# Patient Record
Sex: MALE | Race: WHITE | NOT HISPANIC OR LATINO | Employment: OTHER | URBAN - METROPOLITAN AREA
[De-identification: names, ages, dates, MRNs, and addresses within clinical notes are randomized per-mention and may not be internally consistent; named-entity substitution may affect disease eponyms.]

---

## 2017-01-05 ENCOUNTER — AMBULATORY - HEALTHEAST (OUTPATIENT)
Dept: NURSING | Facility: CLINIC | Age: 77
End: 2017-01-05

## 2017-01-13 ENCOUNTER — COMMUNICATION - HEALTHEAST (OUTPATIENT)
Dept: INTERNAL MEDICINE | Facility: CLINIC | Age: 77
End: 2017-01-13

## 2017-01-13 DIAGNOSIS — E78.00 HYPERCHOLESTEREMIA: ICD-10-CM

## 2017-01-19 ENCOUNTER — COMMUNICATION - HEALTHEAST (OUTPATIENT)
Dept: NURSING | Facility: CLINIC | Age: 77
End: 2017-01-19

## 2017-01-24 ENCOUNTER — AMBULATORY - HEALTHEAST (OUTPATIENT)
Dept: LAB | Facility: CLINIC | Age: 77
End: 2017-01-24

## 2017-01-24 ENCOUNTER — COMMUNICATION - HEALTHEAST (OUTPATIENT)
Dept: NURSING | Facility: CLINIC | Age: 77
End: 2017-01-24

## 2017-01-24 DIAGNOSIS — I48.0 PAROXYSMAL ATRIAL FIBRILLATION (H): ICD-10-CM

## 2017-02-08 ENCOUNTER — COMMUNICATION - HEALTHEAST (OUTPATIENT)
Dept: NURSING | Facility: CLINIC | Age: 77
End: 2017-02-08

## 2017-02-08 DIAGNOSIS — I48.91 ATRIAL FIBRILLATION (H): ICD-10-CM

## 2017-03-14 ENCOUNTER — COMMUNICATION - HEALTHEAST (OUTPATIENT)
Dept: NURSING | Facility: CLINIC | Age: 77
End: 2017-03-14

## 2017-03-21 ENCOUNTER — COMMUNICATION - HEALTHEAST (OUTPATIENT)
Dept: NURSING | Facility: CLINIC | Age: 77
End: 2017-03-21

## 2017-03-21 ENCOUNTER — AMBULATORY - HEALTHEAST (OUTPATIENT)
Dept: LAB | Facility: CLINIC | Age: 77
End: 2017-03-21

## 2017-03-21 DIAGNOSIS — I48.91 ATRIAL FIBRILLATION (H): ICD-10-CM

## 2017-04-19 ENCOUNTER — OFFICE VISIT - HEALTHEAST (OUTPATIENT)
Dept: FAMILY MEDICINE | Facility: CLINIC | Age: 77
End: 2017-04-19

## 2017-04-19 DIAGNOSIS — E55.9 VITAMIN D DEFICIENCY: ICD-10-CM

## 2017-04-19 DIAGNOSIS — I25.10 CORONARY ARTERY DISEASE: ICD-10-CM

## 2017-04-19 DIAGNOSIS — E78.00 HYPERCHOLESTEREMIA: ICD-10-CM

## 2017-04-20 ENCOUNTER — COMMUNICATION - HEALTHEAST (OUTPATIENT)
Dept: FAMILY MEDICINE | Facility: CLINIC | Age: 77
End: 2017-04-20

## 2017-04-25 ENCOUNTER — COMMUNICATION - HEALTHEAST (OUTPATIENT)
Dept: NURSING | Facility: CLINIC | Age: 77
End: 2017-04-25

## 2017-04-25 ENCOUNTER — AMBULATORY - HEALTHEAST (OUTPATIENT)
Dept: LAB | Facility: CLINIC | Age: 77
End: 2017-04-25

## 2017-04-25 DIAGNOSIS — I48.91 ATRIAL FIBRILLATION (H): ICD-10-CM

## 2017-05-17 ENCOUNTER — COMMUNICATION - HEALTHEAST (OUTPATIENT)
Dept: NURSING | Facility: CLINIC | Age: 77
End: 2017-05-17

## 2017-05-26 ENCOUNTER — COMMUNICATION - HEALTHEAST (OUTPATIENT)
Dept: INTERNAL MEDICINE | Facility: CLINIC | Age: 77
End: 2017-05-26

## 2017-05-26 DIAGNOSIS — Z79.01 LONG TERM (CURRENT) USE OF ANTICOAGULANTS: ICD-10-CM

## 2017-05-26 DIAGNOSIS — G45.9 TRANSIENT CEREBRAL ISCHEMIA: ICD-10-CM

## 2017-05-26 DIAGNOSIS — I48.91 ATRIAL FIBRILLATION (H): ICD-10-CM

## 2017-06-05 ENCOUNTER — COMMUNICATION - HEALTHEAST (OUTPATIENT)
Dept: CARDIOLOGY | Facility: CLINIC | Age: 77
End: 2017-06-05

## 2017-06-05 DIAGNOSIS — I42.9 CARDIOMYOPATHY (H): ICD-10-CM

## 2017-06-13 ENCOUNTER — COMMUNICATION - HEALTHEAST (OUTPATIENT)
Dept: NURSING | Facility: CLINIC | Age: 77
End: 2017-06-13

## 2017-06-23 ENCOUNTER — AMBULATORY - HEALTHEAST (OUTPATIENT)
Dept: LAB | Facility: CLINIC | Age: 77
End: 2017-06-23

## 2017-06-23 ENCOUNTER — COMMUNICATION - HEALTHEAST (OUTPATIENT)
Dept: NURSING | Facility: CLINIC | Age: 77
End: 2017-06-23

## 2017-06-23 DIAGNOSIS — I48.91 ATRIAL FIBRILLATION (H): ICD-10-CM

## 2017-06-30 ENCOUNTER — OFFICE VISIT - HEALTHEAST (OUTPATIENT)
Dept: CARDIOLOGY | Facility: CLINIC | Age: 77
End: 2017-06-30

## 2017-06-30 DIAGNOSIS — I48.0 PAROXYSMAL ATRIAL FIBRILLATION (H): ICD-10-CM

## 2017-06-30 ASSESSMENT — MIFFLIN-ST. JEOR: SCORE: 1920.54

## 2017-07-24 ENCOUNTER — COMMUNICATION - HEALTHEAST (OUTPATIENT)
Dept: CARDIOLOGY | Facility: CLINIC | Age: 77
End: 2017-07-24

## 2017-07-24 DIAGNOSIS — I48.0 PAF (PAROXYSMAL ATRIAL FIBRILLATION) (H): ICD-10-CM

## 2017-07-24 DIAGNOSIS — R09.89 PULSE IRREGULARITY: ICD-10-CM

## 2017-07-25 ENCOUNTER — COMMUNICATION - HEALTHEAST (OUTPATIENT)
Dept: FAMILY MEDICINE | Facility: CLINIC | Age: 77
End: 2017-07-25

## 2017-07-25 ENCOUNTER — AMBULATORY - HEALTHEAST (OUTPATIENT)
Dept: CARDIOLOGY | Facility: CLINIC | Age: 77
End: 2017-07-25

## 2017-07-25 DIAGNOSIS — R09.89 PULSE IRREGULARITY: ICD-10-CM

## 2017-07-25 DIAGNOSIS — I48.0 PAF (PAROXYSMAL ATRIAL FIBRILLATION) (H): ICD-10-CM

## 2017-07-25 DIAGNOSIS — I48.0 PAROXYSMAL ATRIAL FIBRILLATION (H): ICD-10-CM

## 2017-07-25 LAB
ATRIAL RATE - MUSE: NORMAL BPM
DIASTOLIC BLOOD PRESSURE - MUSE: NORMAL MMHG
INTERPRETATION ECG - MUSE: NORMAL
P AXIS - MUSE: NORMAL DEGREES
PR INTERVAL - MUSE: NORMAL MS
QRS DURATION - MUSE: 82 MS
QT - MUSE: 340 MS
QTC - MUSE: 420 MS
R AXIS - MUSE: 40 DEGREES
SYSTOLIC BLOOD PRESSURE - MUSE: NORMAL MMHG
T AXIS - MUSE: 29 DEGREES
VENTRICULAR RATE- MUSE: 92 BPM

## 2017-07-25 ASSESSMENT — MIFFLIN-ST. JEOR: SCORE: 1908.74

## 2017-07-27 ENCOUNTER — AMBULATORY - HEALTHEAST (OUTPATIENT)
Dept: LAB | Facility: CLINIC | Age: 77
End: 2017-07-27

## 2017-07-27 ENCOUNTER — COMMUNICATION - HEALTHEAST (OUTPATIENT)
Dept: NURSING | Facility: CLINIC | Age: 77
End: 2017-07-27

## 2017-07-27 DIAGNOSIS — I48.91 ATRIAL FIBRILLATION (H): ICD-10-CM

## 2017-08-03 ENCOUNTER — COMMUNICATION - HEALTHEAST (OUTPATIENT)
Dept: NURSING | Facility: CLINIC | Age: 77
End: 2017-08-03

## 2017-08-03 ENCOUNTER — AMBULATORY - HEALTHEAST (OUTPATIENT)
Dept: LAB | Facility: CLINIC | Age: 77
End: 2017-08-03

## 2017-08-03 DIAGNOSIS — I48.91 ATRIAL FIBRILLATION (H): ICD-10-CM

## 2017-08-10 ENCOUNTER — COMMUNICATION - HEALTHEAST (OUTPATIENT)
Dept: NURSING | Facility: CLINIC | Age: 77
End: 2017-08-10

## 2017-08-10 ENCOUNTER — AMBULATORY - HEALTHEAST (OUTPATIENT)
Dept: LAB | Facility: CLINIC | Age: 77
End: 2017-08-10

## 2017-08-10 DIAGNOSIS — I48.91 ATRIAL FIBRILLATION (H): ICD-10-CM

## 2017-08-17 ENCOUNTER — COMMUNICATION - HEALTHEAST (OUTPATIENT)
Dept: NURSING | Facility: CLINIC | Age: 77
End: 2017-08-17

## 2017-08-17 ENCOUNTER — AMBULATORY - HEALTHEAST (OUTPATIENT)
Dept: LAB | Facility: CLINIC | Age: 77
End: 2017-08-17

## 2017-08-17 DIAGNOSIS — I48.91 ATRIAL FIBRILLATION (H): ICD-10-CM

## 2017-08-18 ENCOUNTER — OFFICE VISIT - HEALTHEAST (OUTPATIENT)
Dept: CARDIOLOGY | Facility: CLINIC | Age: 77
End: 2017-08-18

## 2017-08-18 DIAGNOSIS — I48.19 PERSISTENT ATRIAL FIBRILLATION (H): ICD-10-CM

## 2017-08-18 DIAGNOSIS — I42.9 CARDIOMYOPATHY (H): ICD-10-CM

## 2017-08-18 ASSESSMENT — MIFFLIN-ST. JEOR: SCORE: 1913.51

## 2017-08-21 ENCOUNTER — COMMUNICATION - HEALTHEAST (OUTPATIENT)
Dept: NURSING | Facility: CLINIC | Age: 77
End: 2017-08-21

## 2017-08-28 ENCOUNTER — COMMUNICATION - HEALTHEAST (OUTPATIENT)
Dept: INTERNAL MEDICINE | Facility: CLINIC | Age: 77
End: 2017-08-28

## 2017-08-28 DIAGNOSIS — I48.91 ATRIAL FIBRILLATION (H): ICD-10-CM

## 2017-08-28 DIAGNOSIS — G45.9 TRANSIENT CEREBRAL ISCHEMIA: ICD-10-CM

## 2017-08-28 DIAGNOSIS — Z79.01 LONG TERM (CURRENT) USE OF ANTICOAGULANTS: ICD-10-CM

## 2017-08-29 ENCOUNTER — COMMUNICATION - HEALTHEAST (OUTPATIENT)
Dept: CARDIOLOGY | Facility: CLINIC | Age: 77
End: 2017-08-29

## 2017-09-06 ENCOUNTER — HOSPITAL ENCOUNTER (OUTPATIENT)
Dept: CARDIOLOGY | Facility: HOSPITAL | Age: 77
Discharge: HOME | End: 2017-09-06
Attending: NURSE PRACTITIONER

## 2017-09-06 DIAGNOSIS — I48.19 PERSISTENT ATRIAL FIBRILLATION (H): ICD-10-CM

## 2017-09-08 ENCOUNTER — COMMUNICATION - HEALTHEAST (OUTPATIENT)
Dept: NURSING | Facility: CLINIC | Age: 77
End: 2017-09-08

## 2017-09-11 ENCOUNTER — COMMUNICATION - HEALTHEAST (OUTPATIENT)
Dept: CARDIOLOGY | Facility: CLINIC | Age: 77
End: 2017-09-11

## 2017-09-11 DIAGNOSIS — I48.19 PERSISTENT ATRIAL FIBRILLATION (H): ICD-10-CM

## 2017-09-25 ENCOUNTER — COMMUNICATION - HEALTHEAST (OUTPATIENT)
Dept: NURSING | Facility: CLINIC | Age: 77
End: 2017-09-25

## 2017-09-25 ENCOUNTER — AMBULATORY - HEALTHEAST (OUTPATIENT)
Dept: LAB | Facility: CLINIC | Age: 77
End: 2017-09-25

## 2017-09-25 DIAGNOSIS — I48.91 ATRIAL FIBRILLATION (H): ICD-10-CM

## 2017-10-12 ENCOUNTER — AMBULATORY - HEALTHEAST (OUTPATIENT)
Dept: LAB | Facility: CLINIC | Age: 77
End: 2017-10-12

## 2017-10-12 ENCOUNTER — COMMUNICATION - HEALTHEAST (OUTPATIENT)
Dept: NURSING | Facility: CLINIC | Age: 77
End: 2017-10-12

## 2017-10-12 DIAGNOSIS — I48.91 ATRIAL FIBRILLATION (H): ICD-10-CM

## 2017-11-10 ENCOUNTER — OFFICE VISIT - HEALTHEAST (OUTPATIENT)
Dept: FAMILY MEDICINE | Facility: CLINIC | Age: 77
End: 2017-11-10

## 2017-11-10 ENCOUNTER — COMMUNICATION - HEALTHEAST (OUTPATIENT)
Dept: NURSING | Facility: CLINIC | Age: 77
End: 2017-11-10

## 2017-11-10 DIAGNOSIS — I48.91 ATRIAL FIBRILLATION (H): ICD-10-CM

## 2017-11-10 DIAGNOSIS — M25.512 ACUTE PAIN OF LEFT SHOULDER: ICD-10-CM

## 2017-11-10 DIAGNOSIS — Z23 NEED FOR VACCINATION: ICD-10-CM

## 2017-11-19 ENCOUNTER — COMMUNICATION - HEALTHEAST (OUTPATIENT)
Dept: INTERNAL MEDICINE | Facility: CLINIC | Age: 77
End: 2017-11-19

## 2017-11-19 DIAGNOSIS — Z79.01 LONG TERM CURRENT USE OF ANTICOAGULANT THERAPY: ICD-10-CM

## 2017-11-19 DIAGNOSIS — I48.91 ATRIAL FIBRILLATION (H): ICD-10-CM

## 2017-11-19 DIAGNOSIS — I48.19 PERSISTENT ATRIAL FIBRILLATION (H): ICD-10-CM

## 2017-11-19 DIAGNOSIS — G45.9 TRANSIENT CEREBRAL ISCHEMIA: ICD-10-CM

## 2017-12-05 ENCOUNTER — AMBULATORY - HEALTHEAST (OUTPATIENT)
Dept: LAB | Facility: CLINIC | Age: 77
End: 2017-12-05

## 2017-12-05 ENCOUNTER — COMMUNICATION - HEALTHEAST (OUTPATIENT)
Dept: NURSING | Facility: CLINIC | Age: 77
End: 2017-12-05

## 2017-12-05 DIAGNOSIS — I48.91 ATRIAL FIBRILLATION (H): ICD-10-CM

## 2017-12-21 ENCOUNTER — AMBULATORY - HEALTHEAST (OUTPATIENT)
Dept: LAB | Facility: CLINIC | Age: 77
End: 2017-12-21

## 2017-12-21 ENCOUNTER — COMMUNICATION - HEALTHEAST (OUTPATIENT)
Dept: NURSING | Facility: CLINIC | Age: 77
End: 2017-12-21

## 2017-12-21 DIAGNOSIS — I48.91 ATRIAL FIBRILLATION (H): ICD-10-CM

## 2018-01-11 ENCOUNTER — COMMUNICATION - HEALTHEAST (OUTPATIENT)
Dept: NURSING | Facility: CLINIC | Age: 78
End: 2018-01-11

## 2018-01-11 DIAGNOSIS — I48.19 PERSISTENT ATRIAL FIBRILLATION (H): ICD-10-CM

## 2018-01-18 ENCOUNTER — COMMUNICATION - HEALTHEAST (OUTPATIENT)
Dept: NURSING | Facility: CLINIC | Age: 78
End: 2018-01-18

## 2018-01-18 ENCOUNTER — AMBULATORY - HEALTHEAST (OUTPATIENT)
Dept: LAB | Facility: CLINIC | Age: 78
End: 2018-01-18

## 2018-01-18 DIAGNOSIS — I48.19 PERSISTENT ATRIAL FIBRILLATION (H): ICD-10-CM

## 2018-01-18 LAB — INR PPP: 1.8 (ref 0.9–1.1)

## 2018-01-29 ENCOUNTER — COMMUNICATION - HEALTHEAST (OUTPATIENT)
Dept: FAMILY MEDICINE | Facility: CLINIC | Age: 78
End: 2018-01-29

## 2018-02-01 ENCOUNTER — COMMUNICATION - HEALTHEAST (OUTPATIENT)
Dept: NURSING | Facility: CLINIC | Age: 78
End: 2018-02-01

## 2018-02-01 ENCOUNTER — AMBULATORY - HEALTHEAST (OUTPATIENT)
Dept: LAB | Facility: CLINIC | Age: 78
End: 2018-02-01

## 2018-02-01 DIAGNOSIS — I48.19 PERSISTENT ATRIAL FIBRILLATION (H): ICD-10-CM

## 2018-02-01 LAB — INR PPP: 1.6 (ref 0.9–1.1)

## 2018-02-12 ENCOUNTER — COMMUNICATION - HEALTHEAST (OUTPATIENT)
Dept: CARDIOLOGY | Facility: CLINIC | Age: 78
End: 2018-02-12

## 2018-02-12 DIAGNOSIS — I48.91 A-FIB (H): ICD-10-CM

## 2018-02-15 ENCOUNTER — HOSPITAL ENCOUNTER (OUTPATIENT)
Dept: CARDIOLOGY | Facility: HOSPITAL | Age: 78
Discharge: HOME | End: 2018-02-15
Attending: INTERNAL MEDICINE

## 2018-02-15 ENCOUNTER — COMMUNICATION - HEALTHEAST (OUTPATIENT)
Dept: INTERNAL MEDICINE | Facility: CLINIC | Age: 78
End: 2018-02-15

## 2018-02-15 ENCOUNTER — AMBULATORY - HEALTHEAST (OUTPATIENT)
Dept: LAB | Facility: CLINIC | Age: 78
End: 2018-02-15

## 2018-02-15 DIAGNOSIS — I48.19 PERSISTENT ATRIAL FIBRILLATION (H): ICD-10-CM

## 2018-02-15 DIAGNOSIS — I48.91 A-FIB (H): ICD-10-CM

## 2018-02-15 LAB — INR PPP: 2.9 (ref 0.9–1.1)

## 2018-02-17 ENCOUNTER — COMMUNICATION - HEALTHEAST (OUTPATIENT)
Dept: INTERNAL MEDICINE | Facility: CLINIC | Age: 78
End: 2018-02-17

## 2018-02-17 DIAGNOSIS — G45.9 TRANSIENT CEREBRAL ISCHEMIA: ICD-10-CM

## 2018-02-17 DIAGNOSIS — Z79.01 LONG TERM CURRENT USE OF ANTICOAGULANT THERAPY: ICD-10-CM

## 2018-02-17 DIAGNOSIS — E78.00 HYPERCHOLESTEREMIA: ICD-10-CM

## 2018-02-19 ENCOUNTER — AMBULATORY - HEALTHEAST (OUTPATIENT)
Dept: CARDIOLOGY | Facility: CLINIC | Age: 78
End: 2018-02-19

## 2018-02-19 DIAGNOSIS — I48.19 PERSISTENT ATRIAL FIBRILLATION (H): ICD-10-CM

## 2018-03-01 ENCOUNTER — AMBULATORY - HEALTHEAST (OUTPATIENT)
Dept: LAB | Facility: CLINIC | Age: 78
End: 2018-03-01

## 2018-03-01 ENCOUNTER — COMMUNICATION - HEALTHEAST (OUTPATIENT)
Dept: NURSING | Facility: CLINIC | Age: 78
End: 2018-03-01

## 2018-03-01 DIAGNOSIS — I48.19 PERSISTENT ATRIAL FIBRILLATION (H): ICD-10-CM

## 2018-03-01 LAB — INR PPP: 2.5 (ref 0.9–1.1)

## 2018-03-09 ENCOUNTER — COMMUNICATION - HEALTHEAST (OUTPATIENT)
Dept: NURSING | Facility: CLINIC | Age: 78
End: 2018-03-09

## 2018-03-09 ENCOUNTER — AMBULATORY - HEALTHEAST (OUTPATIENT)
Dept: LAB | Facility: CLINIC | Age: 78
End: 2018-03-09

## 2018-03-09 ENCOUNTER — COMMUNICATION - HEALTHEAST (OUTPATIENT)
Dept: CARDIOLOGY | Facility: CLINIC | Age: 78
End: 2018-03-09

## 2018-03-09 DIAGNOSIS — I48.19 PERSISTENT ATRIAL FIBRILLATION (H): ICD-10-CM

## 2018-03-09 LAB — INR PPP: 2.9 (ref 0.9–1.1)

## 2018-03-13 ENCOUNTER — OFFICE VISIT - HEALTHEAST (OUTPATIENT)
Dept: CARDIOLOGY | Facility: CLINIC | Age: 78
End: 2018-03-13

## 2018-03-13 DIAGNOSIS — I48.19 PERSISTENT ATRIAL FIBRILLATION (H): ICD-10-CM

## 2018-03-13 ASSESSMENT — MIFFLIN-ST. JEOR: SCORE: 1928.02

## 2018-03-22 ENCOUNTER — HOSPITAL ENCOUNTER (OUTPATIENT)
Dept: CARDIOLOGY | Facility: HOSPITAL | Age: 78
Discharge: HOME | End: 2018-03-22
Attending: NURSE PRACTITIONER

## 2018-03-22 DIAGNOSIS — I48.19 PERSISTENT ATRIAL FIBRILLATION (H): ICD-10-CM

## 2018-03-22 LAB
AORTIC ROOT: 4.5 CM
AORTIC VALVE MEAN VELOCITY: 80.7 CM/S
AV DIMENSIONLESS INDEX VTI: 0.8
AV MEAN GRADIENT: 3 MMHG
AV PEAK GRADIENT: 4 MMHG
AV VALVE AREA: 3 CM2
AV VELOCITY RATIO: 0.8
BSA FOR ECHO PROCEDURE: 2.45 M2
CV BLOOD PRESSURE: NORMAL MMHG
CV ECHO HEIGHT: 71.5 IN
CV ECHO WEIGHT: 263 LBS
DOP CALC AO PEAK VEL: 99.9 CM/S
DOP CALC AO VTI: 20.2 CM
DOP CALC LVOT AREA: 3.8 CM2
DOP CALC LVOT DIAMETER: 2.2 CM
DOP CALC LVOT PEAK VEL: 82 CM/S
DOP CALC LVOT STROKE VOLUME: 61.2 CM3
DOP CALCLVOT PEAK VEL VTI: 16.1 CM
EJECTION FRACTION: 58 % (ref 55–75)
FRACTIONAL SHORTENING: 15.6 % (ref 28–44)
INTERVENTRICULAR SEPTUM IN END DIASTOLE: 1.1 CM (ref 0.6–1)
IVS/PW RATIO: 1.1
LA AREA 1: 23.2 CM2
LA AREA 2: 26.4 CM2
LEFT ATRIUM LENGTH: 5.58 CM
LEFT ATRIUM SIZE: 4.3 CM
LEFT ATRIUM VOLUME INDEX: 38.1 ML/M2
LEFT ATRIUM VOLUME: 93.3 ML
LEFT VENTRICLE CARDIAC INDEX: 2.2 L/MIN/M2
LEFT VENTRICLE CARDIAC OUTPUT: 5.5 L/MIN
LEFT VENTRICLE DIASTOLIC VOLUME INDEX: 49.4 CM3/M2 (ref 34–74)
LEFT VENTRICLE DIASTOLIC VOLUME: 121 CM3 (ref 62–150)
LEFT VENTRICLE HEART RATE: 90 BPM
LEFT VENTRICLE MASS INDEX: 66.9 G/M2
LEFT VENTRICLE SYSTOLIC VOLUME INDEX: 20.8 CM3/M2 (ref 11–31)
LEFT VENTRICLE SYSTOLIC VOLUME: 51 CM3 (ref 21–61)
LEFT VENTRICULAR INTERNAL DIMENSION IN DIASTOLE: 4.5 CM (ref 4.2–5.8)
LEFT VENTRICULAR INTERNAL DIMENSION IN SYSTOLE: 3.8 CM (ref 2.5–4)
LEFT VENTRICULAR MASS: 164 G
LEFT VENTRICULAR OUTFLOW TRACT MEAN GRADIENT: 2 MMHG
LEFT VENTRICULAR OUTFLOW TRACT MEAN VELOCITY: 64.5 CM/S
LEFT VENTRICULAR POSTERIOR WALL IN END DIASTOLE: 1 CM (ref 0.6–1)
LV STROKE VOLUME INDEX: 25 ML/M2
MV AVERAGE E/E' RATIO: 15.1 CM/S
MV DECELERATION TIME: 148 MS
MV E'TISSUE VEL-LAT: 7.8 CM/S
MV E'TISSUE VEL-MED: 6.92 CM/S
MV LATERAL E/E' RATIO: 14.2
MV MEDIAL E/E' RATIO: 16
MV PEAK E VELOCITY: 111 CM/S
NUC REST DIASTOLIC VOLUME INDEX: 4208 LBS
NUC REST SYSTOLIC VOLUME INDEX: 71.5 IN
TRICUSPID REGURGITATION PEAK PRESSURE GRADIENT: 21.3 MMHG
TRICUSPID VALVE ANULAR PLANE SYSTOLIC EXCURSION: 1.2 CM
TRICUSPID VALVE PEAK REGURGITANT VELOCITY: 231 CM/S

## 2018-03-22 ASSESSMENT — MIFFLIN-ST. JEOR: SCORE: 1928.02

## 2018-04-02 ENCOUNTER — COMMUNICATION - HEALTHEAST (OUTPATIENT)
Dept: FAMILY MEDICINE | Facility: CLINIC | Age: 78
End: 2018-04-02

## 2018-04-04 ENCOUNTER — COMMUNICATION - HEALTHEAST (OUTPATIENT)
Dept: ANTICOAGULATION | Facility: CLINIC | Age: 78
End: 2018-04-04

## 2018-04-04 ENCOUNTER — OFFICE VISIT - HEALTHEAST (OUTPATIENT)
Dept: UROLOGY | Facility: CLINIC | Age: 78
End: 2018-04-04

## 2018-04-04 DIAGNOSIS — N20.9 URINARY TRACT STONES: ICD-10-CM

## 2018-04-04 DIAGNOSIS — N20.1 CALCULUS OF URETER: ICD-10-CM

## 2018-04-04 DIAGNOSIS — N13.2 HYDRONEPHROSIS WITH URINARY OBSTRUCTION DUE TO URETERAL CALCULUS: ICD-10-CM

## 2018-04-04 DIAGNOSIS — N20.0 CALCULUS OF KIDNEY: ICD-10-CM

## 2018-04-04 LAB
ALBUMIN UR-MCNC: ABNORMAL MG/DL
APPEARANCE UR: ABNORMAL
BILIRUB UR QL STRIP: NEGATIVE
COLOR UR AUTO: ABNORMAL
GLUCOSE UR STRIP-MCNC: NEGATIVE MG/DL
HGB UR QL STRIP: ABNORMAL
KETONES UR STRIP-MCNC: NEGATIVE MG/DL
LEUKOCYTE ESTERASE UR QL STRIP: NEGATIVE
NITRATE UR QL: NEGATIVE
PH UR STRIP: 5 [PH] (ref 5–8)
SP GR UR STRIP: 1.02 (ref 1–1.03)
UROBILINOGEN UR STRIP-ACNC: ABNORMAL

## 2018-04-04 ASSESSMENT — MIFFLIN-ST. JEOR: SCORE: 1468.76

## 2018-04-05 ENCOUNTER — COMMUNICATION - HEALTHEAST (OUTPATIENT)
Dept: SCHEDULING | Facility: CLINIC | Age: 78
End: 2018-04-05

## 2018-04-05 ENCOUNTER — COMMUNICATION - HEALTHEAST (OUTPATIENT)
Dept: UROLOGY | Facility: CLINIC | Age: 78
End: 2018-04-05

## 2018-04-06 ENCOUNTER — COMMUNICATION - HEALTHEAST (OUTPATIENT)
Dept: FAMILY MEDICINE | Facility: CLINIC | Age: 78
End: 2018-04-06

## 2018-04-09 ENCOUNTER — COMMUNICATION - HEALTHEAST (OUTPATIENT)
Dept: ANTICOAGULATION | Facility: CLINIC | Age: 78
End: 2018-04-09

## 2018-04-09 ENCOUNTER — AMBULATORY - HEALTHEAST (OUTPATIENT)
Dept: LAB | Facility: CLINIC | Age: 78
End: 2018-04-09

## 2018-04-09 DIAGNOSIS — I48.19 PERSISTENT ATRIAL FIBRILLATION (H): ICD-10-CM

## 2018-04-09 LAB — INR PPP: 3.2 (ref 0.9–1.1)

## 2018-04-10 ENCOUNTER — COMMUNICATION - HEALTHEAST (OUTPATIENT)
Dept: UROLOGY | Facility: CLINIC | Age: 78
End: 2018-04-10

## 2018-04-10 DIAGNOSIS — N20.1 CALCULUS OF URETER: ICD-10-CM

## 2018-04-17 ENCOUNTER — OFFICE VISIT - HEALTHEAST (OUTPATIENT)
Dept: UROLOGY | Facility: CLINIC | Age: 78
End: 2018-04-17

## 2018-04-17 ENCOUNTER — HOSPITAL ENCOUNTER (OUTPATIENT)
Dept: CT IMAGING | Facility: CLINIC | Age: 78
Discharge: HOME | End: 2018-04-17
Attending: PHYSICIAN ASSISTANT

## 2018-04-17 DIAGNOSIS — N13.2 HYDRONEPHROSIS WITH URINARY OBSTRUCTION DUE TO URETERAL CALCULUS: ICD-10-CM

## 2018-04-17 DIAGNOSIS — N20.0 CALCULUS OF KIDNEY: ICD-10-CM

## 2018-04-17 DIAGNOSIS — N20.1 CALCULUS OF URETER: ICD-10-CM

## 2018-04-17 DIAGNOSIS — N20.9 URINARY TRACT STONES: ICD-10-CM

## 2018-04-17 LAB
ALBUMIN UR-MCNC: NEGATIVE MG/DL
APPEARANCE UR: CLEAR
BILIRUB UR QL STRIP: ABNORMAL
COLOR UR AUTO: YELLOW
GLUCOSE UR STRIP-MCNC: NEGATIVE MG/DL
HGB UR QL STRIP: ABNORMAL
KETONES UR STRIP-MCNC: ABNORMAL MG/DL
LEUKOCYTE ESTERASE UR QL STRIP: NEGATIVE
NITRATE UR QL: NEGATIVE
PH UR STRIP: 5 [PH] (ref 5–8)
SP GR UR STRIP: >=1.03 (ref 1–1.03)
UROBILINOGEN UR STRIP-ACNC: ABNORMAL

## 2018-04-17 ASSESSMENT — MIFFLIN-ST. JEOR: SCORE: 1922.35

## 2018-04-19 ENCOUNTER — COMMUNICATION - HEALTHEAST (OUTPATIENT)
Dept: TELEHEALTH | Facility: CLINIC | Age: 78
End: 2018-04-19

## 2018-04-23 ENCOUNTER — COMMUNICATION - HEALTHEAST (OUTPATIENT)
Dept: ANTICOAGULATION | Facility: CLINIC | Age: 78
End: 2018-04-23

## 2018-04-23 ENCOUNTER — AMBULATORY - HEALTHEAST (OUTPATIENT)
Dept: LAB | Facility: CLINIC | Age: 78
End: 2018-04-23

## 2018-04-23 DIAGNOSIS — I48.19 PERSISTENT ATRIAL FIBRILLATION (H): ICD-10-CM

## 2018-04-23 LAB — INR PPP: 4.7 (ref 0.9–1.1)

## 2018-04-30 ENCOUNTER — AMBULATORY - HEALTHEAST (OUTPATIENT)
Dept: CARDIOLOGY | Facility: CLINIC | Age: 78
End: 2018-04-30

## 2018-04-30 DIAGNOSIS — Z00.6 RESEARCH EXAM: ICD-10-CM

## 2018-04-30 LAB
ATRIAL RATE - MUSE: 113 BPM
DIASTOLIC BLOOD PRESSURE - MUSE: NORMAL MMHG
INTERPRETATION ECG - MUSE: NORMAL
P AXIS - MUSE: NORMAL DEGREES
PR INTERVAL - MUSE: NORMAL MS
QRS DURATION - MUSE: 84 MS
QT - MUSE: 340 MS
QTC - MUSE: 413 MS
R AXIS - MUSE: 34 DEGREES
SYSTOLIC BLOOD PRESSURE - MUSE: NORMAL MMHG
T AXIS - MUSE: 26 DEGREES
VENTRICULAR RATE- MUSE: 89 BPM

## 2018-04-30 ASSESSMENT — MIFFLIN-ST. JEOR: SCORE: 1900.47

## 2018-05-01 ENCOUNTER — OFFICE VISIT - HEALTHEAST (OUTPATIENT)
Dept: UROLOGY | Facility: CLINIC | Age: 78
End: 2018-05-01

## 2018-05-01 ENCOUNTER — HOSPITAL ENCOUNTER (OUTPATIENT)
Dept: CT IMAGING | Facility: CLINIC | Age: 78
Discharge: HOME | End: 2018-05-01
Attending: PHYSICIAN ASSISTANT

## 2018-05-01 DIAGNOSIS — N20.9 URINARY TRACT STONES: ICD-10-CM

## 2018-05-01 DIAGNOSIS — N20.1 CALCULUS OF URETER: ICD-10-CM

## 2018-05-01 LAB
ALBUMIN UR-MCNC: NEGATIVE MG/DL
APPEARANCE UR: CLEAR
BILIRUB UR QL STRIP: NEGATIVE
COLOR UR AUTO: YELLOW
GLUCOSE UR STRIP-MCNC: NEGATIVE MG/DL
HGB UR QL STRIP: ABNORMAL
KETONES UR STRIP-MCNC: NEGATIVE MG/DL
LEUKOCYTE ESTERASE UR QL STRIP: NEGATIVE
NITRATE UR QL: NEGATIVE
PH UR STRIP: 5.5 [PH] (ref 5–8)
SP GR UR STRIP: 1.02 (ref 1–1.03)
UROBILINOGEN UR STRIP-ACNC: ABNORMAL

## 2018-05-03 ENCOUNTER — COMMUNICATION - HEALTHEAST (OUTPATIENT)
Dept: ANTICOAGULATION | Facility: CLINIC | Age: 78
End: 2018-05-03

## 2018-05-03 ENCOUNTER — AMBULATORY - HEALTHEAST (OUTPATIENT)
Dept: LAB | Facility: CLINIC | Age: 78
End: 2018-05-03

## 2018-05-03 DIAGNOSIS — I48.19 PERSISTENT ATRIAL FIBRILLATION (H): ICD-10-CM

## 2018-05-03 LAB — INR PPP: 2.4 (ref 0.9–1.1)

## 2018-05-04 ENCOUNTER — COMMUNICATION - HEALTHEAST (OUTPATIENT)
Dept: ADMINISTRATIVE | Facility: CLINIC | Age: 78
End: 2018-05-04

## 2018-05-15 ENCOUNTER — HOSPITAL ENCOUNTER (OUTPATIENT)
Dept: CT IMAGING | Facility: CLINIC | Age: 78
Discharge: HOME | End: 2018-05-15
Attending: UROLOGY

## 2018-05-15 ENCOUNTER — OFFICE VISIT - HEALTHEAST (OUTPATIENT)
Dept: UROLOGY | Facility: CLINIC | Age: 78
End: 2018-05-15

## 2018-05-15 DIAGNOSIS — N20.9 URINARY TRACT STONES: ICD-10-CM

## 2018-05-15 DIAGNOSIS — N20.1 CALCULUS OF URETER: ICD-10-CM

## 2018-05-15 LAB
ALBUMIN UR-MCNC: NEGATIVE MG/DL
APPEARANCE UR: CLEAR
BILIRUB UR QL STRIP: NEGATIVE
COLOR UR AUTO: YELLOW
GLUCOSE UR STRIP-MCNC: NEGATIVE MG/DL
HGB UR QL STRIP: NEGATIVE
KETONES UR STRIP-MCNC: NEGATIVE MG/DL
LEUKOCYTE ESTERASE UR QL STRIP: NEGATIVE
NITRATE UR QL: NEGATIVE
PH UR STRIP: 5.5 [PH] (ref 5–8)
SP GR UR STRIP: 1.02 (ref 1–1.03)
UROBILINOGEN UR STRIP-ACNC: NORMAL

## 2018-05-17 ENCOUNTER — AMBULATORY - HEALTHEAST (OUTPATIENT)
Dept: LAB | Facility: CLINIC | Age: 78
End: 2018-05-17

## 2018-05-17 ENCOUNTER — COMMUNICATION - HEALTHEAST (OUTPATIENT)
Dept: ANTICOAGULATION | Facility: CLINIC | Age: 78
End: 2018-05-17

## 2018-05-17 DIAGNOSIS — I48.19 PERSISTENT ATRIAL FIBRILLATION (H): ICD-10-CM

## 2018-05-17 LAB — INR PPP: 2.3 (ref 0.9–1.1)

## 2018-05-19 ENCOUNTER — COMMUNICATION - HEALTHEAST (OUTPATIENT)
Dept: INTERNAL MEDICINE | Facility: CLINIC | Age: 78
End: 2018-05-19

## 2018-05-19 DIAGNOSIS — E78.00 HYPERCHOLESTEREMIA: ICD-10-CM

## 2018-06-11 ENCOUNTER — COMMUNICATION - HEALTHEAST (OUTPATIENT)
Dept: UROLOGY | Facility: CLINIC | Age: 78
End: 2018-06-11

## 2018-06-11 DIAGNOSIS — N20.1 CALCULUS OF URETER: ICD-10-CM

## 2018-06-13 ENCOUNTER — AMBULATORY - HEALTHEAST (OUTPATIENT)
Dept: LAB | Facility: CLINIC | Age: 78
End: 2018-06-13

## 2018-06-13 ENCOUNTER — COMMUNICATION - HEALTHEAST (OUTPATIENT)
Dept: ANTICOAGULATION | Facility: CLINIC | Age: 78
End: 2018-06-13

## 2018-06-13 DIAGNOSIS — I48.19 PERSISTENT ATRIAL FIBRILLATION (H): ICD-10-CM

## 2018-06-13 LAB — INR PPP: 2.6 (ref 0.9–1.1)

## 2018-06-14 ENCOUNTER — OFFICE VISIT - HEALTHEAST (OUTPATIENT)
Dept: UROLOGY | Facility: CLINIC | Age: 78
End: 2018-06-14

## 2018-06-14 ENCOUNTER — COMMUNICATION - HEALTHEAST (OUTPATIENT)
Dept: FAMILY MEDICINE | Facility: CLINIC | Age: 78
End: 2018-06-14

## 2018-06-14 ENCOUNTER — HOSPITAL ENCOUNTER (OUTPATIENT)
Dept: CT IMAGING | Facility: CLINIC | Age: 78
Discharge: HOME | End: 2018-06-14
Attending: UROLOGY

## 2018-06-14 DIAGNOSIS — N20.0 CALCULUS OF KIDNEY: ICD-10-CM

## 2018-06-14 DIAGNOSIS — N20.9 URINARY TRACT STONES: ICD-10-CM

## 2018-06-14 DIAGNOSIS — N20.1 CALCULUS OF URETER: ICD-10-CM

## 2018-06-15 ENCOUNTER — COMMUNICATION - HEALTHEAST (OUTPATIENT)
Dept: UROLOGY | Facility: CLINIC | Age: 78
End: 2018-06-15

## 2018-06-19 ENCOUNTER — COMMUNICATION - HEALTHEAST (OUTPATIENT)
Dept: FAMILY MEDICINE | Facility: CLINIC | Age: 78
End: 2018-06-19

## 2018-06-19 ENCOUNTER — OFFICE VISIT - HEALTHEAST (OUTPATIENT)
Dept: FAMILY MEDICINE | Facility: CLINIC | Age: 78
End: 2018-06-19

## 2018-06-19 DIAGNOSIS — E66.9 OBESITY: ICD-10-CM

## 2018-06-19 DIAGNOSIS — N20.9 UROLITHIASIS: ICD-10-CM

## 2018-06-19 DIAGNOSIS — C61 MALIGNANT NEOPLASM OF PROSTATE (H): ICD-10-CM

## 2018-06-19 DIAGNOSIS — I48.19 PERSISTENT ATRIAL FIBRILLATION (H): ICD-10-CM

## 2018-06-19 DIAGNOSIS — Z01.818 PRE-OPERATIVE EXAMINATION: ICD-10-CM

## 2018-06-19 DIAGNOSIS — G45.9 TRANSIENT CEREBRAL ISCHEMIA: ICD-10-CM

## 2018-06-19 DIAGNOSIS — I25.10 CORONARY ARTERY DISEASE: ICD-10-CM

## 2018-06-19 LAB
ANION GAP SERPL CALCULATED.3IONS-SCNC: 11 MMOL/L (ref 5–18)
BASOPHILS # BLD AUTO: 0 THOU/UL (ref 0–0.2)
BASOPHILS NFR BLD AUTO: 1 % (ref 0–2)
BUN SERPL-MCNC: 15 MG/DL (ref 8–28)
CALCIUM SERPL-MCNC: 10.4 MG/DL (ref 8.5–10.5)
CHLORIDE BLD-SCNC: 109 MMOL/L (ref 98–107)
CO2 SERPL-SCNC: 22 MMOL/L (ref 22–31)
CREAT SERPL-MCNC: 0.84 MG/DL (ref 0.7–1.3)
EOSINOPHIL # BLD AUTO: 0.2 THOU/UL (ref 0–0.4)
EOSINOPHIL NFR BLD AUTO: 4 % (ref 0–6)
ERYTHROCYTE [DISTWIDTH] IN BLOOD BY AUTOMATED COUNT: 14.4 % (ref 11–14.5)
GFR SERPL CREATININE-BSD FRML MDRD: >60 ML/MIN/1.73M2
GLUCOSE BLD-MCNC: 136 MG/DL (ref 70–125)
HCT VFR BLD AUTO: 43.7 % (ref 40–54)
HGB BLD-MCNC: 14.7 G/DL (ref 14–18)
LYMPHOCYTES # BLD AUTO: 1.9 THOU/UL (ref 0.8–4.4)
LYMPHOCYTES NFR BLD AUTO: 32 % (ref 20–40)
MCH RBC QN AUTO: 29.6 PG (ref 27–34)
MCHC RBC AUTO-ENTMCNC: 33.5 G/DL (ref 32–36)
MCV RBC AUTO: 88 FL (ref 80–100)
MONOCYTES # BLD AUTO: 0.5 THOU/UL (ref 0–0.9)
MONOCYTES NFR BLD AUTO: 9 % (ref 2–10)
NEUTROPHILS # BLD AUTO: 3.1 THOU/UL (ref 2–7.7)
NEUTROPHILS NFR BLD AUTO: 54 % (ref 50–70)
PLATELET # BLD AUTO: 122 THOU/UL (ref 140–440)
PMV BLD AUTO: 7.3 FL (ref 7–10)
POTASSIUM BLD-SCNC: 4 MMOL/L (ref 3.5–5)
RBC # BLD AUTO: 4.95 MILL/UL (ref 4.4–6.2)
SODIUM SERPL-SCNC: 142 MMOL/L (ref 136–145)
WBC: 5.7 THOU/UL (ref 4–11)

## 2018-06-19 ASSESSMENT — MIFFLIN-ST. JEOR: SCORE: 1912.83

## 2018-06-20 ENCOUNTER — COMMUNICATION - HEALTHEAST (OUTPATIENT)
Dept: FAMILY MEDICINE | Facility: CLINIC | Age: 78
End: 2018-06-20

## 2018-06-21 ENCOUNTER — ANESTHESIA - HEALTHEAST (OUTPATIENT)
Dept: SURGERY | Facility: CLINIC | Age: 78
End: 2018-06-21

## 2018-06-22 ENCOUNTER — SURGERY - HEALTHEAST (OUTPATIENT)
Dept: SURGERY | Facility: CLINIC | Age: 78
End: 2018-06-22

## 2018-06-22 ASSESSMENT — MIFFLIN-ST. JEOR: SCORE: 1906.94

## 2018-06-26 ENCOUNTER — COMMUNICATION - HEALTHEAST (OUTPATIENT)
Dept: ANTICOAGULATION | Facility: CLINIC | Age: 78
End: 2018-06-26

## 2018-07-02 ENCOUNTER — AMBULATORY - HEALTHEAST (OUTPATIENT)
Dept: UROLOGY | Facility: CLINIC | Age: 78
End: 2018-07-02

## 2018-07-02 DIAGNOSIS — N20.1 CALCULUS OF URETER: ICD-10-CM

## 2018-07-02 DIAGNOSIS — N20.9 URINARY TRACT STONES: ICD-10-CM

## 2018-07-02 LAB
ALBUMIN UR-MCNC: ABNORMAL MG/DL
APPEARANCE UR: ABNORMAL
BILIRUB UR QL STRIP: ABNORMAL
COLOR UR AUTO: YELLOW
GLUCOSE UR STRIP-MCNC: NEGATIVE MG/DL
HGB UR QL STRIP: ABNORMAL
KETONES UR STRIP-MCNC: ABNORMAL MG/DL
LEUKOCYTE ESTERASE UR QL STRIP: ABNORMAL
NITRATE UR QL: NEGATIVE
PH UR STRIP: 5.5 [PH] (ref 5–8)
SP GR UR STRIP: >=1.03 (ref 1–1.03)
UROBILINOGEN UR STRIP-ACNC: ABNORMAL

## 2018-07-03 LAB — BACTERIA SPEC CULT: NO GROWTH

## 2018-07-11 ENCOUNTER — COMMUNICATION - HEALTHEAST (OUTPATIENT)
Dept: ANTICOAGULATION | Facility: CLINIC | Age: 78
End: 2018-07-11

## 2018-07-11 ENCOUNTER — AMBULATORY - HEALTHEAST (OUTPATIENT)
Dept: LAB | Facility: CLINIC | Age: 78
End: 2018-07-11

## 2018-07-11 DIAGNOSIS — I48.19 PERSISTENT ATRIAL FIBRILLATION (H): ICD-10-CM

## 2018-07-11 LAB — INR PPP: 3.7 (ref 0.9–1.1)

## 2018-07-25 ENCOUNTER — AMBULATORY - HEALTHEAST (OUTPATIENT)
Dept: LAB | Facility: CLINIC | Age: 78
End: 2018-07-25

## 2018-07-25 ENCOUNTER — COMMUNICATION - HEALTHEAST (OUTPATIENT)
Dept: ANTICOAGULATION | Facility: CLINIC | Age: 78
End: 2018-07-25

## 2018-07-25 DIAGNOSIS — I48.19 PERSISTENT ATRIAL FIBRILLATION (H): ICD-10-CM

## 2018-07-25 LAB — INR PPP: 2.8 (ref 0.9–1.1)

## 2018-08-06 ENCOUNTER — OFFICE VISIT - HEALTHEAST (OUTPATIENT)
Dept: UROLOGY | Facility: CLINIC | Age: 78
End: 2018-08-06

## 2018-08-06 ENCOUNTER — HOSPITAL ENCOUNTER (OUTPATIENT)
Dept: CT IMAGING | Facility: CLINIC | Age: 78
Setting detail: RADIATION/ONCOLOGY SERIES
Discharge: STILL ADMITTED OR ADMINISTRATIVE | End: 2018-08-06
Attending: UROLOGY

## 2018-08-06 DIAGNOSIS — N20.9 URINARY CALCULUS: ICD-10-CM

## 2018-08-06 DIAGNOSIS — N20.1 CALCULUS OF URETER: ICD-10-CM

## 2018-08-06 DIAGNOSIS — N20.0 CALCULUS OF KIDNEY: ICD-10-CM

## 2018-08-06 DIAGNOSIS — N20.9 URINARY TRACT STONES: ICD-10-CM

## 2018-08-06 LAB
ALBUMIN UR-MCNC: NEGATIVE MG/DL
APPEARANCE UR: CLEAR
BILIRUB UR QL STRIP: ABNORMAL
COLOR UR AUTO: ABNORMAL
GLUCOSE UR STRIP-MCNC: NEGATIVE MG/DL
HGB UR QL STRIP: NEGATIVE
KETONES UR STRIP-MCNC: ABNORMAL MG/DL
LEUKOCYTE ESTERASE UR QL STRIP: NEGATIVE
NITRATE UR QL: NEGATIVE
PH UR STRIP: 5 [PH] (ref 5–8)
SP GR UR STRIP: >=1.03 (ref 1–1.03)
UROBILINOGEN UR STRIP-ACNC: ABNORMAL

## 2018-08-07 ENCOUNTER — COMMUNICATION - HEALTHEAST (OUTPATIENT)
Dept: CARDIOLOGY | Facility: CLINIC | Age: 78
End: 2018-08-07

## 2018-08-07 ENCOUNTER — COMMUNICATION - HEALTHEAST (OUTPATIENT)
Dept: ADMINISTRATIVE | Facility: CLINIC | Age: 78
End: 2018-08-07

## 2018-08-07 DIAGNOSIS — I48.19 PERSISTENT ATRIAL FIBRILLATION (H): ICD-10-CM

## 2018-08-08 ENCOUNTER — COMMUNICATION - HEALTHEAST (OUTPATIENT)
Dept: ANTICOAGULATION | Facility: CLINIC | Age: 78
End: 2018-08-08

## 2018-08-08 ENCOUNTER — AMBULATORY - HEALTHEAST (OUTPATIENT)
Dept: LAB | Facility: CLINIC | Age: 78
End: 2018-08-08

## 2018-08-08 DIAGNOSIS — I48.19 PERSISTENT ATRIAL FIBRILLATION (H): ICD-10-CM

## 2018-08-08 LAB — INR PPP: 2.5 (ref 0.9–1.1)

## 2018-08-13 ENCOUNTER — COMMUNICATION - HEALTHEAST (OUTPATIENT)
Dept: INTERNAL MEDICINE | Facility: CLINIC | Age: 78
End: 2018-08-13

## 2018-08-13 DIAGNOSIS — Z79.01 LONG TERM CURRENT USE OF ANTICOAGULANT THERAPY: ICD-10-CM

## 2018-08-13 DIAGNOSIS — G45.9 TRANSIENT CEREBRAL ISCHEMIA: ICD-10-CM

## 2018-08-16 ENCOUNTER — COMMUNICATION - HEALTHEAST (OUTPATIENT)
Dept: INTERNAL MEDICINE | Facility: CLINIC | Age: 78
End: 2018-08-16

## 2018-08-16 DIAGNOSIS — E78.00 HYPERCHOLESTEREMIA: ICD-10-CM

## 2018-09-04 ENCOUNTER — AMBULATORY - HEALTHEAST (OUTPATIENT)
Dept: LAB | Facility: CLINIC | Age: 78
End: 2018-09-04

## 2018-09-04 ENCOUNTER — COMMUNICATION - HEALTHEAST (OUTPATIENT)
Dept: ANTICOAGULATION | Facility: CLINIC | Age: 78
End: 2018-09-04

## 2018-09-04 DIAGNOSIS — I48.19 PERSISTENT ATRIAL FIBRILLATION (H): ICD-10-CM

## 2018-09-04 LAB — INR PPP: 2.3 (ref 0.9–1.1)

## 2018-10-09 ENCOUNTER — COMMUNICATION - HEALTHEAST (OUTPATIENT)
Dept: ANTICOAGULATION | Facility: CLINIC | Age: 78
End: 2018-10-09

## 2018-10-09 ENCOUNTER — AMBULATORY - HEALTHEAST (OUTPATIENT)
Dept: LAB | Facility: CLINIC | Age: 78
End: 2018-10-09

## 2018-10-09 DIAGNOSIS — I48.19 PERSISTENT ATRIAL FIBRILLATION (H): ICD-10-CM

## 2018-10-09 LAB — INR PPP: 3.2 (ref 0.9–1.1)

## 2018-10-23 ENCOUNTER — AMBULATORY - HEALTHEAST (OUTPATIENT)
Dept: LAB | Facility: CLINIC | Age: 78
End: 2018-10-23

## 2018-10-23 ENCOUNTER — COMMUNICATION - HEALTHEAST (OUTPATIENT)
Dept: ANTICOAGULATION | Facility: CLINIC | Age: 78
End: 2018-10-23

## 2018-10-23 DIAGNOSIS — I48.19 PERSISTENT ATRIAL FIBRILLATION (H): ICD-10-CM

## 2018-10-23 LAB — INR PPP: 3.6 (ref 0.9–1.1)

## 2018-11-06 ENCOUNTER — COMMUNICATION - HEALTHEAST (OUTPATIENT)
Dept: ANTICOAGULATION | Facility: CLINIC | Age: 78
End: 2018-11-06

## 2018-11-06 ENCOUNTER — AMBULATORY - HEALTHEAST (OUTPATIENT)
Dept: LAB | Facility: CLINIC | Age: 78
End: 2018-11-06

## 2018-11-06 DIAGNOSIS — I48.19 PERSISTENT ATRIAL FIBRILLATION (H): ICD-10-CM

## 2018-11-06 LAB — INR PPP: 2.5 (ref 0.9–1.1)

## 2018-11-20 ENCOUNTER — COMMUNICATION - HEALTHEAST (OUTPATIENT)
Dept: ANTICOAGULATION | Facility: CLINIC | Age: 78
End: 2018-11-20

## 2018-11-20 ENCOUNTER — AMBULATORY - HEALTHEAST (OUTPATIENT)
Dept: LAB | Facility: CLINIC | Age: 78
End: 2018-11-20

## 2018-11-20 DIAGNOSIS — I48.19 PERSISTENT ATRIAL FIBRILLATION (H): ICD-10-CM

## 2018-11-20 LAB — INR PPP: 3 (ref 0.9–1.1)

## 2018-12-11 ENCOUNTER — COMMUNICATION - HEALTHEAST (OUTPATIENT)
Dept: ANTICOAGULATION | Facility: CLINIC | Age: 78
End: 2018-12-11

## 2018-12-11 DIAGNOSIS — I48.19 PERSISTENT ATRIAL FIBRILLATION (H): ICD-10-CM

## 2018-12-18 ENCOUNTER — COMMUNICATION - HEALTHEAST (OUTPATIENT)
Dept: TELEHEALTH | Facility: CLINIC | Age: 78
End: 2018-12-18

## 2018-12-18 ENCOUNTER — COMMUNICATION - HEALTHEAST (OUTPATIENT)
Dept: ANTICOAGULATION | Facility: CLINIC | Age: 78
End: 2018-12-18

## 2018-12-18 ENCOUNTER — AMBULATORY - HEALTHEAST (OUTPATIENT)
Dept: LAB | Facility: CLINIC | Age: 78
End: 2018-12-18

## 2018-12-18 DIAGNOSIS — I48.19 PERSISTENT ATRIAL FIBRILLATION (H): ICD-10-CM

## 2018-12-18 LAB — INR PPP: 2.6 (ref 0.9–1.1)

## 2019-01-15 ENCOUNTER — COMMUNICATION - HEALTHEAST (OUTPATIENT)
Dept: ANTICOAGULATION | Facility: CLINIC | Age: 79
End: 2019-01-15

## 2019-01-15 ENCOUNTER — AMBULATORY - HEALTHEAST (OUTPATIENT)
Dept: LAB | Facility: CLINIC | Age: 79
End: 2019-01-15

## 2019-01-15 DIAGNOSIS — I48.19 PERSISTENT ATRIAL FIBRILLATION (H): ICD-10-CM

## 2019-01-15 LAB — INR PPP: 2.7 (ref 0.9–1.1)

## 2019-02-10 ENCOUNTER — COMMUNICATION - HEALTHEAST (OUTPATIENT)
Dept: INTERNAL MEDICINE | Facility: CLINIC | Age: 79
End: 2019-02-10

## 2019-02-10 DIAGNOSIS — G45.9 TRANSIENT CEREBRAL ISCHEMIA: ICD-10-CM

## 2019-02-10 DIAGNOSIS — Z79.01 LONG TERM CURRENT USE OF ANTICOAGULANT THERAPY: ICD-10-CM

## 2019-02-11 ENCOUNTER — COMMUNICATION - HEALTHEAST (OUTPATIENT)
Dept: SCHEDULING | Facility: CLINIC | Age: 79
End: 2019-02-11

## 2019-02-12 ENCOUNTER — OFFICE VISIT - HEALTHEAST (OUTPATIENT)
Dept: FAMILY MEDICINE | Facility: CLINIC | Age: 79
End: 2019-02-12

## 2019-02-12 ENCOUNTER — RECORDS - HEALTHEAST (OUTPATIENT)
Dept: GENERAL RADIOLOGY | Facility: CLINIC | Age: 79
End: 2019-02-12

## 2019-02-12 DIAGNOSIS — I48.19 PERSISTENT ATRIAL FIBRILLATION (H): ICD-10-CM

## 2019-02-12 DIAGNOSIS — R07.89 OTHER CHEST PAIN: ICD-10-CM

## 2019-02-12 DIAGNOSIS — R07.89 RIGHT-SIDED CHEST WALL PAIN: ICD-10-CM

## 2019-02-12 DIAGNOSIS — E78.00 HYPERCHOLESTEREMIA: ICD-10-CM

## 2019-02-26 ENCOUNTER — COMMUNICATION - HEALTHEAST (OUTPATIENT)
Dept: ANTICOAGULATION | Facility: CLINIC | Age: 79
End: 2019-02-26

## 2019-02-26 ENCOUNTER — AMBULATORY - HEALTHEAST (OUTPATIENT)
Dept: LAB | Facility: CLINIC | Age: 79
End: 2019-02-26

## 2019-02-26 DIAGNOSIS — I48.19 PERSISTENT ATRIAL FIBRILLATION (H): ICD-10-CM

## 2019-02-26 LAB — INR PPP: 2.6 (ref 0.9–1.1)

## 2019-03-20 ENCOUNTER — COMMUNICATION - HEALTHEAST (OUTPATIENT)
Dept: CARDIOLOGY | Facility: CLINIC | Age: 79
End: 2019-03-20

## 2019-03-20 DIAGNOSIS — I48.19 PERSISTENT ATRIAL FIBRILLATION (H): ICD-10-CM

## 2019-04-09 ENCOUNTER — COMMUNICATION - HEALTHEAST (OUTPATIENT)
Dept: ANTICOAGULATION | Facility: CLINIC | Age: 79
End: 2019-04-09

## 2019-04-09 ENCOUNTER — AMBULATORY - HEALTHEAST (OUTPATIENT)
Dept: LAB | Facility: CLINIC | Age: 79
End: 2019-04-09

## 2019-04-09 DIAGNOSIS — I48.19 PERSISTENT ATRIAL FIBRILLATION (H): ICD-10-CM

## 2019-04-09 LAB — INR PPP: 2.5 (ref 0.9–1.1)

## 2019-04-16 ENCOUNTER — COMMUNICATION - HEALTHEAST (OUTPATIENT)
Dept: CARDIOLOGY | Facility: CLINIC | Age: 79
End: 2019-04-16

## 2019-04-16 DIAGNOSIS — I48.19 PERSISTENT ATRIAL FIBRILLATION (H): ICD-10-CM

## 2019-04-18 ENCOUNTER — AMBULATORY - HEALTHEAST (OUTPATIENT)
Dept: FAMILY MEDICINE | Facility: CLINIC | Age: 79
End: 2019-04-18

## 2019-04-18 DIAGNOSIS — I48.19 PERSISTENT ATRIAL FIBRILLATION (H): ICD-10-CM

## 2019-06-04 ENCOUNTER — AMBULATORY - HEALTHEAST (OUTPATIENT)
Dept: LAB | Facility: CLINIC | Age: 79
End: 2019-06-04

## 2019-06-04 ENCOUNTER — COMMUNICATION - HEALTHEAST (OUTPATIENT)
Dept: ANTICOAGULATION | Facility: CLINIC | Age: 79
End: 2019-06-04

## 2019-06-04 DIAGNOSIS — I48.19 PERSISTENT ATRIAL FIBRILLATION (H): ICD-10-CM

## 2019-06-04 LAB — INR PPP: 2 (ref 0.9–1.1)

## 2019-07-07 ENCOUNTER — COMMUNICATION - HEALTHEAST (OUTPATIENT)
Dept: INTERNAL MEDICINE | Facility: CLINIC | Age: 79
End: 2019-07-07

## 2019-07-07 DIAGNOSIS — Z79.01 LONG TERM CURRENT USE OF ANTICOAGULANT THERAPY: ICD-10-CM

## 2019-07-07 DIAGNOSIS — G45.9 TRANSIENT CEREBRAL ISCHEMIA: ICD-10-CM

## 2019-07-17 ENCOUNTER — COMMUNICATION - HEALTHEAST (OUTPATIENT)
Dept: FAMILY MEDICINE | Facility: CLINIC | Age: 79
End: 2019-07-17

## 2019-07-24 ENCOUNTER — COMMUNICATION - HEALTHEAST (OUTPATIENT)
Dept: ANTICOAGULATION | Facility: CLINIC | Age: 79
End: 2019-07-24

## 2019-07-24 ENCOUNTER — AMBULATORY - HEALTHEAST (OUTPATIENT)
Dept: LAB | Facility: CLINIC | Age: 79
End: 2019-07-24

## 2019-07-24 DIAGNOSIS — I48.19 PERSISTENT ATRIAL FIBRILLATION (H): ICD-10-CM

## 2019-07-24 LAB — INR PPP: 3.1 (ref 0.9–1.1)

## 2019-07-26 ENCOUNTER — COMMUNICATION - HEALTHEAST (OUTPATIENT)
Dept: FAMILY MEDICINE | Facility: CLINIC | Age: 79
End: 2019-07-26

## 2019-07-30 ENCOUNTER — AMBULATORY - HEALTHEAST (OUTPATIENT)
Dept: LAB | Facility: CLINIC | Age: 79
End: 2019-07-30

## 2019-07-30 ENCOUNTER — COMMUNICATION - HEALTHEAST (OUTPATIENT)
Dept: ANTICOAGULATION | Facility: CLINIC | Age: 79
End: 2019-07-30

## 2019-07-30 DIAGNOSIS — I48.19 PERSISTENT ATRIAL FIBRILLATION (H): ICD-10-CM

## 2019-07-30 LAB — INR PPP: 2.6 (ref 0.9–1.1)

## 2019-08-15 ENCOUNTER — COMMUNICATION - HEALTHEAST (OUTPATIENT)
Dept: ANTICOAGULATION | Facility: CLINIC | Age: 79
End: 2019-08-15

## 2019-08-20 ENCOUNTER — COMMUNICATION - HEALTHEAST (OUTPATIENT)
Dept: UROLOGY | Facility: CLINIC | Age: 79
End: 2019-08-20

## 2019-08-20 DIAGNOSIS — Z87.442 HISTORY OF KIDNEY STONES: ICD-10-CM

## 2019-08-21 ENCOUNTER — AMBULATORY - HEALTHEAST (OUTPATIENT)
Dept: LAB | Facility: CLINIC | Age: 79
End: 2019-08-21

## 2019-08-21 ENCOUNTER — COMMUNICATION - HEALTHEAST (OUTPATIENT)
Dept: ANTICOAGULATION | Facility: CLINIC | Age: 79
End: 2019-08-21

## 2019-08-21 DIAGNOSIS — I48.19 PERSISTENT ATRIAL FIBRILLATION (H): ICD-10-CM

## 2019-08-21 LAB — INR PPP: 1.7 (ref 0.9–1.1)

## 2019-08-27 ENCOUNTER — OFFICE VISIT - HEALTHEAST (OUTPATIENT)
Dept: UROLOGY | Facility: CLINIC | Age: 79
End: 2019-08-27

## 2019-08-27 ENCOUNTER — COMMUNICATION - HEALTHEAST (OUTPATIENT)
Dept: LAB | Facility: CLINIC | Age: 79
End: 2019-08-27

## 2019-08-27 ENCOUNTER — HOSPITAL ENCOUNTER (OUTPATIENT)
Dept: CT IMAGING | Facility: CLINIC | Age: 79
Discharge: HOME | End: 2019-08-27
Attending: PHYSICIAN ASSISTANT

## 2019-08-27 DIAGNOSIS — Z87.442 HISTORY OF KIDNEY STONES: ICD-10-CM

## 2019-08-27 DIAGNOSIS — E55.9 VITAMIN D DEFICIENCY: ICD-10-CM

## 2019-08-27 DIAGNOSIS — N20.0 CALCULUS OF KIDNEY: ICD-10-CM

## 2019-08-27 DIAGNOSIS — R73.01 IMPAIRED FASTING GLUCOSE: ICD-10-CM

## 2019-08-27 DIAGNOSIS — D69.6 THROMBOCYTOPENIA (H): ICD-10-CM

## 2019-08-27 DIAGNOSIS — I48.19 PERSISTENT ATRIAL FIBRILLATION (H): ICD-10-CM

## 2019-08-27 DIAGNOSIS — E78.5 DYSLIPIDEMIA: ICD-10-CM

## 2019-08-27 LAB
ALBUMIN UR-MCNC: NEGATIVE MG/DL
APPEARANCE UR: CLEAR
BILIRUB UR QL STRIP: ABNORMAL
COLOR UR AUTO: YELLOW
GLUCOSE UR STRIP-MCNC: NEGATIVE MG/DL
HGB UR QL STRIP: ABNORMAL
KETONES UR STRIP-MCNC: NEGATIVE MG/DL
LEUKOCYTE ESTERASE UR QL STRIP: NEGATIVE
NITRATE UR QL: NEGATIVE
PH UR STRIP: 5 [PH] (ref 5–8)
SP GR UR STRIP: >=1.03 (ref 1–1.03)
UROBILINOGEN UR STRIP-ACNC: ABNORMAL

## 2019-09-10 ENCOUNTER — AMBULATORY - HEALTHEAST (OUTPATIENT)
Dept: CARDIOLOGY | Facility: CLINIC | Age: 79
End: 2019-09-10

## 2019-09-10 DIAGNOSIS — I48.91 A-FIB (H): ICD-10-CM

## 2019-09-19 ENCOUNTER — COMMUNICATION - HEALTHEAST (OUTPATIENT)
Dept: ANTICOAGULATION | Facility: CLINIC | Age: 79
End: 2019-09-19

## 2019-09-19 ENCOUNTER — OFFICE VISIT - HEALTHEAST (OUTPATIENT)
Dept: FAMILY MEDICINE | Facility: CLINIC | Age: 79
End: 2019-09-19

## 2019-09-19 ENCOUNTER — AMBULATORY - HEALTHEAST (OUTPATIENT)
Dept: LAB | Facility: CLINIC | Age: 79
End: 2019-09-19

## 2019-09-19 DIAGNOSIS — R73.01 IMPAIRED FASTING GLUCOSE: ICD-10-CM

## 2019-09-19 DIAGNOSIS — H90.6 MIXED CONDUCTIVE AND SENSORINEURAL HEARING LOSS OF BOTH EARS: ICD-10-CM

## 2019-09-19 DIAGNOSIS — G45.9 TRANSIENT CEREBRAL ISCHEMIA, UNSPECIFIED TYPE: ICD-10-CM

## 2019-09-19 DIAGNOSIS — Z23 FLU VACCINE NEED: ICD-10-CM

## 2019-09-19 DIAGNOSIS — E66.01 CLASS 2 SEVERE OBESITY DUE TO EXCESS CALORIES WITH SERIOUS COMORBIDITY AND BODY MASS INDEX (BMI) OF 36.0 TO 36.9 IN ADULT (H): ICD-10-CM

## 2019-09-19 DIAGNOSIS — E55.9 VITAMIN D DEFICIENCY: ICD-10-CM

## 2019-09-19 DIAGNOSIS — E66.812 CLASS 2 SEVERE OBESITY DUE TO EXCESS CALORIES WITH SERIOUS COMORBIDITY AND BODY MASS INDEX (BMI) OF 36.0 TO 36.9 IN ADULT (H): ICD-10-CM

## 2019-09-19 DIAGNOSIS — I48.19 PERSISTENT ATRIAL FIBRILLATION (H): ICD-10-CM

## 2019-09-19 DIAGNOSIS — Z00.01 ENCOUNTER FOR GENERAL ADULT MEDICAL EXAMINATION WITH ABNORMAL FINDINGS: ICD-10-CM

## 2019-09-19 DIAGNOSIS — E78.5 DYSLIPIDEMIA: ICD-10-CM

## 2019-09-19 DIAGNOSIS — N43.3 LEFT HYDROCELE: ICD-10-CM

## 2019-09-19 DIAGNOSIS — N20.0 CALCULUS OF KIDNEY: ICD-10-CM

## 2019-09-19 DIAGNOSIS — D69.6 THROMBOCYTOPENIA (H): ICD-10-CM

## 2019-09-19 DIAGNOSIS — E78.00 HYPERCHOLESTEREMIA: ICD-10-CM

## 2019-09-19 DIAGNOSIS — H61.23 CERUMINOSIS, BILATERAL: ICD-10-CM

## 2019-09-19 LAB
25(OH)D3 SERPL-MCNC: 27.7 NG/ML (ref 30–80)
25(OH)D3 SERPL-MCNC: 27.7 NG/ML (ref 30–80)
ANION GAP SERPL CALCULATED.3IONS-SCNC: 8 MMOL/L (ref 5–18)
BUN SERPL-MCNC: 15 MG/DL (ref 8–28)
CALCIUM SERPL-MCNC: 10.4 MG/DL (ref 8.5–10.5)
CHLORIDE BLD-SCNC: 108 MMOL/L (ref 98–107)
CHOLEST SERPL-MCNC: 168 MG/DL
CO2 SERPL-SCNC: 27 MMOL/L (ref 22–31)
CREAT SERPL-MCNC: 0.9 MG/DL (ref 0.7–1.3)
ERYTHROCYTE [DISTWIDTH] IN BLOOD BY AUTOMATED COUNT: 13.2 % (ref 11–14.5)
FASTING STATUS PATIENT QL REPORTED: YES
GFR SERPL CREATININE-BSD FRML MDRD: >60 ML/MIN/1.73M2
GLUCOSE BLD-MCNC: 104 MG/DL (ref 70–125)
HBA1C MFR BLD: 6.1 % (ref 3.5–6)
HCT VFR BLD AUTO: 46.2 % (ref 40–54)
HDLC SERPL-MCNC: 34 MG/DL
HGB BLD-MCNC: 15.6 G/DL (ref 14–18)
INR PPP: 3.1 (ref 0.9–1.1)
LDLC SERPL CALC-MCNC: 111 MG/DL
MCH RBC QN AUTO: 29.6 PG (ref 27–34)
MCHC RBC AUTO-ENTMCNC: 33.8 G/DL (ref 32–36)
MCV RBC AUTO: 87 FL (ref 80–100)
PLATELET # BLD AUTO: 124 THOU/UL (ref 140–440)
PMV BLD AUTO: 7.7 FL (ref 7–10)
POTASSIUM BLD-SCNC: 4.2 MMOL/L (ref 3.5–5)
RBC # BLD AUTO: 5.29 MILL/UL (ref 4.4–6.2)
SODIUM SERPL-SCNC: 143 MMOL/L (ref 136–145)
TRIGL SERPL-MCNC: 117 MG/DL
WBC: 6.9 THOU/UL (ref 4–11)

## 2019-09-19 ASSESSMENT — MIFFLIN-ST. JEOR: SCORE: 1920.09

## 2019-10-01 ENCOUNTER — COMMUNICATION - HEALTHEAST (OUTPATIENT)
Dept: ANTICOAGULATION | Facility: CLINIC | Age: 79
End: 2019-10-01

## 2019-10-01 ENCOUNTER — AMBULATORY - HEALTHEAST (OUTPATIENT)
Dept: LAB | Facility: CLINIC | Age: 79
End: 2019-10-01

## 2019-10-01 DIAGNOSIS — I48.19 PERSISTENT ATRIAL FIBRILLATION (H): ICD-10-CM

## 2019-10-01 LAB — INR PPP: 3.2 (ref 0.9–1.1)

## 2019-10-15 ENCOUNTER — COMMUNICATION - HEALTHEAST (OUTPATIENT)
Dept: ANTICOAGULATION | Facility: CLINIC | Age: 79
End: 2019-10-15

## 2019-10-15 ENCOUNTER — AMBULATORY - HEALTHEAST (OUTPATIENT)
Dept: LAB | Facility: CLINIC | Age: 79
End: 2019-10-15

## 2019-10-15 DIAGNOSIS — I48.19 PERSISTENT ATRIAL FIBRILLATION (H): ICD-10-CM

## 2019-10-15 LAB — INR PPP: 2.5 (ref 0.9–1.1)

## 2019-10-18 ENCOUNTER — COMMUNICATION - HEALTHEAST (OUTPATIENT)
Dept: FAMILY MEDICINE | Facility: CLINIC | Age: 79
End: 2019-10-18

## 2019-10-18 DIAGNOSIS — I48.19 PERSISTENT ATRIAL FIBRILLATION (H): ICD-10-CM

## 2019-10-29 ENCOUNTER — COMMUNICATION - HEALTHEAST (OUTPATIENT)
Dept: ANTICOAGULATION | Facility: CLINIC | Age: 79
End: 2019-10-29

## 2019-10-29 ENCOUNTER — AMBULATORY - HEALTHEAST (OUTPATIENT)
Dept: LAB | Facility: CLINIC | Age: 79
End: 2019-10-29

## 2019-10-29 DIAGNOSIS — I48.19 PERSISTENT ATRIAL FIBRILLATION (H): ICD-10-CM

## 2019-10-29 LAB — INR PPP: 3.7 (ref 0.9–1.1)

## 2019-11-12 ENCOUNTER — COMMUNICATION - HEALTHEAST (OUTPATIENT)
Dept: ANTICOAGULATION | Facility: CLINIC | Age: 79
End: 2019-11-12

## 2019-11-12 ENCOUNTER — AMBULATORY - HEALTHEAST (OUTPATIENT)
Dept: LAB | Facility: CLINIC | Age: 79
End: 2019-11-12

## 2019-11-12 DIAGNOSIS — I48.19 PERSISTENT ATRIAL FIBRILLATION (H): ICD-10-CM

## 2019-11-12 LAB — INR PPP: 2.1 (ref 0.9–1.1)

## 2019-11-26 ENCOUNTER — AMBULATORY - HEALTHEAST (OUTPATIENT)
Dept: LAB | Facility: CLINIC | Age: 79
End: 2019-11-26

## 2019-11-26 ENCOUNTER — COMMUNICATION - HEALTHEAST (OUTPATIENT)
Dept: ANTICOAGULATION | Facility: CLINIC | Age: 79
End: 2019-11-26

## 2019-11-26 DIAGNOSIS — I48.19 PERSISTENT ATRIAL FIBRILLATION (H): ICD-10-CM

## 2019-11-26 LAB — INR PPP: 2.6 (ref 0.9–1.1)

## 2019-12-05 ENCOUNTER — COMMUNICATION - HEALTHEAST (OUTPATIENT)
Dept: ANTICOAGULATION | Facility: CLINIC | Age: 79
End: 2019-12-05

## 2019-12-05 DIAGNOSIS — I48.19 PERSISTENT ATRIAL FIBRILLATION (H): ICD-10-CM

## 2019-12-05 DIAGNOSIS — G45.9 TRANSIENT CEREBRAL ISCHEMIA, UNSPECIFIED TYPE: ICD-10-CM

## 2019-12-24 ENCOUNTER — COMMUNICATION - HEALTHEAST (OUTPATIENT)
Dept: ANTICOAGULATION | Facility: CLINIC | Age: 79
End: 2019-12-24

## 2019-12-24 ENCOUNTER — AMBULATORY - HEALTHEAST (OUTPATIENT)
Dept: LAB | Facility: CLINIC | Age: 79
End: 2019-12-24

## 2019-12-24 DIAGNOSIS — I48.19 PERSISTENT ATRIAL FIBRILLATION (H): ICD-10-CM

## 2019-12-24 DIAGNOSIS — G45.9 TRANSIENT CEREBRAL ISCHEMIA, UNSPECIFIED TYPE: ICD-10-CM

## 2019-12-24 LAB — INR PPP: 2.4 (ref 0.9–1.1)

## 2020-01-21 ENCOUNTER — AMBULATORY - HEALTHEAST (OUTPATIENT)
Dept: LAB | Facility: CLINIC | Age: 80
End: 2020-01-21

## 2020-01-21 ENCOUNTER — COMMUNICATION - HEALTHEAST (OUTPATIENT)
Dept: ANTICOAGULATION | Facility: CLINIC | Age: 80
End: 2020-01-21

## 2020-01-21 DIAGNOSIS — I48.19 PERSISTENT ATRIAL FIBRILLATION (H): ICD-10-CM

## 2020-01-21 DIAGNOSIS — G45.9 TRANSIENT CEREBRAL ISCHEMIA, UNSPECIFIED TYPE: ICD-10-CM

## 2020-01-21 LAB — INR PPP: 2.1 (ref 0.9–1.1)

## 2020-02-18 ENCOUNTER — AMBULATORY - HEALTHEAST (OUTPATIENT)
Dept: LAB | Facility: CLINIC | Age: 80
End: 2020-02-18

## 2020-02-18 ENCOUNTER — COMMUNICATION - HEALTHEAST (OUTPATIENT)
Dept: ANTICOAGULATION | Facility: CLINIC | Age: 80
End: 2020-02-18

## 2020-02-18 DIAGNOSIS — I48.19 PERSISTENT ATRIAL FIBRILLATION (H): ICD-10-CM

## 2020-02-18 DIAGNOSIS — G45.9 TRANSIENT CEREBRAL ISCHEMIA, UNSPECIFIED TYPE: ICD-10-CM

## 2020-02-18 LAB — INR PPP: 2.5 (ref 0.9–1.1)

## 2020-03-31 ENCOUNTER — COMMUNICATION - HEALTHEAST (OUTPATIENT)
Dept: ANTICOAGULATION | Facility: CLINIC | Age: 80
End: 2020-03-31

## 2020-03-31 ENCOUNTER — AMBULATORY - HEALTHEAST (OUTPATIENT)
Dept: LAB | Facility: CLINIC | Age: 80
End: 2020-03-31

## 2020-03-31 DIAGNOSIS — I48.19 PERSISTENT ATRIAL FIBRILLATION (H): ICD-10-CM

## 2020-03-31 DIAGNOSIS — G45.9 TRANSIENT CEREBRAL ISCHEMIA, UNSPECIFIED TYPE: ICD-10-CM

## 2020-03-31 LAB — INR PPP: 2.1 (ref 0.9–1.1)

## 2020-05-26 ENCOUNTER — AMBULATORY - HEALTHEAST (OUTPATIENT)
Dept: LAB | Facility: CLINIC | Age: 80
End: 2020-05-26

## 2020-05-26 ENCOUNTER — COMMUNICATION - HEALTHEAST (OUTPATIENT)
Dept: ANTICOAGULATION | Facility: CLINIC | Age: 80
End: 2020-05-26

## 2020-05-26 DIAGNOSIS — G45.9 TRANSIENT CEREBRAL ISCHEMIA, UNSPECIFIED TYPE: ICD-10-CM

## 2020-05-26 DIAGNOSIS — I48.19 PERSISTENT ATRIAL FIBRILLATION (H): ICD-10-CM

## 2020-05-26 LAB — INR PPP: 2.7 (ref 0.9–1.1)

## 2020-07-02 ENCOUNTER — COMMUNICATION - HEALTHEAST (OUTPATIENT)
Dept: SCHEDULING | Facility: CLINIC | Age: 80
End: 2020-07-02

## 2020-07-02 ENCOUNTER — RECORDS - HEALTHEAST (OUTPATIENT)
Dept: ADMINISTRATIVE | Facility: OTHER | Age: 80
End: 2020-07-02

## 2020-07-04 ENCOUNTER — RECORDS - HEALTHEAST (OUTPATIENT)
Dept: ADMINISTRATIVE | Facility: OTHER | Age: 80
End: 2020-07-04

## 2020-07-08 ENCOUNTER — OFFICE VISIT - HEALTHEAST (OUTPATIENT)
Dept: FAMILY MEDICINE | Facility: CLINIC | Age: 80
End: 2020-07-08

## 2020-07-08 ENCOUNTER — COMMUNICATION - HEALTHEAST (OUTPATIENT)
Dept: ANTICOAGULATION | Facility: CLINIC | Age: 80
End: 2020-07-08

## 2020-07-08 DIAGNOSIS — R73.01 IMPAIRED FASTING GLUCOSE: ICD-10-CM

## 2020-07-08 DIAGNOSIS — G45.9 TRANSIENT CEREBRAL ISCHEMIA, UNSPECIFIED TYPE: ICD-10-CM

## 2020-07-08 DIAGNOSIS — E78.00 HYPERCHOLESTEREMIA: ICD-10-CM

## 2020-07-08 DIAGNOSIS — I48.19 PERSISTENT ATRIAL FIBRILLATION (H): ICD-10-CM

## 2020-07-08 DIAGNOSIS — L03.115 CELLULITIS OF RIGHT LEG: ICD-10-CM

## 2020-07-08 LAB
ANION GAP SERPL CALCULATED.3IONS-SCNC: 8 MMOL/L (ref 5–18)
BUN SERPL-MCNC: 15 MG/DL (ref 8–28)
CALCIUM SERPL-MCNC: 10.3 MG/DL (ref 8.5–10.5)
CHLORIDE BLD-SCNC: 105 MMOL/L (ref 98–107)
CHOLEST SERPL-MCNC: 135 MG/DL
CO2 SERPL-SCNC: 26 MMOL/L (ref 22–31)
CREAT SERPL-MCNC: 0.92 MG/DL (ref 0.7–1.3)
ERYTHROCYTE [DISTWIDTH] IN BLOOD BY AUTOMATED COUNT: 13.5 % (ref 11–14.5)
FASTING STATUS PATIENT QL REPORTED: YES
GFR SERPL CREATININE-BSD FRML MDRD: >60 ML/MIN/1.73M2
GLUCOSE BLD-MCNC: 112 MG/DL (ref 70–125)
HBA1C MFR BLD: 5.8 % (ref 3.5–6)
HCT VFR BLD AUTO: 43.3 % (ref 40–54)
HDLC SERPL-MCNC: 29 MG/DL
HGB BLD-MCNC: 14.7 G/DL (ref 14–18)
INR PPP: 2 (ref 0.9–1.1)
LDLC SERPL CALC-MCNC: 90 MG/DL
MCH RBC QN AUTO: 29.8 PG (ref 27–34)
MCHC RBC AUTO-ENTMCNC: 33.9 G/DL (ref 32–36)
MCV RBC AUTO: 88 FL (ref 80–100)
PLATELET # BLD AUTO: 129 THOU/UL (ref 140–440)
PMV BLD AUTO: 7.8 FL (ref 7–10)
POTASSIUM BLD-SCNC: 4.7 MMOL/L (ref 3.5–5)
RBC # BLD AUTO: 4.94 MILL/UL (ref 4.4–6.2)
SODIUM SERPL-SCNC: 139 MMOL/L (ref 136–145)
TRIGL SERPL-MCNC: 82 MG/DL
WBC: 7.4 THOU/UL (ref 4–11)

## 2020-07-14 ENCOUNTER — COMMUNICATION - HEALTHEAST (OUTPATIENT)
Dept: INTERNAL MEDICINE | Facility: CLINIC | Age: 80
End: 2020-07-14

## 2020-07-14 DIAGNOSIS — G45.9 TRANSIENT CEREBRAL ISCHEMIA: ICD-10-CM

## 2020-07-14 DIAGNOSIS — Z79.01 LONG TERM CURRENT USE OF ANTICOAGULANT THERAPY: ICD-10-CM

## 2020-09-01 ENCOUNTER — AMBULATORY - HEALTHEAST (OUTPATIENT)
Dept: LAB | Facility: CLINIC | Age: 80
End: 2020-09-01

## 2020-09-01 ENCOUNTER — COMMUNICATION - HEALTHEAST (OUTPATIENT)
Dept: ANTICOAGULATION | Facility: CLINIC | Age: 80
End: 2020-09-01

## 2020-09-01 DIAGNOSIS — I48.19 PERSISTENT ATRIAL FIBRILLATION (H): ICD-10-CM

## 2020-09-01 DIAGNOSIS — G45.9 TRANSIENT CEREBRAL ISCHEMIA, UNSPECIFIED TYPE: ICD-10-CM

## 2020-09-01 LAB — INR PPP: 2.1 (ref 0.9–1.1)

## 2020-09-02 ENCOUNTER — RECORDS - HEALTHEAST (OUTPATIENT)
Dept: ADMINISTRATIVE | Facility: OTHER | Age: 80
End: 2020-09-02

## 2020-09-03 ENCOUNTER — OFFICE VISIT - HEALTHEAST (OUTPATIENT)
Dept: FAMILY MEDICINE | Facility: CLINIC | Age: 80
End: 2020-09-03

## 2020-09-03 DIAGNOSIS — Z23 ENCOUNTER FOR IMMUNIZATION: ICD-10-CM

## 2020-09-03 DIAGNOSIS — I48.19 PERSISTENT ATRIAL FIBRILLATION (H): ICD-10-CM

## 2020-09-03 DIAGNOSIS — S81.801D OPEN WOUND OF RIGHT LOWER EXTREMITY, SUBSEQUENT ENCOUNTER: ICD-10-CM

## 2020-09-03 DIAGNOSIS — L03.115 CELLULITIS OF RIGHT LEG: ICD-10-CM

## 2020-09-18 ENCOUNTER — OFFICE VISIT - HEALTHEAST (OUTPATIENT)
Dept: VASCULAR SURGERY | Facility: CLINIC | Age: 80
End: 2020-09-18

## 2020-09-18 ENCOUNTER — AMBULATORY - HEALTHEAST (OUTPATIENT)
Dept: VASCULAR SURGERY | Facility: CLINIC | Age: 80
End: 2020-09-18

## 2020-09-18 DIAGNOSIS — S80.11XA LEG HEMATOMA, RIGHT, INITIAL ENCOUNTER: ICD-10-CM

## 2020-09-18 DIAGNOSIS — R73.03 PRE-DIABETES: ICD-10-CM

## 2020-09-18 DIAGNOSIS — R60.9 DEPENDENT EDEMA: ICD-10-CM

## 2020-09-18 DIAGNOSIS — I87.303 VENOUS HYPERTENSION OF LOWER EXTREMITY, BILATERAL: ICD-10-CM

## 2020-09-18 DIAGNOSIS — E66.812 CLASS 2 SEVERE OBESITY DUE TO EXCESS CALORIES WITH SERIOUS COMORBIDITY AND BODY MASS INDEX (BMI) OF 35.0 TO 35.9 IN ADULT (H): ICD-10-CM

## 2020-09-18 DIAGNOSIS — I87.2 VENOUS INSUFFICIENCY OF BOTH LOWER EXTREMITIES: ICD-10-CM

## 2020-09-18 DIAGNOSIS — E66.01 CLASS 2 SEVERE OBESITY DUE TO EXCESS CALORIES WITH SERIOUS COMORBIDITY AND BODY MASS INDEX (BMI) OF 35.0 TO 35.9 IN ADULT (H): ICD-10-CM

## 2020-09-18 DIAGNOSIS — L97.912 CHRONIC ULCER OF RIGHT LEG, WITH FAT LAYER EXPOSED (H): ICD-10-CM

## 2020-09-18 ASSESSMENT — MIFFLIN-ST. JEOR: SCORE: 1930.52

## 2020-09-22 ENCOUNTER — AMBULATORY - HEALTHEAST (OUTPATIENT)
Dept: VASCULAR SURGERY | Facility: CLINIC | Age: 80
End: 2020-09-22

## 2020-09-22 ENCOUNTER — COMMUNICATION - HEALTHEAST (OUTPATIENT)
Dept: VASCULAR SURGERY | Facility: CLINIC | Age: 80
End: 2020-09-22

## 2020-09-22 DIAGNOSIS — I87.303 VENOUS HYPERTENSION OF LOWER EXTREMITY, BILATERAL: ICD-10-CM

## 2020-09-22 DIAGNOSIS — L97.912 CHRONIC ULCER OF RIGHT LEG, WITH FAT LAYER EXPOSED (H): ICD-10-CM

## 2020-09-22 DIAGNOSIS — R73.03 PRE-DIABETES: ICD-10-CM

## 2020-09-22 DIAGNOSIS — S80.11XA LEG HEMATOMA, RIGHT, INITIAL ENCOUNTER: ICD-10-CM

## 2020-09-25 ENCOUNTER — AMBULATORY - HEALTHEAST (OUTPATIENT)
Dept: VASCULAR SURGERY | Facility: CLINIC | Age: 80
End: 2020-09-25

## 2020-09-29 ENCOUNTER — AMBULATORY - HEALTHEAST (OUTPATIENT)
Dept: VASCULAR SURGERY | Facility: CLINIC | Age: 80
End: 2020-09-29

## 2020-10-05 ENCOUNTER — AMBULATORY - HEALTHEAST (OUTPATIENT)
Dept: VASCULAR SURGERY | Facility: CLINIC | Age: 80
End: 2020-10-05

## 2020-10-05 DIAGNOSIS — L97.912 CHRONIC ULCER OF RIGHT LEG, WITH FAT LAYER EXPOSED (H): ICD-10-CM

## 2020-10-09 ENCOUNTER — AMBULATORY - HEALTHEAST (OUTPATIENT)
Dept: VASCULAR SURGERY | Facility: CLINIC | Age: 80
End: 2020-10-09

## 2020-10-09 DIAGNOSIS — L97.912 CHRONIC ULCER OF RIGHT LEG, WITH FAT LAYER EXPOSED (H): ICD-10-CM

## 2020-10-12 ENCOUNTER — AMBULATORY - HEALTHEAST (OUTPATIENT)
Dept: VASCULAR SURGERY | Facility: CLINIC | Age: 80
End: 2020-10-12

## 2020-10-12 DIAGNOSIS — I87.303 VENOUS HYPERTENSION OF LOWER EXTREMITY, BILATERAL: ICD-10-CM

## 2020-10-12 DIAGNOSIS — L97.912 CHRONIC ULCER OF RIGHT LEG, WITH FAT LAYER EXPOSED (H): ICD-10-CM

## 2020-10-12 DIAGNOSIS — S80.11XA LEG HEMATOMA, RIGHT, INITIAL ENCOUNTER: ICD-10-CM

## 2020-10-13 ENCOUNTER — COMMUNICATION - HEALTHEAST (OUTPATIENT)
Dept: ANTICOAGULATION | Facility: CLINIC | Age: 80
End: 2020-10-13

## 2020-10-13 ENCOUNTER — OFFICE VISIT - HEALTHEAST (OUTPATIENT)
Dept: FAMILY MEDICINE | Facility: CLINIC | Age: 80
End: 2020-10-13

## 2020-10-13 DIAGNOSIS — Z23 ENCOUNTER FOR IMMUNIZATION: ICD-10-CM

## 2020-10-13 DIAGNOSIS — D69.6 THROMBOCYTOPENIA (H): ICD-10-CM

## 2020-10-13 DIAGNOSIS — S81.801D OPEN WOUND OF RIGHT LOWER EXTREMITY, SUBSEQUENT ENCOUNTER: ICD-10-CM

## 2020-10-13 DIAGNOSIS — I48.19 PERSISTENT ATRIAL FIBRILLATION (H): ICD-10-CM

## 2020-10-13 DIAGNOSIS — Z00.01 ENCOUNTER FOR GENERAL ADULT MEDICAL EXAMINATION WITH ABNORMAL FINDINGS: ICD-10-CM

## 2020-10-13 DIAGNOSIS — G45.9 TRANSIENT CEREBRAL ISCHEMIA, UNSPECIFIED TYPE: ICD-10-CM

## 2020-10-13 DIAGNOSIS — I87.2 VENOUS INSUFFICIENCY OF BOTH LOWER EXTREMITIES: ICD-10-CM

## 2020-10-13 DIAGNOSIS — Z85.46 H/O PROSTATE CANCER: ICD-10-CM

## 2020-10-13 DIAGNOSIS — E66.09 CLASS 2 OBESITY DUE TO EXCESS CALORIES WITHOUT SERIOUS COMORBIDITY WITH BODY MASS INDEX (BMI) OF 36.0 TO 36.9 IN ADULT: ICD-10-CM

## 2020-10-13 DIAGNOSIS — E66.812 CLASS 2 OBESITY DUE TO EXCESS CALORIES WITHOUT SERIOUS COMORBIDITY WITH BODY MASS INDEX (BMI) OF 36.0 TO 36.9 IN ADULT: ICD-10-CM

## 2020-10-13 DIAGNOSIS — I25.10 CORONARY ARTERY DISEASE INVOLVING NATIVE CORONARY ARTERY OF NATIVE HEART WITHOUT ANGINA PECTORIS: ICD-10-CM

## 2020-10-13 DIAGNOSIS — H90.6 MIXED CONDUCTIVE AND SENSORINEURAL HEARING LOSS OF BOTH EARS: ICD-10-CM

## 2020-10-13 DIAGNOSIS — R73.01 IMPAIRED FASTING GLUCOSE: ICD-10-CM

## 2020-10-13 DIAGNOSIS — E55.9 VITAMIN D DEFICIENCY: ICD-10-CM

## 2020-10-13 DIAGNOSIS — N43.3 LEFT HYDROCELE: ICD-10-CM

## 2020-10-13 LAB
ANION GAP SERPL CALCULATED.3IONS-SCNC: 9 MMOL/L (ref 5–18)
BUN SERPL-MCNC: 14 MG/DL (ref 8–28)
CALCIUM SERPL-MCNC: 9.9 MG/DL (ref 8.5–10.5)
CHLORIDE BLD-SCNC: 105 MMOL/L (ref 98–107)
CO2 SERPL-SCNC: 24 MMOL/L (ref 22–31)
CREAT SERPL-MCNC: 0.81 MG/DL (ref 0.7–1.3)
ERYTHROCYTE [DISTWIDTH] IN BLOOD BY AUTOMATED COUNT: 14.2 % (ref 11–14.5)
GFR SERPL CREATININE-BSD FRML MDRD: >60 ML/MIN/1.73M2
GLUCOSE BLD-MCNC: 102 MG/DL (ref 70–125)
HBA1C MFR BLD: 5.9 %
HCT VFR BLD AUTO: 45.3 % (ref 40–54)
HGB BLD-MCNC: 15.3 G/DL (ref 14–18)
INR PPP: 2.5 (ref 0.9–1.1)
MCH RBC QN AUTO: 29.3 PG (ref 27–34)
MCHC RBC AUTO-ENTMCNC: 33.7 G/DL (ref 32–36)
MCV RBC AUTO: 87 FL (ref 80–100)
PLATELET # BLD AUTO: 107 THOU/UL (ref 140–440)
PMV BLD AUTO: 7.7 FL (ref 7–10)
POTASSIUM BLD-SCNC: 4.2 MMOL/L (ref 3.5–5)
RBC # BLD AUTO: 5.2 MILL/UL (ref 4.4–6.2)
SODIUM SERPL-SCNC: 138 MMOL/L (ref 136–145)
WBC: 5.9 THOU/UL (ref 4–11)

## 2020-10-13 ASSESSMENT — MIFFLIN-ST. JEOR: SCORE: 1902.51

## 2020-10-14 LAB
25(OH)D3 SERPL-MCNC: 26.8 NG/ML (ref 30–80)
25(OH)D3 SERPL-MCNC: 26.8 NG/ML (ref 30–80)

## 2020-10-19 ENCOUNTER — AMBULATORY - HEALTHEAST (OUTPATIENT)
Dept: VASCULAR SURGERY | Facility: CLINIC | Age: 80
End: 2020-10-19

## 2020-10-19 DIAGNOSIS — L97.912 CHRONIC ULCER OF RIGHT LEG, WITH FAT LAYER EXPOSED (H): ICD-10-CM

## 2020-10-23 ENCOUNTER — COMMUNICATION - HEALTHEAST (OUTPATIENT)
Dept: FAMILY MEDICINE | Facility: CLINIC | Age: 80
End: 2020-10-23

## 2020-10-23 DIAGNOSIS — I48.19 PERSISTENT ATRIAL FIBRILLATION (H): ICD-10-CM

## 2020-10-24 RX ORDER — CARVEDILOL 25 MG/1
25 TABLET ORAL 2 TIMES DAILY WITH MEALS
Qty: 180 TABLET | Refills: 3 | Status: SHIPPED | OUTPATIENT
Start: 2020-10-24 | End: 2022-02-08

## 2020-10-29 ENCOUNTER — COMMUNICATION - HEALTHEAST (OUTPATIENT)
Dept: FAMILY MEDICINE | Facility: CLINIC | Age: 80
End: 2020-10-29

## 2020-10-29 DIAGNOSIS — Z12.11 COLON CANCER SCREENING: ICD-10-CM

## 2020-11-10 ENCOUNTER — OFFICE VISIT - HEALTHEAST (OUTPATIENT)
Dept: VASCULAR SURGERY | Facility: CLINIC | Age: 80
End: 2020-11-10

## 2020-11-10 DIAGNOSIS — R73.03 PRE-DIABETES: ICD-10-CM

## 2020-11-10 DIAGNOSIS — E66.01 MORBID OBESITY (H): ICD-10-CM

## 2020-11-10 DIAGNOSIS — S80.11XA LEG HEMATOMA, RIGHT, INITIAL ENCOUNTER: ICD-10-CM

## 2020-11-10 DIAGNOSIS — L97.912 CHRONIC ULCER OF RIGHT LEG, WITH FAT LAYER EXPOSED (H): ICD-10-CM

## 2020-11-10 DIAGNOSIS — R60.9 DEPENDENT EDEMA: ICD-10-CM

## 2020-11-10 DIAGNOSIS — I87.303 VENOUS HYPERTENSION OF LOWER EXTREMITY, BILATERAL: ICD-10-CM

## 2020-11-10 DIAGNOSIS — I87.2 VENOUS INSUFFICIENCY OF BOTH LOWER EXTREMITIES: ICD-10-CM

## 2020-11-25 ENCOUNTER — AMBULATORY - HEALTHEAST (OUTPATIENT)
Dept: ANTICOAGULATION | Facility: CLINIC | Age: 80
End: 2020-11-25

## 2020-11-25 ENCOUNTER — RECORDS - HEALTHEAST (OUTPATIENT)
Dept: ADMINISTRATIVE | Facility: OTHER | Age: 80
End: 2020-11-25

## 2020-11-25 DIAGNOSIS — I48.19 PERSISTENT ATRIAL FIBRILLATION (H): ICD-10-CM

## 2020-11-25 LAB — COLOGUARD-ABSTRACT: POSITIVE

## 2020-12-08 ENCOUNTER — COMMUNICATION - HEALTHEAST (OUTPATIENT)
Dept: ANTICOAGULATION | Facility: CLINIC | Age: 80
End: 2020-12-08

## 2020-12-08 ENCOUNTER — AMBULATORY - HEALTHEAST (OUTPATIENT)
Dept: LAB | Facility: CLINIC | Age: 80
End: 2020-12-08

## 2020-12-08 DIAGNOSIS — I48.19 PERSISTENT ATRIAL FIBRILLATION (H): ICD-10-CM

## 2020-12-08 LAB — INR PPP: 2.9 (ref 0.9–1.1)

## 2020-12-22 ENCOUNTER — OFFICE VISIT - HEALTHEAST (OUTPATIENT)
Dept: FAMILY MEDICINE | Facility: CLINIC | Age: 80
End: 2020-12-22

## 2020-12-22 ENCOUNTER — AMBULATORY - HEALTHEAST (OUTPATIENT)
Dept: FAMILY MEDICINE | Facility: CLINIC | Age: 80
End: 2020-12-22

## 2020-12-22 DIAGNOSIS — Z20.822 ENCOUNTER FOR LABORATORY TESTING FOR COVID-19 VIRUS: ICD-10-CM

## 2020-12-23 ENCOUNTER — COMMUNICATION - HEALTHEAST (OUTPATIENT)
Dept: SCHEDULING | Facility: CLINIC | Age: 80
End: 2020-12-23

## 2020-12-23 ENCOUNTER — COMMUNICATION - HEALTHEAST (OUTPATIENT)
Dept: FAMILY MEDICINE | Facility: CLINIC | Age: 80
End: 2020-12-23

## 2020-12-23 DIAGNOSIS — R89.9 ABNORMAL LABORATORY TEST RESULT: ICD-10-CM

## 2020-12-23 LAB
SARS-COV-2 PCR COMMENT: NORMAL
SARS-COV-2 RNA SPEC QL NAA+PROBE: NEGATIVE
SARS-COV-2 VIRUS SPECIMEN SOURCE: NORMAL

## 2020-12-29 ENCOUNTER — RECORDS - HEALTHEAST (OUTPATIENT)
Dept: ADMINISTRATIVE | Facility: OTHER | Age: 80
End: 2020-12-29

## 2021-01-18 ENCOUNTER — COMMUNICATION - HEALTHEAST (OUTPATIENT)
Dept: FAMILY MEDICINE | Facility: CLINIC | Age: 81
End: 2021-01-18

## 2021-01-18 DIAGNOSIS — G45.9 TRANSIENT CEREBRAL ISCHEMIA: ICD-10-CM

## 2021-01-18 DIAGNOSIS — Z79.01 LONG TERM CURRENT USE OF ANTICOAGULANT THERAPY: ICD-10-CM

## 2021-01-19 RX ORDER — WARFARIN SODIUM 5 MG/1
TABLET ORAL
Qty: 90 TABLET | Refills: 1 | Status: SHIPPED | OUTPATIENT
Start: 2021-01-19 | End: 2022-08-09

## 2021-01-21 ENCOUNTER — RECORDS - HEALTHEAST (OUTPATIENT)
Dept: HEALTH INFORMATION MANAGEMENT | Facility: CLINIC | Age: 81
End: 2021-01-21

## 2021-01-21 ENCOUNTER — AMBULATORY - HEALTHEAST (OUTPATIENT)
Dept: FAMILY MEDICINE | Facility: CLINIC | Age: 81
End: 2021-01-21

## 2021-01-21 DIAGNOSIS — R89.9 ABNORMAL LABORATORY TEST RESULT: ICD-10-CM

## 2021-01-22 ENCOUNTER — COMMUNICATION - HEALTHEAST (OUTPATIENT)
Dept: FAMILY MEDICINE | Facility: CLINIC | Age: 81
End: 2021-01-22

## 2021-01-26 ENCOUNTER — AMBULATORY - HEALTHEAST (OUTPATIENT)
Dept: SURGERY | Facility: CLINIC | Age: 81
End: 2021-01-26

## 2021-01-26 DIAGNOSIS — Z11.59 ENCOUNTER FOR SCREENING FOR OTHER VIRAL DISEASES: ICD-10-CM

## 2021-01-27 ENCOUNTER — COMMUNICATION - HEALTHEAST (OUTPATIENT)
Dept: FAMILY MEDICINE | Facility: CLINIC | Age: 81
End: 2021-01-27

## 2021-02-02 ENCOUNTER — COMMUNICATION - HEALTHEAST (OUTPATIENT)
Dept: SCHEDULING | Facility: CLINIC | Age: 81
End: 2021-02-02

## 2021-02-02 ENCOUNTER — AMBULATORY - HEALTHEAST (OUTPATIENT)
Dept: LAB | Facility: CLINIC | Age: 81
End: 2021-02-02

## 2021-02-02 ENCOUNTER — COMMUNICATION - HEALTHEAST (OUTPATIENT)
Dept: ANTICOAGULATION | Facility: CLINIC | Age: 81
End: 2021-02-02

## 2021-02-02 DIAGNOSIS — I48.19 PERSISTENT ATRIAL FIBRILLATION (H): ICD-10-CM

## 2021-02-02 LAB — INR PPP: 1.8 (ref 0.9–1.1)

## 2021-02-09 ENCOUNTER — OFFICE VISIT - HEALTHEAST (OUTPATIENT)
Dept: FAMILY MEDICINE | Facility: CLINIC | Age: 81
End: 2021-02-09

## 2021-02-09 DIAGNOSIS — Z01.818 PREOPERATIVE EXAMINATION: ICD-10-CM

## 2021-02-09 DIAGNOSIS — R73.01 IMPAIRED FASTING GLUCOSE: ICD-10-CM

## 2021-02-09 DIAGNOSIS — E66.09 CLASS 2 OBESITY DUE TO EXCESS CALORIES WITHOUT SERIOUS COMORBIDITY WITH BODY MASS INDEX (BMI) OF 36.0 TO 36.9 IN ADULT: ICD-10-CM

## 2021-02-09 DIAGNOSIS — I25.10 CORONARY ARTERY DISEASE INVOLVING NATIVE CORONARY ARTERY OF NATIVE HEART WITHOUT ANGINA PECTORIS: ICD-10-CM

## 2021-02-09 DIAGNOSIS — G45.9 TRANSIENT CEREBRAL ISCHEMIA, UNSPECIFIED TYPE: ICD-10-CM

## 2021-02-09 DIAGNOSIS — I48.19 PERSISTENT ATRIAL FIBRILLATION (H): ICD-10-CM

## 2021-02-09 DIAGNOSIS — D69.6 THROMBOCYTOPENIA (H): ICD-10-CM

## 2021-02-09 DIAGNOSIS — R89.9 ABNORMAL LABORATORY TEST RESULT: ICD-10-CM

## 2021-02-09 DIAGNOSIS — E66.812 CLASS 2 OBESITY DUE TO EXCESS CALORIES WITHOUT SERIOUS COMORBIDITY WITH BODY MASS INDEX (BMI) OF 36.0 TO 36.9 IN ADULT: ICD-10-CM

## 2021-02-09 DIAGNOSIS — Z01.818 PREOP GENERAL PHYSICAL EXAM: ICD-10-CM

## 2021-02-09 LAB
ANION GAP SERPL CALCULATED.3IONS-SCNC: 6 MMOL/L (ref 5–18)
BUN SERPL-MCNC: 17 MG/DL (ref 8–28)
CALCIUM SERPL-MCNC: 9.9 MG/DL (ref 8.5–10.5)
CHLORIDE BLD-SCNC: 107 MMOL/L (ref 98–107)
CO2 SERPL-SCNC: 28 MMOL/L (ref 22–31)
CREAT SERPL-MCNC: 0.97 MG/DL (ref 0.7–1.3)
ERYTHROCYTE [DISTWIDTH] IN BLOOD BY AUTOMATED COUNT: 14.1 % (ref 11–14.5)
GFR SERPL CREATININE-BSD FRML MDRD: >60 ML/MIN/1.73M2
GLUCOSE BLD-MCNC: 121 MG/DL (ref 70–125)
HBA1C MFR BLD: 5.7 %
HCT VFR BLD AUTO: 43.7 % (ref 40–54)
HGB BLD-MCNC: 15.8 G/DL (ref 14–18)
MCH RBC QN AUTO: 31 PG (ref 27–34)
MCHC RBC AUTO-ENTMCNC: 36.2 G/DL (ref 32–36)
MCV RBC AUTO: 86 FL (ref 80–100)
PLATELET # BLD AUTO: 112 THOU/UL (ref 140–440)
PMV BLD AUTO: 9.6 FL (ref 7–10)
POTASSIUM BLD-SCNC: 4.3 MMOL/L (ref 3.5–5)
RBC # BLD AUTO: 5.1 MILL/UL (ref 4.4–6.2)
SODIUM SERPL-SCNC: 141 MMOL/L (ref 136–145)
WBC: 7.5 THOU/UL (ref 4–11)

## 2021-02-09 ASSESSMENT — MIFFLIN-ST. JEOR
SCORE: 1922.35
SCORE: 1912.72

## 2021-02-10 ENCOUNTER — COMMUNICATION - HEALTHEAST (OUTPATIENT)
Dept: FAMILY MEDICINE | Facility: CLINIC | Age: 81
End: 2021-02-10

## 2021-02-12 ENCOUNTER — AMBULATORY - HEALTHEAST (OUTPATIENT)
Dept: LAB | Facility: CLINIC | Age: 81
End: 2021-02-12

## 2021-02-12 DIAGNOSIS — Z11.59 ENCOUNTER FOR SCREENING FOR OTHER VIRAL DISEASES: ICD-10-CM

## 2021-02-14 ENCOUNTER — COMMUNICATION - HEALTHEAST (OUTPATIENT)
Dept: SCHEDULING | Facility: CLINIC | Age: 81
End: 2021-02-14

## 2021-02-14 ENCOUNTER — ANESTHESIA - HEALTHEAST (OUTPATIENT)
Dept: SURGERY | Facility: CLINIC | Age: 81
End: 2021-02-14

## 2021-02-15 ENCOUNTER — COMMUNICATION - HEALTHEAST (OUTPATIENT)
Dept: ANTICOAGULATION | Facility: CLINIC | Age: 81
End: 2021-02-15

## 2021-02-15 ENCOUNTER — SURGERY - HEALTHEAST (OUTPATIENT)
Dept: SURGERY | Facility: CLINIC | Age: 81
End: 2021-02-15

## 2021-02-15 ASSESSMENT — MIFFLIN-ST. JEOR: SCORE: 1890.6

## 2021-02-18 ENCOUNTER — COMMUNICATION - HEALTHEAST (OUTPATIENT)
Dept: FAMILY MEDICINE | Facility: CLINIC | Age: 81
End: 2021-02-18

## 2021-02-24 ENCOUNTER — AMBULATORY - HEALTHEAST (OUTPATIENT)
Dept: NURSING | Facility: CLINIC | Age: 81
End: 2021-02-24

## 2021-02-25 ENCOUNTER — COMMUNICATION - HEALTHEAST (OUTPATIENT)
Dept: ANTICOAGULATION | Facility: CLINIC | Age: 81
End: 2021-02-25

## 2021-02-25 ENCOUNTER — AMBULATORY - HEALTHEAST (OUTPATIENT)
Dept: LAB | Facility: CLINIC | Age: 81
End: 2021-02-25

## 2021-02-25 DIAGNOSIS — I48.19 PERSISTENT ATRIAL FIBRILLATION (H): ICD-10-CM

## 2021-02-25 LAB — INR PPP: 1.6 (ref 0.9–1.1)

## 2021-02-27 ENCOUNTER — AMBULATORY - HEALTHEAST (OUTPATIENT)
Dept: GASTROENTEROLOGY | Facility: CLINIC | Age: 81
End: 2021-02-27

## 2021-02-27 DIAGNOSIS — Z11.59 ENCOUNTER FOR SCREENING FOR OTHER VIRAL DISEASES: ICD-10-CM

## 2021-03-09 ENCOUNTER — AMBULATORY - HEALTHEAST (OUTPATIENT)
Dept: LAB | Facility: CLINIC | Age: 81
End: 2021-03-09

## 2021-03-09 ENCOUNTER — COMMUNICATION - HEALTHEAST (OUTPATIENT)
Dept: ANTICOAGULATION | Facility: CLINIC | Age: 81
End: 2021-03-09

## 2021-03-09 DIAGNOSIS — I48.19 PERSISTENT ATRIAL FIBRILLATION (H): ICD-10-CM

## 2021-03-09 LAB — INR PPP: 2.4 (ref 0.9–1.1)

## 2021-03-17 ENCOUNTER — AMBULATORY - HEALTHEAST (OUTPATIENT)
Dept: NURSING | Facility: CLINIC | Age: 81
End: 2021-03-17

## 2021-03-22 ENCOUNTER — COMMUNICATION - HEALTHEAST (OUTPATIENT)
Dept: SCHEDULING | Facility: CLINIC | Age: 81
End: 2021-03-22

## 2021-03-23 ENCOUNTER — COMMUNICATION - HEALTHEAST (OUTPATIENT)
Dept: FAMILY MEDICINE | Facility: CLINIC | Age: 81
End: 2021-03-23

## 2021-03-23 ENCOUNTER — COMMUNICATION - HEALTHEAST (OUTPATIENT)
Dept: ANTICOAGULATION | Facility: CLINIC | Age: 81
End: 2021-03-23

## 2021-03-23 ENCOUNTER — AMBULATORY - HEALTHEAST (OUTPATIENT)
Dept: LAB | Facility: CLINIC | Age: 81
End: 2021-03-23

## 2021-03-23 DIAGNOSIS — I48.19 PERSISTENT ATRIAL FIBRILLATION (H): ICD-10-CM

## 2021-03-23 LAB — INR PPP: 2.1 (ref 0.9–1.1)

## 2021-03-26 ENCOUNTER — COMMUNICATION - HEALTHEAST (OUTPATIENT)
Dept: FAMILY MEDICINE | Facility: CLINIC | Age: 81
End: 2021-03-26

## 2021-03-30 ENCOUNTER — COMMUNICATION - HEALTHEAST (OUTPATIENT)
Dept: FAMILY MEDICINE | Facility: CLINIC | Age: 81
End: 2021-03-30

## 2021-04-13 ENCOUNTER — OFFICE VISIT - HEALTHEAST (OUTPATIENT)
Dept: FAMILY MEDICINE | Facility: CLINIC | Age: 81
End: 2021-04-13

## 2021-04-13 DIAGNOSIS — D12.6 SERRATED ADENOMA OF COLON: ICD-10-CM

## 2021-04-13 DIAGNOSIS — I25.10 CORONARY ARTERY DISEASE INVOLVING NATIVE CORONARY ARTERY OF NATIVE HEART WITHOUT ANGINA PECTORIS: ICD-10-CM

## 2021-04-13 DIAGNOSIS — R73.01 IMPAIRED FASTING GLUCOSE: ICD-10-CM

## 2021-04-13 DIAGNOSIS — I48.19 PERSISTENT ATRIAL FIBRILLATION (H): ICD-10-CM

## 2021-04-15 ENCOUNTER — AMBULATORY - HEALTHEAST (OUTPATIENT)
Dept: LAB | Facility: CLINIC | Age: 81
End: 2021-04-15

## 2021-04-15 DIAGNOSIS — Z11.59 ENCOUNTER FOR SCREENING FOR OTHER VIRAL DISEASES: ICD-10-CM

## 2021-04-15 ASSESSMENT — MIFFLIN-ST. JEOR: SCORE: 1922.35

## 2021-04-16 ENCOUNTER — ANESTHESIA - HEALTHEAST (OUTPATIENT)
Dept: SURGERY | Facility: CLINIC | Age: 81
End: 2021-04-16

## 2021-04-17 ENCOUNTER — COMMUNICATION - HEALTHEAST (OUTPATIENT)
Dept: SCHEDULING | Facility: CLINIC | Age: 81
End: 2021-04-17

## 2021-04-19 ENCOUNTER — SURGERY - HEALTHEAST (OUTPATIENT)
Dept: SURGERY | Facility: CLINIC | Age: 81
End: 2021-04-19

## 2021-04-19 ENCOUNTER — AMBULATORY - HEALTHEAST (OUTPATIENT)
Dept: ANTICOAGULATION | Facility: CLINIC | Age: 81
End: 2021-04-19

## 2021-04-29 ENCOUNTER — COMMUNICATION - HEALTHEAST (OUTPATIENT)
Dept: ANTICOAGULATION | Facility: CLINIC | Age: 81
End: 2021-04-29

## 2021-05-04 ENCOUNTER — COMMUNICATION - HEALTHEAST (OUTPATIENT)
Dept: ANTICOAGULATION | Facility: CLINIC | Age: 81
End: 2021-05-04

## 2021-05-04 ENCOUNTER — AMBULATORY - HEALTHEAST (OUTPATIENT)
Dept: LAB | Facility: CLINIC | Age: 81
End: 2021-05-04

## 2021-05-04 DIAGNOSIS — I48.19 PERSISTENT ATRIAL FIBRILLATION (H): ICD-10-CM

## 2021-05-04 LAB — INR PPP: 2.1 (ref 0.9–1.1)

## 2021-05-26 ENCOUNTER — COMMUNICATION - HEALTHEAST (OUTPATIENT)
Dept: ANTICOAGULATION | Facility: CLINIC | Age: 81
End: 2021-05-26

## 2021-05-26 VITALS
HEART RATE: 80 BPM | SYSTOLIC BLOOD PRESSURE: 118 MMHG | RESPIRATION RATE: 18 BRPM | DIASTOLIC BLOOD PRESSURE: 64 MMHG | TEMPERATURE: 97.8 F

## 2021-05-26 VITALS — HEART RATE: 72 BPM | RESPIRATION RATE: 18 BRPM | TEMPERATURE: 97.8 F

## 2021-05-27 VITALS
SYSTOLIC BLOOD PRESSURE: 102 MMHG | TEMPERATURE: 97.1 F | RESPIRATION RATE: 26 BRPM | DIASTOLIC BLOOD PRESSURE: 62 MMHG | HEART RATE: 80 BPM

## 2021-05-27 VITALS
HEART RATE: 76 BPM | DIASTOLIC BLOOD PRESSURE: 60 MMHG | TEMPERATURE: 98.2 F | RESPIRATION RATE: 12 BRPM | SYSTOLIC BLOOD PRESSURE: 120 MMHG

## 2021-05-27 VITALS
TEMPERATURE: 97.5 F | RESPIRATION RATE: 24 BRPM | SYSTOLIC BLOOD PRESSURE: 118 MMHG | HEART RATE: 64 BPM | DIASTOLIC BLOOD PRESSURE: 64 MMHG

## 2021-05-27 VITALS
DIASTOLIC BLOOD PRESSURE: 74 MMHG | SYSTOLIC BLOOD PRESSURE: 120 MMHG | TEMPERATURE: 97.7 F | RESPIRATION RATE: 16 BRPM | HEART RATE: 80 BPM

## 2021-05-27 NOTE — TELEPHONE ENCOUNTER
ANTICOAGULATION  MANAGEMENT    Assessment     Today's INR result of 2.5 is Therapeutic (goal INR of 2.0-3.0)        Warfarin taken as previously instructed    No new diet changes affecting INR    No new medication/supplements affecting INR    Continues to tolerate warfarin with no reported s/s of bleeding or thromboembolism     Previous INR was Therapeutic    Plan:     Spoke with Prosper regarding INR result and instructed:     Warfarin Dosing Instructions:  Continue current warfarin dose    2.5 mg every Tue, Fri; 5 mg all other days       Instructed patient to follow up no later than: 6-8 weeks. Appt is made for 6/4.     Education provided: importance of taking warfarin as instructed    Waldo verbalizes understanding and agrees to warfarin dosing plan.    Instructed to call the [facility] for any changes, questions or concerns. (#[phone])   ?   Christopher Jimenez RN    Subjective/Objective:      Prosper Lopez, a 78 y.o. male is on warfarin.     Prosper reports:     Home warfarin dose: verbally confirmed home dose with pt and updated on anticoagulation calendar     Missed doses: No     Medication changes:  No     S/S of bleeding or thromboembolism:  No     New Injury or illness:  No     Changes in diet or alcohol consumption:  No     Upcoming surgery, procedure or cardioversion:  No    Anticoagulation Episode Summary     Current INR goal:   2.0-3.0   TTR:   76.3 % (4.2 y)   Next INR check:   6/4/2019   INR from last check:   2.50 (4/9/2019)   Weekly max warfarin dose:      Target end date:   Indefinite   INR check location:      Preferred lab:      Send INR reminders to:   ANTICOAGULATION POOL B (MPW,HUG,STW,RVL,OAK,RLN)    Indications    A-fib (H) (Resolved) [I48.91]           Comments:            Anticoagulation Care Providers     Provider Role Specialty Phone number    Demar Dickey MD Referring Family Medicine [phone]

## 2021-05-28 ENCOUNTER — RECORDS - HEALTHEAST (OUTPATIENT)
Dept: ADMINISTRATIVE | Facility: CLINIC | Age: 81
End: 2021-05-28

## 2021-05-29 NOTE — TELEPHONE ENCOUNTER
ANTICOAGULATION  MANAGEMENT    Assessment     Today's INR result of 2.0 is Therapeutic (goal INR of 2.0-3.0)        Warfarin taken as previously instructed    No new diet changes affecting INR    No new medication/supplements affecting INR    Continues to tolerate warfarin with no reported s/s of bleeding or thromboembolism     Previous INR was Therapeutic    Plan:     Spoke with Prosper regarding INR result and instructed:     Warfarin Dosing Instructions:  Continue current warfarin dose    2.5 mg every Tue, Fri; 5 mg all other days         Instructed patient to follow up no later than: 6-8 weeks. Appt is made fro 7/30.     Education provided: importance of taking warfarin as instructed    Waldo verbalizes understanding and agrees to warfarin dosing plan.    Instructed to call the [facility] for any changes, questions or concerns. (#[phone])   ?   Christopher Jimenez RN    Subjective/Objective:      Prosper Lopez, a 79 y.o. male is on warfarin.     Prosper reports:     Home warfarin dose: verbally confirmed home dose with pt and updated on anticoagulation calendar     Missed doses: No     Medication changes:  No     S/S of bleeding or thromboembolism:  No     New Injury or illness:  No     Changes in diet or alcohol consumption:  No     Upcoming surgery, procedure or cardioversion:  No    Anticoagulation Episode Summary     Current INR goal:   2.0-3.0   TTR:   77.1 % (4.4 y)   Next INR check:   7/30/2019   INR from last check:   2.00 (6/4/2019)   Weekly max warfarin dose:      Target end date:   Indefinite   INR check location:      Preferred lab:      Send INR reminders to:   [facility]    Indications    A-fib (H) (Resolved) [I48.91]           Comments:            Anticoagulation Care Providers     Provider Role Specialty Phone number    Foreign Tavares MD Referring Family Medicine [phone]

## 2021-05-30 VITALS — WEIGHT: 259 LBS | BODY MASS INDEX: 36.12 KG/M2

## 2021-05-30 NOTE — TELEPHONE ENCOUNTER
Patient Returning Call  Reason for call:  Returning phone call.  Information relayed to patient:  Patient was questioning the status of his refill request for Warfarin. I informed patient the refill had been submitted to his pharmacy. Patient was concerned because he recently changed his PCP.  Patient has additional questions:  No  If YES, what are your questions/concerns: No additional questions at this time.  Okay to leave a detailed message?: No call back needed

## 2021-05-30 NOTE — TELEPHONE ENCOUNTER
ANTICOAGULATION  MANAGEMENT    Assessment     Today's INR result of 2.6 is Therapeutic (goal INR of 2.0-3.0)        Warfarin taken as previously instructed    No new diet changes affecting INR    Interaction between Amoxicillin and warfarin may be affecting INR- will finish tmr.     Continues to tolerate warfarin with no reported s/s of bleeding or thromboembolism     Previous INR was Supratherapeutic    Plan:     Spoke with Prosper regarding INR result and instructed:     Warfarin Dosing Instructions:  Continue current warfarin dose    2.5 mg every Tue, Fri; 5 mg all other days         Instructed patient to follow up no later than: OV on 8/8.     Education provided: importance of taking warfarin as instructed    Waldo verbalizes understanding and agrees to warfarin dosing plan.    Instructed to call the [facility] for any changes, questions or concerns. (#[phone])   ?   Christopher Jimenez RN    Subjective/Objective:      Prosper Lopez, a 79 y.o. male is on warfarin.     Prosper reports:     Home warfarin dose: template incorrect; verbally confirmed home dose with pt and updated on anticoagulation calendar     Missed doses: No     Medication changes:  Yes: Amoxicillin     S/S of bleeding or thromboembolism:  No     New Injury or illness:  No     Changes in diet or alcohol consumption:  No     Upcoming surgery, procedure or cardioversion:  No    Anticoagulation Episode Summary     Current INR goal:   2.0-3.0   TTR:   77.5 % (4.5 y)   Next INR check:   8/8/2019   INR from last check:   2.60 (7/30/2019)   Weekly max warfarin dose:      Target end date:   Indefinite   INR check location:      Preferred lab:      Send INR reminders to:   [facility]    Indications    A-fib (H) (Resolved) [I48.91]           Comments:            Anticoagulation Care Providers     Provider Role Specialty Phone number    Foreign Tavares MD Referring Family Medicine [phone]

## 2021-05-30 NOTE — TELEPHONE ENCOUNTER
Medication Question or Clarification  Who is calling: Patient  What medication are you calling about? (include dose and sig)    Disp Refills Start End    warfarin (COUMADIN/JANTOVEN) 5 MG tablet 90 tablet 1 7/8/2019     Sig: TAKE 0.5 TO 1 TABLET BY MOUTH EVERY DAY. ADJUST DOSE PER INR RESULT AS INSTRUCTED.    Sent to pharmacy as: warfarin (COUMADIN/JANTOVEN) 5 MG tablet    Cosign for Ordering: Accepted by Foreign Tavares MD on 7/8/2019  5:02 PM      Who prescribed the medication?: Foreign Tavares MD   What is your question/concern?: Patient called with concerns that his pharmacy has still not received the above medication to process his refill.  Patient stated this is not urgent he is not out of the medication.  Patient verbalized a concern that this was mixed up with another patient at the pharmacy with the same or similar name.  Patient stated this has happened in the past.      Writer offered to contact the pharmacy to verify whether or not this Rx had been received, and if not would try to determine where the error may have occurred.  Writer stated a return call would be made to the patient if there were any questions or concerns about the Rx.  Patient was in  Agreement.    Writer placed a call to [facility], spoke with the pharmacist.  Writer was informed that the Rx that was sent electronically on 7/8/19 had not been received.  Writer explained to the pharmacist the patient's concerns about it being filled under the wrong patient due to similar names.  Pharmacist stated patient's are always verified by their date of birth.  Pharmacist requested to be provided with the medication information verbally in order to resolve this for the patient as soon as possible.  Writer provider a verbal order for the above Rx.  Pharmacist stated they will process this as soon as possible for the patient in order to resolve this situation.  Pharmacy: [facility] ([address])  Okay to leave a  detailed message?: No  Site CMT - Please call the pharmacy to obtain any additional needed information.

## 2021-05-30 NOTE — TELEPHONE ENCOUNTER
Who is calling:  Patient  Reason for Call:  Patient wanted to inform [facility] nurse about antibiotics he was prescribed from his dentist.  Date of last appointment with primary care: 2.12.19  Okay to leave a detailed message: Yes

## 2021-05-30 NOTE — TELEPHONE ENCOUNTER
RN cannot approve Refill Request    RN can NOT refill this medication med is not covered by policy/route to provider. Last office visit: 4/19/2017 Demar Dickey MD Last Physical: 6/19/2018 Last MTM visit: Visit date not found Last visit same specialty: Visit date not found.  Next visit within 3 mo: Visit date not found  Next physical within 3 mo: Visit date not found      Jeana Cornelius, [facility] Triage/Med Refill 7/7/2019    Requested Prescriptions   Pending Prescriptions Disp Refills     warfarin (COUMADIN/JANTOVEN) 5 MG tablet [Pharmacy Med Name: WARFARIN SOD 5MG TABLETS (PEACH)] 90 tablet 0     Sig: TAKE 0.5 TO 1 TABLET BY MOUTH EVERY DAY. ADJUST DOSE PER INR RESULT AS INSTRUCTED.       Warfarin Refill Protocol  Failed - 7/7/2019 11:14 AM        Failed -  Route to appropriate pool/provider     Last Anticoagulation Summary:   Anticoagulation Episode Summary     Current INR goal:   2.0-3.0   TTR:   77.1 % (4.4 y)   Next INR check:   7/30/2019   INR from last check:   2.00 (6/4/2019)   Weekly max warfarin dose:      Target end date:   Indefinite   INR check location:      Preferred lab:      Send INR reminders to:   [facility]    Indications    A-fib (H) (Resolved) [I48.91]           Comments:            Anticoagulation Care Providers     Provider Role Specialty Phone number    Foreign Tavares MD Referring Family Medicine [phone]                Passed - Provider visit in last year     Last office visit with prescriber/PCP: 4/19/2017 Demar Dickey MD OR same dept: Visit date not found OR same specialty: Visit date not found  Last physical: 6/19/2018 Last MTM visit: Visit date not found    Next appt within 3 mo: Visit date not found Next physical within 3 mo: Visit date not found  Prescriber OR PCP: Demar Dickey MD  Last diagnosis associated with med order: 1. Transient Ischemic Attack  - warfarin (COUMADIN/JANTOVEN) 5 MG tablet [Pharmacy Med Name: WARFARIN SOD 5MG  TABLETS (PEACH)]; TAKE 0.5 TO 1 TABLET BY MOUTH EVERY DAY. ADJUST DOSE PER INR RESULT AS INSTRUCTED.  Dispense: 90 tablet; Refill: 0    2. Long term current use of anticoagulant therapy  - warfarin (COUMADIN/JANTOVEN) 5 MG tablet [Pharmacy Med Name: WARFARIN SOD 5MG TABLETS (PEACH)]; TAKE 0.5 TO 1 TABLET BY MOUTH EVERY DAY. ADJUST DOSE PER INR RESULT AS INSTRUCTED.  Dispense: 90 tablet; Refill: 0    If protocol passes may refill for 6 months if within 3 months of last provider visit (or a total of 9 months).

## 2021-05-30 NOTE — TELEPHONE ENCOUNTER
Left message to call back for: returning pt's call.  Information to relay to patient:  LM to return call

## 2021-05-30 NOTE — TELEPHONE ENCOUNTER
ANTICOAGULATION  MANAGEMENT    Assessment     Today's INR result of 3.1 is Supratherapeutic (goal INR of 2.0-3.0)        Warfarin taken as previously instructed    No new diet changes affecting INR    Interaction between Percocet and warfarin may be affecting INR- has been taking for toothache.     Also started Amoxicillin today for the toothache. Will follow up with dentist on 7/26 to see if can stop abx. Pt will call [facility] back if he has to continue abx past 7/26.     Continues to tolerate warfarin with no reported s/s of bleeding or thromboembolism     Previous INR was Therapeutic    Plan:     Spoke with Prosper regarding INR result and instructed:     Warfarin Dosing Instructions:  Take one time lower dose of 2.5 mg today only then continue current warfarin dose    2.5 mg every Tue, Fri; 5 mg all other days         Instructed patient to follow up no later than: OV on 8/8.    Education provided: importance of taking warfarin as instructed and potential interaction between warfarin and Amoxicillin, Percocet    Waldo verbalizes understanding and agrees to warfarin dosing plan.    Instructed to call the [facility] for any changes, questions or concerns. (#[phone])   ?   Christopher Jimenez RN    Subjective/Objective:      Prosper Lopez, a 79 y.o. male is on warfarin.     Prosper reports:     Home warfarin dose: verbally confirmed home dose with pt and updated on anticoagulation calendar     Missed doses: No     Medication changes:  Yes: taking Percocet and Amoxicillin     S/S of bleeding or thromboembolism:  No     New Injury or illness:  Yes: toothache     Changes in diet or alcohol consumption:  No     Upcoming surgery, procedure or cardioversion:  No    Anticoagulation Episode Summary     Current INR goal:   2.0-3.0   TTR:   77.5 % (4.5 y)   Next INR check:   8/8/2019   INR from last check:   3.10! (7/24/2019)   Weekly max warfarin dose:      Target end date:   Indefinite   INR check location:       Preferred lab:      Send INR reminders to:   [facility]    Indications    A-fib (H) (Resolved) [I48.91]           Comments:            Anticoagulation Care Providers     Provider Role Specialty Phone number    Foreign Tavares MD Referring Family Medicine [phone]

## 2021-05-30 NOTE — TELEPHONE ENCOUNTER
Who is calling:  Patient   Reason for Call:  Patient states that he has some concerns about Warfarin 5 mg tablet . Per patient he is seeing different provider . Patient requested call back from providers nurse to discuss directly about medication.   Date of last appointment with primary care: 02/12/19  Okay to leave a detailed message: No

## 2021-05-30 NOTE — TELEPHONE ENCOUNTER
[facility] called back and spoke with pt. Pt started on Amoxicillin 7/24 and will continue taking until around 7/30. [facility] informed pt of potential interaction between warfarin and Amoxicillin. Advised to recheck INR on 7/30. Pt agreed. INR appt is made for 7/30.

## 2021-05-31 VITALS — WEIGHT: 259.8 LBS | HEIGHT: 72 IN | BODY MASS INDEX: 35.19 KG/M2

## 2021-05-31 VITALS — WEIGHT: 257 LBS | HEIGHT: 72 IN | BODY MASS INDEX: 34.81 KG/M2

## 2021-05-31 VITALS — WEIGHT: 259.6 LBS | BODY MASS INDEX: 35.16 KG/M2 | HEIGHT: 72 IN

## 2021-05-31 VITALS — BODY MASS INDEX: 36.17 KG/M2 | WEIGHT: 263 LBS

## 2021-05-31 NOTE — PATIENT INSTRUCTIONS - HE
Patient Stated Goal: Prevent further stones  Calcium Oxalate Stone Prevention Self Management    Drink more fluids:    Drinking more liquids is the best way you can help prevent future stones. Stones can form when substances in the urine are too concentrated. The more you drink, the more urine you will make. This means all substances in the urine will be less concentrated.    How much urine should I be producing?    The usual recommended daily urine production is about 2 to 3 quarts (1884-3340 ml). If you are producing more than 3 quarts of urine on a regular basis, it is possible to deplete important minerals stored in the body.    To measure the amount of urine you produce in a day, you can either:    Collect all urine in a container and measure at the end of the day     Use a measuring cup each time you urinate and add up the amounts at the end of the day     Observe    Color - Dark micah urine is concentrated. Light straw color or lighter is dilute and desirable     Odor - Concentrated urine tends to smell stronger. Dilute urine is nearly odorless    Ways to increase your fluid intake    Increasing the amount of fluid you drink is effective for all types of kidney stones. While water is commonly recommended, all fluids are effective for increasing the amount of urine your body produces.    Focus on starting a lifelong habit, rather than a short-term solution.     Keep liquids on hand that you like. Crystal Light is a low calorie appropriate choice.    Drink out of larger glasses. You'll tend to drink more with each serving.     Have an additional glass of fluid with each meal.     Keep a water or drink bottle at work and fill it regularly.     *If you are prone to fluid retention, consult your doctor before making changes to your fluid habits.    Low Oxalate Diet:    Avoid excess amounts or daily consumption of these foods:    All nuts and nut products including peanuts, almonds, pecans, peanut butter, almond  milk    Rhubarb    Chocolate    Soybeans and soy products     Spinach    Wheat Germ    Beets    Maintain a normal calcium diet:    Researches have found that people with low calcium intakes tend to have more stones. Foods with high calcium content are acceptable and include:    Dairy products (including milk, cheese and yogurt)    Meat and fish    Enriched cereals    Dark green vegetables    What about calcium supplements?     Many people take calcium supplements, either on their own or as prescribed by a doctor. Research has indicated that calcium supplements do not usually pose a risk for stone formation.  Calcium citrate is a better choice for a supplement.    Avoid excess salt:    Salt (sodium chloride) is found in abundance in many foods. High sodium levels in the urine can interfere with the kidney's handling of calcium.     Tips for reducing the salt in your diet:    Don't use salt at the table    Reduce the salt used in food preparation. Try 1/2 teaspoon when recipes call for 1 teaspoon.    Use herbs and spices for flavoring instead of salt.    Avoid salty foods.    Check the label before you buy or use a product. Note sodium and portion size information.    Try to consume less than 2,000 mg/day. (1 teaspoon = 2,000 mg)    Foods with high sodium content include:    Processed meat (including luncheon meats, sausage)     Crackers     Instant cereal     Processed cheese     Canned soups     Chips and snack foods     Soy sauce    [facility] can respond to your questions or concerns 24 hours a day at [phone].

## 2021-05-31 NOTE — TELEPHONE ENCOUNTER
ANTICOAGULATION  MANAGEMENT    Assessment     Today's INR result of 1.7 is Subtherapeutic (goal INR of 2.0-3.0)        Missed dose(s) may be affecting INR- missed a dose 1 or 2 weeks ago.    No new diet changes affecting INR    No new medication/supplements affecting INR    Continues to tolerate warfarin with no reported s/s of bleeding or thromboembolism     Previous INR was Therapeutic    Plan:     Spoke with Prosper regarding INR result and instructed:     Warfarin Dosing Instructions:  Take booster dose of 7.5 mg today only then continue current warfarin dose    2.5 mg every Tue, Fri; 5 mg all other days         Instructed patient to follow up no later than: 2 weeks.    Education provided: importance of taking warfarin as instructed    Waldo verbalizes understanding and agrees to warfarin dosing plan.    Instructed to call the [facility] for any changes, questions or concerns. (#[phone])   ?   Christopher Jimenez RN    Subjective/Objective:      Prosper Lopez, a 79 y.o. male is on warfarin.     Prosper reports:     Home warfarin dose: verbally confirmed home dose with pt and updated on anticoagulation calendar     Missed doses: Yes: one dose 1 or 2 weeks ago     Medication changes:  No     S/S of bleeding or thromboembolism:  No     New Injury or illness:  No     Changes in diet or alcohol consumption:  No     Upcoming surgery, procedure or cardioversion:  No    Anticoagulation Episode Summary     Current INR goal:   2.0-3.0   TTR:   77.4 % (4.6 y)   Next INR check:   9/4/2019   INR from last check:   1.70! (8/21/2019)   Weekly max warfarin dose:      Target end date:   Indefinite   INR check location:      Preferred lab:      Send INR reminders to:   [facility]    Indications    A-fib (H) (Resolved) [I48.91]           Comments:            Anticoagulation Care Providers     Provider Role Specialty Phone number    Foreign Tavares MD Referring Family Medicine [phone]

## 2021-05-31 NOTE — TELEPHONE ENCOUNTER
ANTICOAGULATION  MANAGEMENT PROGRAM    Prosper Bhagatickson is overdue for INR check.  Reminder call made.    Spoke with pt and scheduled INR appointment on 8/20 .    Christopher Jimenez RN

## 2021-05-31 NOTE — PROGRESS NOTES
Assessment/Plan:        Diagnoses and all orders for this visit:    Calculus of kidney  -     Patient Stated Goal: Prevent further stones  -     Calcium Oxalate Stone Prevention Education    History of kidney stones  -     Urinalysis Macroscopic      Stone Management Plan  [facility] Stone [facility] 7/2/2018 8/6/2018 8/27/2019   Urinary Tract Infection No suspicion of infection No suspicion of infection No suspicion of infection   Renal Colic Well controlled symptoms Asymptomatic at this time Asymptomatic at this time   Renal Failure No suspicion of renal failure No suspicion of renal failure No suspicion of renal failure   Current CT date - 8/6/2018 8/27/2019   Right sided stones? - Yes Yes   R Number of ureteral stones - No ureteral stones No ureteral stones   R Number of kidney stones  - Renal stones unchanged from last exam Renal stones unchanged from last exam   R GSD of kidney stones - 2 - 4 2 - 4   R Hydronephrosis - None None   R Stone Event No current event No current event No current event   R Current Plan - Observe Observe   Observe rationale - Significant stone burden amenable to emergency management Significant stone burden amenable to emergency management   Left sided stones? - No No   L Number of ureteral stones - - -   L GSD of ureteral stones - - -   L Location of ureteral stone - - -   L Number of kidney stones  - - -   L GSD of kidney stones - - -   L Hydronephrosis - None -   L Stone Event Established event Resolved event No current event   Diagnosis date - - -   Initial location of primary symptomatic stone - - -   Initial GSD of primary symptomatic stone - - -   Resolved date - 8/6/2018 -   Post-op status Stent Removal No residual stone -   L MET Status - - -   L Current Plan - - -   MET - - -   Clear rationale - - -             Subjective:      HPI  Mr. Prosper Lopez is a 79 y.o.  male returning to the [facility] [place] for follow up of his stone disease.    He has done  well in the interim. No stone symptoms.    CT from today is personally reviewed. Right upper pole 3 mm stone is unchanged.    We had a good discussion regarding future observation. He has a compromised distal ureter secondary to prostate surgery and is likely to have challenges should the stone mobilize. That being said, he is comfortable waiting for symptoms before intervention.    Will follow on a PRN basis and  should the need arise.     ROS   Review of systems is negative except for HPI.    Past Medical History:   Diagnosis Date     Atrial fibrillation (H) 06/10/2014     Calculus of ureter 4/4/2018     Cardiomyopathy (H) 06/16/2014     Cataract 2012     Coronary artery disease      Hydrarthrosis 2012     Hyperlipidemia 2012     Nephrolithiasis 2009    first stone at age 68     Obesity 9/1/2015     Plantar fasciitis of right foot      Prostate CA (H) 2011     Seborrheic keratosis 2012     Skin neoplasm     Of uncertain behavior     TIA (transient ischemic attack) 01/24/2012     Tubular adenoma of colon 2006       Past Surgical History:   Procedure Laterality Date     CA CYSTO/URETERO W/LITHOTRIPSY &INDWELL STENT INSRT Left 6/22/2018    Procedure: CYSTOSCOPY, LEFT  URETEROSCOPY, LASER LITHOTRIPSY STENT INSERTION;  Surgeon: Foreign Lakhani MD;  Location: [facility];  Service: Urology     PROSTATE SURGERY       URETEROSCOPY  2009       Current Outpatient Medications   Medication Sig Dispense Refill     carvedilol (COREG) 25 MG tablet Take 1 tablet (25 mg total) by mouth 2 (two) times a day with meals. 60 tablet 5     cholecalciferol, vitamin D3, (VITAMIN D3) 2,000 unit cap Take 2,000 Units by mouth daily.       warfarin (COUMADIN/JANTOVEN) 5 MG tablet TAKE 0.5 TO 1 TABLET BY MOUTH EVERY DAY. ADJUST DOSE PER INR RESULT AS INSTRUCTED. 90 tablet 1     No current facility-administered medications for this visit.        No Known Allergies    Social History     Socioeconomic History     Marital status:       Spouse name: Not on file     Number of children: Not on file     Years of education: Not on file     Highest education level: Not on file   Occupational History     Not on file   Social Needs     Financial resource strain: Not on file     Food insecurity:     Worry: Not on file     Inability: Not on file     Transportation needs:     Medical: Not on file     Non-medical: Not on file   Tobacco Use     Smoking status: Former Smoker     Last attempt to quit: 2014     Years since quittin.2     Smokeless tobacco: Never Used   Substance and Sexual Activity     Alcohol use: Yes     Comment: Occasional     Drug use: No     Sexual activity: Not on file   Lifestyle     Physical activity:     Days per week: Not on file     Minutes per session: Not on file     Stress: Not on file   Relationships     Social connections:     Talks on phone: Not on file     Gets together: Not on file     Attends Alevism service: Not on file     Active member of club or organization: Not on file     Attends meetings of clubs or organizations: Not on file     Relationship status: Not on file     Intimate partner violence:     Fear of current or ex partner: Not on file     Emotionally abused: Not on file     Physically abused: Not on file     Forced sexual activity: Not on file   Other Topics Concern     Not on file   Social History Narrative     Not on file       Family History   Problem Relation Age of Onset     Heart attack Father      Heart disease Father      Colon cancer Brother      Urolithiasis Brother      Objective:      Physical Exam  Vitals:    19 0823   BP: 105/62   Pulse: 80   Temp: 97.6  F (36.4  C)     General - well developed, well nourished, appropriate for age. Appears no distress at this time  Abdomen - moderately obese soft, non-tender, no hepatosplenomegaly, no masses.   - no flank tenderness, no suprapubic tenderness, kidney and bladder non-palpable  MSK - normal spinal curvature. no spinal  tenderness. normal gait. muscular strength intact.  Psych - oriented to time, place, and person, normal mood and affect.      Labs   Urinalysis POC (Office):  Nitrite, UA   Date Value Ref Range Status   08/27/2019 Negative Negative Final   08/06/2018 Negative Negative Final   07/02/2018 Negative Negative Final       Lab Urinalysis:  Blood, UA   Date Value Ref Range Status   08/27/2019 Trace (!) Negative Final   08/06/2018 Negative Negative Final   07/02/2018 Large (!) Negative Final     Nitrite, UA   Date Value Ref Range Status   08/27/2019 Negative Negative Final   08/06/2018 Negative Negative Final   07/02/2018 Negative Negative Final     Leukocytes, UA   Date Value Ref Range Status   08/27/2019 Negative Negative Final   08/06/2018 Negative Negative Final   07/02/2018 Small (!) Negative Final     pH, UA   Date Value Ref Range Status   08/27/2019 5.0 5.0 - 8.0 Final   08/06/2018 5.0 5.0 - 8.0 Final   07/02/2018 5.5 5.0 - 8.0 Final

## 2021-06-01 ENCOUNTER — RECORDS - HEALTHEAST (OUTPATIENT)
Dept: ADMINISTRATIVE | Facility: CLINIC | Age: 81
End: 2021-06-01

## 2021-06-01 VITALS — HEIGHT: 71 IN | BODY MASS INDEX: 36.6 KG/M2 | WEIGHT: 261.4 LBS

## 2021-06-01 VITALS — HEIGHT: 72 IN | BODY MASS INDEX: 35.21 KG/M2 | WEIGHT: 260 LBS

## 2021-06-01 VITALS — WEIGHT: 255.7 LBS | BODY MASS INDEX: 34.63 KG/M2 | HEIGHT: 72 IN

## 2021-06-01 VITALS — BODY MASS INDEX: 35.62 KG/M2 | HEIGHT: 72 IN | WEIGHT: 263 LBS

## 2021-06-01 VITALS — BODY MASS INDEX: 21.67 KG/M2 | HEIGHT: 72 IN | WEIGHT: 160 LBS

## 2021-06-01 VITALS — WEIGHT: 260.1 LBS | HEIGHT: 71 IN | BODY MASS INDEX: 36.41 KG/M2

## 2021-06-01 VITALS — BODY MASS INDEX: 35.62 KG/M2 | WEIGHT: 263 LBS | HEIGHT: 72 IN

## 2021-06-01 NOTE — PROGRESS NOTES
Assessment and Plan:       1. Encounter for general adult medical examination with abnormal findings  Annual wellness visit completed.  Risks associated with suboptimal diet and urine leakage issues.  Needs high-dose flu shot today which was provided.  Immunizations otherwise reviewed and up-to-date.    2. Class 2 severe obesity due to excess calories with serious comorbidity and body mass index (BMI) of 36.0 to 36.9 in adult (H)  Dietary and exercise modification for weight goal less than 250 pounds initially, less than 240 pounds ideally before follow-up office visit 6 months.    3. Persistent atrial fibrillation (H)  Persistent atrial fibrillation.  Chads 2 vascular score of 4.  Warfarin 2.5 mg on Tuesday and Friday otherwise 5 mg daily.  INR was 3.1 today.  Continue same dose.  Repeat in 2 weeks.  Rate controlled ongoing.  Not appropriate for cardioversion.  Follows with Dr. June al.    4. Hypercholesteremia  Had been on atorvastatin 20 mg daily in the past however heard bad things about it and wanted to quit on his own.  Does not want to restart statin therapy.  Lipid cascade reviewed with noted worsening cholesterol results since November 3, 2016.  Therapeutic lifestyle changes reviewed.    5. Impaired fasting glucose  A1c of 6.1% with fasting glucose 104.  Therapeutic lifestyle changes reviewed as noted above.    6. Transient cerebral ischemia, unspecified type  Described TIA history however patient uncertain if it was a TIA.  Risk factor reduction discussed.    7. Flu vaccine need  High-dose flu shot provided.  Immunizations otherwise up-to-date.  - Influenza High Dose, Seasonal 65+ yrs    8.  Left-sided hydrocele    9.  Mixed conductive and sensorineural hearing loss of both ears    10.  Ceruminosis, bilateral      The patient's current medical problems were reviewed.    I have had an Advance Directives discussion with the patient.  The following high BMI interventions were performed this visit:  encouragement to exercise, weight monitoring, weight loss from baseline weight and lifestyle education regarding diet.  Ensure ongoing efforts to achieve weight goal < 250 pounds initially, < 240 pounds ideally.     The following health maintenance schedule was reviewed with the patient and provided in printed form in the after visit summary:   Health Maintenance   Topic Date Due     ZOSTER VACCINES (1 of 2) 1990     MEDICARE ANNUAL WELLNESS VISIT  2017     INFLUENZA VACCINE RULE BASED (1) 2019     ADVANCE CARE PLANNING  2021 (Originally 2016)     FALL RISK ASSESSMENT  2020     TD 18+ HE  2021     PNEUMOCOCCAL POLYSACCHARIDE VACCINE AGE 65 AND OVER  Completed     PNEUMOCOCCAL CONJUGATE VACCINE FOR ADULTS (PCV13 OR PREVNAR)  Completed        Subjective:     Chief Complaint: Prosper Lopez is an 79 y.o. male here for an Annual Wellness visit.     HPI: History of persistent atrial fibrillation.  Not cardioversion candidate.  Continues chronic warfarin anticoagulation.  Risk factor reduction reviewed.  Was on atorvastatin 20 mg daily however discontinued on his own because he had heard bad things about atorvastatin and does not want to try other statin therapy.  Impaired fasting glucose history.  Hearing decrease noted.  Unclear if TIA history patient uncertain that that was etiology for symptoms.  No recurrent concerns.  No orthopnea or PND..  [id number] score of 4 with history of TIA and on chronic warfarin.  Will continue with rate control.  Not appropriate for cardioversion and canceled.   In , he had tachycardia mediated cardiomyopathy, which resolved after cardioversion.     Review of Systems:  Please see above.  The rest of the review of systems are negative for all systems.     - Frances  5 sons -   1 daughter -  40 breast CA  Tobacco: quit ~  (1 ppd x > 30 years) - had quit once for 14 years  EtOH: occ  Mom -  92 y.o due to ? cancer  Dad  -  56 heart attack  2 bros - one  age 78 heart attack and EtOHism  1 sis -  ? 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     Internal Derangement Of Knee     Persistent atrial fibrillation (H)     Coronary artery disease, NONOBSTRUCTIVE,  ASX, NEGATIVE STRESS TEST      Obesity     Plantar fasciitis of right foot     Calculus of ureter     Hydronephrosis with urinary obstruction due to ureteral calculus     Ureterolithiasis     Left hydrocele     Mixed conductive and sensorineural hearing loss of both ears     Impaired fasting glucose     Past Medical History:   Diagnosis Date     Atrial fibrillation (H) 06/10/2014     Calculus of ureter 2018     Cardiomyopathy (H) 2014     Cataract 2012     Coronary artery disease      Hydrarthrosis 2012     Hyperlipidemia 2012     Nephrolithiasis 2009    first stone at age 68     Obesity 2015     Plantar fasciitis of right foot      Prostate CA (H) 2011     Seborrheic keratosis 2012     Skin neoplasm     Of uncertain behavior     TIA (transient ischemic attack) 2012     Tubular adenoma of colon 2006      Past Surgical History:   Procedure Laterality Date     [place] CYSTO/URETERO W/LITHOTRIPSY &INDWELL STENT INSRT Left 2018    Procedure: CYSTOSCOPY, LEFT  URETEROSCOPY, LASER LITHOTRIPSY STENT INSERTION;  Surgeon: Foreign Lakhani MD;  Location: [facility] OR;   Service: Urology     PROSTATE SURGERY       URETEROSCOPY  2009      Family History   Problem Relation Age of Onset     Heart attack Father      Heart disease Father      Colon cancer Brother      Urolithiasis Brother       Social History     Socioeconomic History     Marital status:      Spouse name: Not on file     Number of children: Not on file     Years of education: Not on file     Highest education level: Not on file   Occupational History     Not on file   Social Needs     Financial resource strain: Not on file     Food insecurity:     Worry: Not on file     Inability: Not on file     Transportation needs:     Medical: Not on file     Non-medical: Not on file   Tobacco Use     Smoking status: Former Smoker     Last attempt to quit: 2014     Years since quittin.3     Smokeless tobacco: Never Used   Substance and Sexual Activity     Alcohol use: Yes     Comment: Occasional     Drug use: No     Sexual activity: Not on file   Lifestyle     Physical activity:     Days per week: Not on file     Minutes per session: Not on file     Stress: Not on file   Relationships     Social connections:     Talks on phone: Not on file     Gets together: Not on file     Attends Alevism service: Not on file     Active member of club or organization: Not on file     Attends meetings of clubs or organizations: Not on file     Relationship status: Not on file     Intimate partner violence:     Fear of current or ex partner: Not on file     Emotionally abused: Not on file     Physically abused: Not on file     Forced sexual activity: Not on file   Other Topics Concern     Not on file   Social History Narrative     Not on file      Current Outpatient Medications   Medication Sig Dispense Refill     carvedilol (COREG) 25 MG tablet Take 1 tablet (25 mg total) by mouth 2 (two) times a day with meals. 60 tablet 5     cholecalciferol, vitamin D3, (VITAMIN D3) 2,000 unit cap Take 2,000 Units by mouth daily.       warfarin  "(COUMADIN/JANTOVEN) 5 MG tablet TAKE 0.5 TO 1 TABLET BY MOUTH EVERY DAY. ADJUST DOSE PER INR RESULT AS INSTRUCTED. 90 tablet 1     No current facility-administered medications for this visit.       Objective:   Vital Signs:   Visit Vitals  /60   Pulse 95   Ht 5' 11\" (1.803 m)   Wt (!) 263 lb (119.3 kg)   SpO2 95%   BMI 36.68 kg/m         VisionScreening:  No exam data present     PHYSICAL EXAM    General Appearance:    Alert, cooperative, no distress, appears stated age.  Morbid obesity.   Head:    Normocephalic, without obvious abnormality, atraumatic   Eyes:    PERRL, conjunctiva/corneas clear, EOM's intact, fundi     benign, both eyes        Ears:   Bilateral cerumen impaction, both ears.  Curette removal with ear lavage personally.  Tolerated well.  Moderate cerumen residual noted.  Patient declines further intervention at this time.   Nose:   Nares normal, septum midline, mucosa normal, no drainage    or sinus tenderness   Throat:   Lips, mucosa, and tongue normal; teeth and gums normal   Neck:   Supple, symmetrical, trachea midline, no adenopathy;        thyroid:  No enlargement/tenderness/nodules; no carotid    bruit or JVD   Back:     Symmetric, no curvature, ROM normal, no CVA tenderness   Lungs:     Clear to auscultation bilaterally, respirations unlabored   Chest wall:    No tenderness or deformity   Heart:    Regular rate and rhythm, S1 and S2 normal, no murmur, rub   or gallop   Abdomen:     Soft, non-tender, bowel sounds active all four quadrants,     no masses, no organomegaly.     Genitalia:    Normal male without lesion, discharge or tenderness.  No inguinal hernia noted.  Left-sided hydrocele.   Rectal:   Deferred per patient request.   Extremities:   Extremities normal, atraumatic, no cyanosis or edema   Pulses:   2+ and symmetric all extremities   Skin:   Skin color, texture, turgor normal, no rashes or lesions   Lymph nodes:   Cervical, supraclavicular, and axillary nodes normal "   Neurologic:   CNII-XII intact. Normal strength, sensation and reflexes       throughout        Assessment Results 9/19/2019   Activities of Daily Living No help needed   Instrumental Activities of Daily Living No help needed   Get Up and Go Score Less than 12 seconds   Mini Cog Total Score 4   Some recent data might be hidden     A Mini-Cog score of 0-2 suggests the possibility of dementia, score of 3-5 suggests no dementia    Identified Health Risks:     The patient was counseled and encouraged to consider modifying their diet and eating habits. He was provided with information on recommended healthy diet options.  Information on urinary incontinence and treatment options given to patient.  Information regarding advance directives (living hector), including where he can download the appropriate form, was provided to the patient via the AVS.         Echo 3/22/18 Summary     When compared to the previous study dated 10/14/2014, no significant change.    Left ventricle ejection fraction is normal. The calculated left ventricular ejection fraction is 58%.    Normal left ventricular size and systolic function.    E/e' > 15, suggesting elevated LV filling pressures.    Left atrial volume is mildly increased.    The aortic root is mildly dilated.         24-HOUR HOLTER MONITOR  TEST DATE:  03/22/2018     DATE OF INTERPRETATION:  03/26/2018     INDICATION FOR TESTING:  Atrial fibrillation.     INTERPRETATION:  This is a 23-hour 59-minute Holter monitor recording.  The  predominant rhythm was atrial fibrillation.  The QRS and QT intervals appeared  normal.  The average heart rate for the monitored interval was 86 beats per minute  with a minimum heart rate of 41 beats per minute occurring at 7:51 p.m. and a  maximum heart rate of 148 beats per minute occurring at 11:53 a.m.  There were no  significant pauses or bradycardia noted.     The patient had moderately frequent ventricular ectopy noted consisting of 1296  PVC's.   There were no nonsustained or sustained ventricular events.     The patient was in persistent atrial fibrillation throughout the recording time  interval.  Early morning hours demonstrated slightly elevated ventricular response  between approximately 100 and 120 beats per minute, while the rest of daytime hours  demonstrated controlled ventricular response between 70 and 90 beats per minute.   Nighttime heart rates also appeared well controlled, with nighttime heart rates  averaging between approximately 70 and 90 beats per minute.     The patient returned a diary with no symptoms documented.     CONCLUSION:  Abnormal Holter monitor by virtue of persistent atrial fibrillation.   The early morning hour heart rates appear to be mildly elevated, though the  remainder of the day demonstrates well controlled ventricular response.        KHRIS ANN MD  ta  KIAN 03/26/2018 16:55:00  T 03/26/2018 17:59:36  R 03/26/2018 17:59:36  [id number]

## 2021-06-01 NOTE — TELEPHONE ENCOUNTER
ANTICOAGULATION  MANAGEMENT    Assessment     Today's INR result of 3.1 is Supratherapeutic (goal INR of 2.0-3.0)        Warfarin taken as previously instructed    Decreased greens/vitamin K intake may be affecting INR- will get back to normal greens     No new medication/supplements affecting INR    Continues to tolerate warfarin with no reported s/s of bleeding or thromboembolism     Previous INR was Subtherapeutic    Plan:     Spoke with Prosper regarding INR result and instructed:     Warfarin Dosing Instructions:  Continue current warfarin dose    2.5 mg every Tue, Fri; 5 mg all other days         Instructed patient to follow up no later than: 2 weeks.     Education provided: importance of consistent vitamin K intake and importance of taking warfarin as instructed    Waldo verbalizes understanding and agrees to warfarin dosing plan.    Instructed to call the [facility] for any changes, questions or concerns. (#[phone])   ?   Christopher Jimenez RN    Subjective/Objective:      Prosper Lopez, a 79 y.o. male is on warfarin.     Prosper reports:     Home warfarin dose: verbally confirmed home dose with Waldo and updated on anticoagulation calendar     Missed doses: No     Medication changes:  No     S/S of bleeding or thromboembolism:  No     New Injury or illness:  No     Changes in diet or alcohol consumption:  Yes, had less greens     Upcoming surgery, procedure or cardioversion:  No    Anticoagulation Episode Summary     Current INR goal:   2.0-3.0   TTR:   86.1 %   Next INR check:   10/3/2019   INR from last check:   3.10! (9/19/2019)   Weekly max warfarin dose:      Target end date:   Indefinite   INR check location:      Preferred lab:      Send INR reminders to:   MAGDALENA TEJEDA    Indications    A-fib (H) (Resolved) [I48.91]           Comments:            Anticoagulation Care Providers     Provider Role Specialty Phone number    Foreign Tavares MD Referring Family Medicine [phone]

## 2021-06-01 NOTE — TELEPHONE ENCOUNTER
ANTICOAGULATION  MANAGEMENT    Assessment     Today's INR result of 3.2 is Supratherapeutic (goal INR of 2.0-3.0)        Warfarin taken as previously instructed    No new diet changes affecting INR    No new medication/supplements affecting INR    Continues to tolerate warfarin with no reported s/s of bleeding or thromboembolism     Previous INR was Supratherapeutic    Plan:     Spoke with Prosper regarding INR result and instructed:     Warfarin Dosing Instructions:  Continue current warfarin dose    2.5 mg every Tue, Fri; 5 mg all other days     Pt agreed to slightly increase greens.    Instructed patient to follow up no later than: 2 weeks.     Education provided: importance of consistent vitamin K intake and importance of taking warfarin as instructed    Waldo verbalizes understanding and agrees to warfarin dosing plan.    Instructed to call the [facility] for any changes, questions or concerns. (#[phone])   ?   Christopher Jimenez RN    Subjective/Objective:      Prosper Lopez, a 79 y.o. male is on warfarin.     Prosper reports:     Home warfarin dose: verbally confirmed home dose with Waldo and updated on anticoagulation calendar     Missed doses: No     Medication changes:  No     S/S of bleeding or thromboembolism:  No     New Injury or illness:  No     Changes in diet or alcohol consumption:  No     Upcoming surgery, procedure or cardioversion:  No    Anticoagulation Episode Summary     Current INR goal:   2.0-3.0   TTR:   82.8 %   Next INR check:   10/15/2019   INR from last check:   3.20! (10/1/2019)   Weekly max warfarin dose:      Target end date:   Indefinite   INR check location:      Preferred lab:      Send INR reminders to:   MAGDALENA TEJEDA    Indications    A-fib (H) (Resolved) [I48.91]           Comments:            Anticoagulation Care Providers     Provider Role Specialty Phone number    Foreign Tavares MD Referring Family Medicine [phone]

## 2021-06-02 VITALS — WEIGHT: 269 LBS | BODY MASS INDEX: 37.52 KG/M2

## 2021-06-02 NOTE — TELEPHONE ENCOUNTER
ANTICOAGULATION  MANAGEMENT    Assessment     Today's INR result of 2.5 is Therapeutic (goal INR of 2.0-3.0)        Warfarin taken as previously instructed    No new diet changes affecting INR    No new medication/supplements affecting INR    Continues to tolerate warfarin with no reported s/s of bleeding or thromboembolism     Previous INR was Supratherapeutic    Plan:     Spoke with Prosper regarding INR result and instructed:     Warfarin Dosing Instructions:  Continue current warfarin dose    2.5 mg every Tue, Fri; 5 mg all other days         Instructed patient to follow up no later than: 2 weeks. Appt is made for 10/29.    Education provided: importance of taking warfarin as instructed    Waldo verbalizes understanding and agrees to warfarin dosing plan.    Instructed to call the [facility] for any changes, questions or concerns. (#[phone])   ?   Christopher Jimenez RN    Subjective/Objective:      Prosper Lopez, a 79 y.o. male is on warfarin.     Prosper reports:     Home warfarin dose: verbally confirmed home dose with pt and updated on anticoagulation calendar     Missed doses: No     Medication changes:  No     S/S of bleeding or thromboembolism:  No     New Injury or illness:  No     Changes in diet or alcohol consumption:  No     Upcoming surgery, procedure or cardioversion:  No    Anticoagulation Episode Summary     Current INR goal:   2.0-3.0   TTR:   85.4 %   Next INR check:   10/29/2019   INR from last check:   2.50 (10/15/2019)   Weekly max warfarin dose:      Target end date:   Indefinite   INR check location:      Preferred lab:      Send INR reminders to:   MAGDALENA TEJEDA    Indications    A-fib (H) (Resolved) [I48.91]           Comments:            Anticoagulation Care Providers     Provider Role Specialty Phone number    Foreign Tavares MD Referring Family Medicine [phone]

## 2021-06-02 NOTE — TELEPHONE ENCOUNTER
Anticoagulation Management    Unable to reach Prosper today.    Today's INR result of 3.7 is Supratherapeutic (goal INR of 2.0-3.0).     Follow up required to discuss out of range INR .    Left message to hold warfarin tonight.       ACN to follow up    Magdalena Maharaj, RN

## 2021-06-02 NOTE — TELEPHONE ENCOUNTER
ANTICOAGULATION  MANAGEMENT    Assessment     Yesterday's INR result of 3.7 is Supratherapeutic (goal INR of 2.0-3.0)        Warfarin taken as previously instructed    No new diet changes affecting INR    No new medication/supplements affecting INR    Continues to tolerate warfarin with no reported s/s of bleeding or thromboembolism     Previous INR was Therapeutic    Plan:     Spoke with Prosper regarding INR result and instructed:     Warfarin Dosing Instructions:  Take 2.5 mg today only then change warfarin dose to 2.5 mg daily on Tue, Thu, Sat; and 5 mg daily rest of week  (8.3 % change)    Instructed patient to follow up no later than: 2 weeks    Education provided: target INR goal and significance of current INR result, importance of following up for INR monitoring at instructed interval, importance of taking warfarin as instructed, importance of notifying clinic for changes in medications and importance of notifying clinic for diarrhea, nausea/vomiting, reduced intake and/or illness    Waldo verbalizes understanding and agrees to warfarin dosing plan.    Instructed to call the [facility] for any changes, questions or concerns. (#[phone])   ?   Magdalena Maharaj RN    Subjective/Objective:      Prosper Lopez, a 79 y.o. male is on warfarin.     Prosper reports:     Home warfarin dose: verbally confirmed home dose with Waldo and updated on anticoagulation calendar     Missed doses: No     Medication changes:  No     S/S of bleeding or thromboembolism:  No     New Injury or illness:  No     Changes in diet or alcohol consumption:  No     Upcoming surgery, procedure or cardioversion:  No    Anticoagulation Episode Summary     Current INR goal:   2.0-3.0   TTR:   87.2 %   Next INR check:   11/12/2019   INR from last check:   3.70! (10/29/2019)   Weekly max warfarin dose:      Target end date:   Indefinite   INR check location:      Preferred lab:      Send INR reminders to:   MAGDALENA TEJEDA    Indications     A-fib (H) (Resolved) [I48.91]           Comments:            Anticoagulation Care Providers     Provider Role Specialty Phone number    Foreign Tavares MD Referring Family Medicine [phone]

## 2021-06-02 NOTE — TELEPHONE ENCOUNTER
Refill Approved    Rx renewed per Medication Renewal Policy. Medication was last renewed on 4/18/19.    Deb Cabral, [facility] Triage/Med Refill 10/18/2019     Requested Prescriptions   Pending Prescriptions Disp Refills     carvedilol (COREG) 25 MG tablet [Pharmacy Med Name: CARVEDILOL 25MG TABLETS] 60 tablet 0     Sig: TAKE 1 TABLET BY MOUTH TWICE DAILY WITH MEALS       Beta-Blockers Refill Protocol Passed - 10/18/2019  5:11 AM        Passed - PCP or prescribing provider visit in past 12 months or next 3 months     Last office visit with prescriber/PCP: 2/12/2019 Foreign Tavares MD OR same dept: 2/12/2019 Foreign Tavares MD OR same specialty: 2/12/2019 Foreign Tavares MD  Last physical: 9/19/2019 Last MTM visit: Visit date not found   Next visit within 3 mo: Visit date not found  Next physical within 3 mo: Visit date not found  Prescriber OR PCP: Foreign Tavares MD  Last diagnosis associated with med order: 1. Persistent atrial fibrillation  - carvedilol (COREG) 25 MG tablet [Pharmacy Med Name: CARVEDILOL 25MG TABLETS]; TAKE 1 TABLET BY MOUTH TWICE DAILY WITH MEALS  Dispense: 60 tablet; Refill: 0    If protocol passes may refill for 12 months if within 3 months of last provider visit (or a total of 15 months).             Passed - Blood pressure filed in past 12 months     BP Readings from Last 1 Encounters:   09/19/19 110/60

## 2021-06-03 VITALS
BODY MASS INDEX: 36.82 KG/M2 | HEIGHT: 71 IN | HEART RATE: 95 BPM | OXYGEN SATURATION: 95 % | WEIGHT: 263 LBS | DIASTOLIC BLOOD PRESSURE: 60 MMHG | SYSTOLIC BLOOD PRESSURE: 110 MMHG

## 2021-06-03 NOTE — TELEPHONE ENCOUNTER
ANTICOAGULATION  MANAGEMENT    Assessment     Today's INR result of 2.6 is Therapeutic (goal INR of 2.0-3.0)        Warfarin taken as previously instructed    No new diet changes affecting INR    No new medication/supplements affecting INR    Continues to tolerate warfarin with no reported s/s of bleeding or thromboembolism     Previous INR was Therapeutic    Plan:     Spoke with Prosper regarding INR result and instructed:     Warfarin Dosing Instructions:  Continue current warfarin dose    2.5 mg every Tue, Thu, Sat; 5 mg all other days         Instructed patient to follow up no later than: 4 weeks.    Education provided: importance of taking warfarin as instructed    Waldo verbalizes understanding and agrees to warfarin dosing plan.    Instructed to call the [facility] for any changes, questions or concerns. (#[phone])   ?   Christopher Jimenez RN    Subjective/Objective:      Prosper Lopez, a 79 y.o. male is on warfarin.     Prosper reports:     Home warfarin dose: as updated on anticoagulation calendar per template     Missed doses: No     Medication changes:  No     S/S of bleeding or thromboembolism:  No     New Injury or illness:  No     Changes in diet or alcohol consumption:  No     Upcoming surgery, procedure or cardioversion:  No    Anticoagulation Episode Summary     Current INR goal:   2.0-3.0   TTR:   85.9 % (1 y)   Next INR check:   12/24/2019   INR from last check:   2.60 (11/26/2019)   Weekly max warfarin dose:      Target end date:   Indefinite   INR check location:      Preferred lab:      Send INR reminders to:   MAGDALENA TEJEDA    Indications    A-fib (H) (Resolved) [I48.91]           Comments:            Anticoagulation Care Providers     Provider Role Specialty Phone number    Foreign Tavares MD Referring Family Medicine [phone]

## 2021-06-03 NOTE — TELEPHONE ENCOUNTER
ANTICOAGULATION  MANAGEMENT    Assessment     Today's INR result of 2.1 is Therapeutic (goal INR of 2.0-3.0)        Warfarin taken as previously instructed    No new diet changes affecting INR    No new medication/supplements affecting INR    Continues to tolerate warfarin with no reported s/s of bleeding or thromboembolism     Previous INR was Supratherapeutic    Plan:     Spoke with Prosper regarding INR result and instructed:     Warfarin Dosing Instructions:  Continue current warfarin dose    2.5 mg every Tue, Thu, Sat; 5 mg all other days         Instructed patient to follow up no later than: 2 weeks.     Education provided: importance of taking warfarin as instructed    Waldo verbalizes understanding and agrees to warfarin dosing plan.    Instructed to call the [facility] for any changes, questions or concerns. (#[phone])   ?   Christopher Jimenez RN    Subjective/Objective:      Prosper Lopez, a 79 y.o. male is on warfarin.     Prosper reports:     Home warfarin dose: verbally confirmed home dose with pt and updated on anticoagulation calendar     Missed doses: No     Medication changes:  No     S/S of bleeding or thromboembolism:  No     New Injury or illness:  No     Changes in diet or alcohol consumption:  No     Upcoming surgery, procedure or cardioversion:  No    Anticoagulation Episode Summary     Current INR goal:   2.0-3.0   TTR:   85.9 %   Next INR check:   11/26/2019   INR from last check:   2.10 (11/12/2019)   Weekly max warfarin dose:      Target end date:   Indefinite   INR check location:      Preferred lab:      Send INR reminders to:   MAGDALENA TEJEDA    Indications    A-fib (H) (Resolved) [I48.91]           Comments:            Anticoagulation Care Providers     Provider Role Specialty Phone number    Foreign Tavares MD Referring Family Medicine [phone]

## 2021-06-04 ENCOUNTER — COMMUNICATION - HEALTHEAST (OUTPATIENT)
Dept: ANTICOAGULATION | Facility: CLINIC | Age: 81
End: 2021-06-04

## 2021-06-04 VITALS
TEMPERATURE: 98.5 F | WEIGHT: 262 LBS | HEART RATE: 106 BPM | DIASTOLIC BLOOD PRESSURE: 60 MMHG | SYSTOLIC BLOOD PRESSURE: 100 MMHG | OXYGEN SATURATION: 98 % | BODY MASS INDEX: 36.54 KG/M2

## 2021-06-04 VITALS
WEIGHT: 261.8 LBS | BODY MASS INDEX: 35.46 KG/M2 | TEMPERATURE: 97 F | DIASTOLIC BLOOD PRESSURE: 66 MMHG | HEART RATE: 84 BPM | SYSTOLIC BLOOD PRESSURE: 118 MMHG | RESPIRATION RATE: 18 BRPM | HEIGHT: 72 IN

## 2021-06-04 VITALS
SYSTOLIC BLOOD PRESSURE: 110 MMHG | OXYGEN SATURATION: 95 % | BODY MASS INDEX: 36.4 KG/M2 | HEART RATE: 101 BPM | WEIGHT: 261 LBS | DIASTOLIC BLOOD PRESSURE: 60 MMHG

## 2021-06-04 NOTE — TELEPHONE ENCOUNTER
ANTICOAGULATION  MANAGEMENT    Assessment     Today's INR result of 2.4 is Therapeutic (goal INR of 2.0-3.0)        Warfarin taken as previously instructed    No new diet changes affecting INR    No new medication/supplements affecting INR    Continues to tolerate warfarin with no reported s/s of bleeding or thromboembolism     Previous INR was Therapeutic    Plan:     Spoke with Prosper regarding INR result and instructed:     Warfarin Dosing Instructions:  Continue current warfarin dose    2.5 mg every Tue, Thu, Sat; 5 mg all other days         Instructed patient to follow up no later than: 4 weeks.    Education provided: importance of taking warfarin as instructed    Waldo verbalizes understanding and agrees to warfarin dosing plan.    Instructed to call the [facility] for any changes, questions or concerns. (#[phone])   ?   Christopher Jimenez RN    Subjective/Objective:      Prosper Lopez, a 79 y.o. male is on warfarin.     Prosper reports:     Home warfarin dose: verbally confirmed home dose with pt and updated on anticoagulation calendar     Missed doses: No     Medication changes:  No     S/S of bleeding or thromboembolism:  No     New Injury or illness:  No     Changes in diet or alcohol consumption:  No     Upcoming surgery, procedure or cardioversion:  No    Anticoagulation Episode Summary     Current INR goal:   2.0-3.0   TTR:   85.9 % (1 y)   Next INR check:   1/21/2020   INR from last check:   2.40 (12/24/2019)   Weekly max warfarin dose:      Target end date:   Indefinite   INR check location:      Preferred lab:      Send INR reminders to:   MAGDALENA TEJEDA    Indications    A-fib (H) (Resolved) [I48.91]           Comments:            Anticoagulation Care Providers     Provider Role Specialty Phone number    Foreign Tavares MD Referring Family Medicine [phone]

## 2021-06-04 NOTE — TELEPHONE ENCOUNTER
Anticoagulation Annual Referral Renewal Review    Prosper Lopez's chart reviewed for annual renewal of referral to anticoagulation monitoring.        Criteria for anticoagulation nurse and/or pharmacist renewal met   Warfarin indication: Atrial Fibrillation Yes, per indication   Current with INR monitoring/compliant Yes Yes   Date of last office visit 9/19/19 Yes, had office visit within last year   Time in Therapeutic Range (TTR) 100 % Yes, TTR > 60%       Prosper Lopez met all criteria for anticoagulation management program initiated renewal.  New INR standing orders and anticoagulation referral renewal placed.      Christopher Jimenez RN  9:17 AM

## 2021-06-05 ENCOUNTER — RECORDS - HEALTHEAST (OUTPATIENT)
Dept: FAMILY MEDICINE | Facility: CLINIC | Age: 81
End: 2021-06-05

## 2021-06-05 ENCOUNTER — RECORDS - HEALTHEAST (OUTPATIENT)
Dept: CARDIOLOGY | Facility: CLINIC | Age: 81
End: 2021-06-05

## 2021-06-05 VITALS
SYSTOLIC BLOOD PRESSURE: 120 MMHG | HEART RATE: 100 BPM | RESPIRATION RATE: 20 BRPM | DIASTOLIC BLOOD PRESSURE: 68 MMHG | BODY MASS INDEX: 37.42 KG/M2 | WEIGHT: 266.4 LBS | TEMPERATURE: 97 F

## 2021-06-05 VITALS
WEIGHT: 261 LBS | RESPIRATION RATE: 12 BRPM | TEMPERATURE: 98.3 F | DIASTOLIC BLOOD PRESSURE: 60 MMHG | SYSTOLIC BLOOD PRESSURE: 118 MMHG | HEART RATE: 72 BPM | BODY MASS INDEX: 35.4 KG/M2

## 2021-06-05 VITALS
OXYGEN SATURATION: 98 % | HEIGHT: 71 IN | BODY MASS INDEX: 36.4 KG/M2 | SYSTOLIC BLOOD PRESSURE: 120 MMHG | HEART RATE: 73 BPM | DIASTOLIC BLOOD PRESSURE: 80 MMHG | WEIGHT: 260 LBS

## 2021-06-05 VITALS
BODY MASS INDEX: 37.18 KG/M2 | SYSTOLIC BLOOD PRESSURE: 110 MMHG | DIASTOLIC BLOOD PRESSURE: 60 MMHG | OXYGEN SATURATION: 94 % | WEIGHT: 261 LBS | HEART RATE: 96 BPM

## 2021-06-05 VITALS
HEIGHT: 70 IN | OXYGEN SATURATION: 95 % | SYSTOLIC BLOOD PRESSURE: 120 MMHG | TEMPERATURE: 97.4 F | WEIGHT: 264 LBS | DIASTOLIC BLOOD PRESSURE: 60 MMHG | HEART RATE: 109 BPM | BODY MASS INDEX: 37.8 KG/M2

## 2021-06-05 VITALS — HEIGHT: 72 IN | WEIGHT: 260 LBS | BODY MASS INDEX: 35.21 KG/M2

## 2021-06-05 VITALS — BODY MASS INDEX: 34.27 KG/M2 | WEIGHT: 253 LBS | HEIGHT: 72 IN

## 2021-06-05 DIAGNOSIS — I48.91 ATRIAL FIBRILLATION (H): ICD-10-CM

## 2021-06-05 DIAGNOSIS — M23.90 INTERNAL DERANGEMENT OF KNEE: ICD-10-CM

## 2021-06-05 NOTE — TELEPHONE ENCOUNTER
ANTICOAGULATION  MANAGEMENT    Assessment     Today's INR result of 2.1 is Therapeutic (goal INR of 2.0-3.0)        Warfarin taken as previously instructed    No new diet changes affecting INR    No new medication/supplements affecting INR    Continues to tolerate warfarin with no reported s/s of bleeding or thromboembolism     Previous INR was Therapeutic    Plan:     Left a detailed message for Prosper regarding INR result and instructed:     Warfarin Dosing Instructions:  Continue current warfarin dose    2.5 mg every Tue, Thu, Sat; 5 mg all other days         Instructed patient to follow up no later than: 4-6 weeks.    Education provided: importance of taking warfarin as instructed      Instructed to call the [facility] for any changes, questions or concerns. (#[phone])   ?   Christopher Jimenez RN    Subjective/Objective:      Prosper Lopez, a 79 y.o. male is on warfarin.     Prosper reports:     Home warfarin dose: as updated on anticoagulation calendar per template     Missed doses: No     Medication changes:  No     S/S of bleeding or thromboembolism:  No     New Injury or illness:  No     Changes in diet or alcohol consumption:  No     Upcoming surgery, procedure or cardioversion:  No    Anticoagulation Episode Summary     Current INR goal:   2.0-3.0   TTR:   85.9 % (1 y)   Next INR check:   3/3/2020   INR from last check:   2.10 (1/21/2020)   Weekly max warfarin dose:      Target end date:   Indefinite   INR check location:      Preferred lab:      Send INR reminders to:   MAGDALENA TEJEDA    Indications    A-fib (H) (Resolved) [I48.91]           Comments:            Anticoagulation Care Providers     Provider Role Specialty Phone number    Foreign Tavares MD Referring Family Medicine [phone]

## 2021-06-06 ENCOUNTER — HEALTH MAINTENANCE LETTER (OUTPATIENT)
Age: 81
End: 2021-06-06

## 2021-06-06 NOTE — TELEPHONE ENCOUNTER
ANTICOAGULATION  MANAGEMENT    Assessment     Today's INR result of 2.5 is Therapeutic (goal INR of 2.0-3.0)        Warfarin taken as previously instructed    No new diet changes affecting INR    No new medication/supplements affecting INR    Continues to tolerate warfarin with no reported s/s of bleeding or thromboembolism     Previous INR was Therapeutic    Plan:     Spoke with Prosper regarding INR result and instructed:     Warfarin Dosing Instructions:  Continue current warfarin dose    2.5 mg every Tue, Thu, Sat; 5 mg all other days         Instructed patient to follow up no later than: 4-6 weeks.     Education provided: importance of taking warfarin as instructed    Waldo verbalizes understanding and agrees to warfarin dosing plan.    Instructed to call the [facility] for any changes, questions or concerns. (#[phone])   ?   Christopher Jimenez RN    Subjective/Objective:      Prosper Lopez, a 79 y.o. male is on warfarin.     Prosper reports:     Home warfarin dose: as updated on anticoagulation calendar per template     Missed doses: No     Medication changes:  No     S/S of bleeding or thromboembolism:  No     New Injury or illness:  No     Changes in diet or alcohol consumption:  No     Upcoming surgery, procedure or cardioversion:  No    Anticoagulation Episode Summary     Current INR goal:   2.0-3.0   TTR:   85.9 % (1 y)   Next INR check:   3/31/2020   INR from last check:   2.50 (2/18/2020)   Weekly max warfarin dose:      Target end date:   Indefinite   INR check location:      Preferred lab:      Send INR reminders to:   MAGDALENA TEJEDA    Indications    A-fib (H) (Resolved) [I48.91]           Comments:            Anticoagulation Care Providers     Provider Role Specialty Phone number    Foreign Tavares MD Referring Family Medicine [phone]

## 2021-06-08 NOTE — TELEPHONE ENCOUNTER
ANTICOAGULATION  MANAGEMENT    Assessment     Today's INR result of 2.7 is Therapeutic (goal INR of 2.0-3.0)        Warfarin taken as previously instructed    No new diet changes affecting INR    No new medication/supplements affecting INR    Continues to tolerate warfarin with no reported s/s of bleeding or thromboembolism     Previous INR was Therapeutic    Plan:     Spoke with wife Frances (she will relay message to pt) regarding INR result and instructed:     Warfarin Dosing Instructions:  Continue current warfarin dose    2.5 mg every Tue, Thu, Sat; 5 mg all other days         Instructed patient to follow up no later than: 4-6 weeks.     Education provided: importance of taking warfarin as instructed    Frances verbalizes understanding and agrees to warfarin dosing plan.    Instructed to call the [facility] for any changes, questions or concerns. (#[phone])   ?   Christopher Jimenez RN    Subjective/Objective:      Prosper CHRIS John, a 80 y.o. male is on warfarin.     Prosper reports:     Home warfarin dose: as updated on anticoagulation calendar per template     Missed doses: No     Medication changes:  No     S/S of bleeding or thromboembolism:  No     New Injury or illness:  No     Changes in diet or alcohol consumption:  No     Upcoming surgery, procedure or cardioversion:  No    Anticoagulation Episode Summary     Current INR goal:   2.0-3.0   TTR:   85.9 % (1 y)   Next INR check:   7/7/2020   INR from last check:   2.70 (5/26/2020)   Weekly max warfarin dose:      Target end date:   Indefinite   INR check location:      Preferred lab:      Send INR reminders to:   MAGDALENA TEJEDA    Indications    A-fib (H) (Resolved) [I48.91]           Comments:            Anticoagulation Care Providers     Provider Role Specialty Phone number    Foreign Tavares MD Referring Family Medicine [phone]

## 2021-06-09 NOTE — TELEPHONE ENCOUNTER
ANTICOAGULATION  MANAGEMENT    Assessment     Today's INR result of 2.0 is Therapeutic (goal INR of 2.0-3.0)        Warfarin taken as previously instructed    No new diet changes affecting INR    Interaction between Keflex and warfarin may be affecting INR- started 7/2 for cellulitis. Discontinuing Keflex today and starting on Clindamycin.     ED visit 7/2 and 7/4 for cellulitis.     Continues to tolerate warfarin with no reported s/s of bleeding or thromboembolism     Previous INR was Therapeutic    Plan:     Spoke with Prosper regarding INR result and instructed:     Warfarin Dosing Instructions:  Continue current warfarin dose    2.5 mg every Tue, Thu, Sat; 5 mg all other days         Instructed patient to follow up no later than: 8 weeks.     Education provided: importance of taking warfarin as instructed and no interaction anticipated between warfarin and Clindamycin    Waldo verbalizes understanding and agrees to warfarin dosing plan.    Instructed to call the [facility] for any changes, questions or concerns. (#[phone])   ?   Christopher Jimenez RN    Subjective/Objective:      Prosper Lopez, a 80 y.o. male is on warfarin.     Prosper reports:     Home warfarin dose: as updated on anticoagulation calendar per template     Missed doses: No     Medication changes:  Yes: Keflex and Clindamycin.      S/S of bleeding or thromboembolism:  No     New Injury or illness:  Yes: cellulitis     Changes in diet or alcohol consumption:  No     Upcoming surgery, procedure or cardioversion:  No    Anticoagulation Episode Summary     Current INR goal:   2.0-3.0   TTR:   85.9 % (1 y)   Next INR check:   9/2/2020   INR from last check:   2.00 (7/8/2020)   Weekly max warfarin dose:      Target end date:   Indefinite   INR check location:      Preferred lab:      Send INR reminders to:   MAGDALENA TEJEDA    Indications    A-fib (H) (Resolved) [I48.91]           Comments:            Anticoagulation Care Providers      Provider Role Specialty Phone number    Foreign Tavares MD Referring Family Medicine [phone]

## 2021-06-09 NOTE — TELEPHONE ENCOUNTER
RN cannot approve Refill Request    RN can NOT refill this medication Protocol failed and NO refill given. Last office visit: 7/8/2020 Foreign Tavares MD Last Physical: 9/19/2019 Last MTM visit: Visit date not found Last visit same specialty: Visit date not found.  Next visit within 3 mo: Visit date not found  Next physical within 3 mo: Visit date not found      Deb Cabral, [facility] Triage/Med Refill 7/14/2020    Requested Prescriptions   Pending Prescriptions Disp Refills     warfarin ANTICOAGULANT (COUMADIN/JANTOVEN) 5 MG tablet [Pharmacy Med Name: WARFARIN SOD 5MG TABLETS (PEACH)] 90 tablet 1     Sig: TAKE 1/2 TO 1 TABLET BY MOUTH EVERY DAY, ADJUST DOSE PER INR RESULTS AS DIRECTED       Warfarin Refill Protocol  Failed - 7/14/2020  6:46 AM        Failed -  Route to appropriate pool/provider     Last Anticoagulation Summary:   Anticoagulation Episode Summary     Current INR goal:   2.0-3.0   TTR:   85.6 % (12 mo)   Next INR check:   9/2/2020   INR from last check:   2.00 (7/8/2020)   Weekly max warfarin dose:      Target end date:   Indefinite   INR check location:      Preferred lab:      Send INR reminders to:   [facility] NIKHIL    Indications    A-fib (H) (Resolved) [I48.91]           Comments:            Anticoagulation Care Providers     Provider Role Specialty Phone number    Foreign Tavares MD Referring Family Medicine [phone]                Passed - Provider visit in last year     Last office visit with prescriber/PCP: 7/8/2020 Foreign Tavares MD OR same dept: Visit date not found OR same specialty: Visit date not found  Last physical: 9/19/2019 Last MTM visit: Visit date not found    Next appt within 3 mo: Visit date not found Next physical within 3 mo: Visit date not found  Prescriber OR PCP: Foreign Tavares MD  Last diagnosis associated with med order: 1. Transient Ischemic Attack  - warfarin ANTICOAGULANT (COUMADIN/JANTOVEN) 5 MG tablet [Pharmacy Med Name: WARFARIN SOD 5MG TABLETS  (PEACH)]; TAKE 1/2 TO 1 TABLET BY MOUTH EVERY DAY, ADJUST DOSE PER INR RESULTS AS DIRECTED  Dispense: 90 tablet; Refill: 1    2. Long term current use of anticoagulant therapy  - warfarin ANTICOAGULANT (COUMADIN/JANTOVEN) 5 MG tablet [Pharmacy Med Name: WARFARIN SOD 5MG TABLETS (PEACH)]; TAKE 1/2 TO 1 TABLET BY MOUTH EVERY DAY, ADJUST DOSE PER INR RESULTS AS DIRECTED  Dispense: 90 tablet; Refill: 1    If protocol passes may refill for 6 months if within 3 months of last provider visit (or a total of 9 months).

## 2021-06-09 NOTE — PROGRESS NOTES
"Assessment/Plan:    1. Cellulitis of right leg  Right lower leg cellulitis with persistent infection.  Appears relatively stable however according to patient.  Currently utilizing cephalexin.  Switch to clindamycin 300 mg 3 times daily for 10 days.  Did recommend follow-up evaluation of persistent or worsening concerns.  - clindamycin (CLEOCIN) 300 MG capsule; Take 1 capsule (300 mg total) by mouth 3 (three) times a day for 10 days.  Dispense: 30 capsule; Refill: 0  - HM2(CBC w/o Differential)    2. Persistent atrial fibrillation (H)  Persistent atrial fibrillation.  Has been followed by Dr. Mtz.  Prior history of 24-hour Holter monitor March 22, 2018 with persistent atrial fibrillation.  Echocardiogram at that time with EF 58%.  Continues carvedilol 25 mg twice daily.  Otherwise asymptomatic.  - Basic Metabolic Panel    3. Impaired fasting glucose  Check A1c and fasting glucose.  Dietary and exercise modification for weight goal less than 250 pounds initially, less than 240 pounds ideally.  - Basic Metabolic Panel  - Glycosylated Hemoglobin A1c    4. Hypercholesteremia  Patient resistant to statin use.  Had been prescribed atorvastatin 20 mg daily however did not utilize due to \"hearing bad things about statins\".  Would like to have cholesterol rechecked however.  States that \"cholesterol levels have always been good\".  - Lipid Cascade      The following high BMI interventions were performed this visit: encouragement to exercise, weight monitoring, weight loss from baseline weight and lifestyle education regarding diet .  Ensure ongoing efforts to achieve weight goal < 250 pounds initially, < 240 pounds ideally.         Subjective:    Prosper Lopez is seen today for follow-up evaluation.  History of persistent atrial fibrillation.  Chads 2 vascular score of 4.  Continues chronic warfarin anticoagulation needs INR recheck today.  Carvedilol 25 mg twice daily.  Followed by Dr. Mtz history of impaired " fasting glucose.  Recent right leg injury 2020 after hitting his right shin on a 2 x 8 on and.  Subsequently seen through urgent C room  as well as  and is on cephalexin.  Has not noticed any significant improvement.  Redness persists.  No fevers or chills.  No drainage.  Comprehensive review of systems as above otherwise all negative.  Has declined statin therapy due to hearing bad things about it     - Frances  5 sons -   1 daughter -  40 breast CA  Tobacco: quit ~  (1 ppd x > 30 years) - had quit once for 14 years  EtOH: occ  Mom -  92 y.o due to ? cancer  Dad -  56 heart attack  2 bros - one  age 78 heart attack and EtOHism  1 sis -  ? cancer   Surgeries: prostate surgery due to cancer; kidney stones bilateral  Hospitalizations: kidney stone   Work: retired ()    Past Surgical History:   Procedure Laterality Date     [place] CYSTO/URETERO W/LITHOTRIPSY &INDWELL STENT INSRT Left 2018    Procedure: CYSTOSCOPY, LEFT  URETEROSCOPY, LASER LITHOTRIPSY STENT INSERTION;  Surgeon: Foreign Lakhani MD;  Location: [facility];  Service: Urology     PROSTATE SURGERY       URETEROSCOPY          Family History   Problem Relation Age of Onset     Heart attack Father      Heart disease Father      Colon cancer Brother      Urolithiasis Brother         Past Medical History:   Diagnosis Date     Atrial fibrillation (H) 06/10/2014     Calculus of ureter 2018     Cardiomyopathy (H) 2014     Cataract      Coronary artery disease      Hydrarthrosis 2012     Hyperlipidemia 2012     Nephrolithiasis 2009    first stone at age 68     Obesity 2015     Plantar fasciitis of right foot      Prostate CA (H) 2011     Seborrheic keratosis 2012     Skin neoplasm     Of uncertain behavior     TIA (transient ischemic attack) 2012     Tubular adenoma of colon 2006        Social History     Tobacco Use     Smoking status: Former  Smoker     Last attempt to quit: 2014     Years since quittin.1     Smokeless tobacco: Never Used   Substance Use Topics     Alcohol use: Yes     Comment: Occasional     Drug use: No        Current Outpatient Medications   Medication Sig Dispense Refill     carvedilol (COREG) 25 MG tablet TAKE 1 TABLET BY MOUTH TWICE DAILY WITH MEALS 180 tablet 3     cephalexin (KEFLEX) 500 MG capsule TK 1 C PO QID FOR 10 DAYS       cholecalciferol, vitamin D3, (VITAMIN D3) 2,000 unit cap Take 2,000 Units by mouth daily.       warfarin (COUMADIN/JANTOVEN) 5 MG tablet TAKE 0.5 TO 1 TABLET BY MOUTH EVERY DAY. ADJUST DOSE PER INR RESULT AS INSTRUCTED. 90 tablet 1     clindamycin (CLEOCIN) 300 MG capsule Take 1 capsule (300 mg total) by mouth 3 (three) times a day for 10 days. 30 capsule 0     No current facility-administered medications for this visit.           Objective:    Vitals:    20 0821   BP: 110/60   Pulse: (!) 101   SpO2: 95%   Weight: (!) 261 lb (118.4 kg)      Body mass index is 36.4 kg/m .    Alert.  No apparent distress at rest.  Right lower extremity erythema associated with prior leg injury with excoriation.  No purulent drainage.  Mild tenderness around local injury.  No calf tenderness.  1+ ankle swelling.  Cardiac exam irregular, rate controlled.      This note has been dictated using voice recognition software and as a result may contain minor grammatical errors and unintended word substitutions.

## 2021-06-09 NOTE — TELEPHONE ENCOUNTER
RN Triage:    Fell a few days ago and hit right leg on board.  Scrape on right shin is now scabbed over and feels fine.  A few hours ago, he noticed a red and slightly swollen area on right heel.  Swelling and erythema are moving up the leg to the calf now.  Entire right foot is beginning to swell.  Not painful.  No change in temperature of skin.  He plans to go to [facility] in [place] now.    Mignon Smith RN    [facility]      Additional Information    Negative: Sounds like a life-threatening emergency to the triager    Negative: Chest pain    Negative: Small area of swelling and followed an insect bite to the area    Negative: Followed a knee injury    Negative: Ankle or foot injury    Negative: Pregnant with leg swelling or edema    Negative: Difficulty breathing at rest    Negative: Entire foot is cool or blue in comparison to other side    Negative: SEVERE swelling (e.g., swelling extends above knee, entire leg is swollen, weeping fluid)    Negative: Thigh or calf pain and only 1 side and present > 1 hour    Thigh, calf, or ankle swelling in only one leg    Protocols used: LEG SWELLING AND EDEMA-A-OH

## 2021-06-10 NOTE — PROGRESS NOTES
HPI:     folllow up on low vitamin D.Also off statin for a couple months.    No chest pain. Normal stress test in 2014          Current Outpatient Prescriptions on File Prior to Visit   Medication Sig Dispense Refill     atorvastatin (LIPITOR) 20 MG tablet TAKE 1 TABLET BY MOUTH EVERY NIGHT AT BEDTIME 90 tablet 2     carvedilol (COREG) 6.25 MG tablet TAKE 1 TABLET BY MOUTH TWICE DAILY WITH MEALS 180 tablet 2     warfarin (COUMADIN) 5 MG tablet 2.5 mg on Fri; 5 mg all other days or as last directed 90 tablet 0     [DISCONTINUED] atorvastatin (LIPITOR) 20 MG tablet TAKE 1 TABLET BY MOUTH EVERY NIGHT AT BEDTIME 90 tablet 3     No current facility-administered medications on file prior to visit.        Pmh: reviewed  Psh: reviewed  Allergy:  reviewed      EXAM:    /64  Pulse 64  Temp 98.4  F (36.9  C) (Oral)   Resp 16  Wt (!) 259 lb (117.5 kg)  BMI 36.12 kg/m2  GEN:   ALERT, NAD, ORIENTED TIMES THREE  HEENT: TMS NL, PERRL, THR CLEAR  NECK: SUPPLE WITHOUT ADENOPATHY OR THYROMEGALY  LUNGS: CTA  COR: RRR WITHOUT MURMUR  ABD: SOFT, +BS, NONTX WITHOUT GUARDING OR PERITONEAL SIGNS  SKIN: NO RASH , ULCERS OR LESIONS NOTED  MS:  NEURO:  EXT: WITHOUT EDEMA OR SWELLING    NM Exercise Stress Test (Order [id number])   Imaging   Date: 10/22/2014 Department: [facility]  Released By: Gricelda Kern (auto-released) Authorizing: Misha Mtz MD (Ted)   Study Result      ORDERING PHYSICIAN: Dr. Elver Mtz.     INDICATION: Coronary artery disease, abnormal EKG.     STRESS SUMMARY: The patient exercised for 5 minutes and 26 seconds according to the  Jj protocol, stopping due to fatigue. Chest discomfort was not reported and  ischemic electrocardiographic changes were not noted by the supervising  cardiologist, Dr. Sneha Hicks. The peak heart rate 130 with 89% of the age  predicted maximum. The blood pressure 132/80, peak 184/82.     TECHNICAL COMMENTS: Rest dose 4.03 mCi of thallium injected at  rest and 42.4 mCi of  sestamibi. The  images are satisfactory. Comparison is made with the  images of the examination of 05/13/2011.     FINDINGS: The exercise stress and rest images demonstrate normal left ventricular  size and tracer uptake pattern. The gated images reveal normal resting regional wall  motion and global systolic performance. The measured resting ejection fraction is  61.      CONCLUSION: Exercise stress nuclear study is negative for inducible myocardial  ischemia or infarction.      COMMENTS: Comparison with the images of the examination of 05/13/2011 demonstrates  no significant interval change.         Lab Results   Component Value Date    CHOL 119 11/03/2016    CHOL 97 09/22/2015    CHOL 125 03/01/2012     Lab Results   Component Value Date    HDL 29 (L) 11/03/2016    HDL 32 (L) 09/22/2015    HDL 30 (L) 03/01/2012     Lab Results   Component Value Date    LDLCALC 72 11/03/2016    LDLCALC 48 09/22/2015    LDLCALC 77.6 03/01/2012     Lab Results   Component Value Date    TRIG 92 11/03/2016    TRIG 83 09/22/2015    TRIG 87 03/01/2012     No components found for: CHOLHDL      No results found for this or any previous visit (from the past 168 hour(s)).    DIAGNOSIS:  1. Vitamin D deficiency  Vitamin D, Total (25-Hydroxy)   2. Hypercholesteremia  Lipid Profile   3. Coronary artery disease         PLAN:  Patient Instructions   Resume statin    Wait to get the fasting lipids until being back on the statin for about 6 weeks.    Check vitamin D today

## 2021-06-11 ENCOUNTER — AMBULATORY - HEALTHEAST (OUTPATIENT)
Dept: ANTICOAGULATION | Facility: CLINIC | Age: 81
End: 2021-06-11

## 2021-06-11 NOTE — PATIENT INSTRUCTIONS - HE
- Please call us if your compression wraps fall more than 1-2 inches below the bend of the knee. Call if they are too painful. Call if they get wet. If it is a weekend and the wraps fall down, are too painful, or get wet take the wraps off and put on another form of compression. Compression such as velcro wraps, compression stockings, short stretch bandages, or tubular compression. Apply one of these until you can be seen in clinic. Please call us if you have any questions [phone].    - Treatment:  Layered Compression Bandaging (2-layer)    What is it?  The layered compression bandaging has a layer of absorbent material that will soak up drainage.     Why we do it.   This is done to treat swelling, wounds, or both.  This will in turn help circulation and healing.    How to care for your bandages.  The wraps need to be kept dry. If  the wraps become wet, remove them and call the clinic to have another wrap applied.    What to expect.  It is common for the wraps to be uncomfortable at the beginning. The first two days are usually the hardest; then they will become more comfortable.       Elevating your legs will help the discomfort. Try to elevate your legs as much as possible.    If rest and elevation does not help your discomfort, call your provider.  If your provider is not available you can remove the wrap and leave a message for further instructions.    - Swelling and Compression Therapy    Swelling in the legs can be caused by many reasons. No matter what the reason, treatment usually includes some type of compression. This may be done with a support sock, dressing, ace wrap, or layered wraps.     It is important to treat the swelling for many reasons. If the swelling is not treated you may develop blisters that can lead to ulcerations. This is caused when extra fluid goes into tissue causing damage and blocking blood flow to the tissue.     It is important that you wear your compression every day,  including days that you will be seen in clinic.     Compression is often the most important part of treating leg wounds. Without controlling the swelling it is often not possible to heal wounds.     Going without compression for even brief periods of time can be damaging to your legs and your health.  Your compression should be put on first thing in the morning. Take the compression off at night only when instructed by your care provider to do so. Sometimes wearing compression 24 hours a day will be recommended.       If you are having difficulty wearing your compression it is important to notify your care provider so that other options may be reviewed.    Thank you for choosing  [facility]. Please call us if you have any questions [phone].

## 2021-06-11 NOTE — PROGRESS NOTES
Assessment/Plan:    1. Cellulitis of right leg  Right lower extremity cellulitis.  Had been seen to urgency room yesterday.  Has not picked up prescription for clindamycin 450 mg 4 times daily x10 days nor the mupirocin.  Patient will  prescriptions on the way to the pharmacy following this appointment and begin immediately with further assessment through wound care clinic due to delayed healing of wound since June 28, 2020 after initial injury described.  - Ambulatory referral to Wound Clinic    2. Open wound of right lower extremity, subsequent encounter  As above, wound care clinic referral placed.  Wound care and dressing change discussed.  - Ambulatory referral to Wound Clinic    3. Persistent atrial fibrillation (H)  Persistent atrial fibrillation history.  Continues chronic warfarin anticoagulation with INR 2.10 on Tuesday, September 1 and likely repeat 4 to 6-week interval since stable.    4. Encounter for immunization  High-dose flu shot provided.  - Influenza,Quad,High Dose,PF 65 YR+        Subjective:    Prosper Lopez is seen today for right leg injury which originally occurred June 28, 2020 after hitting his leg on a 2 x 8 board.  Slowly healing wound since that time.  Had been on Keflex for cellulitis initially.  Had been seen through emergency room yesterday and diagnosed with recurrent cellulitis.  Started on clindamycin 450 mg 4 times daily x10 days plus mupirocin however has not picked up either prescription want to reassess first here to make sure that I do not make any medication change.  No fever.  Scant drainage.  2 other areas of superficial denuded skin.  Clear drainage.  Left leg with prominent left thigh primarily posterior aspect.  Patient has had assessment in the past for [facility] with question of noncancerous tumor however patient had declined to have removed due to potential risk for nerve injury etc.  Comprehensive review of systems as above otherwise all  negative.     - Frances  5 sons -   1 daughter -  40 breast CA  Tobacco: quit ~  (1 ppd x > 30 years) - had quit once for 14 years  EtOH: occ  Mom -  92 y.o due to ? cancer  Dad -  56 heart attack  2 bros - one  age 78 heart attack and EtOHism  1 sis -  ? cancer   Surgeries: prostate surgery due to cancer; kidney stones bilateral  Hospitalizations: kidney stone   Work: retired ()    Past Surgical History:   Procedure Laterality Date     NM CYSTO/URETERO W/LITHOTRIPSY &INDWELL STENT INSRT Left 2018    Procedure: CYSTOSCOPY, LEFT  URETEROSCOPY, LASER LITHOTRIPSY STENT INSERTION;  Surgeon: Foreign Lakhani MD;  Location: [facility];  Service: Urology     PROSTATE SURGERY       URETEROSCOPY          Family History   Problem Relation Age of Onset     Heart attack Father      Heart disease Father      Colon cancer Brother      Urolithiasis Brother         Past Medical History:   Diagnosis Date     Atrial fibrillation (H) 06/10/2014     Calculus of ureter 2018     Cardiomyopathy (H) 2014     Cataract 2012     Coronary artery disease      Hydrarthrosis 2012     Hyperlipidemia 2012     Nephrolithiasis 2009    first stone at age 68     Obesity 2015     Plantar fasciitis of right foot      Prostate CA (H)      Seborrheic keratosis 2012     Skin neoplasm     Of uncertain behavior     TIA (transient ischemic attack) 2012     Tubular adenoma of colon 2006        Social History     Tobacco Use     Smoking status: Former Smoker     Last attempt to quit: 2014     Years since quittin.2     Smokeless tobacco: Never Used   Substance Use Topics     Alcohol use: Yes     Comment: Occasional     Drug use: No        Current Outpatient Medications   Medication Sig Dispense Refill     carvedilol (COREG) 25 MG tablet TAKE 1 TABLET BY MOUTH TWICE DAILY WITH MEALS 180 tablet 3     cholecalciferol, vitamin D3, (VITAMIN D3) 2,000 unit cap  Take 2,000 Units by mouth daily.       clindamycin (CLEOCIN) 150 MG capsule Take 450 mg by mouth 4 (four) times a day. x 10 days       mupirocin (BACTROBAN) 2 % ointment Apply 1 application topically 3 (three) times a day.       warfarin ANTICOAGULANT (COUMADIN/JANTOVEN) 5 MG tablet TAKE 1/2 TO 1 TABLET BY MOUTH EVERY DAY, ADJUST DOSE PER INR RESULTS AS DIRECTED 90 tablet 1     No current facility-administered medications for this visit.           Objective:    Vitals:    09/03/20 1419   BP: 100/60   Pulse: (!) 106   Temp: 98.5  F (36.9  C)   SpO2: 98%   Weight: (!) 262 lb (118.8 kg)      Body mass index is 36.54 kg/m .    Alert.  No apparent distress.  Left posterior thigh with induration without fluctuance.  Patient describes this is chronic, stable over 20 years.  Right lower extremity with area of cellulitis more anterolateral aspect of mid shin with 2 cm defect with 1 cm deep ulceration without significant purulent drainage.  2 areas of denuded skin with serous fluid drainage only.  No calf tenderness.  No ankle edema.  Cardiac exam irregular, rate controlled.      This note has been dictated using voice recognition software and as a result may contain minor grammatical errors and unintended word substitutions.

## 2021-06-11 NOTE — TELEPHONE ENCOUNTER
ANTICOAGULATION  MANAGEMENT    Assessment     Today's INR result of 2.1 is Therapeutic (goal INR of 2.0-3.0)        Warfarin taken as previously instructed    No new diet changes affecting INR    No new medication/supplements affecting INR    Continues to tolerate warfarin with no reported s/s of bleeding or thromboembolism     Previous INR was Therapeutic    Plan:     Left detailed message for Prosper regarding INR result and instructed:     Warfarin Dosing Instructions:  Continue current warfarin dose    2.5 mg every Tue, Thu, Sat; 5 mg all other days         Instructed patient to follow up no later than: 8 weeks.    Education provided: importance of following up for INR monitoring at instructed interval and importance of taking warfarin as instructed      Instructed to call the [facility] for any changes, questions or concerns. (#[phone])   ?   Christopher Jimenez RN    Subjective/Objective:      Prosper Lopez, a 80 y.o. male is on warfarin.     Prosper reports:     Home warfarin dose: as updated on anticoagulation calendar per template     Missed doses: No     Medication changes:  No     S/S of bleeding or thromboembolism:  No     New Injury or illness:  No     Changes in diet or alcohol consumption:  No     Upcoming surgery, procedure or cardioversion:  No    Anticoagulation Episode Summary     Current INR goal:   2.0-3.0   TTR:   91.1 % (1 y)   Next INR check:   10/27/2020   INR from last check:   2.10 (9/1/2020)   Weekly max warfarin dose:      Target end date:   Indefinite   INR check location:      Preferred lab:      Send INR reminders to:   MAGDALENA TEJEDA    Indications    A-fib (H) (Resolved) [I48.91]           Comments:            Anticoagulation Care Providers     Provider Role Specialty Phone number    Foreign Tavares MD Referring Family Medicine [phone]

## 2021-06-11 NOTE — PROGRESS NOTES
Cardiology Progress Note    Assessment:  Atrial fibrillation status post cardioversion in 2014, successful maintenance of normal sinus rhythm, on anticoagulation  Tachycardia-induced cardiomyopathy, resolved  Nonobstructive coronary artery disease, asymptomatic, negative stress test in 2014  Hypertension, good control  Hyperlipidemia, good control      Plan:  He appears to be well compensated.  Should continue current cardiac medications without any changes    Subjective:   This is 77 y.o. male who comes in today for routine follow-up visit.  He has done well.  He denies development of chest pain or shortness of breath.  He has not had heart palpitations syncope.  He tolerates medication well.  INR stays within therapeutic range.    Review of Systems:   General: WNL  Eyes: WNL  Ears/Nose/Throat: WNL  Lungs: WNL  Heart: WNL  Stomach: WNL  Bladder: WNL  Muscle/Joints: WNL  Skin: WNL  Nervous System: WNL  Mental Health: WNL     Blood: WNL    Objective:   /62 (Patient Site: Right Arm, Patient Position: Sitting, Cuff Size: Adult Large)  Pulse 68  Resp 24  Ht 6' (1.829 m)  Wt (!) 259 lb 9.6 oz (117.8 kg)  BMI 35.21 kg/m2  Physical Exam:  GENERAL: no distress  NECK: No JVD  LUNGS: Clear to auscultation.  CARDIAC: regular rhythm, S1 & S2 normal.  No heaves, thrills, gallops or murmurs.  ABDOMEN: flat, negative hepatosplenomegaly, soft and non-tender.  EXTREMITIES: No evidence of cyanosis, clubbing or edema.    Current Outpatient Prescriptions   Medication Sig Dispense Refill     atorvastatin (LIPITOR) 20 MG tablet TAKE 1 TABLET BY MOUTH EVERY NIGHT AT BEDTIME 90 tablet 2     carvedilol (COREG) 6.25 MG tablet TAKE 1 TABLET BY MOUTH TWICE DAILY WITH MEALS 180 tablet 0     cholecalciferol, vitamin D3, (VITAMIN D3) 2,000 unit cap Take 2,000 Units by mouth daily.       warfarin (COUMADIN) 5 MG tablet Take 1/2 or 1 tab (2.5mg or 5mg) daily by mouth, as directed.  Adjust dose based on INR results. 90 tablet 0     No  current facility-administered medications for this visit.        Cardiographics:    Echocardiogram: October 2014 Normal left ventricular size and systolic function.  Left ventricular ejection fraction is visually estimated to be 55%.  No significant valvular abnormalities.  Since 6/11/2014 the LV function has improved     Stress Test: October 2014 Exercise stress nuclear study is negative for inducible myocardial  ischemia or infarction.      Coronary calcium score: July 2014 110     Lab Results:       Lab Results   Component Value Date    CHOL 119 11/03/2016    CHOL 97 09/22/2015    CHOL 125 03/01/2012     Lab Results   Component Value Date    HDL 29 (L) 11/03/2016    HDL 32 (L) 09/22/2015    HDL 30 (L) 03/01/2012     Lab Results   Component Value Date    LDLCALC 72 11/03/2016    LDLCALC 48 09/22/2015    LDLCALC 77.6 03/01/2012     Lab Results   Component Value Date    TRIG 92 11/03/2016    TRIG 83 09/22/2015    TRIG 87 03/01/2012     No components found for: CHOLHDL  No results found for: BNP    Misha (Elver)  MD Bibi

## 2021-06-11 NOTE — PROGRESS NOTES
Compression Applied to Right  2-Layer Coban: I Applied the inner foam layer with the foot dorsiflexed and started atthe base of the fifth metatarsal head. I left the bottom of the heel exposed, and proceed by winding the foam up the leg using minimal overlap to just below the fibular head. I then applied the compression layer with the foot dorsiflexed and startingat the base of the fifth metatarsal head. I applied at full stretch and proceeded up the leg using 50% overlap. The bottom of the heel is covered with the compression layer up to the end at the fibular head just below the back of the knee and levelwith the top edge of the foam layer.  I gently pressed and conformed the entire surface of the system to ensurethat the two layers are firmly bound together

## 2021-06-11 NOTE — PATIENT INSTRUCTIONS - HE
- Please call us if your compression wraps fall more than 1-2 inches below the bend of the knee. Call if they are too painful. Call if they get wet. If it is a weekend and the wraps fall down, are too painful, or get wet take the wraps off and put on another form of compression. Compression such as velcro wraps, compression stockings, short stretch bandages, or tubular compression. Apply one of these until you can be seen in clinic. Please call us if you have any questions [phone].    - Treatment:  Layered Compression Bandaging (2-layer)    What is it?  The layered compression bandaging has a layer of absorbent material that will soak up drainage.     Why we do it.   This is done to treat swelling, wounds, or both.  This will in turn help circulation and healing.    How to care for your bandages.  The wraps need to be kept dry. If  the wraps become wet, remove them and call the clinic to have another wrap applied.    What to expect.  It is common for the wraps to be uncomfortable at the beginning. The first two days are usually the hardest; then they will become more comfortable.       Elevating your legs will help the discomfort. Try to elevate your legs as much as possible.    If rest and elevation does not help your discomfort, call your provider.  If your provider is not available you can remove the wrap and leave a message for further instructions.    - Swelling and Compression Therapy    Swelling in the legs can be caused by many reasons. No matter what the reason, treatment usually includes some type of compression. This may be done with a support sock, dressing, ace wrap, or layered wraps.     It is important to treat the swelling for many reasons. If the swelling is not treated you may develop blisters that can lead to ulcerations. This is caused when extra fluid goes into tissue causing damage and blocking blood flow to the tissue.     It is important that you wear your compression every day,  including days that you will be seen in clinic.     Compression is often the most important part of treating leg wounds. Without controlling the swelling it is often not possible to heal wounds.     Going without compression for even brief periods of time can be damaging to your legs and your health.  Your compression should be put on first thing in the morning. Take the compression off at night only when instructed by your care provider to do so. Sometimes wearing compression 24 hours a day will be recommended.       If you are having difficulty wearing your compression it is important to notify your care provider so that other options may be reviewed.    Thank you for choosing  [facility]. Please call us if you have any questions [phone].

## 2021-06-11 NOTE — PATIENT INSTRUCTIONS - HE
You have pre-diabetes you need to limit your sweets and carbohydrates; limit pasta; crackers; chips     Try to reduce your weight to 240           Wound Care Instructions     Twice per week at the clinic we will Cleanse your right leg wound(s) with Dilute hibiclens 30cc in 500cc NS     Pat Dry with non-sterile gauze     Apply Lotion to the intact skin surrounding your wound and other dry skin locations. Some good lotions include: Remedy Skin Repair Cream, Sarna, Vanicream or Cetaphil     Apply silvercel into/onto the wounds     Cover with gauze     Secure with non-sterile roll gauze and tape as needed; avoid adhesive directly on the skin     Compression: 2 layer on the right leg     It is not ok to get your wound wet in the bath or shower  Thank you for choosing [facility] as partners in your care.  Please read the following information about your treatment.    Treatment:  Layered Compression Bandaging (Two-layer)    What is it?  The layered compression bandaging has a layer of absorbent material that will soak up drainage.     Why we do it.   This is done to treat swelling, wounds, or both.  This will in turn help circulation and healing.    How to care for your bandages.  The wraps need to be kept dry. If  the wraps become wet, remove them and call the clinic to have another wrap applied.    What to expect.  It is common for the wraps to be uncomfortable at the beginning. The first two days are usually the hardest; then they will become more comfortable.       Elevating your legs will help the discomfort. Try to elevate your legs as much as possible.    If rest and elevation does not help your discomfort, call your provider.  If your provider is not available you can remove the wrap and leave a message for further instructions.      If you have any questions, please contact [facility] at [phone].

## 2021-06-12 NOTE — PATIENT INSTRUCTIONS - HE
- Please call us if your compression wraps fall more than 1-2 inches below the bend of the knee. Remove the wraps if you experience any shortness of breathe or notice your toes turning blue/purple and then give us a call. Call if they are too painful. Call if they get wet. If it is a weekend and the wraps fall down, are too painful, or get wet take the wraps off and put on another form of compression. Compression such as velcro wraps, compression stockings, short stretch bandages, or tubular compression. Apply one of these until you can be seen in clinic. Please call us if you have any questions [phone].     - Treatment:  Layered Compression Bandaging (2-layer)     What is it?  The layered compression bandaging has a layer of absorbent material that will soak up drainage.      Why we do it.   This is done to treat swelling, wounds, or both.  This will in turn help circulation and healing.     How to care for your bandages.  The wraps need to be kept dry. If  the wraps become wet, remove them and call the clinic to have another wrap applied.     What to expect.  It is common for the wraps to be uncomfortable at the beginning. The first two days are usually the hardest; then they will become more comfortable.       Elevating your legs will help the discomfort. Try to elevate your legs as much as possible.     If rest and elevation does not help your discomfort, call your provider.  If your provider is not available you can remove the wrap and leave a message for further instructions.     - Swelling and Compression Therapy     Swelling in the legs can be caused by many reasons. No matter what the reason, treatment usually includes some type of compression. This may be done with a support sock, dressing, ace wrap, or layered wraps.      It is important to treat the swelling for many reasons. If the swelling is not treated you may develop blisters that can lead to ulcerations. This is caused when extra fluid goes  into tissue causing damage and blocking blood flow to the tissue.      It is important that you wear your compression every day, including days that you will be seen in clinic.      Compression is often the most important part of treating leg wounds. Without controlling the swelling it is often not possible to heal wounds.      Going without compression for even brief periods of time can be damaging to your legs and your health.  Your compression should be put on first thing in the morning. Take the compression off at night only when instructed by your care provider to do so. Sometimes wearing compression 24 hours a day will be recommended.        If you are having difficulty wearing your compression it is important to notify your care provider so that other options may be reviewed.     Thank you for choosing [facility]. Please call us if you have any questions [phone].

## 2021-06-12 NOTE — PATIENT INSTRUCTIONS - HE
- Please call us if your compression wraps fall more than 1-2 inches below the bend of the knee. Remove the wraps if you experience any shortness of breathe or notice your toes turning blue/purple and then give us a call. Call if they are too painful. Call if they get wet. If it is a weekend and the wraps fall down, are too painful, or get wet take the wraps off and put on another form of compression. Compression such as velcro wraps, compression stockings, short stretch bandages, or tubular compression. Apply one of these until you can be seen in clinic. Please call us if you have any questions [phone].    - Treatment:  Layered Compression Bandaging (2-layer)    What is it?  The layered compression bandaging has a layer of absorbent material that will soak up drainage.     Why we do it.   This is done to treat swelling, wounds, or both.  This will in turn help circulation and healing.    How to care for your bandages.  The wraps need to be kept dry. If  the wraps become wet, remove them and call the clinic to have another wrap applied.    What to expect.  It is common for the wraps to be uncomfortable at the beginning. The first two days are usually the hardest; then they will become more comfortable.       Elevating your legs will help the discomfort. Try to elevate your legs as much as possible.    If rest and elevation does not help your discomfort, call your provider.  If your provider is not available you can remove the wrap and leave a message for further instructions.    - Swelling and Compression Therapy    Swelling in the legs can be caused by many reasons. No matter what the reason, treatment usually includes some type of compression. This may be done with a support sock, dressing, ace wrap, or layered wraps.     It is important to treat the swelling for many reasons. If the swelling is not treated you may develop blisters that can lead to ulcerations. This is caused when extra fluid goes into tissue  causing damage and blocking blood flow to the tissue.     It is important that you wear your compression every day, including days that you will be seen in clinic.     Compression is often the most important part of treating leg wounds. Without controlling the swelling it is often not possible to heal wounds.     Going without compression for even brief periods of time can be damaging to your legs and your health.  Your compression should be put on first thing in the morning. Take the compression off at night only when instructed by your care provider to do so. Sometimes wearing compression 24 hours a day will be recommended.       If you are having difficulty wearing your compression it is important to notify your care provider so that other options may be reviewed.    Thank you for choosing [facility]. Please call us if you have any questions [phone].

## 2021-06-12 NOTE — TELEPHONE ENCOUNTER
ANTICOAGULATION  MANAGEMENT    Assessment     Today's INR result of 2.5 is Therapeutic (goal INR of 2.0-3.0)        Warfarin taken as previously instructed    No new diet changes affecting INR    No new medication/supplements affecting INR    Continues to tolerate warfarin with no reported s/s of bleeding or thromboembolism     Previous INR was Therapeutic    Plan:     Spoke on phone with Prosper regarding INR result and instructed:     Warfarin Dosing Instructions:  Continue current warfarin dose    2.5 mg every Tue, Thu, Sat; 5 mg all other days         Instructed patient to follow up no later than: 8 weeks.    Education provided: importance of following up for INR monitoring at instructed interval and importance of taking warfarin as instructed    Waldo verbalizes understanding and agrees to warfarin dosing plan.    Instructed to call the [facility] for any changes, questions or concerns. (#[phone])   ?   Christopher Jimenez RN    Subjective/Objective:      Prosper Lopez, a 80 y.o. male is on warfarin.     Prosper reports:     Home warfarin dose: as updated on anticoagulation calendar per template     Missed doses: No     Medication changes:  No     S/S of bleeding or thromboembolism:  No     New Injury or illness:  No     Changes in diet or alcohol consumption:  No     Upcoming surgery, procedure or cardioversion:  No    Anticoagulation Episode Summary     Current INR goal:  2.0-3.0   TTR:  96.1 % (1 y)   Next INR check:  12/8/2020   INR from last check:  2.50 (10/13/2020)   Weekly max warfarin dose:     Target end date:  Indefinite   INR check location:     Preferred lab:     Send INR reminders to:  MAGDALENA TEJEDA    Indications    A-fib (H) (Resolved) [I48.91]           Comments:           Anticoagulation Care Providers     Provider Role Specialty Phone number    Foreign Tavares MD Referring Family Medicine [phone]

## 2021-06-12 NOTE — PROGRESS NOTES
Phone call to patient - informed him of recommendations - instructed him to schedule wkly INRs with PMD in preparation for DCCV - understanding verbalized - call transf. to sched.  mg

## 2021-06-12 NOTE — TELEPHONE ENCOUNTER
Refill Approved    Rx renewed per Medication Renewal Policy. Medication was last renewed on 10/18/19, last OV 10/13/20.    Jeana Cornelius, [facility] Triage/Med Refill 10/24/2020     Requested Prescriptions   Pending Prescriptions Disp Refills     carvediloL (COREG) 25 MG tablet 180 tablet 3     Sig: Take 1 tablet (25 mg total) by mouth 2 (two) times a day with meals.       Beta-Blockers Refill Protocol Passed - 10/23/2020 12:36 PM        Passed - PCP or prescribing provider visit in past 12 months or next 3 months     Last office visit with prescriber/PCP: 9/3/2020 Foreign Tavares MD OR same dept: 9/3/2020 Foreign Tavares MD OR same specialty: 9/3/2020 Foreign Tavares MD  Last physical: 10/13/2020 Last MTM visit: Visit date not found   Next visit within 3 mo: Visit date not found  Next physical within 3 mo: Visit date not found  Prescriber OR PCP: Foreign Tavares MD  Last diagnosis associated with med order: 1. Persistent atrial fibrillation (H)  - carvediloL (COREG) 25 MG tablet; Take 1 tablet (25 mg total) by mouth 2 (two) times a day with meals.  Dispense: 180 tablet; Refill: 3    If protocol passes may refill for 12 months if within 3 months of last provider visit (or a total of 15 months).             Passed - Blood pressure filed in past 12 months     BP Readings from Last 1 Encounters:   10/13/20 120/80

## 2021-06-12 NOTE — PROGRESS NOTES
Patient seen in clinic for EKG per Dr. Mtz's order after patient suspected he had reverted back into AF over wknd (refer to 7-24-17 phone note) - EKG shows AF with CVR @ 92 - /74 - patient continues to deny dyspnea, dizziness, palpitations - confirmed he continues to take Warfarin daily, managed by PMD - last INR 6-23-17 was 2.5, previous INR 4-25-17 was 3.0.    Informed patient that update and EKG would be forwarded to Dr. Mtz for review/recommendations - understanding verbalized.    Please review update/EKG - f/u pending 6/2018 - any new orders?  mg

## 2021-06-12 NOTE — TELEPHONE ENCOUNTER
Who is calling:  Patient   Reason for Call:  Patient stated that he would like to do a Cologuard test.   Date of last appointment with primary care: n/a  Okay to leave a detailed message: Yes  [phone] or My-Chart

## 2021-06-12 NOTE — PATIENT INSTRUCTIONS - HE
- Please call us if your compression wraps fall more than 1-2 inches below the bend of the knee. Remove the wraps if you experience any shortness of breathe or notice your toes turning blue/purple and then give us a call. Call if they are too painful. Call if they get wet. If it is a weekend and the wraps fall down, are too painful, or get wet take the wraps off and put on another form of compression. Compression such as velcro wraps, compression stockings, short stretch bandages, or tubular compression. Apply one of these until you can be seen in clinic. Please call us if you have any questions [phone].    - Treatment:  Layered Compression Bandaging (2-layer)    What is it?  The layered compression bandaging has a layer of absorbent material that will soak up drainage.     Why we do it.   This is done to treat swelling, wounds, or both.  This will in turn help circulation and healing.    How to care for your bandages.  The wraps need to be kept dry. If  the wraps become wet, remove them and call the clinic to have another wrap applied.    What to expect.  It is common for the wraps to be uncomfortable at the beginning. The first two days are usually the hardest; then they will become more comfortable.       Elevating your legs will help the discomfort. Try to elevate your legs as much as possible.    If rest and elevation does not help your discomfort, call your provider.  If your provider is not available you can remove the wrap and leave a message for further instructions.    - Swelling and Compression Therapy    Swelling in the legs can be caused by many reasons. No matter what the reason, treatment usually includes some type of compression. This may be done with a support sock, dressing, ace wrap, or layered wraps.     It is important to treat the swelling for many reasons. If the swelling is not treated you may develop blisters that can lead to ulcerations. This is caused when extra fluid goes into tissue  causing damage and blocking blood flow to the tissue.     It is important that you wear your compression every day, including days that you will be seen in clinic.     Compression is often the most important part of treating leg wounds. Without controlling the swelling it is often not possible to heal wounds.     Going without compression for even brief periods of time can be damaging to your legs and your health.  Your compression should be put on first thing in the morning. Take the compression off at night only when instructed by your care provider to do so. Sometimes wearing compression 24 hours a day will be recommended.       If you are having difficulty wearing your compression it is important to notify your care provider so that other options may be reviewed.    Thank you for choosing [facility]. Please call us if you have any questions [phone].

## 2021-06-12 NOTE — PROGRESS NOTES
"[facility] HEART [facility]   Arrhythmia Clinic    Assessment/Plan:  Diagnoses and all orders for this visit:    Persistent atrial fibrillation and second episode known of persistent atrial fib.  He tells me it was discovered in 2014 when his chiropractor noted that he was irregular.  I see looking back in Dr. Mtz's note after cardioversion in October 2014 that he noted no improvement in activity tolerance or energy level.  He tells me today that he checks his pulse off and on and noted it was irregular in that precipitated visit with Dr. Mtz in June 2017.  He has not noticed any change in energy level or tolerance of activity.  He tells me \"I can do everything I normally do \".  I spent time discussing rate versus rhythm control.  I discussed race and affirm studies which showed no difference in longevity or cardiac events on rate versus rhythm control.  I discussed natural progression with atrial fib and cardioversion is only temporarily interrupting the rhythm.  It turns out his wife is in chronic A. fib but has a lot more health issues than him.  He has a tendency towards lower normal heart rates in regular rhythm.  I discussed that I would be limited on antiarrhythmics I could use with him.  I also discussed that we do not use antiarrhythmics or try to keep him in rhythm if he has no symptoms.  Therefore he decided after discussion that he does not want to do a repeat cardioversion as he thinks he is not symptomatic.  I told him if he changes his mind and he wants a repeat cardioversion he has to come to call me or come back to me in 4-6 weeks at most.  After that it will be difficult to get him in rhythm without using antiarrhythmic.  He seems to have a good understanding of this.  I had him walk 500 feet around the rectangle in our clinic and heart rates elevated to 120s.  I recommended that he increase carvedilol from 6.25 mg 1 tab p.o. twice daily to 6.25 1-1/2 tabs in the morning and stay with 1 tab in " the evening.  To wear a 24-hour Holter to check heart rates daytime and nighttime in A. fib in 2 weeks.  I will call him with those results.  I discussed importance of good rate control to prevent heart failure with A. fib.  I do not think that this will be hard to obtain with him.  [id number] score of 4 with 2 points for history of TIA and 2 points for age 75 and over.  Discussed need for chronic anticoagulation regardless of rate versus rhythm control.    -     Holter Monitor; Future; Expected date: 8/18/17    -     carvedilol (COREG) 6.25 MG tablet; Take 11/2 tab in am and 1 tab in pm  Dispense: 225 tablet; Refill: 3    40 minutes were spent in face-to-face counseling regarding above diagnoses and options for treatment.        ______________________________________________________________________    Subjective:    I had the opportunity to see Prosper Lopez at the [facility]. Prosper Lopez is a 77 y.o. male and here for EP follow-up regarding now second episode of known persistent atrial fib .  Prosper Lopez has a known history of persistent atrial fib which was found incidentally in the summer 2014 and has gone 3 years without any known episodes of atrial fib.  Discussed that he is likely had some episodes in the intervening 3 years but asymptomatic so not noticing anything.  He is clear to me that he has not noticed any change in energy level or tolerance of activity.  He does bowling and some house and yard work as primary activities.  He does not do routine exercise.  He does occasional golfing.  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  He has been on carvedilol since then.  He is also been on warfarin since then as well.  His INRs have been stable.  He has been doing weekly INRs.  His accidentally took the wrong dose last week and the INR was slightly elevated.  His INR is so stable that he usually goes 6 weeks between INR checks.  He has no bleeding issues on  warfarin.  He denies other cardiac symptoms other than shortness of breath with more physical activity such as doing lots of raking or heavy lifting.  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     Internal Derangement Of Knee     Persistent atrial fibrillation     Coronary artery disease, NONOBSTRUCTIVE,  ASX, NEGATIVE STRESS TEST 2014     Obesity     Plantar fasciitis of right foot     Medical History:  Past Medical History:   Diagnosis Date     Arrhythmia a-fib     Atrial fibrillation      Cardiomyopathy      Cataract      Hydrarthrosis      Hyperlipidemia      Hyperlipidemia      Nephrolithiasis      Prostate CA      Seborrheic keratosis      Skin neoplasm     Of uncertain behavior     TIA (transient ischemic attack)      Tubular adenoma of colon      Surgical History:  Past Surgical History:   Procedure Laterality Date     PROSTATE SURGERY       Social History:  Social History   Substance Use Topics     Smoking status: Former Smoker     Quit date: 5/29/2014     Smokeless tobacco: None     Alcohol use Yes      Comment: Occasionall        Review of Systems: Review of Systems:   General: WNL  Eyes: WNL  Ears/Nose/Throat: WNL  Lungs: WNL  Heart: Irregular Heartbeat  Stomach: WNL  Bladder: WNL  Muscle/Joints: WNL  Skin: WNL  Nervous System: WNL  Mental Health: WNL     Blood: Easy Bruising      Family History:  Family History   Problem Relation Age of Onset     Heart attack Father      Heart disease Father      Colon cancer Brother          Allergies:  No Known Allergies  Medications:  Current Outpatient  "Prescriptions   Medication Sig Dispense Refill     atorvastatin (LIPITOR) 20 MG tablet TAKE 1 TABLET BY MOUTH EVERY NIGHT AT BEDTIME 90 tablet 2     carvedilol (COREG) 6.25 MG tablet Take 11/2 tab in am and 1 tab in pm 225 tablet 3     cholecalciferol, vitamin D3, (VITAMIN D3) 2,000 unit cap Take 2,000 Units by mouth daily.       warfarin (COUMADIN) 5 MG tablet Take 1/2 or 1 tab (2.5mg or 5mg) daily by mouth, as directed.  Adjust dose based on INR results. 90 tablet 0     No current facility-administered medications for this visit.        Objective:   Vital signs:  /76 (Patient Site: Left Arm, Patient Position: Sitting, Cuff Size: Adult Large)  Pulse 80  Resp 20  Ht 5' 11.5\" (1.816 m)  Wt (!) 259 lb 12.8 oz (117.8 kg)  BMI 35.73 kg/m2      Physical Exam:    GENERAL APPEARANCE: Alert, cooperative and in no acute distress.  HEENT: No scleral icterus. No Xanthelasma. Oral mucuos membranes pink and moist.  NECK: JVP Nl.   CHEST: clear to auscultation  CARDIOVASCULAR: S1, S2 without murmur ,clicks or rubs. Irregular, irregular.  Radial and posterior tibial pulses are intact and symetric.   EXTREMITIES: No cyanosis, clubbing or edema.    Results personally reviewed:  Results for orders placed during the hospital encounter of 10/14/14   Echo Complete [ECH10] 10/14/2014    Narrative      Summary   Normal left ventricular size and systolic function.   Left ventricular ejection fraction is visually estimated to be 55%.   No significant valvular abnormalities.   Since 6/11/2014 the LV function has improved              June 2014 echo shows EF 30%.  Global hypokinesis.       Results for orders placed or performed in visit on 07/25/17   ECG Clinic Future   Result Value Ref Range    SYSTOLIC BLOOD PRESSURE  mmHg    DIASTOLIC BLOOD PRESSURE  mmHg    VENTRICULAR RATE 92 BPM    ATRIAL RATE  BPM    P-R INTERVAL  ms    QRS DURATION 82 ms    Q-T INTERVAL 340 ms    QTC CALCULATION (BEZET) 420 ms    P Axis  degrees    R AXIS " 40 degrees    T AXIS 29 degrees    MUSE DIAGNOSIS       Atrial fibrillation  Abnormal ECG  When compared with ECG of 17-SEP-2014 13:24,  Atrial fibrillation has replaced Sinus rhythm  Confirmed by ANA PORTILLO MD LOC: ([id number]) on 7/25/2017 4:07:18 PM          TSH:   Lab Results   Component Value Date    TSH 1.30 06/10/2014     BNP: No results found for: BNP  BMP:  Lab Results   Component Value Date    CREATININE 0.79 11/03/2016    BUN 12 11/03/2016     11/03/2016    K 4.2 11/03/2016     (H) 11/03/2016    CO2 21 (L) 11/03/2016       This note has been dictated using voice recognition software. Any grammatical or context distortions are unintentional and inherent to the software.    GIBSON CHAPMAN RN, [facility]  [phone]

## 2021-06-12 NOTE — PATIENT INSTRUCTIONS - HE
Keep an eye on your newly healed wound. Change foam pad every 2-3 days. Call clinic if wound reopens.    Wear compression stockings when you are up during the day. Okay to take off at night.  Swelling and Compression Therapy    Swelling in the legs can be caused by many reasons. No matter what the reason, treatment usually includes some type of compression. This may be done with a support sock, dressing, ace wrap, or layered wraps.     It is important to treat the swelling for many reasons. If the swelling is not treated you may develop blisters that can lead to ulcerations. This is caused when extra fluid goes into tissue causing damage and blocking blood flow to the tissue.     It is important that you wear your compression every day, including days that you will be seen in clinic.     Compression is often the most important part of treating leg wounds. Without controlling the swelling it is often not possible to heal wounds.     Going without compression for even brief periods of time can be damaging to your legs and your health.  Your compression should be put on first thing in the morning. Take the compression off at night only when instructed by your care provider to do so. Sometimes wearing compression 24 hours a day will be recommended.       If you are having difficulty wearing your compression it is important to notify your care provider so that other options may be reviewed.    Please call us if you have any questions [phone].    Thank you for choosing [facility].

## 2021-06-12 NOTE — PROGRESS NOTES
Assessment and Plan:     Patient has been advised of split billing requirements and indicates understanding: No     1. Encounter for general adult medical examination with abnormal findings  Annual wellness visit completed.  Risks associate with suboptimal diet, need to complete healthcare directives and prior fall.  Risks reviewed in detail.  Annual wellness visits to continue.    2. Class 2 obesity due to excess calories without serious comorbidity with body mass index (BMI) of 36.0 to 36.9 in adult  Dietary and exercise modifications for weight goal less than 250 pounds initially, less than 240 pounds ideally.    3. Persistent atrial fibrillation (H)  Persistent atrial fibrillation appears rate controlled and continues carvedilol 25 mg twice daily plus warfarin anticoagulation.  No bleeding concerns.  Check INR today.  Atrial fibrillation recheck in 6 months anticipated.  - Basic Metabolic Panel    4. Coronary artery disease involving native coronary artery of native heart without angina pectoris  History of nonobstructive coronary artery disease, asymptomatic.    5. Impaired fasting glucose  History of impaired fasting glucose.  Prior A1c of 5.8% and will repeat A1c and fasting glucose today with therapeutic lifestyle changes reviewed as noted above.  - Glycosylated Hemoglobin A1c  - Basic Metabolic Panel    6. Mixed conductive and sensorineural hearing loss of both ears  History of mixed conductive and sensorineural hearing loss.  Right external auditory canal cerumen otherwise patient declines attempts at cerumen removal.  Declines need for further intervention or hearing aids at this time.    7. Open wound of right lower extremity, subsequent encounter  Continues to follow with vascular clinic regarding right lower extremity ulceration with prior cellulitis noted.  Weekly follow-up currently.    8. Venous insufficiency of both lower extremities  Lateral venous insufficiency noted with recommendation for use  of compression stockings.    9. Vitamin D deficiency  History of vitamin D deficiency currently using 2000 units daily.  Repeat vitamin D level.  - Vitamin D, Total (25-Hydroxy)    10. Thrombocytopenia (H)  Prior mild thrombocytopenia noted July 8, 2020 with platelet count 129,000.  Repeat CBC to ensure stable or resolved.  - HM2(CBC w/o Differential)    11. Encounter for immunization  Tetanus booster provided.  - Td, Preservative Free (green label)    12.  History of prostate cancer  Status post prostatectomy    13.  Left-sided hydrocele  Asymptomatic.      The patient's current medical problems were reviewed.    I have had an Advance Directives discussion with the patient.  The following high BMI interventions were performed this visit: encouragement to exercise, weight monitoring, weight loss from baseline weight and lifestyle education regarding diet.  Ensure ongoing efforts to achieve weight goal < 250 pounds initially, < 240 pounds ideally.     The following health maintenance schedule was reviewed with the patient and provided in printed form in the after visit summary:   Health Maintenance   Topic Date Due     ZOSTER VACCINES (1 of 2) 04/16/1990     MEDICARE ANNUAL WELLNESS VISIT  10/13/2021     FALL RISK ASSESSMENT  10/13/2021     LIPID  07/08/2025     ADVANCE CARE PLANNING  10/13/2025     TD 18+ HE  10/13/2030     Pneumococcal Vaccine: 65+ Years  Completed     INFLUENZA VACCINE RULE BASED  Completed     Pneumococcal Vaccine: Pediatrics (0 to 5 Years) and At-Risk Patients (6 to 64 Years)  Aged Out     HEPATITIS B VACCINES  Aged Out        Subjective:     Chief Complaint: Prosper Lopez is an 80 y.o. male here for an Annual Wellness visit.     HPI: In general doing well.  Follows with vascular clinic once a week currently following right lower extremity ulceration with secondary cellulitis.  Bilateral venous insufficiency lower extremities noted.  Improvement with current leg wraps and wound care  described.  No recurrent issues for infection following completion of clindamycin 450 mg 4 times daily x10 days plus topical mupirocin.  Patient has persistent atrial fibrillation history.  Is not aware of cardiac ectopy.  No dizziness.  No chest pain or shortness of breath.  No orthopnea or PND.  Bilateral leg swelling remains stable.  Has had mild low platelet count at 129,000.  Vitamin D deficiency history with prior level of 11.4 November 3, 2016 and now on vitamin D supplement 2000 units daily.  Bilateral hearing loss.  Has had prostate cancer status post prostatectomy previously.  Nonobstructive coronary artery disease noted with prior stress testing as noted below without inducible ischemia.  Possible TIA associated with episode of dizziness however this was not clear patient has not had any further recurrent issues with this.  Needs tetanus booster.  Denies recent illness including fever cough or shortness of breath.    Review of Systems:  Please see above.  The rest of the review of systems are negative for all systems.     - Frances  5 sons -   1 daughter -  40 breast CA  Tobacco: quit ~  (1 ppd x > 30 years) - had quit once for 14 years  EtOH: occ  Mom -  92 y.o due to ? cancer  Dad -  56 heart attack  2 bros - one  age 78 heart attack and EtOHism  1 sis -  ? 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     Internal Derangement Of Knee     Persistent atrial fibrillation (H)     Coronary artery disease, NONOBSTRUCTIVE,  ASX, NEGATIVE STRESS TEST 2014     Obesity     Plantar fasciitis of right foot     Calculus of ureter     Hydronephrosis with urinary obstruction due to ureteral calculus     Ureterolithiasis     Left hydrocele     Mixed conductive and sensorineural hearing loss of both ears     Impaired fasting glucose     Venous insufficiency of both lower extremities     H/O prostate cancer     Past Medical History:   Diagnosis Date     Actinic keratosis     Created by Conversion      Atrial fibrillation (H) 06/10/2014     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     Created by Conversion [employer] Annotation: May  5 2011  1:17PM -  ,  : 2006      Calculus of ureter 4/4/2018     Cardiomyopathy (H) 06/16/2014     Cataract 2012     Cataract     Created by Conversion      Compound Nevus     Created by Conversion  Replacement Utility updated for latest IMO load     Coronary artery disease      Hydrarthrosis 2012     Hydrarthrosis Of The Ankle / Foot     Created by Conversion      Hydronephrosis with urinary obstruction due to ureteral calculus 4/4/2018     Hypercholesteremia     Created by Conversion      Hyperlipidemia 2012     Impaired fasting glucose 9/19/2019     Internal derangement of knee     Created by Conversion  Replacement Utility updated for latest IMO load     Left hydrocele 9/19/2019     Malignant Neoplasm Of The Prostate Gland     Created by Conversion      Mixed conductive and sensorineural hearing loss of both ears 9/19/2019     Nephrolithiasis 2009    first stone at age 68     Nephrolithiasis     Created by [employer] Annotation: May  4 2011  7:56LENNY Dickey, Demar: STENT AND LASER      Obesity 9/1/2015     Persistent atrial fibrillation (H)     First diagnosed in 7 of 2014 and appeared asymptomatic after cardioversion on 914. Reoccurrence on  2017 and again persistent [id number] score of 4 with history of TIA-on warfarin Asymptomatic and on rate control since 2017.      Plantar fasciitis of right foot      Prostate CA (H)      Seborrheic keratosis      Seborrheic Keratosis     Created by Conversion      Skin neoplasm     Of uncertain behavior     Skin Neoplasm Of Uncertain Behavior     Created by Conversion      TIA (transient ischemic attack) 2012     Transient Ischemic Attack     Created by Conversion  Replacement Utility updated for latest IMO load     Tubular adenoma of colon 2006     Ureterolithiasis       Past Surgical History:   Procedure Laterality Date     VT CYSTO/URETERO W/LITHOTRIPSY &INDWELL STENT INSRT Left 2018    Procedure: CYSTOSCOPY, LEFT  URETEROSCOPY, LASER LITHOTRIPSY STENT INSERTION;  Surgeon: Foreign Lakhani MD;  Location: [facility];  Service: Urology     PROSTATE SURGERY       URETEROSCOPY        Family History   Problem Relation Age of Onset     Heart attack Father      Heart disease Father      Colon cancer Brother      Urolithiasis Brother       Social History     Socioeconomic History     Marital status:      Spouse name: Not on file     Number of children: Not on file     Years of education: Not on file     Highest education level: Not on file   Occupational History     Not on file   Social Needs     Financial resource strain: Not on file     Food insecurity     Worry: Not on file     Inability: Not on file     Transportation needs     Medical: Not on file     Non-medical: Not on file   Tobacco Use     Smoking status: Former Smoker     Quit date: 2014     Years since quittin.3     Smokeless tobacco: Never Used   Substance and Sexual Activity     Alcohol use: Yes     Comment: Occasional     Drug use: No     Sexual activity: Not on file   Lifestyle     Physical activity     Days per week: Not on file     Minutes per session: Not on file     Stress: Not on file  "  Relationships     Social connections     Talks on phone: Not on file     Gets together: Not on file     Attends [employer] service: Not on file     Active member of club or organization: Not on file     Attends meetings of clubs or organizations: Not on file     Relationship status: Not on file     Intimate partner violence     Fear of current or ex partner: Not on file     Emotionally abused: Not on file     Physically abused: Not on file     Forced sexual activity: Not on file   Other Topics Concern     Not on file   Social History Narrative     Not on file      Current Outpatient Medications   Medication Sig Dispense Refill     carvedilol (COREG) 25 MG tablet TAKE 1 TABLET BY MOUTH TWICE DAILY WITH MEALS 180 tablet 3     cholecalciferol, vitamin D3, (VITAMIN D3) 2,000 unit cap Take 2,000 Units by mouth daily.       warfarin ANTICOAGULANT (COUMADIN/JANTOVEN) 5 MG tablet TAKE 1/2 TO 1 TABLET BY MOUTH EVERY DAY, ADJUST DOSE PER INR RESULTS AS DIRECTED 90 tablet 1     No current facility-administered medications for this visit.       Objective:   Vital Signs:   Visit Vitals  /80   Pulse 73   Ht 5' 10.75\" (1.797 m)   Wt (!) 260 lb (117.9 kg)   SpO2 98%   BMI 36.52 kg/m           VisionScreening:  No exam data present     PHYSICAL EXAM    General Appearance:    Alert, cooperative, no distress, appears stated age.  BMI = 36.52.   Head:    Normocephalic, without obvious abnormality, atraumatic   Eyes:    PERRL, conjunctiva/corneas clear, EOM's intact, fundi     benign, both eyes.  H/O cataract surgery.        Ears:    Normal TM's and external ear canals, both ears.  Right external canal with cerumen.   Nose:   Nares normal, septum midline, mucosa normal, no drainage    or sinus tenderness   Throat:   Lips, mucosa, and tongue normal; teeth and gums normal   Neck:   Supple, symmetrical, trachea midline, no adenopathy;        thyroid:  No enlargement/tenderness/nodules; no carotid    bruit or JVD   Back:     " Symmetric, no curvature, ROM normal, no CVA tenderness   Lungs:     Clear to auscultation bilaterally, respirations unlabored   Chest wall:    No tenderness or deformity   Heart:    Regular rate and rhythm, S1 and S2 normal, no murmur, rub   or gallop   Abdomen:     Soft, non-tender, bowel sounds active all four quadrants,     no masses, no organomegaly.     Genitalia:    Normal male without lesion, discharge or tenderness.  No inguinal hernia noted.  Left hydrocele.   Rectal:    Normal tone.  Prostate normal/symmetric, no masses or tenderness.   Extremities:   Extremities normal, atraumatic, no cyanosis.  1+ edema   Pulses:   2+ and symmetric all extremities   Skin:   Skin color, texture, turgor normal, no rashes or lesions   Lymph nodes:   Cervical, supraclavicular, and axillary nodes normal   Neurologic:   CNII-XII intact. Normal strength, sensation and reflexes       throughout        Assessment Results 10/13/2020   Activities of Daily Living No help needed   Instrumental Activities of Daily Living No help needed   Get Up and Go Score Less than 12 seconds   Mini Cog Total Score 3   Some recent data might be hidden     A Mini-Cog score of 0-2 suggests the possibility of dementia, score of 3-5 suggests no dementia      Echo 3/22/20 Summary    When compared to the previous study dated 10/14/2014, no significant change.    Left ventricle ejection fraction is normal. The calculated left ventricular ejection fraction is 58%.    Normal left ventricular size and systolic function.    E/e' > 15, suggesting elevated LV filling pressures.    Left atrial volume is mildly increased.    The aortic root is mildly dilated.         NM Exercise Stress Test 10/29/14  ORDERING  PHYSICIAN:  Dr. Elver Mtz  INDICATION:  Coronary artery disease, abnormal EKG.     STRESS SUMMARY:  The patient exercised for 5 minutes and 26 seconds according to the  Jj protocol, stopping due to fatigue.  Chest discomfort was not reported  and  ischemic electrocardiographic changes were not noted by the supervising  cardiologist, Dr. Sneha Hicks.  The peak heart rate 130 with 89% of the age  predicted maximum.  The blood pressure 132/80, peak 184/82.     TECHNICAL COMMENTS:  Rest dose 4.03 mCi of thallium injected at rest and 42.4 mCi of  sestamibi. The  images are satisfactory.  Comparison is made with the  images of the examination of 05/13/2011.     FINDINGS: The exercise stress and rest images demonstrate normal left ventricular  size and tracer uptake pattern. The gated images reveal normal resting regional wall  motion and global systolic performance. The measured resting ejection fraction is  61.      CONCLUSION:  Exercise stress nuclear study is negative for inducible myocardial  ischemia or infarction.      COMMENTS:  Comparison with the images of the examination of 05/13/2011 demonstrates  no significant interval change.     ANA PORTILLO MD  pg  D 10/29/2014 13:58:39  T 10/30/2014 08:14:38  R 10/30/2014 08:14:38  [id number]        Identified Health Risks:     The patient was counseled and encouraged to consider modifying their diet and eating habits. He was provided with information on recommended healthy diet options.  He is at risk for falling and has been provided with information to reduce the risk of falling at home.  Information regarding advance directives (living hector), including where he can download the appropriate form, was provided to the patient via the AVS.

## 2021-06-13 NOTE — PROGRESS NOTES
Compression Applied to Left  2-Layer Coban: I Applied the inner foam layer with the foot dorsiflexed and started atthe base of the fifth metatarsal head. I left the bottom of the heel exposed, and proceed by winding the foam up the leg using minimal overlap to just below the fibular head. I then applied the compression layer with the foot dorsiflexed and startingat the base of the fifth metatarsal head. I applied at full stretch and proceeded up the leg using 50% overlap. The bottom of the heel is covered with the compression layer up to the end at the fibular head just below the back of the knee and levelwith the top edge of the foam layer.  I gently pressed and conformed the entire surface of the system to ensurethat the two layers are firmly bound together

## 2021-06-13 NOTE — PATIENT INSTRUCTIONS - HE
2 layer on the left leg today; patient to remove this in 4-7 days    Order different compression stockings    Apply lotion to legs daily; can try the anti-itch Sarna cream    Continue to wear your compression stockings or velcro wraps every day; put them on first thing in the morning and remove at bedtime    Replace your compression stockings every 3-4 months; these garments will lose their elasticity and become ineffective    Replace velcro wraps every 1-2 years    Elevate your legs periodically throughout the day, 30-60 minutes 1-3 times per day    Apply lotion to your legs 1-2 times per day; some good name brands are Cetaphil, Sarna, Aveeno, VaniCream, CeraVe    Continue to walk and exercise    If you are taking a diuretic continue to do so at the direction of your primary care provider    Make an appointment at the vascular clinic again if you have worsening swelling, need a prescription for new compression garments; and/or develop new wounds

## 2021-06-13 NOTE — TELEPHONE ENCOUNTER
ANTICOAGULATION  MANAGEMENT    Assessment     Today's INR result of 2.9 is Therapeutic (goal INR of 2.0-3.0)        Warfarin taken as previously instructed    No new diet changes affecting INR    No new medication/supplements affecting INR    Continues to tolerate warfarin with no reported s/s of bleeding or thromboembolism     Previous INR was Therapeutic    Plan:     Spoke on phone with patients wife, Frances regarding INR result and instructed:     Warfarin Dosing Instructions:  Continue current warfarin dose 2.5 mg daily on Tues, Thurs, Sat; and 5 mg daily rest of week  (0 % change)    Instructed patient to follow up no later than: 8 weeks     Frances verbalizes understanding and agrees to warfarin dosing plan.    Instructed to call the [facility] for any changes, questions or concerns. (#[phone])   ?   Payton Yi RN    Subjective/Objective:      Prosper Lopez, a 80 y.o. male is on warfarin.     Prosper reports:     Home warfarin dose: verbally confirmed home dose with Frances and updated on anticoagulation calendar     Missed doses: No     Medication changes:  No     S/S of bleeding or thromboembolism:  No     New Injury or illness:  No     Changes in diet or alcohol consumption:  No     Upcoming surgery, procedure or cardioversion:  No    Anticoagulation Episode Summary     Current INR goal:  2.0-3.0   TTR:  100.0 % (1 y)   Next INR check:  2/2/2021   INR from last check:  2.90 (12/8/2020)   Weekly max warfarin dose:     Target end date:  Indefinite   INR check location:     Preferred lab:     Send INR reminders to:  MAGDALENA TEJEDA    Indications    A-fib (H) (Resolved) [I48.91]           Comments:           Anticoagulation Care Providers     Provider Role Specialty Phone number    Foreign Tavares MD Referring Family Medicine [phone]

## 2021-06-13 NOTE — PROGRESS NOTES
"Prosper Lopez is a 80 y.o. male who is being evaluated via a billable telephone visit.      The patient has been notified of following:     \"This telephone visit will be conducted via a call between you and your physician/provider. We have found that certain health care needs can be provided without the need for a physical exam.  This service lets us provide the care you need with a short phone conversation.  If a prescription is necessary we can send it directly to your pharmacy.  If lab work is needed we can place an order for that and you can then stop by our lab to have the test done at a later time.    Telephone visits are billed at different rates depending on your insurance coverage. During this emergency period, for some insurers they may be billed the same as an in-person visit.  Please reach out to your insurance provider with any questions.    If during the course of the call the physician/provider feels a telephone visit is not appropriate, you will not be charged for this service.\"    Patient has given verbal consent to a Telephone visit? Yes    What phone number would you like to be contacted at? [phone]    Patient would like to receive their AVS by AVS Preference: Ash.    Assessment/Plan:    1. Encounter for laboratory testing for COVID-19 virus  We will place order for COVID-19 testing.  Advised patient to stay quarantined until results are back.  Will notify patient of results when available.  We discussed signs and symptoms of COVID-19 to monitor for.  He will notify us if he develops any acute worsening of symptoms.  Patient is comfortable with this plan.  - Symptomatic COVID-19 Virus (CORONAVIRUS) PCR; Future    Subjective:    Prosper Lopez is a 80 year old male seen today to discuss COVID-19 testing.  Patient states that he feels generally well overall.  He does not necessarily have a sore throat but has a feeling in his throat that a cold may be coming on.  He has no known " exposure to COVID-19 and has been taking precautions but given upcoming holidays, would like to be tested prior.  He denies any fevers, myalgias, coughing, shortness of breath or other respiratory symptoms.  Patient has not been screened for COVID-19 before. He continues taking medications as prescribed for chronic conditions including A. fib, CAD. review of systems is as stated in HPI, and the remainder of the 10 system review is otherwise unremarkable.    Past Medical History, Family History, and Social History reviewed.    Past Surgical History:   Procedure Laterality Date     OH CYSTO/URETERO W/LITHOTRIPSY &INDWELL STENT INSRT Left 6/22/2018    Procedure: CYSTOSCOPY, LEFT  URETEROSCOPY, LASER LITHOTRIPSY STENT INSERTION;  Surgeon: Foreign Lakhani MD;  Location: [facility];  Service: Urology     PROSTATE SURGERY       URETEROSCOPY  2009        Family History   Problem Relation Age of Onset     Heart attack Father      Heart disease Father      Colon cancer Brother      Urolithiasis Brother         Past Medical History:   Diagnosis Date     Actinic keratosis     Created by Conversion      Atrial fibrillation (H) 06/10/2014     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     Created by Conversion [facility] Annotation: May  5 2011  1:17PM -  ,  : 2006      Calculus of ureter 4/4/2018     Cardiomyopathy (H) 06/16/2014     Cataract 2012     Cataract     Created by Conversion      Compound Nevus     Created by Conversion  Replacement Utility updated for latest IMO load     Coronary artery disease      Hydrarthrosis 2012     Hydrarthrosis Of The Ankle / Foot     Created by Conversion      Hydronephrosis with urinary obstruction due to ureteral calculus 4/4/2018     Hypercholesteremia     Created by Conversion      Hyperlipidemia 2012     Impaired fasting glucose 9/19/2019     Internal derangement of knee     Created by Conversion  Replacement Utility updated for latest IMO load     Left hydrocele  2019     Malignant Neoplasm Of The Prostate Gland     Created by Conversion      Mixed conductive and sensorineural hearing loss of both ears 2019     Nephrolithiasis     first stone at age 68     Nephrolithiasis     Created by Conversion [facility] Annotation: May  4 2011  7:56Demar Mittal: STENT AND LASER      Obesity 2015     Persistent atrial fibrillation (H)     First diagnosed in 2014 and appeared asymptomatic after cardioversion on 914. Reoccurrence on 2017 and again persistent [id number] score of 4 with history of TIA-on warfarin Asymptomatic and on rate control since 2017.      Plantar fasciitis of right foot      Prostate CA (H)      Seborrheic keratosis      Seborrheic Keratosis     Created by Conversion      Skin neoplasm     Of uncertain behavior     Skin Neoplasm Of Uncertain Behavior     Created by Conversion      TIA (transient ischemic attack) 2012     Transient Ischemic Attack     Created by Conversion  Replacement Utility updated for latest IMO load     Tubular adenoma of colon 2006     Ureterolithiasis         Social History     Tobacco Use     Smoking status: Former Smoker     Quit date: 2014     Years since quittin.5     Smokeless tobacco: Never Used   Substance Use Topics     Alcohol use: Yes     Comment: Occasional     Drug use: No        Current Outpatient Medications   Medication Sig Dispense Refill     carvediloL (COREG) 25 MG tablet Take 1 tablet (25 mg total) by mouth 2 (two) times a day with meals. 180 tablet 3     cholecalciferol, vitamin D3, (VITAMIN D3) 2,000 unit cap Take 2,000 Units by mouth daily.       warfarin ANTICOAGULANT (COUMADIN/JANTOVEN) 5 MG tablet TAKE 1/2 TO 1 TABLET BY MOUTH EVERY DAY, ADJUST DOSE PER INR RESULTS AS DIRECTED 90 tablet 1     No current facility-administered medications for this visit.           Objective:  Physical examination is limited today due to nature of telephone visit.    General:    Patient is pleasant and cooperative over the phone.  No sign of respiratory distress, coughing etc.         This note has been dictated using voice recognition software. Any grammatical or context distortions are unintentional and inherent to the use of this software.         Phone call duration:  7 minutes    Indigo Tabares, CNP

## 2021-06-14 NOTE — TELEPHONE ENCOUNTER
"[facility]ATION  MANAGEMENT    Assessment     Warfarin interruption plan for colonoscopy on 02/15/2021.    Atrial Fibrillation and TIA      Risk stratification for thromboembolism: moderate. (2017 ACC periprocedure pathway for NVAF)      [id number] = 4 (age ++, TIA++)      NVAF: 2017 ACC periprocedure pathway for NVAF advises likely NO bridge for moderate risk stratification ([id number] score 5-6) WITHOUT a hx of stroke, TIA or systemic embolism and not at increased risk for bleeding Guidelines not specific for low risk [id number] 4 but HX TIA.    Plan       Pre-Procedure:    Hold warfarin until after procedure startin/10/2021          Post-Procedure:    Resume home warfarin dose if okay with provider doing procedure on night of procedure, 02/15/2021 PM: 7.5mg    Recheck INR ~7 days after resuming warfarin   ?   Margaret Almendarez, PharmD  [facility]    Subjective/Objective:      Prosper Lopez, pablito 80 y.o. male    Goal INR Range: 2-3    Patient bridged in past: No    Pertinent History: TIA , afib DX     Wt Readings from Last 3 Encounters:   11/10/20 (!) 266 lb 6.4 oz (120.8 kg)   10/13/20 (!) 260 lb (117.9 kg)   20 (!) 261 lb (118.4 kg)        Ideal body weight: 74.7 kg (164 lb 11.8 oz)  Adjusted ideal body weight: 93.2 kg (205 lb 6.4 oz)     Estimated body mass index is 37.42 kg/m  as calculated from the following:    Height as of 10/13/20: 5' 10.75\" (1.797 m).    Weight as of 11/10/20: 266 lb 6.4 oz (120.8 kg).    Lab Results   Component Value Date    INR 1.80 (H) 2021    INR 2.90 (H) 2020    INR 2.50 (H) 10/13/2020     Lab Results   Component Value Date    HGB 15.3 10/13/2020    HCT 45.3 10/13/2020     (L) 10/13/2020     Lab Results   Component Value Date    CREATININE 0.81 10/13/2020    CREATININE 0.92 2020    CREATININE 0.90 2019     Estimated CrCl: CrCl cannot be calculated (Patient's most recent lab result is older " than the maximum 10 days allowed.).

## 2021-06-14 NOTE — TELEPHONE ENCOUNTER
Reason contacted:  Results   Information relayed:  Below message.    Patient is wondering if Dr. Tavares has seen his cologuard results?   Please advise.   Additional questions:  No  Further follow-up needed:  Yes  Okay to leave a detailed message:  No

## 2021-06-14 NOTE — TELEPHONE ENCOUNTER
It appears that his Cologuard test was abnormal.  I will place a request for gastroenterology to consult with patient before deciding whether or not to repeat colonoscopy potentially.  We will contact patient in order to arrange for appointment.

## 2021-06-14 NOTE — TELEPHONE ENCOUNTER
Reason for Call: Request for an order or referral:pt having colonoscopy 2/15/21, INGE RILEY would like warfarin held 4 days prior to procedure    Order or referral being requested: warfarin held   Date needed: 2/11/21-2/15/21    Has the patient been seen by the PCP for this problem? YES    Additional comments: Maddie calles calling    Phone number Patient can be reached at:     Best Time: any    Can we leave a detailed message on this number?  Yes    Call taken on 1/27/2021 at 10:39 AM by Celsa Kim

## 2021-06-14 NOTE — TELEPHONE ENCOUNTER
Who is calling:   [facility]  Reason for Call:  Needing hold and bridge orders for colonoscopy on 02/15/21, looking for 4 day hold and bridging if advised.  Date of last appointment with primary care:   Okay to leave a detailed message: Yes

## 2021-06-14 NOTE — TELEPHONE ENCOUNTER
See TE 01/27/2021, procedure review sent to PCP for input.    Margaret Almendarez, PharmD, BCACP  Anticoagulation Clinical Pharmacist

## 2021-06-14 NOTE — PROGRESS NOTES
ASSESSMENT:  1. Need for vaccination  Influenza High Dose, Seasonal 65+ yrs   2. Acute pain of left shoulder  Ambulatory referral to PT/OT   3. Atrial fibrillation  INR       PLAN:  Patient here today for left shoulder pain.  Could potentially be rotator cuff injury although patient has not sustained an injury he can recall.  He would like to try physical therapy prior to pursuing any imaging.  Referral placed.  Will follow for his course and if imaging is needed could order that as a next step.  He would like his INR checked as long as he is here today.  That is followed by anticoagulation management.    No problem-specific Assessment & Plan notes found for this encounter.      There are no Patient Instructions on file for this visit.    Orders Placed This Encounter   Procedures     Influenza High Dose, Seasonal 65+ yrs     Ambulatory referral to PT/OT     Referral Priority:   Routine     Referral Type:   Physical Therapy or Occupational Therapy     Referral Reason:   Evaluation and Treatment     Requested Specialty:   Physical Therapy     Number of Visits Requested:   1     There are no discontinued medications.    No Follow-up on file.    CHIEF COMPLAINT:  Chief Complaint   Patient presents with     Shoulder Pain     Left shoulder has been painful for aprox 2-3 weeks. painful to raise above head. denies injury or trauma     INR Check       HISTORY OF PRESENT ILLNESS:  Prosper is a 77 y.o. male here today to discuss ongoing left shoulder pain.  Is been painful for approximately 2 weeks and he feels it is slightly worsening and not improving.  He denies any injury to the shoulder.  He cannot recall previous injuries or tweaking it in any way.  It is painful to raise his arm above the head, starts to be painful at approximately 90  abduction from the body.  He feels it across the deltoid and into the upper arm.  He has fairly good rotation forward, however behind the body is difficult due to pain.  No bruising,  swelling, redness.  No numbness tingling or shooting pains.  He would like a flu shot today.    REVIEW OF SYSTEMS:      Pertinent items are noted in HPI.  All other systems are negative  PFSH:  Reviewed, no changes      TOBACCO USE:  History   Smoking Status     Former Smoker     Quit date: 5/29/2014   Smokeless Tobacco     Not on file       VITALS:  Vitals:    11/10/17 1444   BP: 126/72   Patient Site: Right Arm   Patient Position: Sitting   Cuff Size: Adult Large   Pulse: 82   Resp: 16   Temp: 97.6  F (36.4  C)   TempSrc: Oral   Weight: (!) 263 lb (119.3 kg)     Wt Readings from Last 3 Encounters:   11/10/17 (!) 263 lb (119.3 kg)   08/18/17 (!) 259 lb 12.8 oz (117.8 kg)   07/25/17 (!) 257 lb (116.6 kg)       PHYSICAL EXAM:   /72 (Patient Site: Right Arm, Patient Position: Sitting, Cuff Size: Adult Large)  Pulse 82  Temp 97.6  F (36.4  C) (Oral)   Resp 16  Wt (!) 263 lb (119.3 kg)  BMI 36.17 kg/m2  General appearance: alert, appears stated age and cooperative  Lungs: clear to auscultation bilaterally  Heart: regular rate and rhythm, S1, S2 normal, no murmur, click, rub or gallop  Extremities: Left shoulder painful to palpation anteriorly with deep palpation.  No pain over bursa.  No pain over the AC joint.  He does have pain with posterior rotation as well as approximately 90  abduction with overhead raise.  He can raise it all the way overhead and has full range of motion if he pushes himself, however this is painful.    DATA REVIEWED:  Additional History from Old Records Summarized (2): 0  Decision to Obtain Records (1): 0  Radiology Tests Summarized or Ordered (1): 0  Labs Reviewed or Ordered (1): 0  Medicine Test Summarized or Ordered (1): 0  Independent Review of EKG or X-RAY(2 each): 0    The visit lasted a total of 25 minutes face to face with the patient. Over 50% of the time was spent counseling and educating the patient about plan of care.    MEDICATIONS:  Current Outpatient Prescriptions    Medication Sig Dispense Refill     atorvastatin (LIPITOR) 20 MG tablet TAKE 1 TABLET BY MOUTH EVERY NIGHT AT BEDTIME 90 tablet 2     carvedilol (COREG) 12.5 MG tablet Take 1 tablet (12.5 mg total) by mouth 2 (two) times a day with meals. 180 tablet 3     cholecalciferol, vitamin D3, (VITAMIN D3) 2,000 unit cap Take 2,000 Units by mouth daily.       warfarin (COUMADIN) 5 MG tablet Take 1/2 - 1 tablet ( 2.5 - 5 mg ) daily as directed. Adjust dose per INR results as instructed. 90 tablet 0     No current facility-administered medications for this visit.        This note has been dictated using voice recognition software. Any grammatical or context distortions are unintentional and inherent to the software

## 2021-06-14 NOTE — TELEPHONE ENCOUNTER
----- Message from Foreign Tavares MD sent at 1/21/2021  6:27 PM CST -----  Due to abnormal Cologuard test results, I did place a referral for patient to complete colonoscopy if patient agrees to procedure.

## 2021-06-14 NOTE — TELEPHONE ENCOUNTER
----- Message from Indigo Tabares CNP sent at 12/23/2020  1:16 PM CST -----  Please notify patient that his COVID-19 test came back negative.

## 2021-06-14 NOTE — TELEPHONE ENCOUNTER
RN cannot approve Refill Request    RN can NOT refill this medication Protocol failed and NO refill given. Last office visit: 9/3/2020 Foreign Tavares MD Last Physical: 10/13/2020 Last MTM visit: Visit date not found Last visit same specialty: 9/3/2020 Foreign Tavares MD.  Next visit within 3 mo: Visit date not found  Next physical within 3 mo: Visit date not found      Deb Cabral, [facility] Triage/Med Refill 1/19/2021    Warfarin

## 2021-06-15 NOTE — TELEPHONE ENCOUNTER
Rcvd call from Lottie castro/RODOLFO , she is calling for update on bridging orders. Patients procedure is 02.15.2021

## 2021-06-15 NOTE — PATIENT INSTRUCTIONS - HE

## 2021-06-15 NOTE — PROGRESS NOTES
[facility]  [address]  [facility] [address]  Dept: [phone]  Dept Fax: [phone]  Primary Provider: Delaney Hernandez MD  Pre-op Performing Provider: DELANEY HERNANDEZ    PREOPERATIVE EVALUATION:  Today's date: 2/9/2021    Prosper Lopez is a 80 y.o. male who presents for a preoperative evaluation.    Surgical Information:  Surgery/Procedure: colonoscopy  Surgery Location:   SurgeON: Dr. Miner  Surgery Date: 2/15/21  Time of Surgery: TBD  Where patient plans to recover: At home with family  Fax number for surgical facility: Note does not need to be faxed, will be available electronically in Epic.    Type of Anesthesia Anticipated: to be determined    Assessment & Plan      The proposed surgical procedure is considered LOW risk.    Preoperative examination  Preoperative examination completed for scheduled colonoscopy February 15, 2021.  No contraindication to upcoming procedure identified.    Abnormal laboratory test result  Recent abnormal Cologuard test November 25, 2020.  History of abnormal colonoscopy November 29, 2006 with advanced adenoma as well as tubular adenomas present at that time without subsequent follow-up colonoscopy noted.  Patient scheduled for colonoscopy as noted February 15, 2021 for further assessment.    Persistent atrial fibrillation (H)  Persistent atrial fibrillation rate controlled with carvedilol 25 mg twice daily.  Continues warfarin anticoagulation currently and will hold 4 days prior to scheduled procedure and then resume the evening of procedure is lying is doing well with repeat INR 1 week following.  - Basic Metabolic Panel    Impaired fasting glucose  Impaired fasting glucose history.  Repeat A1c today.  Weight goal remaining less than 250 pounds initially, less than 240 pounds ideally.  - Glycosylated Hemoglobin A1c    Transient cerebral ischemia, unspecified type  Possible history of TIA without residual concerns however patient  "uncertain if this was in fact the etiology for prior issues.    Coronary artery disease involving native coronary artery of native heart without angina pectoris  CAD, stable otherwise asymptomatic with nonobstructive findings.    Class 2 obesity due to excess calories without serious comorbidity with body mass index (BMI) of 36.0 to 36.9 in adult  As above, therapeutic lifestyle changes for weight goal less than 250 pounds initially, less than 240 pounds ideally.    Thrombocytopenia (H)  History of mild thrombocytopenia present.  Repeat CBC today with platelet count.  No current bleeding concerns other than warfarin use identified.  - HM2(CBC w/o Differential)         Risks and Recommendations:  The patient has the following additional risks and recommendations for perioperative complications:  Cardiovascular:   - h/o a. fib    Medication Instructions:  Patient is to take all scheduled medications on the day of surgery EXCEPT for modifications listed below:   hold warfarin x 4 days prior (beginning 2/11/21), then resume evening of procedure with INR recheck ~ 1 week following.    RECOMMENDATION:  APPROVAL GIVEN to proceed with proposed procedure, without further diagnostic evaluation.  [id number]}    Subjective     HPI related to upcoming procedure:  Abnormal Cologuard result on 11/25/20.  Prior colonoscopy in 11/29/06 (repeat at 3 year interval recommended) due to tubular adenoma x 2 (\"advanced adenoma\" as well as tubular adenoma).  Patient has persistent atrial fibrillation history.  Is not aware of cardiac ectopy.  No dizziness.  No chest pain or shortness of breath.  No orthopnea or PND.  Bilateral leg swelling remains stable.  Has had mild low platelet count at 129,000.  Vitamin D deficiency history with prior level of 11.4 November 3, 2016 and now on vitamin D supplement 2000 units daily.  Bilateral hearing loss.  Has had prostate cancer status post prostatectomy previously.  Nonobstructive coronary artery disease " noted with prior stress testing as noted below without inducible ischemia.  Possible TIA associated with episode of dizziness however this was not clear patient has not had any further recurrent issues with this.  Bowling 3x/week.  Denies recent illness including fever cough or shortness of breath.     - Frances  5 sons -   1 daughter -  40 breast CA  Tobacco: quit ~  (1 ppd x > 30 years) - had quit once for 14 years  EtOH: occ  Mom -  92 y.o due to ? cancer  Dad -  56 heart attack  2 bros - one  age 78 heart attack and EtOHism  1 sis -  ? cancer   Surgeries: prostate surgery due to cancer; kidney stones bilateral; bilateral cataracts  Hospitalizations: kidney stone   Work: retired ()    Preop Questions 2021   Have you ever had a heart attack or stroke? No   Have you ever had surgery on your heart or blood vessels, such as a stent placement, a coronary artery bypass, or surgery on an artery in your head, neck, heart, or legs? No   Do you have chest pain with activity? No   Do you have a history of  heart failure? No   Do you currently have a cold, bronchitis or symptoms of other infection? No   Do you have a cough, shortness of breath, or wheezing? No   Do you or anyone in your family have previous history of blood clots? No   Do you or does anyone in your family have a serious bleeding problem such as prolonged bleeding following surgeries or cuts? No   Have you ever had problems with anemia or been told to take iron pills? No   Have you had any abnormal blood loss such as black, tarry or bloody stools? No   Have you ever had a blood transfusion? No   Are you willing to have a blood transfusion if it is medically needed before, during, or after your surgery? Yes   Have you or any of your relatives ever had problems with anesthesia? No   Do you have sleep apnea, excessive snoring or daytime drowsiness? No   Do you have any artifical heart valves or other  implanted medical devices like a pacemaker, defibrillator, or continuous glucose monitor? No   Do you have artificial joints? No   Are you allergic to latex? No         Health Care Directive:  Patient does not have a Health Care Directive or Living Will: please ensure updated    Preoperative Review of :    reviewed - no record of controlled substances prescribed.     A-FIB - Patient has a longstanding history of chronic A-fib currently on rate control. Current treatment regimen includes Warfarin for stroke prevention and denies significant symptoms of lightheadedness, palpitations or dyspnea.       Review of Systems  CONSTITUTIONAL: NEGATIVE for fever, chills, change in weight  INTEGUMENTARY/SKIN: NEGATIVE for worrisome rashes, moles or lesions  EYES: NEGATIVE for vision changes or irritation  ENT/MOUTH: NEGATIVE for ear, mouth and throat problems  RESP: NEGATIVE for significant cough or SOB  BREAST: NEGATIVE for masses, tenderness or discharge  CV: NEGATIVE for chest pain, palpitations or peripheral edema  GI: NEGATIVE for nausea, abdominal pain, heartburn, or change in bowel habits  : NEGATIVE for frequency, dysuria, or hematuria  MUSCULOSKELETAL: NEGATIVE for significant arthralgias or myalgia  NEURO: NEGATIVE for weakness, dizziness or paresthesias  ENDOCRINE: NEGATIVE for temperature intolerance, skin/hair changes  HEME: NEGATIVE for bleeding problems  PSYCHIATRIC: NEGATIVE for changes in mood or affect    Patient Active Problem List    Diagnosis Date Noted     Obesity (BMI 35.0-39.9) with comorbidity (H) 11/10/2020     Venous insufficiency of both lower extremities 10/13/2020     H/O prostate cancer 10/13/2020     Left hydrocele 09/19/2019     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     Impaired fasting glucose 09/19/2019     Ureterolithiasis      Calculus of ureter 04/04/2018     Hydronephrosis with urinary obstruction due to ureteral calculus 04/04/2018     Plantar  fasciitis of right foot 10/13/2016     Obesity 09/01/2015     Coronary artery disease, NONOBSTRUCTIVE,  ASX, NEGATIVE STRESS TEST 2014 08/03/2015     Internal Derangement Of Knee      Persistent atrial fibrillation (H)      Nephrolithiasis      Hydrarthrosis Of The Ankle / Foot      Compound Nevus      Actinic Keratosis      Seborrheic Keratosis      Skin Neoplasm Of Uncertain Behavior      Malignant Neoplasm Of The Prostate Gland      Transient Ischemic Attack      Cataract      Hypercholesteremia      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      Past Medical History:   Diagnosis Date     Actinic keratosis     Created by Conversion      Atrial fibrillation (H) 06/10/2014     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     Created by Conversion reQall Annotation: May  5 2011  1:17PM -  ,  : 2006      Calculus of ureter 4/4/2018     Cardiomyopathy (H) 06/16/2014     Cataract 2012     Cataract     Created by Conversion      Compound Nevus     Created by Conversion  Replacement Utility updated for latest IMO load     Coronary artery disease      Hydrarthrosis 2012     Hydrarthrosis Of The Ankle / Foot     Created by Conversion      Hydronephrosis with urinary obstruction due to ureteral calculus 4/4/2018     Hypercholesteremia     Created by Conversion      Hyperlipidemia 2012     Impaired fasting glucose 9/19/2019     Internal derangement of knee     Created by Conversion  Replacement Utility updated for latest IMO load     Left hydrocele 9/19/2019     Malignant Neoplasm Of The Prostate Gland     Created by Conversion      Mixed conductive and sensorineural hearing loss of both ears 9/19/2019     Nephrolithiasis 2009    first stone at age 68     Nephrolithiasis     Created by [employer] Annotation: May  4 2011  7:56AM Zeyad Dickey, Demar: STENT AND LASER      Obesity 9/1/2015     Persistent atrial fibrillation (H)     First diagnosed in 7 of 2014 and appeared asymptomatic after cardioversion on  914. Reoccurrence on 2017 and again persistent [id number] score of 4 with history of TIA-on warfarin Asymptomatic and on rate control since 2017.      Plantar fasciitis of right foot      Prostate CA (H)      Seborrheic keratosis      Seborrheic Keratosis     Created by Conversion      Skin neoplasm     Of uncertain behavior     Skin Neoplasm Of Uncertain Behavior     Created by Conversion      TIA (transient ischemic attack) 2012     Transient Ischemic Attack     Created by Conversion  Replacement Utility updated for latest IMO load     Tubular adenoma of colon 2006     Ureterolithiasis      Past Surgical History:   Procedure Laterality Date     N[place] CYSTO/URETERO W/LITHOTRIPSY &INDWELL STENT INSRT Left 2018    Procedure: CYSTOSCOPY, LEFT  URETEROSCOPY, LASER LITHOTRIPSY STENT INSERTION;  Surgeon: Foreign Lakhani MD;  Location: [facility];  Service: Urology     PROSTATE SURGERY       URETEROSCOPY       Current Outpatient Medications   Medication Sig Dispense Refill     carvediloL (COREG) 25 MG tablet Take 1 tablet (25 mg total) by mouth 2 (two) times a day with meals. 180 tablet 3     cholecalciferol, vitamin D3, (VITAMIN D3) 2,000 unit cap Take 2,000 Units by mouth daily.       warfarin ANTICOAGULANT (COUMADIN/JANTOVEN) 5 MG tablet TAKE 1/2 TO 1 TABLET BY MOUTH EVERY DAY, ADJUST DOSE PER INR RESULTS AS DIRECTED (Patient taking differently: Ld 21) 90 tablet 1     No current facility-administered medications for this visit.        No Known Allergies    Social History     Tobacco Use     Smoking status: Former Smoker     Quit date: 2014     Years since quittin.7     Smokeless tobacco: Never Used   Substance Use Topics     Alcohol use: Yes     Comment: Occasional      Family History   Problem Relation Age of Onset     Heart attack Father      Heart disease Father      Colon cancer Brother      Urolithiasis Brother      Social History     Substance and Sexual  "Activity   Drug Use No        Objective     /60   Pulse (!) 109   Temp 97.4  F (36.3  C)   Ht 5' 10.25\" (1.784 m)   Wt (!) 264 lb (119.7 kg)   SpO2 95%   BMI 37.61 kg/m    Physical Exam  GENERAL APPEARANCE: healthy, alert and no distress  HENT: ear canals and TM's normal and nose and mouth without ulcers or lesions  RESP: lungs clear to auscultation - no rales, rhonchi or wheezes  CV: regular rate and rhythm, normal S1 S2, no S3 or S4 and no murmur, click or rub  ABDOMEN: soft, nontender, no HSM or masses and bowel sounds normal  NEURO: Normal strength and tone, sensory exam grossly normal, mentation intact and speech normal    Recent Labs   Lab Test 02/09/21  1545 02/02/21  0805 12/08/20  0758 10/13/20  0749 07/08/20  0851 07/08/20  0851   HGB 15.8  --   --  15.3  --  14.7   *  --   --  107*  --  129*   INR  --  1.80* 2.90* 2.50*   < > 2.00*   NA  --   --   --  138  --  139   K  --   --   --  4.2  --  4.7   CREATININE  --   --   --  0.81  --  0.92    < > = values in this interval not displayed.        PRE-OP Diagnostics:   Labs pending at this time. Results will be reviewed when available.  Recent Results (from the past 24 hour(s))   Glycosylated Hemoglobin A1c    Collection Time: 02/09/21  3:45 PM   Result Value Ref Range    Hemoglobin A1c 5.7 (H) <=5.6 %   HM2(CBC w/o Differential)    Collection Time: 02/09/21  3:45 PM   Result Value Ref Range    WBC 7.5 4.0 - 11.0 thou/uL    RBC 5.10 4.40 - 6.20 mill/uL    Hemoglobin 15.8 14.0 - 18.0 g/dL    Hematocrit 43.7 40.0 - 54.0 %    MCV 86 80 - 100 fL    MCH 31.0 27.0 - 34.0 pg    MCHC 36.2 (H) 32.0 - 36.0 g/dL    RDW 14.1 11.0 - 14.5 %    Platelets 112 (L) 140 - 440 thou/uL    MPV 9.6 7.0 - 10.0 fL     No EKG required for low risk surgery (cataract, skin procedure, breast biopsy, etc).    REVISED CARDIAC RISK INDEX (RCRI)   The patient has the following serious cardiovascular risks for perioperative complications:   - No serious cardiac risks = 0 " points    RCRI INTERPRETATION: 0 points: Class I (very low risk - 0.4% complication rate)                      Signed Electronically by: Foreign Tavares MD    Copy of this evaluation report is provided to requesting physician.    Preop [facility] Preop Guidelines    Revised Cardiac Risk Index

## 2021-06-15 NOTE — TELEPHONE ENCOUNTER
ANTICOAGULATION  MANAGEMENT    Assessment     Today's INR result of 2.4 is Therapeutic (goal INR of 2.0-3.0)        Warfarin taken as previously instructed    No new diet changes affecting INR    No new medication/supplements affecting INR    Continues to tolerate warfarin with no reported s/s of bleeding or thromboembolism     Previous INR was Subtherapeutic    Plan:     Spoke on phone with Waldo regarding INR result and instructed:      Warfarin Dosing Instructions:  Continue current warfarin dose 2.5 mg daily on Tu/Th/Sa; and 5 mg daily rest of week  (0 % change)    Instructed patient to follow up no later than: 2 weeks-- scheduled for 3/23    Education provided: target INR goal and significance of current INR result, importance of notifying clinic for changes in medications and importance of notifying clinic for diarrhea, nausea/vomiting, reduced intake and/or illness    Waldo verbalizes understanding and agrees to warfarin dosing plan.    Instructed to call the [facility] for any changes, questions or concerns. (#[phone])   ?   Jaz Sterling RN    Subjective/Objective:      Prosper Lopez, a 80 y.o. male is on warfarin. Waldo Haas reports:     Home warfarin dose: as updated on anticoagulation calendar per template     Missed doses: No     Medication changes:  No     S/S of bleeding or thromboembolism:  No     New Injury or illness:  No     Changes in diet or alcohol consumption:  No     Upcoming surgery, procedure or cardioversion:  Yes: colonoscopy 4/19    Anticoagulation Episode Summary     Current INR goal:  2.0-3.0   TTR:  89.3 % (1 y)   Next INR check:  3/23/2021   INR from last check:  2.40 (3/9/2021)   Weekly max warfarin dose:     Target end date:  Indefinite   INR check location:     Preferred lab:     Send INR reminders to:  MAGDALENA TEJEDA    Indications    A-fib (H) (Resolved) [I48.91]           Comments:           Anticoagulation Care Providers     Provider Role Specialty Phone number     Foreign Tavares MD [facility] [phone]

## 2021-06-15 NOTE — ANESTHESIA CARE TRANSFER NOTE
Last vitals:   Vitals:    02/15/21 0920   BP: 114/72   Pulse: (!) 102   Resp: 16   Temp: 36.4  C (97.5  F)   SpO2: 94%     Patient's level of consciousness is awake  Spontaneous respirations: yes  Maintains airway independently: yes  Dentition unchanged: yes  Oropharynx: oropharynx clear of all foreign objects    QCDR Measures:  ASA# 20 - Surgical Safety Checklist: WHO surgical safety checklist completed prior to induction    PQRS# 430 - Adult PONV Prevention: 4558F - Pt received => 2 anti-emetic agents (different classes) preop & intraop  ASA# 8 - Peds PONV Prevention: NA - Not pediatric patient, not GA or 2 or more risk factors NOT present  PQRS# 424 - Teresa-op Temp Management: 4559F - At least one body temp DOCUMENTED => 35.5C or 95.9F within required timeframe  PQRS# 426 - PACU Transfer Protocol: - Transfer of care checklist used  ASA# 14 - Acute Post-op Pain: ASA14B - Patient did NOT experience pain >= 7 out of 10

## 2021-06-15 NOTE — TELEPHONE ENCOUNTER
ANTICOAGULATION  MANAGEMENT: Discharge Review    Prosper Lopez chart reviewed for anticoagulation continuity of care    Outpatient surgery/procedure on  2/15 for colonoscopy.    Discharge disposition: Home    INR Results:       Recent labs: (last 7 days)     02/15/21  0734   INR 1.35*       Warfarin inpatient management: not applicable    Warfarin discharge instructions: home regimen continued restart 2/18, check  INR 1 week after restarting warfarin     Medication Changes Affecting Anticoagulation: No    Additional Factors Affecting Anticoagulation: No    Plan     Agree with dosing adjustment on discharge and Agree with discharge plan for follow up 1 week after restarting warfarin    Spoke with Waldo- advised to monitor for signs and symptoms of clotting given length of time off warfarin and recent procedure. Waldo verbalized understanding and will contact clinic/triage or seek care in ED if needed.    Anticoagulation calendar updated    Jaz Sterling, RN

## 2021-06-15 NOTE — ANESTHESIA POSTPROCEDURE EVALUATION
Patient: Prosepr Lopez  Procedure(s):  COLONOSCOPY with polypectomy, tattoo, cautery  Anesthesia type: MAC    Patient location: Phase II Recovery  Last vitals:   Vitals Value Taken Time   /90 02/15/21 0944   Temp 36.4  C (97.5  F) 02/15/21 0920   Pulse 71 02/15/21 0945   Resp 16 02/15/21 0920   SpO2 99 % 02/15/21 0945   Vitals shown include unvalidated device data.  Post vital signs: stable  Level of consciousness: awake and responds to simple questions  Post-anesthesia pain: pain controlled  Post-anesthesia nausea and vomiting: no  Pulmonary: unassisted, return to baseline  Cardiovascular: stable and blood pressure at baseline  Hydration: adequate  Anesthetic events: no    QCDR Measures:  ASA# 11 - Teresa-op Cardiac Arrest: ASA11B - Patient did NOT experience unanticipated cardiac arrest  ASA# 12 - Teresa-op Mortality Rate: ASA12B - Patient did NOT die  ASA# 13 - PACU Re-Intubation Rate: ASA13B - Patient did NOT require a new airway mgmt  ASA# 10 - Composite Anes Safety: ASA10A - No serious adverse event    Additional Notes:

## 2021-06-15 NOTE — TELEPHONE ENCOUNTER
See encounter from . No bridging, only warfarin hold.       Pre-Procedure:  ? Hold warfarin until after procedure startin/10/2021  ? Post-Procedure:    Resume home warfarin dose if okay with provider doing procedure on night of procedure, 02/15/2021 PM: 7.5mg    Recheck INR ~7 days after resuming warfarin

## 2021-06-16 PROBLEM — I87.2 VENOUS INSUFFICIENCY OF BOTH LOWER EXTREMITIES: Status: ACTIVE | Noted: 2020-10-13

## 2021-06-16 PROBLEM — H90.6 MIXED CONDUCTIVE AND SENSORINEURAL HEARING LOSS OF BOTH EARS: Status: ACTIVE | Noted: 2019-09-19

## 2021-06-16 PROBLEM — N43.3 LEFT HYDROCELE: Status: ACTIVE | Noted: 2019-09-19

## 2021-06-16 PROBLEM — N13.2 HYDRONEPHROSIS WITH URINARY OBSTRUCTION DUE TO URETERAL CALCULUS: Status: ACTIVE | Noted: 2018-04-04

## 2021-06-16 PROBLEM — R73.01 IMPAIRED FASTING GLUCOSE: Status: ACTIVE | Noted: 2019-09-19

## 2021-06-16 PROBLEM — Z85.46 H/O PROSTATE CANCER: Status: ACTIVE | Noted: 2020-10-13

## 2021-06-16 PROBLEM — N20.1 CALCULUS OF URETER: Status: ACTIVE | Noted: 2018-04-04

## 2021-06-16 PROBLEM — E66.01 MORBID OBESITY (H): Status: ACTIVE | Noted: 2020-11-10

## 2021-06-16 NOTE — TELEPHONE ENCOUNTER
Noted.   Pharmacist is working on bridge plan in a previous encounter.     Jaz Sterling, RN  Anticoagulation Clinic

## 2021-06-16 NOTE — TELEPHONE ENCOUNTER
Who is calling:  [facility]  Reason for Call:  Calling for hold and bridge orders for upcoming procedure, clinic warfarin be held for 4 days, with bridging recommended by provider.  Date of last appointment with primary care:   Okay to leave a detailed message: Yes

## 2021-06-16 NOTE — ANESTHESIA CARE TRANSFER NOTE
Last vitals:   Vitals:    04/19/21 0824   BP: 128/81   Pulse: (!) 107   Resp: 12   Temp: 36.7  C (98  F)   SpO2: 99%     Patient's level of consciousness is awake and drowsy  Spontaneous respirations: yes  Maintains airway independently: yes  Dentition unchanged: yes  Oropharynx: oropharynx clear of all foreign objects    QCDR Measures:  ASA# 20 - Surgical Safety Checklist: WHO surgical safety checklist completed prior to induction    PQRS# 430 - Adult PONV Prevention: 4558F - Pt received => 2 anti-emetic agents (different classes) preop & intraop  ASA# 8 - Peds PONV Prevention: NA - Not pediatric patient, not GA or 2 or more risk factors NOT present  PQRS# 424 - Teresa-op Temp Management: 4559F - At least one body temp DOCUMENTED => 35.5C or 95.9F within required timeframe  PQRS# 426 - PACU Transfer Protocol: - Transfer of care checklist used  ASA# 14 - Acute Post-op Pain: ASA14B - Patient did NOT experience pain >= 7 out of 10

## 2021-06-16 NOTE — ANESTHESIA POSTPROCEDURE EVALUATION
Patient: Prosper Lopez  Procedure(s):  COLONOSCOPY with polypectomy  Anesthesia type: MAC    Patient location: Phase II Recovery  Last vitals:   Vitals Value Taken Time   /69 04/19/21 0840   Temp 36.7  C (98  F) 04/19/21 0824   Pulse 99 04/19/21 0844   Resp 12 04/19/21 0824   SpO2 95 % 04/19/21 0844     Post vital signs: stable  Level of consciousness: awake and responds to simple questions  Post-anesthesia pain: pain controlled  Post-anesthesia nausea and vomiting: no  Pulmonary: unassisted, return to baseline  Cardiovascular: stable and blood pressure at baseline  Hydration: adequate  Anesthetic events: no    QCDR Measures:  ASA# 11 - Teresa-op Cardiac Arrest: ASA11B - Patient did NOT experience unanticipated cardiac arrest  ASA# 12 - Teresa-op Mortality Rate: ASA12B - Patient did NOT die  ASA# 13 - PACU Re-Intubation Rate: ASA13B - Patient did NOT require a new airway mgmt  ASA# 10 - Composite Anes Safety: ASA10A - No serious adverse event    Additional Notes:

## 2021-06-16 NOTE — TELEPHONE ENCOUNTER
Colonoscopy on 4/19-- [facility] calling for hold/bridge orders.    Routing to PharmD for review.      Jaz Sterling, RN  Anticoagulation Clinic

## 2021-06-16 NOTE — TELEPHONE ENCOUNTER
FYI - Status Update  Who is Calling: Patient  Update: Returning phone call from [facility]. Please call the patient back.   Okay to leave a detailed message?:  Yes

## 2021-06-16 NOTE — PROGRESS NOTES
Notes Recorded by Misha Mtz MD (Ted) on 2/19/2018 at 8:07 AM  Back in A. fib with RVR, increase carvedilol to 25 mg twice a day.  Set up visit with A. fib nurse practitioner regarding cardioversion, weekly INRs.      Carvedilol Rx updated. Msg sent to Irma in regards to cardioversion. Will await call back from patient to discuss setting up appt with A.Fib nurse practitioner. -[facility]

## 2021-06-16 NOTE — PROGRESS NOTES
ANTICOAGULATION  MANAGEMENT: Discharge Review    Prosper Lopez chart reviewed for anticoagulation continuity of care    Outpatient surgery/procedure on  4/19 for colonoscopy.    Discharge disposition: Home    INR Results:   Recent labs: (last 7 days)     04/19/21  0631   INR 1.46*       Warfarin inpatient management: not applicable    Warfarin discharge instructions: per ACN directive pending approval from provider     Medication Changes Affecting Anticoagulation: No    Additional Factors Affecting Anticoagulation: No    Plan     No adjustment to anticoagulation plan needed      Patient not contacted        Jaz Sterling, RN

## 2021-06-16 NOTE — ANESTHESIA PREPROCEDURE EVALUATION
Anesthesia Evaluation        Airway   Mallampati: II  Neck ROM: limited   Pulmonary - negative ROS and normal exam                          Cardiovascular - normal exam  (+) hypertension, CAD, dysrhythmias, , hypercholesterolemia,      Neuro/Psych - negative ROS     Endo/Other - negative ROS   (+) obesity,      GI/Hepatic/Renal - negative ROS           Dental - normal exam                        Anesthesia Plan  Planned anesthetic: MAC    ASA 3         Post-op plan: routine recovery

## 2021-06-16 NOTE — TELEPHONE ENCOUNTER
[facility] calling - stating this is one of several calls since 3/22/21.  They are looking for warfarin hold and bridge orders for procedure - colonoscopy on 4/19/21.  They state that they will not be calling again and will have to cancel procedure if they do not here from us soon.

## 2021-06-16 NOTE — TELEPHONE ENCOUNTER
Spoke with Waldo.   Relayed warfarin dosing and follow up instructions.   Will hold off on scheduling follow up INR check right now, as the 4 week jovany falls one day after his colonoscopy date.   Informed Waldo that [facility] will be in contact with hold/bridge plan once completed and we can discuss follow up dates and schedule an appointment at that time.     Jaz Sterling, RN  Anticoagulation Clinic

## 2021-06-16 NOTE — TELEPHONE ENCOUNTER
ANTICOAGULATION  MANAGEMENT    Assessment     Today's INR result of 2.1 is Therapeutic (goal INR of 2.0-3.0)        Warfarin taken as previously instructed    No new diet changes affecting INR    No new medication/supplements affecting INR    Continues to tolerate warfarin with no reported s/s of bleeding or thromboembolism     Previous INR was Therapeutic    Plan:     Left detailed message for Waldo regarding INR result and instructed:      Warfarin Dosing Instructions:  Continue current warfarin dose 2.5 mg daily on Tu/Th/Sa; and 5 mg daily rest of week  (0 % change)    Instructed patient to follow up no later than: 4 weeks, or sooner depending on plan for upcoming colonoscopy. [facility] will be in contact once plan is approved.    Education provided: target INR goal and significance of current INR result    Requested that patient call back if there are any questions regarding plan, or if they think of a change they forgot to write on template.     Instructed to call the [facility] for any changes, questions or concerns. (#[phone])   ?   Jaz Sterling RN    Subjective/Objective:      Prosper PEREZ Lopez, a 80 y.o. male is on warfarin.       Waldo reports:     Home warfarin dose: as updated on anticoagulation calendar per template     Missed doses: No     Medication changes:  No     S/S of bleeding or thromboembolism:  No     New Injury or illness:  No     Changes in diet or alcohol consumption:  No     Upcoming surgery, procedure or cardioversion:  Yes: colonoscopy 4/19-- hold/bridge plan encounter sent to PharmD 3/22 for review.    Anticoagulation Episode Summary     Current INR goal:  2.0-3.0   TTR:  89.3 % (1 y)   Next INR check:  4/20/2021   INR from last check:  2.10 (3/23/2021)   Weekly max warfarin dose:     Target end date:  Indefinite   INR check location:     Preferred lab:     [facility]    Indications    A-fib (H) (Resolved) [I48.91]           Comments:            Anticoagulation Care Providers     Provider Role Specialty Phone number    Foreign Tavares MD Referring Family Medicine [phone]

## 2021-06-16 NOTE — PROGRESS NOTES
[facility] HEART [facility]   Arrhythmia Clinic    Assessment/Plan:  Diagnoses and all orders for this visit:    Persistent atrial fibrillation and Waldo tells me that other than noting occasional short palpitations at rest, he has no symptoms with atrial fibrillation.  He feels these palpitations off and on since this summer.  He is convinced that he is likely been in A. fib all the time because whenever he is checked he is told that he is in A. fib.  He wants to continue to follow with rate control.  [id number] score of 4 with history of TIA and on chronic warfarin.  INRs have been therapeutic.  He has an appointment to have 2 teeth pulled on Friday.  He still on full dose warfarin and last INR was 2.9.  Discussed for safety reasons, he should be off of warfarin for 7 days given his INR is close to 3.  To reschedule dental appointment.  To stop  warfarin in accordance with date for dental appointment.  Dr. Mtz had initially planned him for cardioversion and  he was instructed to continue warfarin until appointment with me.  (There is been some confusion regarding rate vs rhythm control and updated problem list. )  On July 2018 he can be changed to chronic A. fib.  His carvedilol was increased to 25 mg twice daily and on questioning denies any bradycardic symptoms.  Given 8 months of atrial fibrillation history cardioversion alone would not be effective.  It is also a good period of time to reevaluate rate control via looking at complete echo for EF as well as Holter on an active day for him.  Will schedule echo followed by Holter.  I will call him with those results.  To follow-up with Dr. Mtz as previously planned and sees him once a year.  He denies any problems on warfarin.  [id number] score of  4 with 2 points for history of TIA and on chronic anticoagulation.  Will continue with rate control.  Not appropriate for cardioversion and canceled.  -     Echo Complete; Future; Expected date: 3/13/18  -      Holter monitor - 24 hour; Future; Expected date: 3/13/18  40 minutes were spent in face-to-face counseling regarding above diagnoses and options for treatment.      ______________________________________________________________________    Subjective:    I had the opportunity to see Prosper Lopez at the [facility]. Prosper Lopez is a 77 y.o. male and here for urgent EP appointment due to complaints of palpitations with atrial fibrillation .  Prosper Lopez has a known history of persistent atrial fib which was found incidentally in the summer 2014 and he went 3 years without any known episodes of atrial fib.    In July of 2017, he was noted to be back in persistent atrial fibrillation and on consult with me decided to follow rate control is asymptomatic..  He has not noticed any change in energy level or tolerance of activity.  He does bowling and some house and yard work as primary activities.  He does not do routine exercise.  He does occasional golfing.  In 2014, he had tachycardia mediated cardiomyopathy, which resolved after cardioversion.  He has been on carvedilol and warfarin since then.  He called in in February with complaints of occasional palpitations at rest.  He had a 24-hour Holter which showed A. fib throughout with average ventricular response of 93 and ventricular response range .  His heart rates are mildly elevated during daytime.  His carvedilol dose was increased to 25 mg p.o. twice daily.   He was recommended for cardioversion but asymptomatic and see above for details.    ______________________________________________________________________    Problem List:  Patient Active Problem List   Diagnosis     Nephrolithiasis     Hydrarthrosis Of The Ankle / Foot     Compound Nevus     Actinic Keratosis     Seborrheic Keratosis     Skin Neoplasm Of Uncertain Behavior     Malignant Neoplasm Of The Prostate Gland     Transient Ischemic Attack     Cataract      Hypercholesteremia     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     Internal Derangement Of Knee     Persistent atrial fibrillation     Coronary artery disease, NONOBSTRUCTIVE,  ASX, NEGATIVE STRESS TEST 2014     Obesity     Plantar fasciitis of right foot     Medical History:  Past Medical History:   Diagnosis Date     Arrhythmia a-fib     Atrial fibrillation      Cardiomyopathy      Cataract      Hydrarthrosis      Hyperlipidemia      Hyperlipidemia      Nephrolithiasis      Prostate CA      Seborrheic keratosis      Skin neoplasm     Of uncertain behavior     TIA (transient ischemic attack)      Tubular adenoma of colon      Surgical History:  Past Surgical History:   Procedure Laterality Date     PROSTATE SURGERY       Social History:  Social History   Substance Use Topics     Smoking status: Former Smoker     Quit date: 5/29/2014     Smokeless tobacco: Never Used     Alcohol use Yes      Comment: Occasionall        Review of Systems: Review of Systems:   General: WNL  Eyes: WNL  Ears/Nose/Throat: WNL  Lungs: WNL  Heart: Irregular Heartbeat  Stomach: WNL  Bladder: WNL  Muscle/Joints: WNL  Skin: WNL  Nervous System: WNL  Mental Health: WNL     Blood: Easy Bruising      Family History:  Family History   Problem Relation Age of Onset     Heart attack Father      Heart disease Father      Colon cancer Brother          Allergies:  No Known Allergies  Medications:  Current Outpatient Prescriptions   Medication Sig Dispense Refill     atorvastatin (LIPITOR) 20 MG tablet TAKE 1 TABLET BY MOUTH EVERY NIGHT AT BEDTIME 90 tablet 0     carvedilol (COREG) 25 MG tablet Take 1 tablet (25 mg total) by mouth 2 (two) times a day with meals. 120 tablet 5     cholecalciferol, vitamin D3, (VITAMIN D3) 2,000 unit cap Take 2,000 Units by mouth daily.       warfarin (COUMADIN) 5 MG tablet Take 1/2 to 1 tablet (2.5mg to 5mg) by mouth daily, as directed.  Adjust dose based on INR results. 90 tablet 1     No current  "facility-administered medications for this visit.        Objective:   Vital signs:  /60 (Patient Site: Right Arm, Patient Position: Sitting, Cuff Size: Adult Large)  Pulse 72  Resp 16  Ht 5' 11.5\" (1.816 m)  Wt (!) 263 lb (119.3 kg)  BMI 36.17 kg/m2      Physical Exam:    GENERAL APPEARANCE: Alert, cooperative and in no acute distress.  HEENT: No scleral icterus. No Xanthelasma. Oral mucuos membranes pink and moist.  NECK: JVP Nl.   CHEST: clear to auscultation  CARDIOVASCULAR: S1, S2 without murmur ,clicks or rubs. Irregular, irregular in the 80s at rest.  Radial and posterior tibial pulses are intact and symetric.   EXTREMITIES: No cyanosis, clubbing or edema.    Results personally reviewed:  Results for orders placed during the hospital encounter of 10/14/14   Echo Complete [ECH10] 10/14/2014     Narrative      Summary   Normal left ventricular size and systolic function.   Left ventricular ejection fraction is visually estimated to be 55%.   No significant valvular abnormalities.   Since 6/11/2014 the LV function has improved              June 2014 echo shows EF 30%.  Global hypokinesis.    2/15/2018 24-hour Holter shows A. fib throughout with average ventricular response of 93.  Ventricular response range 57-1 7 9.  Heart rate  mildly elevated during daytime.  Carvedilol dose more than doubled to 25 mg twice daily after Holter.      Results for orders placed or performed in visit on 07/25/17   ECG Clinic Future   Result Value Ref Range    SYSTOLIC BLOOD PRESSURE  mmHg    DIASTOLIC BLOOD PRESSURE  mmHg    VENTRICULAR RATE 92 BPM    ATRIAL RATE  BPM    P-R INTERVAL  ms    QRS DURATION 82 ms    Q-T INTERVAL 340 ms    QTC CALCULATION (BEZET) 420 ms    P Axis  degrees    R AXIS 40 degrees    T AXIS 29 degrees    MUSE DIAGNOSIS       Atrial fibrillation  Abnormal ECG  When compared with ECG of 17-SEP-2014 13:24,  Atrial fibrillation has replaced Sinus rhythm  Confirmed by ANA PORTILLO MD LOC:SJ ([id number]) " on 7/25/2017 4:07:18 PM         TSH:   Lab Results   Component Value Date    TSH 1.30 06/10/2014     BNP: No results found for: BNP  BMP:  Lab Results   Component Value Date    CREATININE 0.79 11/03/2016    BUN 12 11/03/2016     11/03/2016    K 4.2 11/03/2016     (H) 11/03/2016    CO2 21 (L) 11/03/2016       This note has been dictated using voice recognition software. Any grammatical or context distortions are unintentional and inherent to the software.    GIBSON CHAPMAN, RN, [facility] HEART [facility]  [phone]

## 2021-06-16 NOTE — PROGRESS NOTES
Assessment/Plan:    1. Persistent atrial fibrillation (H)  Persistent atrial fibrillation, rate controlled.  Continues warfarin anticoagulation currently 2.5 mg Monday Wednesday and Friday otherwise 5 mg daily.  Carvedilol 25 mg twice daily reviewed.  Will hold warfarin 4 days prior to upcoming follow-up colonoscopy scheduled April 19, 2021 then resume following.  Patient should follow-up with Dr. Mtz with referral placed.  Has not been seen by cardiologist since March 13, 2018.  [id number] score of 4.  - Ambulatory referral to Cardiology    2. Impaired fasting glucose  Impaired fasting glucose history with most recent A1c of 5.7% with ongoing weight goal less than 250 pounds initially, less than 240 pounds ideally.    3. Serrated adenoma of colon  Serrated adenomas, multiple.  Recent colonoscopy February 15, 2021 with 7 polyps noted.  Repeat colonoscopy scheduled April 19, 2021.    4. Coronary artery disease involving native coronary artery of native heart without angina pectoris  CAD history.  Atypical chest discomfort noted at times.  Has not had stress testing since 2014.  Repeat nuclear medicine exercise stress test to ensure stable CAD without inducible ischemia etc.  - NM Exercise Stress Test; Future          Subjective:    Prosper Lopez is seen today for follow-up assessment.  Persistent atrial fibrillation.  Continues carvedilol 25 mg twice daily.  Had seen Susan Pate CNP March 13, 2018 and has seen Dr. Mtz in the past.  [id number] score of 4 noted.  Continues chronic warfarin anticoagulation 2.5 mg on Monday Wednesday and Friday otherwise 5 mg daily.  Patient had held warfarin 4 days prior to prior colonoscopy February 15, 2021 and does have repeat colonoscopy scheduled April 19, 2021 for surveillance multiple colon polyps including advanced adenomas with high-grade dysplasia noted.  Nonobstructive CAD history.  Obesity history.  Mild thrombocytopenia only.  Impaired fasting glucose  "with prior A1c of 5.7%.  Denies recent illness.  Did complete Covid 19 vaccine series 2021 without concern.  Comprehensive review of systems as above otherwise all negative.     - Frances  5 sons -   1 daughter -  40 breast CA  Tobacco: quit ~  (1 ppd x > 30 years) - had quit once for 14 years  EtOH: occ  Mom -  92 y.o due to ? cancer  Dad -  56 heart attack  2 bros - one  age 78 heart attack and EtOHism  1 sis -  ? cancer   Surgeries: prostate surgery due to cancer; kidney stones bilateral; bilateral cataracts  Hospitalizations: kidney stone   Work: retired ()    Best bowling score \"289\" - more recently \"265\" about 1 year ago    Past Surgical History:   Procedure Laterality Date     IN COLONOSCOPY FLX DX W/COLLJ SPEC WHEN PFRMD N/A 2/15/2021    Procedure: COLONOSCOPY with polypectomy, tattoo, cautery;  Surgeon: Compa Miner DO;  Location: [facility] OR;  Service: Gastroenterology     IN CYSTO/URETERO W/LITHOTRIPSY &INDWELL STENT INSRT Left 2018    Procedure: CYSTOSCOPY, LEFT  URETEROSCOPY, LASER LITHOTRIPSY STENT INSERTION;  Surgeon: Foreign Lakhani MD;  Location: [facility] Main OR;  Service: Urology     PROSTATE SURGERY       URETEROSCOPY          Family History   Problem Relation Age of Onset     Heart attack Father      Heart disease Father      Colon cancer Brother      Urolithiasis Brother         Past Medical History:   Diagnosis Date     Actinic keratosis     Created by Conversion      Atrial fibrillation (H) 06/10/2014     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     Created by Conversion [facility] Annotation: May  5 2011  1:17PM -  ,  : 2006      Calculus of ureter 2018     Cardiomyopathy (H) 2014     Cataract      Cataract     Created by Conversion      Compound Nevus     Created by Conversion  Replacement Utility updated for latest IMO load     Coronary artery disease      Hydrarthrosis " 2012     Hydrarthrosis Of The Ankle / Foot     Created by Conversion      Hydronephrosis with urinary obstruction due to ureteral calculus 2018     Hypercholesteremia     Created by Conversion      Hyperlipidemia 2012     Impaired fasting glucose 2019     Internal derangement of knee     Created by Conversion  Replacement Utility updated for latest IMO load     Left hydrocele 2019     Malignant Neoplasm Of The Prostate Gland     Created by Conversion      Mixed conductive and sensorineural hearing loss of both ears 2019     Nephrolithiasis     first stone at age 68     Nephrolithiasis     Created by Conversion [facility] Annotation: May  4 2011  7:56LENNY - Keli, Demar: STENT AND LASER      Obesity 2015     Persistent atrial fibrillation (H)     First diagnosed in 2014 and appeared asymptomatic after cardioversion on 91. Reoccurrence on 2017 and again persistent [id number] score of 4 with history of TIA-on warfarin Asymptomatic and on rate control since 2017.      Plantar fasciitis of right foot      Prostate CA (H)      Seborrheic keratosis      Seborrheic Keratosis     Created by Conversion      Skin neoplasm     Of uncertain behavior     Skin Neoplasm Of Uncertain Behavior     Created by Conversion      TIA (transient ischemic attack) 2012     Transient Ischemic Attack     Created by Conversion  Replacement Utility updated for latest IMO load     Tubular adenoma of colon 2006     Ureterolithiasis         Social History     Tobacco Use     Smoking status: Former Smoker     Quit date: 2014     Years since quittin.8     Smokeless tobacco: Never Used   Substance Use Topics     Alcohol use: Yes     Comment: Occasional     Drug use: No        Current Outpatient Medications   Medication Sig Dispense Refill     carvediloL (COREG) 25 MG tablet Take 1 tablet (25 mg total) by mouth 2 (two) times a day with meals. 180 tablet 3     cholecalciferol, vitamin  D3, (VITAMIN D3) 2,000 unit cap Take 2,000 Units by mouth daily.       warfarin ANTICOAGULANT (COUMADIN/JANTOVEN) 5 MG tablet TAKE 1/2 TO 1 TABLET BY MOUTH EVERY DAY, ADJUST DOSE PER INR RESULTS AS DIRECTED (Patient taking differently: Ld 2/11/21) 90 tablet 1     No current facility-administered medications for this visit.           Objective:    Vitals:    04/13/21 0658   BP: 110/60   Pulse: 96   SpO2: 94%   Weight: (!) 261 lb (118.4 kg)      Body mass index is 37.18 kg/m .    Alert.  No apparent distress.  Cardiac exam irregular otherwise rate controlled.  Chest appears clear.  BMI 37.18.      Echo 3/22/18 Summary    When compared to the previous study dated 10/14/2014, no significant change.    Left ventricle ejection fraction is normal. The calculated left ventricular ejection fraction is 58%.    Normal left ventricular size and systolic function.    E/e' > 15, suggesting elevated LV filling pressures.    Left atrial volume is mildly increased.    The aortic root is mildly dilated.           NM Exercise Stress Test 10/29/14    ORDERING  PHYSICIAN:  Dr. Elver Mtz.  INDICATION:  Coronary artery disease, abnormal EKG.     STRESS SUMMARY:  The patient exercised for 5 minutes and 26 seconds according to the  Jj protocol, stopping due to fatigue.  Chest discomfort was not reported and  ischemic electrocardiographic changes were not noted by the supervising  cardiologist, Dr. Sneha Hicks.  The peak heart rate 130 with 89% of the age  predicted maximum.  The blood pressure 132/80, peak 184/82.     TECHNICAL COMMENTS:  Rest dose 4.03 mCi of thallium injected at rest and 42.4 mCi of  sestamibi. The  images are satisfactory.  Comparison is made with the  images of the examination of 05/13/2011.     FINDINGS: The exercise stress and rest images demonstrate normal left ventricular  size and tracer uptake pattern. The gated images reveal normal resting regional wall  motion and global systolic  performance. The measured resting ejection fraction is  61.      CONCLUSION:  Exercise stress nuclear study is negative for inducible myocardial  ischemia or infarction.      COMMENTS:  Comparison with the images of the examination of 05/13/2011 demonstrates  no significant interval change.        ANA PORTILLO MD  pg  D 10/29/2014 13:58:39  T 10/30/2014 08:14:38  R 10/30/2014 08:14:38  [id number]          This note has been dictated using voice recognition software and as a result may contain minor grammatical errors and unintended word substitutions.

## 2021-06-16 NOTE — TELEPHONE ENCOUNTER
Anitra calling from [facility] requesting hold/bridge orders for patients warfarin prior to colonoscopy scheduled on 4/19/2021    Geri can you help with this?   LAWANDAI - this is a duplicate message     Phone number: [phone]

## 2021-06-17 NOTE — TELEPHONE ENCOUNTER
Telephone Encounter by Geri Perry, PharmD at 3/30/2021 12:39 PM     Author: Geri Perry, PharmD Service: -- Author Type: Pharmacist    Filed: 3/30/2021  1:25 PM Encounter Date: 3/30/2021 Status: Addendum    : Geri Perry, PharmD (Pharmacist)    Related Notes: Original Note by Geri Perry, PharmD (Pharmacist) filed at 3/30/2021  1:22 PM       PARKER-PROCEDURAL ANTICOAGULATION  MANAGEMENT    Assessment     Warfarin interruption plan for Colonoscopy on 4/19/21    Atrial Fibrillation       Risk stratification for thromboembolism: moderate. (2017 ACC periprocedure pathway for NVAF)    [id number] ~ 4  [TIA(2012), Age >= 75 years]      Recently tolerated hold without bridge for 2/15/21 colonoscopy.     NVAF: 2017 ACC periprocedure pathway for NVAF advises likely NO bridge for moderate risk stratification ([id number] score 5-6) WITHOUT a hx of stroke, TIA or systemic embolism and not at increased risk for bleeding  Plan       Pre-Procedure:      Okay to hold warfarin 4 days prior to procedure starting Thursday 4/15      ACC/AHA guidelines suggest bridge in patients with hx of TIA.  However, patient's TIA (2012) occurred prior to Afib (2014) dx and may not be embolic, overall NYN6EV6-INAx ~ 4  and recently tolerated hold without bridge 2/2021. Confirm with Dr. Tavares     ?   Geri Perry, PharmD  [facility] Anticoagulation    Subjective/Objective:      Prosper Lopez, a 80 y.o. male    Goal INR Range: 2-3    Patient bridged in past: No bridge with 2/15/21 colonoscopy    Pertinent History:     TIA 1/2012  From 1/21/12 OV          1/25/2012: MRI/MRA Head & MRA Neck        1/25/21 Carotid Ultrasound          Afib DX 2014    Wt Readings from Last 3 Encounters:   02/15/21 (!) 253 lb (114.8 kg)   02/09/21 (!) 264 lb (119.7 kg)   11/10/20 (!) 266 lb 6.4 oz (120.8 kg)        Ideal body weight: 73.6 kg (162 lb 3.3 oz)  Adjusted ideal body weight: 90.1 kg (198 lb 8.4 oz)     Estimated body mass  "index is 36.04 kg/m  as calculated from the following:    Height as of 2/9/21: 5' 10.25\" (1.784 m).    Weight as of 2/15/21: 253 lb (114.8 kg).    Lab Results   Component Value Date    INR 2.10 (H) 03/23/2021    INR 2.40 (H) 03/09/2021    INR 1.60 (H) 02/25/2021     Lab Results   Component Value Date    HGB 15.8 02/09/2021    HCT 43.7 02/09/2021     (L) 02/09/2021     Lab Results   Component Value Date    CREATININE 0.97 02/09/2021    CREATININE 0.81 10/13/2020    CREATININE 0.92 07/08/2020     Estimated CrCl:  76 ml/min using adjusted weight 90 kg, creatinine 0.97       "

## 2021-06-17 NOTE — PROGRESS NOTES
Assessment/Plan:        Diagnoses and all orders for this visit:    Calculus of ureter  -     Symptom Control While Passing a Stone Education  -     CT Abdomen Pelvis Without Oral Without IV Contrast; Future; Expected date: 5/1/18  -     Patient Stated Goal: Pass my stone  -     tamsulosin (FLOMAX) 0.4 mg Cp24; Take 1 capsule (0.4 mg total) by mouth daily for 14 days. With meal  Dispense: 14 capsule; Refill: 1    Hydronephrosis with urinary obstruction due to ureteral calculus    Calculus of kidney    Urinary tract stones  -     Urinalysis Macroscopic      Stone Management Plan  [facility] 4/4/2018 4/17/2018   Urinary Tract Infection No suspicion of infection No suspicion of infection   Renal Colic Asymptomatic at this time Asymptomatic at this time   Renal Failure No suspicion of renal failure No suspicion of renal failure   Current CT date 4/4/2018 4/17/2018   Right sided stones? Yes Yes   R Number of ureteral stones No ureteral stones No ureteral stones   R Number of kidney stones  1 Renal stones unchanged from last exam   R GSD of kidney stones 2 - 4 2 - 4   R Hydronephrosis None None   R Stone Event No current event No current event   R Current Plan Observe Observe   Observe rationale Limited stone burden with good prognosis for spontaneous passage Limited stone burden with good prognosis for spontaneous passage   Left sided stones? Yes Yes   L Number of ureteral stones 1 1   L GSD of ureteral stones 5 5   L Location of ureteral stone Proximal Mid   L Number of kidney stones  No renal stones No renal stones   L GSD of kidney stones N/A N/A   L Hydronephrosis Mild Mild   L Stone Event New event Established event   Diagnosis date 4/4/2018 -   Initial location of primary symptomatic stone Proximal -   Initial GSD of primary symptomatic stone 5 -   L MET Status Initiation Progression   L Current Plan MET MET   MET 2 week F/U 2 week F/U         Subjective:      HPI  Mr. Prosper Lopez is a 78 y.o.   male returning to the [facility] [place] for medical expulsive therapy follow up.     On last encounter, his 5 mm stone was in left proximal ureter with mild hydronephrosis. He has had no unanticipated events.    Symptoms have been controlled with medication and he is able to carry on normal activities. He has experienced intermittent nausea and decreased appetite. He finds zofran helpful. He also has been drinking large amounts cran-apple juice, noting increased loose stools.     Significant current symptoms include:  diffuse mild abdominal discomfort. Pertinent negative current symptoms include:  fever, chills, right flank pain, left flank pain, nausea, vomiting, hematuria, urinary frequency and dysuria.     New CT scan was personally reviewed and demonstrates progression of 5 mm stone to left mid ureter with stable mild hydronephrosis. No change to right renal calculus.    PLAN    77 yo M with progression of left ureteral calculus, now within mid ureter and stable hydronephrosis. Non-obstructing right renal stone.    He will continue to attempt to pass stone and will return in 2 weeks with further imaging. He should discontinue drinking apple juice given GI upset.    Patient also seen and examined by Gayle Gonzalez PA-C       ROS   Review of systems is negative except for HPI.    Past Medical History:   Diagnosis Date     Arrhythmia a-fib     Atrial fibrillation      Cardiomyopathy      Cataract      Hydrarthrosis      Hyperlipidemia      Hyperlipidemia      Nephrolithiasis 2009    first stone at age 68     Prostate CA      Seborrheic keratosis      Skin neoplasm     Of uncertain behavior     TIA (transient ischemic attack)      Tubular adenoma of colon      Past Surgical History:   Procedure Laterality Date     PROSTATE SURGERY       URETEROSCOPY  2009     Current Outpatient Prescriptions   Medication Sig Dispense Refill     atorvastatin (LIPITOR) 20 MG tablet TAKE 1 TABLET BY MOUTH EVERY  NIGHT AT BEDTIME 90 tablet 0     carvedilol (COREG) 25 MG tablet Take 1 tablet (25 mg total) by mouth 2 (two) times a day with meals. 120 tablet 5     cholecalciferol, vitamin D3, (VITAMIN D3) 2,000 unit cap Take 2,000 Units by mouth daily.       tamsulosin (FLOMAX) 0.4 mg Cp24 Take 1 capsule (0.4 mg total) by mouth daily for 14 days. With meal 14 capsule 1     warfarin (COUMADIN) 5 MG tablet Take 1/2 to 1 tablet (2.5mg to 5mg) by mouth daily, as directed.  Adjust dose based on INR results. 90 tablet 1     dimenhyDRINATE (DRAMAMINE) 50 MG tablet Take 1-2 tablets ( mg total) by mouth every 6 (six) hours as needed (pain). 30 tablet 0     ondansetron (ZOFRAN ODT) 4 MG disintegrating tablet Take 1-2 tablets (4-8 mg total) by mouth every 8 (eight) hours as needed for nausea. 20 tablet 0     oxyCODONE (ROXICODONE) 5 MG immediate release tablet Take 1-2 tablets (5-10 mg total) by mouth every 4 (four) hours as needed. 20 tablet 0     No current facility-administered medications for this visit.      No Known Allergies    Social History     Social History     Marital status:      Spouse name: N/A     Number of children: N/A     Years of education: N/A     Occupational History     Not on file.     Social History Main Topics     Smoking status: Former Smoker     Quit date: 5/29/2014     Smokeless tobacco: Never Used     Alcohol use Yes      Comment: Occasionall     Drug use: No     Sexual activity: Not on file     Other Topics Concern     Not on file     Social History Narrative     Family History   Problem Relation Age of Onset     Heart attack Father      Heart disease Father      Colon cancer Brother      Urolithiasis Brother      Objective:      Physical Exam  Vitals:    04/17/18 1058   BP: 111/69   Pulse: 88   Temp: 97.7  F (36.5  C)     General - well developed, well nourished, appropriate for age. Appears no distress at this time  Abdomen - moderately obese soft, non-tender, no hepatosplenomegaly, no  masses.   - no flank tenderness, no suprapubic tenderness, kidney and bladder non-palpable  MSK - normal spinal curvature. no spinal tenderness. normal gait. muscular strength intact.  Psych - oriented to time, place, and person, normal mood and affect.    Labs   Urinalysis POC (Office):  Nitrite, UA   Date Value Ref Range Status   04/17/2018 Negative Negative Final   04/04/2018 Negative Negative Final   04/04/2018 Negative Negative Final       Lab Urinalysis:  Blood, UA   Date Value Ref Range Status   04/17/2018 Moderate (!) Negative Final   04/04/2018 Large (!) Negative Final   04/04/2018 Large (!) Negative Final     Nitrite, UA   Date Value Ref Range Status   04/17/2018 Negative Negative Final   04/04/2018 Negative Negative Final   04/04/2018 Negative Negative Final     Leukocytes, UA   Date Value Ref Range Status   04/17/2018 Negative Negative Final   04/04/2018 Negative Negative Final   04/04/2018 Negative Negative Final     pH, UA   Date Value Ref Range Status   04/17/2018 5.0 5.0 - 8.0 Final   04/04/2018 5.0 5.0 - 8.0 Final   04/04/2018 6.0 4.5 - 8.0 Final

## 2021-06-17 NOTE — PROGRESS NOTES
Assessment/Plan:        Diagnoses and all orders for this visit:    Calculus of ureter  -     tamsulosin (FLOMAX) 0.4 mg Cp24; Take 1 capsule (0.4 mg total) by mouth daily for 14 days. With meal  Dispense: 14 capsule; Refill: 1  -     oxyCODONE (ROXICODONE) 5 MG immediate release tablet; Take 1-2 tablets (5-10 mg total) by mouth every 4 (four) hours as needed.  Dispense: 20 tablet; Refill: 0  -     Symptom Control While Passing a Stone Education  -     CT Abdomen Pelvis Without Oral Without IV Contrast; Future; Expected date: 4/18/18  -     Patient Stated Goal: Pass my stone    Calculus of kidney    Hydronephrosis with urinary obstruction due to ureteral calculus    Urinary tract stones  -     Urinalysis Macroscopic      Stone Management Plan  [facility] 4/4/2018   Urinary Tract Infection No suspicion of infection   Renal Colic Asymptomatic at this time   Renal Failure No suspicion of renal failure   Current CT date 4/4/2018   Right sided stones? Yes   R Number of ureteral stones No ureteral stones   R Number of kidney stones  1   R GSD of kidney stones 2 - 4   R Hydronephrosis None   R Stone Event No current event   R Current Plan Observe   Observe rationale Limited stone burden with good prognosis for spontaneous passage   Left sided stones? Yes   L Number of ureteral stones 1   L GSD of ureteral stones 5   L Location of ureteral stone Proximal   L Number of kidney stones  No renal stones   L GSD of kidney stones N/A   L Hydronephrosis Mild   L Stone Event New event   Diagnosis date 4/4/2018   Initial location of primary symptomatic stone Proximal   Initial GSD of primary symptomatic stone 5   L MET Status Initiation   L Current Plan MET   MET 2 week F/U         Subjective:      HPI  Mr. Prosper Lopez is a 77 y.o.  male presenting to the [facility] [place] following recent [facility] ED visit for urolithiasis.    He is a recurrent calcium oxalate stone former who has required stone  clearance procedures. He has not previously participated in stone risk evaluation. He has no identified modifiable stone risk factors. He has identified non-modifiable stone risks including:  limited family history.    Primary symptom occurring last night was acute onset left lower abdominal pain, x few hours. The pain was sharp and radiated into the mid abdomen. No associated alleviating or aggravating factors. He states similar pain with prior stone event ~ 9 years ago that required surgical clearance with staged ureteroscopy due to low caliber ureter. Significant associated symptoms at presentation included:  nausea.     He is asymptomatic at present. He denies symptoms of fever, chills, flank pain, nausea, vomiting, urinary frequency and dysuria. He is maintained on coumadin for a.fib.    CT scan from earlier today is personally reviewed and demonstrates a 3 mm left proximal ureteral stone. Non-obstructing right renal calculus.    Significant labs from presentation include severe hematuria, no pyuria, negative nitrite, no bacteria, normal WBC, normal C reactive protein, normal creatinine and normal potassium.    PLAN    76 yo M with history of stone event, previously requiring surgical stone clearance. Recently diagnosed obstructing left proximal ureteral calculus, currently asymptomatic. Non-obstructing left renal calculus.    Will proceed with medical expulsive therapy. Risks and benefits were detailed of medical expulsive therapy including probability of stone passage, recurrent renal colic, and requirement of emergency medical and/or surgical care and further imaging. Patient verbalized understanding. Patient agrees with plan as discussed. He will return in 2 weeks with low dose CT scan.    For symptom control, he was prescribed oxycodone and flomax. He has zofran available for nausea Over the counter symptom control medications of Tylenol and Dramamine were recommended.    Patient also seen and examined by  UTE Saleh   Review of Systems  A 12 point comprehensive review of systems is negative except for HPI    Past Medical History:   Diagnosis Date     Arrhythmia a-fib     Atrial fibrillation      Cardiomyopathy      Cataract      Hydrarthrosis      Hyperlipidemia      Hyperlipidemia      Nephrolithiasis 2009    first stone at age 68     Prostate CA      Seborrheic keratosis      Skin neoplasm     Of uncertain behavior     TIA (transient ischemic attack)      Tubular adenoma of colon      Past Surgical History:   Procedure Laterality Date     PROSTATE SURGERY       URETEROSCOPY  2009     Current Outpatient Prescriptions   Medication Sig Dispense Refill     atorvastatin (LIPITOR) 20 MG tablet TAKE 1 TABLET BY MOUTH EVERY NIGHT AT BEDTIME 90 tablet 0     carvedilol (COREG) 25 MG tablet Take 1 tablet (25 mg total) by mouth 2 (two) times a day with meals. 120 tablet 5     cholecalciferol, vitamin D3, (VITAMIN D3) 2,000 unit cap Take 2,000 Units by mouth daily.       dimenhyDRINATE (DRAMAMINE) 50 MG tablet Take 1-2 tablets ( mg total) by mouth every 6 (six) hours as needed (pain). 30 tablet 0     oxyCODONE-acetaminophen (PERCOCET) 5-325 mg per tablet Take 1-2 tablets by mouth every 4 (four) hours as needed for pain. 20 tablet 0     warfarin (COUMADIN) 5 MG tablet Take 1/2 to 1 tablet (2.5mg to 5mg) by mouth daily, as directed.  Adjust dose based on INR results. 90 tablet 1     ondansetron (ZOFRAN ODT) 4 MG disintegrating tablet Take 1-2 tablets (4-8 mg total) by mouth every 8 (eight) hours as needed for nausea. 20 tablet 0     No current facility-administered medications for this visit.      No Known Allergies    Social History     Social History     Marital status:      Spouse name: N/A     Number of children: N/A     Years of education: N/A     Occupational History     Not on file.     Social History Main Topics     Smoking status: Former Smoker     Quit date: 5/29/2014     Smokeless tobacco:  Never Used     Alcohol use Yes      Comment: Occasionall     Drug use: No     Sexual activity: Not on file     Other Topics Concern     Not on file     Social History Narrative     Family History   Problem Relation Age of Onset     Heart attack Father      Heart disease Father      Colon cancer Brother      Urolithiasis Brother      Objective:      Physical Exam  Vitals:    04/04/18 1108   BP: 123/77   Pulse: 91   Temp: 97.3  F (36.3  C)     General - well developed, well nourished, appropriate for age. Appears no distress at this time   Heart - regular rate and rhythm, no murmur  Respiratory - normal effort, clear to auscultation, good air entry without adventitious noises  Abdomen - slender soft, non-tender, no hepatosplenomegaly, no masses.   - no flank tenderness, no suprapubic tenderness, kidney and bladder non-palpable  MSK - normal spinal curvature. no spinal tenderness. normal gait. muscular strength intact.  Neurology - cranial nerves II-XII grossly intact, normal sensation, no unsteadiness  Skin - intact, no bruising, no gouty tophi  Psych - oriented to time, place, and person, normal mood and affect.    Labs  Urinalysis POC (Office):  Nitrite, UA   Date Value Ref Range Status   04/04/2018 Negative Negative Final   04/04/2018 Negative Negative Final   05/04/2011 Negative (Negative) Final       Lab Urinalysis:  Blood, UA   Date Value Ref Range Status   04/04/2018 Large (!) Negative Final   04/04/2018 Large (!) Negative Final   05/04/2011 Negative (Negative) Final     Nitrite, UA   Date Value Ref Range Status   04/04/2018 Negative Negative Final   04/04/2018 Negative Negative Final   05/04/2011 Negative (Negative) Final     Leukocytes, UA   Date Value Ref Range Status   04/04/2018 Negative Negative Final   04/04/2018 Negative Negative Final   05/04/2011 Negative (Negative) Final     pH, UA   Date Value Ref Range Status   04/04/2018 5.0 5.0 - 8.0 Final   04/04/2018 6.0 4.5 - 8.0 Final   05/04/2011 5.5  (5.0-8.0) Final    and Acute Labs   CBC   WBC   Date Value Ref Range Status   04/04/2018 10.1 4.0 - 11.0 thou/uL Final   09/08/2014 6.4 4.0 - 11.0 thou/uL Final   05/04/2011 6.3 4.0 - 11.0 thou/uL Final     Hemoglobin   Date Value Ref Range Status   04/04/2018 15.5 14.0 - 18.0 g/dL Final   09/08/2014 16.4 14.0 - 18.0 g/dL Final   12/13/2012 16.9 14.0 - 18.0 g/dL Final     Platelets   Date Value Ref Range Status   04/04/2018 141 140 - 440 thou/uL Final   09/08/2014 128 (L) 140 - 440 thou/uL Final   05/04/2011 153 140 - 440 thou/uL Final   , C Reactive Protein    CRP   Date Value Ref Range Status   04/04/2018 <0.1 0.0 - 0.8 mg/dL Final    and Renal Panel  [facility]  Creatinine   Date Value Ref Range Status   04/04/2018 0.99 0.70 - 1.30 mg/dL Final   11/03/2016 0.79 0.70 - 1.30 mg/dL Final   09/22/2015 0.82 0.70 - 1.30 mg/dL Final     Potassium   Date Value Ref Range Status   04/04/2018 4.4 3.5 - 5.0 mmol/L Final   11/03/2016 4.2 3.5 - 5.0 mmol/L Final   09/22/2015 4.0 3.5 - 5.0 mmol/L Final     Calcium   Date Value Ref Range Status   04/04/2018 10.4 8.5 - 10.5 mg/dL Final   11/03/2016 10.1 8.5 - 10.5 mg/dL Final   09/22/2015 9.7 8.5 - 10.5 mg/dL Final

## 2021-06-17 NOTE — PROGRESS NOTES
New Carmi AF study Physical Exam  If findings abnormal, please explain  Physical Exam:        Normal  Abnormal Not Done  General Appearance [x]      []    []   HEENT   [x]      []    []   Chest    [x]      []    []   Heart    []      [x]    []   irregular  Abdomen   []      [x]    []    obese  Musculoskeletal  [x]      []    []   Neurologic   [x]      []    []     Dermatologic  []      [x]    []    Scattered bruising to bilateral forearms            Absent  Present  Tremor   [x]      []    If present, score 0-10      0 is no tremor, 1 to 3 as mild, tremor, 4 to 6 as moderate      tremor, 7 to 9 as severe tremor, and 10 as extremely      severe tremor   Tattoos on sensor locations? [] Yes    [x]  No      (i.e., wrists)    Rachael Milian, CNP

## 2021-06-17 NOTE — TELEPHONE ENCOUNTER
ANTICOAGULATION  MANAGEMENT    Assessment     Today's INR result of 2.1 is Therapeutic (goal INR of 2.0-3.0)        Warfarin taken as previously instructed    No new diet changes affecting INR    No new medication/supplements affecting INR    Continues to tolerate warfarin with no reported s/s of bleeding or thromboembolism     Previous INR was Subtherapeutic    Plan:     Spoke on phone with Waldo regarding INR result and instructed:      Warfarin Dosing Instructions:  Continue current warfarin dose 2.5 mg daily on Tu/Th/Sa; and 5 mg daily rest of week  (0 % change)    Instructed patient to follow up no later than: 2 weeks-- will call back to schedule    Education provided: target INR goal and significance of current INR result, importance of notifying clinic for changes in medications and importance of notifying clinic for diarrhea, nausea/vomiting, reduced intake and/or illness    Waldo verbalizes understanding and agrees to warfarin dosing plan.    Instructed to call the [facility] for any changes, questions or concerns. (#[phone])   ?   Jaz Sterling RN    Subjective/Objective:      rPosper Lopez, a 81 y.o. male is on warfarin.       Waldo reports:     Home warfarin dose: as updated on anticoagulation calendar per template     Missed doses: No     Medication changes:  No     S/S of bleeding or thromboembolism:  No     New Injury or illness:  No     Changes in diet or alcohol consumption:  No     Upcoming surgery, procedure or cardioversion:  No    Anticoagulation Episode Summary     Current INR goal:  2.0-3.0   TTR:  79.6 % (1 y)   Next INR check:  5/18/2021   INR from last check:  2.10 (5/4/2021)   Weekly max warfarin dose:     Target end date:  Indefinite   INR check location:     Preferred lab:     Send INR reminders to:  MAGDALENA TEJEDA    Indications    A-fib (H) (Resolved) [I48.91]           Comments:           Anticoagulation Care Providers     Provider Role Specialty Phone number    Chaitanya  Foreign ASTORGA MD [facility] [phone]

## 2021-06-17 NOTE — TELEPHONE ENCOUNTER
ANTICOAGULATION  MANAGEMENT PROGRAM    Prosper Lopez is overdue for INR check.     Spoke with Frances who declined to schedule INR at this time. If calling back please schedule as soon as possible.      Jaz Sterling RN

## 2021-06-17 NOTE — TELEPHONE ENCOUNTER
Telephone Encounter by Jaz Sterling RN at 2/25/2021 11:18 AM     Author: Jaz Sterling RN Service: -- Author Type: Registered Nurse    Filed: 2/25/2021 11:45 AM Encounter Date: 2/25/2021 Status: Signed    : Jaz Sterling RN (Registered Nurse)       ANTICOAGULATION  MANAGEMENT    Assessment     Today's INR result of 1.6 is Subtherapeutic (goal INR of 2.0-3.0)        Warfarin recently held as instructed which may be affecting INR    No new diet changes affecting INR    No new medication/supplements affecting INR    Continues to tolerate warfarin with no reported s/s of bleeding or thromboembolism     Previous INR was Subtherapeutic    Plan:     Spoke on phone with Waldo regarding INR result and instructed:      Warfarin Dosing Instructions:  5mg today then continue current warfarin dose 2.5 mg daily on Tu/Th/Sa; and 5 mg daily rest of week  (0 % change)    Instructed patient to follow up no later than: 2 weeks-- scheduled for 3/9    Education provided: target INR goal and significance of current INR result, importance of notifying clinic for changes in medications, monitoring for clotting signs and symptoms, when to seek medical attention/emergency care and importance of notifying clinic for diarrhea, nausea/vomiting, reduced intake and/or illness    Waldo verbalizes understanding and agrees to warfarin dosing plan.    Instructed to call the [facility] for any changes, questions or concerns. (#[phone])   ?   Jaz Sterling RN    Subjective/Objective:      Prosper Lopez, a 80 y.o. male is on warfarin. Waldo      Waldo reports:     Home warfarin dose: verbally confirmed home dose with Waldo and updated on anticoagulation calendar     Missed doses: Yes: held from 2/11-2/17     Medication changes:  No     S/S of bleeding or thromboembolism:  No     New Injury or illness:  No     Changes in diet or alcohol consumption:  No     Upcoming surgery, procedure or cardioversion:   No    Anticoagulation Episode Summary     Current INR goal:  2.0-3.0   TTR:  90.9 % (1 y)   Next INR check:  2/25/2021   INR from last check:  1.60 (2/25/2021)   Weekly max warfarin dose:     Target end date:  Indefinite   INR check location:     Preferred lab:     Send INR reminders to:  MAGDALENA TEJEDA    Indications    A-fib (H) (Resolved) [I48.91]           Comments:           Anticoagulation Care Providers     Provider Role Specialty Phone number    Foreign Tavares MD Referring Family Medicine [phone]

## 2021-06-17 NOTE — TELEPHONE ENCOUNTER
Telephone Encounter by Christopher Jimenez RN at 3/31/2020 10:13 AM     Author: Christopher Jimenez RN Service: -- Author Type: RN, Care Manager    Filed: 3/31/2020 10:21 AM Encounter Date: 3/31/2020 Status: Attested    : Christopher Jimenez RN (RN, Care Manager) Cosigner: Foreign Tavares MD at 3/31/2020  3:10 PM    Attestation signed by Foreign Tavares MD at 3/31/2020  3:10 PM                     ANTICOAGULATION  MANAGEMENT    Assessment     Today's INR result of 2.1 is Therapeutic (goal INR of 2.0-3.0)        Warfarin taken as previously instructed    No new diet changes affecting INR    No new medication/supplements affecting INR    Continues to tolerate warfarin with no reported s/s of bleeding or thromboembolism     Previous INR was Therapeutic    Plan:     Spoke with Prosper regarding INR result and instructed:     Warfarin Dosing Instructions:  Continue current warfarin dose    2.5 mg every Tue, Thu, Sat; 5 mg all other days         Instructed patient to follow up no later than: 8 weeks.    Education provided: importance of taking warfarin as instructed    Waldo verbalizes understanding and agrees to warfarin dosing plan.    Instructed to call the [facility] for any changes, questions or concerns. (#[phone])   ?   Christopher Jimenez RN    Subjective/Objective:      Prosper Lopez, a 79 y.o. male is on warfarin.     Prosper reports:     Home warfarin dose: verbally confirmed home dose with Waldo and updated on anticoagulation calendar     Missed doses: No     Medication changes:  No     S/S of bleeding or thromboembolism:  No     New Injury or illness:  No     Changes in diet or alcohol consumption:  No     Upcoming surgery, procedure or cardioversion:  No    Anticoagulation Episode Summary     Current INR goal:   2.0-3.0   TTR:   85.9 % (1 y)   Next INR check:   5/26/2020   INR from last check:   2.10 (3/31/2020)   Weekly max warfarin dose:      Target end date:   Indefinite   INR check  location:      Preferred lab:      Send INR reminders to:   MAGDALENA TEJEDA    Indications    A-fib (H) (Resolved) [I48.91]           Comments:            Anticoagulation Care Providers     Provider Role Specialty Phone number    Foreign Tavares MD Referring Family Medicine [phone]

## 2021-06-17 NOTE — TELEPHONE ENCOUNTER
Telephone Encounter by Geri Perry, PharmD at 3/30/2021  5:26 PM     Author: Geri Perry, PharmKIAN Service: -- Author Type: Pharmacist    Filed: 3/30/2021  5:29 PM Encounter Date: 3/30/2021 Status: Signed    : Geri Perry PharmD (Pharmacist)       PARKER-PROCEDURAL ANTICOAGULATION MANAGEMENT    Returned call to [facility]. Left message and relayed pre-procedure orders:      Okay to hold warfarin 4 days prior to procedure    No bridge    Geri Perry, PharmKIAN  [facility] Anticoagulation    ___________    Foreign Tavares MD  You 1 hour ago (3:53 PM)     Okay for hold without bridge.  Thank you for your expertise!

## 2021-06-17 NOTE — PROGRESS NOTES
"      [place] [employer] [place] [employer] Study Consent Visit    Study description: Electrocardiogram Clinical Validation Study \"[place] [employer] [place] [employer]\" study    Note time seated: 7:52 am    Prosper Lopez a 78 y.o. male , was seen in 2566 today to discuss participation in the [employer] study. The consent discussion began on Monday April 30, 2018.    The consent form was reviewed with the patient and Salima Oreilly.     The consent discussion included:    Study purpose     Conflict of interest    Device description      Study visits    Risks of participation    Benefits (if any)    Alternatives    Voluntary participation    Confidentiality     Compensation/costs of participation    Study stipends    Injury and legal rights    The subject was provided time to review the consent form and consider participation. his questions were answered to his satisfaction.   The patient has voluntarily agreed to participate in the above noted study.   The consent form version and HIPPA form version 4.0_16 March 2018, IRB approved April 19, 2018  was signed 04/30/18.    The subject was provided with a copy of the consent form and HIPPA. A copy of the signed forms was forwarded to medical records.    No study procedures were done prior to Prosper Lopez providing informed consent.     Salima ALLIE Oreilly      Study Data collections   Vitals  (TPBP)     Vitals:    04/30/18 0814   BP: 104/58   Patient Site: Right Arm   Patient Position: Sitting   Cuff Size: Adult Regular   Pulse: 85   Temp: 98.2  F (36.8  C)   TempSrc: Oral   Weight: (!) 255 lb 11.2 oz (116 kg)   Height: 5' 11.85\" (1.825 m)      VS taken after 5 min rest   Temp in  C  Height/weight (cm/kg)   Note  time patient placed in supine position: 8:24 am  Ethnicity   []   or    [x]  Not  or   Race  []   or   []    []  Black, of  heritage or   []   or Other   [x]  White  OCCUPATION: " Retired -   HISTORY OF HEART RHYTHM ABNORMALITIES   []  None   []  Atrial Flutter   [] 2  AVB -Type I  [] AF (permanent)  []  Ventricular Tachycardia  []  2  AVB -other:   [x]  AF (persistent   []  Atrial Tachycardia  [] 3  AVB   []  AF (paroxysmal  [] 1  AVB     []  LBBB  [] AF (other)   [] 2  AVB -Type I    []  RBBB  MEDICAL AND ALLERGY HISTORY ([facility])-with start & stop dates   Special interest allergies: active allergic skin reactions     Past Medical History:   Diagnosis Date     Atrial fibrillation 06/10/2014     Calculus of ureter 4/4/2018     Cardiomyopathy 06/16/2014     Cataract 2012     Hydrarthrosis 2012     Hyperlipidemia 2012     Nephrolithiasis 2009    first stone at age 68     Obesity 9/1/2015     Prostate CA 2011     Seborrheic keratosis 2012     Skin neoplasm     Of uncertain behavior     TIA (transient ischemic attack) 01/24/2012     Tubular adenoma of colon 2006      No Known Allergies     Current Outpatient Prescriptions:      atorvastatin (LIPITOR) 20 MG tablet, TAKE 1 TABLET BY MOUTH EVERY NIGHT AT BEDTIME, Disp: 90 tablet, Rfl: 0 Started 09/16/2014 - Atrial Fibrillation     carvedilol (COREG) 25 MG tablet, Take 1 tablet (25 mg total) by mouth 2 (two) times a day with meals., Disp: 120 tablet, Rfl: 5 Started 09/15/2017 - Atrial Fibrillation     cholecalciferol, vitamin D3, (VITAMIN D3) 2,000 unit cap, Take 2,000 Units by mouth daily., Disp: , Rfl: Stated 11/16/2016 - General Health     warfarin (COUMADIN) 5 MG tablet, Take 1/2 to 1 tablet (2.5mg to 5mg) by mouth daily, as directed.  Adjust dose based on INR results., Disp: 90 tablet, Rfl: 1 Started 09/16/2014 - Atrial Fibrillation     ECG done; reviewed by & PE done by Rachael Cantor  Lifestyle  Tobacco/nicotine  [x]  Never  [] Rarely  []  Occasionally  []  Frequently  [] Daily   Alcohol   []  Never  [] Rarely  []  Occasionally  [x]  Frequently  [] Daily    [] Daily-Heavy option  Recreational drug use  [x]  Never  [] Rarely  []   Occasionally  []  Frequently  [] Daily   Caffeine  []  Never  [] Rarely  []  Occasionally  []  Frequently  [x] Daily    [] Daily-Heavy option  Exercise  []  Never  [] Rarely  []  Occasionally  [x]  Frequently  [] Daily       Dominant hand [] left  [x] right [] ambidextrous  Preferred Wrist to wear band on   [x]  left   []  right []  neither [] both  Were screening Day & study day: [x]  same [] different   Same: wrist circumference:      199.5  mm   Device wearing wrist skin fold thickness:       10  mm  Device wearing wrist hairiness:     []  Fine, low density [x]  Thick, low density   []  Fine, high density []  Thick, high density  Pregnancy test  for WOCBP    [x] n/a male or female not child bearing potential  Device Set up and Data collection  CS Laptop ID 19  CS Belt   Wrist device laptop ID 24  Wrist device   Wrist device size [x]  small []  large  Snug band notch 12  Wrist device worn on []  right [x]  left  Device location: [] dorsal and distal  [x]  Dorsal and proximal     []  Palmar and distal []  Palmar and proximal  Wrist device orientation []  proximal  [x] distal  IN-STUDY NOTES  04/30/18  Time: 9:20  Wrist device interaction   [x]  finger []  finger & thumb  []  other:   # of practice runs (no more than 6) 2   Ease of use   DCD rating     [] 1 (Unable to use)    []  2 (Below average ease of use)    []  3 (Average Ease of use)    [] 4 (above average ease of use)    [x]  5 (easiest to use)  TomTom Touch rating    []  1 (Unable to use)    []  2 (Below average ease of use)    []  3 (Average Ease of  use)    [] 4 (above average ease of use)    [x]  5 (easiest to use)  KristianCojoanna Matutedia Mobile Rating    []  1 (Unable to use)    []  2 (Below average ease of use)    []  3 (Average Ease of  use)    [x] 4 (above average ease of use)    []  5 (easiest to use)  Subject has now completed their participation in the [facility] AF study.  Salima Oreilly

## 2021-06-17 NOTE — PROGRESS NOTES
[place] Study Inclusion / Exlcusion Criteria  Protocol Version 8    Inclusion Criteria    Yes No  Subjects must meet all the following inclusion criteria to be enrolled:   [x] [] 1 Able to read, understand, and provide written informed consent   [x] [] 2 Willing and able to participate in the study procedures as described in the consent form   [x] [] 3 Individuals who are 22 years of age and older   [x] [] 4 Able to communicate effectively with and follow instructions from the study staff   [x] [] 5 Have a wrist circumference between 130 mm and 245 mm (measured at  band center  on the preferred wrist. This location is determined by asking the volunteer to put on a normal wrist-watch and marking the skin with a pen/marker to outline the edges of the band.)   Exclusion criteria   Subjects who meet any of the following criteria may not be enrolled:   Yes No     [] [x] 1 Physical disability that precludes safe and adequate testing    [] [x] 2 Mental impairment resulting in limited ability to cooperate    [] [x] 3 Subjects with a pacemaker or implantable cardioverter-defibrillator (ICD)   [] [x] 4 Acute myocardial infarction (MI) within 90 days of screening or other cardiovascular disease that, in the opinion of the Investigator, increases the risk to the subject or renders data uninterpretable (e.g., recent or ongoing unstable angina, significant valvular heart disease or heart failure, myocarditis or pericarditis)   [] [x] 5 Acute pulmonary embolism, pulmonary infarction, or deep vein thrombosis within 90 days of screening   [] [x] 6 Stroke or transient ischemic attack within 90 days of screening    [] [x] 7 Subjects taking rhythm control drugs (e.g., disopyramide, quinidine, flecainide, propafenone, amiodarone, dofetilide, dronedarone, sotalol, procainamide, ibutilide, moricizine, procainamide).   An exception will be made for patients who are undergoing scheduled cardioversion for known AF within 30 days after  study participation. These study participants will be allowed to participate in the study even when on rhythm control drugs. Rate control and anti-coagulants, anti-platelet medications are permitted (e.g., metoprolol, atenolol, diltiazem, coumadin, clopidogrel, aspirin)      Rate control and anti-coagulants, anti-platelet medications are permitted (e.g., metoprolol, atenolol, diltiazem, coumadin, clopidogrel, aspirin)   [] [x] 8 Symptomatic (or active) allergic skin reactions such as eczema, rosacea, impetigo, dermatomyositis or allergic contact dermatitis on both wrists or over electrode attachment sites   [] [x] 9 Known sensitivity to medical adhesives, isopropyl alcohol, watch bands, or electrocardiogram (ECG) electrodes including known allergy or sensitivity to fluoroelastomer bands primarily used in wrist worn fitness devices   [] [x] 10 A history of abnormal life-threatening rhythms as determined by the investigator (e.g., ventricular tachycardia, ventricular fibrillation, 3rd-degree heart block, resting heart rate < 50 beats per minute (bpm), resting heart rate >110 bpm)   [] [x] 11 Significant tremor that prevents subject from being able to hold still   [] [x] 12 Pregnant women: Women who are pregnant at the time of study participation   [] [x] 13 Individuals who have participated in an ECG device study in the past 9 months in the [facility]   [] [x] 14 Occupational exclusion: Employees of the following types of companies       *Technology-focused media companies (e.g., television, magazines, newspapers,       *Health, wellness, or fitness device companies (e.g., step, sleep, or ECG monitors, or general  fitness bands )        Salima Oreilly    Above reviewed, agree.

## 2021-06-17 NOTE — TELEPHONE ENCOUNTER
Telephone Encounter by Jaz Sterling RN at 2/2/2021  9:17 AM     Author: Jaz Sterling RN Service: -- Author Type: Registered Nurse    Filed: 2/2/2021 11:17 AM Encounter Date: 2/2/2021 Status: Signed    : Jaz Sterling RN (Registered Nurse)       ANTICOAGULATION  MANAGEMENT    Assessment     Today's INR result of 1.8 is Subtherapeutic (goal INR of 2.0-3.0)        Missed dose(s) may be affecting INR    No new diet changes affecting INR    No new medication/supplements affecting INR    Continues to tolerate warfarin with no reported s/s of bleeding or thromboembolism     Previous INR was Therapeutic    Plan:     Spoke on phone with Waldo regarding INR result and instructed:      Warfarin Dosing Instructions:  5mg today only then continue current warfarin dose of 2.5 mg daily on Tu/Th/Sa; and 5 mg daily rest of week  (0 % change)    Instructed patient to follow up no later than: 2/9 at pre-op    Education provided: target INR goal and significance of current INR result, importance of notifying clinic for changes in medications, monitoring for bleeding signs and symptoms and monitoring for clotting signs and symptoms    Waldo verbalizes understanding and agrees to warfarin dosing plan.    Instructed to call the [facility] for any changes, questions or concerns. (#[phone])   ?   Jaz Sterling RN    Subjective/Objective:      Prosper Lopez, a 80 y.o. male is on warfarin. Waldo Haas reports:     Home warfarin dose: as updated on anticoagulation calendar per template     Missed doses: Yes: missed Sunday      Medication changes:  No     S/S of bleeding or thromboembolism:  No     New Injury or illness:  No     Changes in diet or alcohol consumption:  No     Upcoming surgery, procedure or cardioversion:  Yes: Colonoscopy on 2/15-- [facility] requested 4 day hold. Pharmacist to review. Patient notified that he would be updated on holding/bridging (if needed) plan on 2/9.    Anticoagulation  Episode Summary     Current INR goal:  2.0-3.0   TTR:  97.2 % (1 y)   Next INR check:  2/9/2021   INR from last check:  1.80 (2/2/2021)   Weekly max warfarin dose:     Target end date:  Indefinite   INR check location:     Preferred lab:     Send INR reminders to:  MAGDALENA TEJEDA    Indications    A-fib (H) (Resolved) [I48.91]           Comments:           Anticoagulation Care Providers     Provider Role Specialty Phone number    Foreign Tavares MD Referring Family Medicine [phone]

## 2021-06-17 NOTE — PATIENT INSTRUCTIONS - HE
Patient Instructions by Foreign Tavares MD at 9/19/2019  2:40 PM     Author: Foreign Tavaers MD Service: -- Author Type: Physician    Filed: 9/19/2019  3:00 PM Encounter Date: 9/19/2019 Status: Signed    : Foreign Tavares MD (Physician)         Patient Education   Understanding USDA MyPlate  The USDA (US Department of Agriculture) has guidelines to help you make healthy food choices. These are called MyPlate. MyPlate shows the food groups that make up healthy meals using the image of a place setting. Before you eat, think about the healthiest choices for what to put onto your plate or into your cup or bowl. To learn more about building a healthy plate, visit www.choosemyplate.gov.       The Food Groups    Fruits: Any fruit or 100% fruit juice counts as part of the Fruit Group. Fruits may be fresh, canned, frozen, or dried, and may be whole, cut-up, or pureed. Make half your plate fruits and vegetables.    Vegetables: Any vegetable or 100% vegetable juice counts as a member of the Vegetable Group. Vegetables may be fresh, frozen, canned, or dried. They can be served raw or cooked and may be whole, cut-up, or mashed. Make half your plate fruits and vegetables.     Grains: All foods made from grains are part of the Grains Group. These include wheat, rice, oats, cornmeal, and barley such as bread, pasta, oatmeal, cereal, tortillas, and grits. Grains should be no more than a quarter of your plate. At least half of your grains should be whole grains.    Protein: This group includes meat, poultry, seafood, beans and peas, eggs, processed soy products (like tofu), nuts (including nut butters), and seeds. Make protein choices no more than a quarter of your plate. Meat and poultry choices should be lean or low fat.    Dairy: All fluid milk products and foods made from milk that contain calcium, like yogurt and cheese are part of the Dairy Group. (Foods that have little calcium, such as cream, butter, and cream  cheese, are not part of the group.) Most dairy choices should be low-fat or fat-free.    Oils: These are fats that are liquid at room temperature. They include canola, corn, olive, soybean, and sunflower oil. Foods that are mainly oil include mayonnaise, certain salad dressings, and soft margarines. You should have only 5 to 7 teaspoons of oils a day. You probably already get this much from the food you eat.  Use Liquid Light to Help Build Your Meals  The [employer] can help you plan and track your meals and activity. You can look up individual foods to see or compare their nutritional value. You can get guidelines for what and how much you should eat. You can compare your food choices. And you can assess personal physical activities and see ways you can improve. Go to www.ieCrowd.gov/Greenlotscker/.    9449-9173 The [employer]. [address] [place] [address]. All rights reserved. This information is not intended as a substitute for professional medical care. Always follow your healthcare professional's instructions.           Patient Education   Urinary Incontinence (Male)    Urinary incontinence means not being able to control the release of urine from the bladder.  Causes  Common causes of urinary incontinence in men include:    Infection    Certain medicines    Aging    Poor pelvic muscle tone    Bladder spasms    Obesity    Urinary retention  Nervous system diseases, diabetes, sleep apnea, urinary tract infections, prostate surgery, and pelvic trauma can also cause incontinence. Constipation and smoking have also been identified as risk factors.  Symptoms    Urge incontinence (also called overactive bladder) is a sudden urge to urinate even though there may not be much urine in the bladder. The need to urinate often during the night is common. It is due to bladder spasms.    Stress incontinence is involuntary urine leakage that can occur with sneezing, coughing, and other actions  that put stress on the bladder.  Treatment  Treatment of urinary incontinence depends on the cause. Infections of the bladder are treated with antibiotics. Urinary retention is treated with a bladder catheter.  Home care  Follow these guidelines when caring for yourself at home:    Don't consume foods and drinks that may irritate the bladder. These include drinks containing alcohol, caffeinate, or carbonation; chocolate; and acidic fruits and juices.    Limit fluid intake to 6 to 8 cups a day.    Lose weight if you are overweight. This will reduce your symptoms.    If needed, wear absorbent pads to catch urine. Change pads frequently to maintain hygiene and prevent skin and bladder infections.    Bathe daily to maintain good hygiene.    If an antibiotic was prescribed to treat a bladder infection, be sure to take it until finished, even if you are feeling better before then. This is to make sure your infection has cleared.    If a catheter was left in place, it is important to keep bacteria from getting into the collection bag. Don't disconnect the catheter from the collection bag.    Use a leg band to secure the catheter drainage tube, so it does not pull on the catheter. Drain the collection bag when it becomes full using the drain spout at the bottom of the bag. Don't disconnect the bag from the catheter.    Don't pull on or try to remove a catheter. The catheter must be removed by a healthcare provider.  Follow-up care  Follow up with your healthcare provider, or as advised.  When to seek medical advice  Call your healthcare provider right away if any of these occur:    Fever over 100.4 F (38 C), or as directed by your healthcare provider    Bladder pain or fullness    Abdominal swelling, nausea, or vomiting    Back pain    Weakness, dizziness, or fainting    If a catheter was left in place, return if:  ? Catheter falls out  ? Catheter stops draining for 6 hours  Date Last Reviewed: 10/1/2017    9106-4636 The  [employer]. [address] [place] [address]. All rights reserved. This information is not intended as a substitute for professional medical care. Always follow your healthcare professional's instructions.     Patient Education   Understanding Advance Care Planning  Advance care planning is the process of deciding ones own future medical care. It helps ensure that if you cant speak for yourself, your wishes can still be carried out. The plan is a series of legal documents that note a persons wishes. The documents vary by state. Advance care planning may be done when a person has a serious illness that is expected to get worse. It may be done before major surgery. And it can help you and your family be prepared in case of a major illness or injury. Advance care planning helps with making decisions at these times.       A health care proxy is a person who acts as the voice of a patient when the patient cant speak for himself or herself. The name of this role varies by state. It may be called a Durable Medical Power of  or Durable Power of  for Healthcare. It may be called an agent, surrogate, or advocate. Or it may be called a representative or decision maker. It is an official duty that is identified by a legal document. The document also varies by state.    Why Is Advance Care Planning Important?  If a person communicates their healthcare wishes:    They will be given medical care that matches their values and goals.    Their family members will not be forced to make decisions in a crisis with no guidance.  Creating a Plan  Making an advance care plan is often done in 3 steps:    Thinking about ones wishes. To create an advance care plan, you should think about what kind of medical treatment you would want if you lose the ability to communicate. Are there any situations in which you would refuse or stop treatment? Are there therapies you would want or not want? And whom do you  want to make decisions for you? There are many places to learn more about how to plan for your care. Ask your doctor or  for resources.    Picking a health care proxy. This means choosing a trusted person to speak for you only when you cant speak for yourself. When you cannot make medical decisions, your proxy makes sure the instructions in your advance care plan are followed. A proxy does not make decisions based on his or her own opinions. They must put aside those opinions and values if needed, and carry out your wishes.    Filling out the legal documents. There are several kinds of legal documents for advance care planning. Each one tells health care providers your wishes. The documents may vary by state. They must be signed and may need to be witnessed or notarized. You can cancel or change them whenever you wish. Depending on your state, the documents may include a Healthcare Proxy form, Living Will, Durable Medical Power of , Advance Directive, or others.  The Familys Role  The best help a family can give is to support their loved ones wishes. Open and honest communication is vital. Family should express any concerns they have about the patients choices while the patient can still make decisions.    5449-0287 The [employer]. [address] [place] [address]. All rights reserved. This information is not intended as a substitute for professional medical care. Always follow your healthcare professional's instructions.         Also, [facility] [employer] [place] offers a free, downloadable health care directive that allows you to share your treatment choices and personal preferences if you cannot communicate your wishes. It also allows you to appoint another person (called a health care agent) to make health care decisions if you are unable to do so. You can download an advance directive by going here: [email or web address]     Patient Education    Personalized Prevention Plan  You are due for the preventive services outlined below.  Your care team is available to assist you in scheduling these services.  If you have already completed any of these items, please share that information with your care team to update in your medical record.  Health Maintenance   Topic Date Due   ? ZOSTER VACCINES (1 of 2) 04/16/1990   ? MEDICARE ANNUAL WELLNESS VISIT  11/03/2017   ? INFLUENZA VACCINE RULE BASED (1) 08/01/2019   ? ADVANCE CARE PLANNING  05/03/2021 (Originally 5/4/2016)   ? FALL RISK ASSESSMENT  09/19/2020   ? TD 18+ HE  05/04/2021   ? PNEUMOCOCCAL POLYSACCHARIDE VACCINE AGE 65 AND OVER  Completed   ? PNEUMOCOCCAL CONJUGATE VACCINE FOR ADULTS (PCV13 OR PREVNAR)  Completed

## 2021-06-17 NOTE — PROGRESS NOTES
Assessment/Plan:        Diagnoses and all orders for this visit:    Calculus of ureter  -     Symptom Control While Passing a Stone Education  -     CT Abdomen Pelvis Without Oral Without IV Contrast; Future; Expected date: 5/15/18  -     Patient Stated Goal: Pass my stone    Urinary tract stones  -     Urinalysis Macroscopic      Stone Management Plan  [facility] 4/4/2018 4/17/2018 5/1/2018   Urinary Tract Infection No suspicion of infection No suspicion of infection No suspicion of infection   Renal Colic Asymptomatic at this time Asymptomatic at this time Well controlled symptoms   Renal Failure No suspicion of renal failure No suspicion of renal failure No suspicion of renal failure   Current CT date 4/4/2018 4/17/2018 5/1/2018   Right sided stones? Yes Yes Yes   R Number of ureteral stones No ureteral stones No ureteral stones No ureteral stones   R Number of kidney stones  1 Renal stones unchanged from last exam Renal stones unchanged from last exam   R GSD of kidney stones 2 - 4 2 - 4 2 - 4   R Hydronephrosis None None None   R Stone Event No current event No current event No current event   R Current Plan Observe Observe Observe   Observe rationale Limited stone burden with good prognosis for spontaneous passage Limited stone burden with good prognosis for spontaneous passage Limited stone burden with good prognosis for spontaneous passage   Left sided stones? Yes Yes Yes   L Number of ureteral stones 1 1 1   L GSD of ureteral stones 5 5 5   L Location of ureteral stone Proximal Mid Distal   L Number of kidney stones  No renal stones No renal stones No renal stones   L GSD of kidney stones N/A N/A N/A   L Hydronephrosis Mild Mild Mild   L Stone Event New event Established event Established event   Diagnosis date 4/4/2018 - -   Initial location of primary symptomatic stone Proximal - -   Initial GSD of primary symptomatic stone 5 - -   L MET Status Initiation Progression Progression   L Current Plan  MET MET MET   MET 2 week F/U 2 week F/U 2 week F/U             Subjective:      HPI  Mr. Prosper Lopez is a 78 y.o.  male returning to the [facility] [place] for medical expulsive therapy follow up.     On last encounter, his 5 mm stone was in left mid ureter with mild hydronephrosis. He has had no unanticipated events.    Symptoms have been minimal . He is asymptomatic at present. He denies symptoms of fever, chills, flank pain, nausea, vomiting, urinary frequency and dysuria. He has been adherent to non-narcotic symptom control protocol and has been pain free.    New CT scan was personally reviewed and demonstrates progression of stone to left distal ureter with no hydronephrosis.     He will continue to attempt to pass stone and will return in 2 weeks with further imaging. .         ROS   Review of systems is negative except for HPI.    Past Medical History:   Diagnosis Date     Atrial fibrillation 06/10/2014     Calculus of ureter 4/4/2018     Cardiomyopathy 06/16/2014     Cataract 2012     Hydrarthrosis 2012     Hyperlipidemia 2012     Nephrolithiasis 2009    first stone at age 68     Obesity 9/1/2015     Prostate CA 2011     Seborrheic keratosis 2012     Skin neoplasm     Of uncertain behavior     TIA (transient ischemic attack) 01/24/2012     Tubular adenoma of colon 2006       Past Surgical History:   Procedure Laterality Date     PROSTATE SURGERY       URETEROSCOPY  2009       Current Outpatient Prescriptions   Medication Sig Dispense Refill     atorvastatin (LIPITOR) 20 MG tablet TAKE 1 TABLET BY MOUTH EVERY NIGHT AT BEDTIME 90 tablet 0     carvedilol (COREG) 25 MG tablet Take 1 tablet (25 mg total) by mouth 2 (two) times a day with meals. 120 tablet 5     cholecalciferol, vitamin D3, (VITAMIN D3) 2,000 unit cap Take 2,000 Units by mouth daily.       warfarin (COUMADIN) 5 MG tablet Take 1/2 to 1 tablet (2.5mg to 5mg) by mouth daily, as directed.  Adjust dose based on INR  results. 90 tablet 1     dimenhyDRINATE (DRAMAMINE) 50 MG tablet Take 1-2 tablets ( mg total) by mouth every 6 (six) hours as needed (pain). 30 tablet 0     ondansetron (ZOFRAN ODT) 4 MG disintegrating tablet Take 1-2 tablets (4-8 mg total) by mouth every 8 (eight) hours as needed for nausea. 20 tablet 0     oxyCODONE (ROXICODONE) 5 MG immediate release tablet Take 1-2 tablets (5-10 mg total) by mouth every 4 (four) hours as needed. 20 tablet 0     No current facility-administered medications for this visit.        No Known Allergies    Social History     Social History     Marital status:      Spouse name: N/A     Number of children: N/A     Years of education: N/A     Occupational History     Not on file.     Social History Main Topics     Smoking status: Former Smoker     Quit date: 5/29/2014     Smokeless tobacco: Never Used     Alcohol use Yes      Comment: Occasionall     Drug use: No     Sexual activity: Not on file     Other Topics Concern     Not on file     Social History Narrative       Family History   Problem Relation Age of Onset     Heart attack Father      Heart disease Father      Colon cancer Brother      Urolithiasis Brother      Objective:      Physical Exam  Vitals:    05/01/18 1407   BP: 106/55   Pulse: (!) 106   Temp: 97.5  F (36.4  C)     General - well developed, well nourished, appropriate for age. Appears no distress at this time  Abdomen - moderately obese soft, non-tender, no hepatosplenomegaly, no masses.   - no flank tenderness, no suprapubic tenderness, kidney and bladder non-palpable  MSK - normal spinal curvature. no spinal tenderness. normal gait. muscular strength intact.  Psych - oriented to time, place, and person, normal mood and affect.      Labs   Urinalysis POC (Office):  Nitrite, UA   Date Value Ref Range Status   05/01/2018 Negative Negative Final   04/17/2018 Negative Negative Final   04/04/2018 Negative Negative Final       Lab Urinalysis:  Blood, UA    Date Value Ref Range Status   05/01/2018 Small (!) Negative Final   04/17/2018 Moderate (!) Negative Final   04/04/2018 Large (!) Negative Final     Nitrite, UA   Date Value Ref Range Status   05/01/2018 Negative Negative Final   04/17/2018 Negative Negative Final   04/04/2018 Negative Negative Final     Leukocytes, UA   Date Value Ref Range Status   05/01/2018 Negative Negative Final   04/17/2018 Negative Negative Final   04/04/2018 Negative Negative Final     pH, UA   Date Value Ref Range Status   05/01/2018 5.5 5.0 - 8.0 Final   04/17/2018 5.0 5.0 - 8.0 Final   04/04/2018 5.0 5.0 - 8.0 Final

## 2021-06-18 NOTE — PROGRESS NOTES
[place] study updates/corrections      Subject experienced no adverse events during his participation in the [place] study.    Michelle Hicks

## 2021-06-18 NOTE — PROGRESS NOTES
Assessment/Plan:        Diagnoses and all orders for this visit:    Calculus of ureter  -     Symptom Control While Passing a Stone Education  -     tamsulosin (FLOMAX) 0.4 mg Cp24; Take 1 capsule (0.4 mg total) by mouth daily.  Dispense: 30 capsule; Refill: 0  -     Patient Stated Goal: Pass my stone  -     CT Abdomen Pelvis Without Oral Without IV Contrast; Future; Expected date: 6/14/18    Urinary tract stones  -     Urinalysis Macroscopic      Stone Management Plan  [facility] 4/17/2018 5/1/2018 5/15/2018   Urinary Tract Infection No suspicion of infection No suspicion of infection No suspicion of infection   Renal Colic Asymptomatic at this time Well controlled symptoms Well controlled symptoms   Renal Failure No suspicion of renal failure No suspicion of renal failure No suspicion of renal failure   Current CT date 4/17/2018 5/1/2018 5/15/2018   Right sided stones? Yes Yes Yes   R Number of ureteral stones No ureteral stones No ureteral stones No ureteral stones   R Number of kidney stones  Renal stones unchanged from last exam Renal stones unchanged from last exam Renal stones unchanged from last exam   R GSD of kidney stones 2 - 4 2 - 4 2 - 4   R Hydronephrosis None None None   R Stone Event No current event No current event No current event   R Current Plan Observe Observe Observe   Observe rationale Limited stone burden with good prognosis for spontaneous passage Limited stone burden with good prognosis for spontaneous passage Limited stone burden with good prognosis for spontaneous passage   Left sided stones? Yes Yes Yes   L Number of ureteral stones 1 1 1   L GSD of ureteral stones 5 5 5   L Location of ureteral stone Mid Distal Distal   L Number of kidney stones  No renal stones No renal stones No renal stones   L GSD of kidney stones N/A N/A N/A   L Hydronephrosis Mild Mild None   L Stone Event Established event Established event Established event   Diagnosis date - - -   Initial location of  primary symptomatic stone - - -   Initial GSD of primary symptomatic stone - - -   L MET Status Progression Progression No progression   L Current Plan MET MET MET   MET 2 week F/U 2 week F/U (No Data)             Subjective:      HPI  Mr. Prosper Lopez is a 78 y.o.  male returning to the [facility] [place] for medical expulsive therapy follow up.     On last encounter, his 4 mm stone was in left distal ureter with mild hydronephrosis. He has had no unanticipated events.    Symptoms have been minimal . He is asymptomatic at present. He denies symptoms of fever, chills, flank pain, nausea, vomiting, urinary frequency and dysuria. He is pleased with lack of symptoms but frustrated with persistence of stone.    New CT scan was personally reviewed and demonstrates no progression of stone with resolution of previous hydronephrosis.     He will continue to attempt to pass stone and will return in 4 weeks with further imaging. .    If the stone persists at that time, will move to surgery.     ROS   Review of systems is negative except for HPI.    Past Medical History:   Diagnosis Date     Atrial fibrillation 06/10/2014     Calculus of ureter 4/4/2018     Cardiomyopathy 06/16/2014     Cataract 2012     Hydrarthrosis 2012     Hyperlipidemia 2012     Nephrolithiasis 2009    first stone at age 68     Obesity 9/1/2015     Prostate CA 2011     Seborrheic keratosis 2012     Skin neoplasm     Of uncertain behavior     TIA (transient ischemic attack) 01/24/2012     Tubular adenoma of colon 2006       Past Surgical History:   Procedure Laterality Date     PROSTATE SURGERY       URETEROSCOPY  2009       Current Outpatient Prescriptions   Medication Sig Dispense Refill     atorvastatin (LIPITOR) 20 MG tablet TAKE 1 TABLET BY MOUTH EVERY NIGHT AT BEDTIME 90 tablet 0     carvedilol (COREG) 25 MG tablet Take 1 tablet (25 mg total) by mouth 2 (two) times a day with meals. 120 tablet 5     cholecalciferol,  vitamin D3, (VITAMIN D3) 2,000 unit cap Take 2,000 Units by mouth daily.       dimenhyDRINATE (DRAMAMINE) 50 MG tablet Take 1-2 tablets ( mg total) by mouth every 6 (six) hours as needed (pain). 30 tablet 0     ondansetron (ZOFRAN ODT) 4 MG disintegrating tablet Take 1-2 tablets (4-8 mg total) by mouth every 8 (eight) hours as needed for nausea. 20 tablet 0     oxyCODONE (ROXICODONE) 5 MG immediate release tablet Take 1-2 tablets (5-10 mg total) by mouth every 4 (four) hours as needed. 20 tablet 0     warfarin (COUMADIN) 5 MG tablet Take 1/2 to 1 tablet (2.5mg to 5mg) by mouth daily, as directed.  Adjust dose based on INR results. 90 tablet 1     tamsulosin (FLOMAX) 0.4 mg Cp24 Take 1 capsule (0.4 mg total) by mouth daily. 30 capsule 0     No current facility-administered medications for this visit.        No Known Allergies    Social History     Social History     Marital status:      Spouse name: N/A     Number of children: N/A     Years of education: N/A     Occupational History     Not on file.     Social History Main Topics     Smoking status: Former Smoker     Quit date: 5/29/2014     Smokeless tobacco: Never Used     Alcohol use Yes      Comment: Occasionall     Drug use: No     Sexual activity: Not on file     Other Topics Concern     Not on file     Social History Narrative       Family History   Problem Relation Age of Onset     Heart attack Father      Heart disease Father      Colon cancer Brother      Urolithiasis Brother      Objective:      Physical Exam  Vitals:    05/15/18 1447   BP: 122/68   Pulse: 97   Temp: 98.5  F (36.9  C)     General - well developed, well nourished, appropriate for age. Appears no distress at this time  Abdomen - mildly obese soft, non-tender, no hepatosplenomegaly, no masses.   - no flank tenderness, no suprapubic tenderness, kidney and bladder non-palpable  MSK - normal spinal curvature. no spinal tenderness. normal gait. muscular strength intact.  Psych -  oriented to time, place, and person, normal mood and affect.      Labs   Urinalysis POC (Office):  Nitrite, UA   Date Value Ref Range Status   05/15/2018 Negative Negative Final   05/01/2018 Negative Negative Final   04/17/2018 Negative Negative Final       Lab Urinalysis:  Blood, UA   Date Value Ref Range Status   05/15/2018 Negative Negative Final   05/01/2018 Small (!) Negative Final   04/17/2018 Moderate (!) Negative Final     Nitrite, UA   Date Value Ref Range Status   05/15/2018 Negative Negative Final   05/01/2018 Negative Negative Final   04/17/2018 Negative Negative Final     Leukocytes, UA   Date Value Ref Range Status   05/15/2018 Negative Negative Final   05/01/2018 Negative Negative Final   04/17/2018 Negative Negative Final     pH, UA   Date Value Ref Range Status   05/15/2018 5.5 5.0 - 8.0 Final   05/01/2018 5.5 5.0 - 8.0 Final   04/17/2018 5.0 5.0 - 8.0 Final

## 2021-06-18 NOTE — PROGRESS NOTES
Assessment/Plan:        Diagnoses and all orders for this visit:    Calculus of ureter  -     Patient Stated Goal: Know what to expect after surgery  -     Ureteroscopy Education    Calculus of kidney    Urinary tract stones  -     Urinalysis Macroscopic      Stone Management Plan  [facility] Stone Management 5/1/2018 5/15/2018 6/14/2018   Urinary Tract Infection No suspicion of infection No suspicion of infection No suspicion of infection   Renal Colic Well controlled symptoms Well controlled symptoms Asymptomatic at this time   Renal Failure No suspicion of renal failure No suspicion of renal failure No suspicion of renal failure   Current CT date 5/1/2018 5/15/2018 6/14/2018   Right sided stones? Yes Yes Yes   R Number of ureteral stones No ureteral stones No ureteral stones No ureteral stones   R Number of kidney stones  Renal stones unchanged from last exam Renal stones unchanged from last exam Renal stones unchanged from last exam   R GSD of kidney stones 2 - 4 2 - 4 2 - 4   R Hydronephrosis None None None   R Stone Event No current event No current event No current event   R Current Plan Observe Observe Observe   Observe rationale Limited stone burden with good prognosis for spontaneous passage Limited stone burden with good prognosis for spontaneous passage Limited stone burden with good prognosis for spontaneous passage   Left sided stones? Yes Yes Yes   L Number of ureteral stones 1 1 1   L GSD of ureteral stones 5 5 5   L Location of ureteral stone Distal Distal Distal   L Number of kidney stones  No renal stones No renal stones No renal stones   L GSD of kidney stones N/A N/A N/A   L Hydronephrosis Mild None None   L Stone Event Established event Established event Established event   Diagnosis date - - -   Initial location of primary symptomatic stone - - -   Initial GSD of primary symptomatic stone - - -   L MET Status Progression No progression No progression   L Current Plan MET MET Clear   MET 2 week F/U  (No Data) -   Clear rationale - - MET failure             Subjective:      HPI  Mr. Prosper Lopez is a 78 y.o.  male returning to the [facility] [place] for medical expulsive therapy follow up.     On last encounter, his 5 mm stone was in left distal ureter with no hydronephrosis. He has had no unanticipated events.    Symptoms have been minimal . He is asymptomatic at present. He denies symptoms of fever, chills, flank pain, nausea, vomiting, urinary frequency and dysuria.     New CT scan was personally reviewed and demonstrates no progression of of 5 mm left distal uretearl stone with no hydronephrosis.     PLAN    77 yo M with no progression of left distal ureteral stone, no hydronephrosis. Non-obstructing right renal stone.    He has failed adequate duration of medical expulsive therapy and will proceed to the operating room for ureteroscopic stone clearance. Risks and benefits were detailed of ureteroscopic stone clearance including potential issues of urinary or systemic infection, ureteral injury, inaccessible stone, incomplete stone clearance, multiple surgeries, and stent related symptoms of urgency, frequency and hematuria. Preoperative evaluation has been requested.    Will continue flomax.    Patient also seen and examined by Gayle Gonzalez PA-C     ROS   Review of systems is negative except for HPI.    Past Medical History:   Diagnosis Date     Atrial fibrillation (H) 06/10/2014     Calculus of ureter 4/4/2018     Cardiomyopathy (H) 06/16/2014     Cataract 2012     Hydrarthrosis 2012     Hyperlipidemia 2012     Nephrolithiasis 2009    first stone at age 68     Obesity 9/1/2015     Prostate CA (H) 2011     Seborrheic keratosis 2012     Skin neoplasm     Of uncertain behavior     TIA (transient ischemic attack) 01/24/2012     Tubular adenoma of colon 2006       Past Surgical History:   Procedure Laterality Date     PROSTATE SURGERY       URETEROSCOPY  2009       Current  Outpatient Prescriptions   Medication Sig Dispense Refill     atorvastatin (LIPITOR) 20 MG tablet TAKE 1 TABLET BY MOUTH EVERY NIGHT AT BEDTIME 90 tablet 0     carvedilol (COREG) 25 MG tablet Take 1 tablet (25 mg total) by mouth 2 (two) times a day with meals. 120 tablet 5     cholecalciferol, vitamin D3, (VITAMIN D3) 2,000 unit cap Take 2,000 Units by mouth daily.       tamsulosin (FLOMAX) 0.4 mg Cp24 TAKE 1 CAPSULE(0.4 MG) BY MOUTH DAILY 30 capsule 0     warfarin (COUMADIN) 5 MG tablet Take 1/2 to 1 tablet (2.5mg to 5mg) by mouth daily, as directed.  Adjust dose based on INR results. 90 tablet 1     oxyCODONE (ROXICODONE) 5 MG immediate release tablet Take 1-2 tablets (5-10 mg total) by mouth every 4 (four) hours as needed. 20 tablet 0     No current facility-administered medications for this visit.        No Known Allergies    Social History     Social History     Marital status:      Spouse name: N/A     Number of children: N/A     Years of education: N/A     Occupational History     Not on file.     Social History Main Topics     Smoking status: Former Smoker     Quit date: 5/29/2014     Smokeless tobacco: Never Used     Alcohol use Yes      Comment: Occasionall     Drug use: No     Sexual activity: Not on file     Other Topics Concern     Not on file     Social History Narrative       Family History   Problem Relation Age of Onset     Heart attack Father      Heart disease Father      Colon cancer Brother      Urolithiasis Brother      Objective:      Physical Exam  Vitals:    06/14/18 1035   BP: 131/65   Pulse: 92   Temp: 98.4  F (36.9  C)     General - well developed, well nourished, appropriate for age. Appears no distress at this time   Abdomen - mildly obese soft, non-tender, no hepatosplenomegaly, no masses.   - no flank tenderness, no suprapubic tenderness, kidney and bladder non-palpable  MSK - normal spinal curvature. no spinal tenderness. normal gait. muscular strength intact.  Neurology -  cranial nerves II-XII grossly intact, normal sensation, no unsteadiness  Skin - intact, no bruising, no gouty tophi  Psych - oriented to time, place, and person, normal mood and affect.    Labs   Urinalysis POC (Office):  Nitrite, UA   Date Value Ref Range Status   05/15/2018 Negative Negative Final   05/01/2018 Negative Negative Final   04/17/2018 Negative Negative Final       Lab Urinalysis:  Blood, UA   Date Value Ref Range Status   05/15/2018 Negative Negative Final   05/01/2018 Small (!) Negative Final   04/17/2018 Moderate (!) Negative Final     Nitrite, UA   Date Value Ref Range Status   05/15/2018 Negative Negative Final   05/01/2018 Negative Negative Final   04/17/2018 Negative Negative Final     Leukocytes, UA   Date Value Ref Range Status   05/15/2018 Negative Negative Final   05/01/2018 Negative Negative Final   04/17/2018 Negative Negative Final     pH, UA   Date Value Ref Range Status   05/15/2018 5.5 5.0 - 8.0 Final   05/01/2018 5.5 5.0 - 8.0 Final   04/17/2018 5.0 5.0 - 8.0 Final

## 2021-06-18 NOTE — PROGRESS NOTES
Assessment/Plan:      Visit for Preoperative Exam.    Encounter Diagnoses   Name Primary?     Pre-operative examination Yes     Urolithiasis, LEFT DISTAL URETERAL STONE      Transient cerebral ischemia      Persistent atrial fibrillation (H), anticoagulated and rate controlled      Obesity      Malignant Neoplasm Of The Prostate Gland      Coronary artery disease, axs and non-obstructive with NEGATIVE  Stress Test 2014         Patient approved for surgery with general or local anesthesia.   Patient Instructions   PER UROLOGY PT WILL NOT HAVE TO BE OFF COUMADIN AS NO INCISION IS BEING MADE    CHECK CBC AND BMP         Subjective:     Scheduled Procedure: CYSTOSCOPY, LEFT URETEROSCOPY, LASER LITHOTRIPSY STENT INSERTION   Surgery Date:  6-22-18  Surgery Location:  [facility]  Surgeon:  DR GARCIA    Current Outpatient Prescriptions   Medication Sig Dispense Refill     atorvastatin (LIPITOR) 20 MG tablet TAKE 1 TABLET BY MOUTH EVERY NIGHT AT BEDTIME 90 tablet 0     carvedilol (COREG) 25 MG tablet Take 1 tablet (25 mg total) by mouth 2 (two) times a day with meals. 120 tablet 5     cholecalciferol, vitamin D3, (VITAMIN D3) 2,000 unit cap Take 2,000 Units by mouth daily.       tamsulosin (FLOMAX) 0.4 mg Cp24 TAKE 1 CAPSULE(0.4 MG) BY MOUTH DAILY 30 capsule 0     warfarin (COUMADIN) 5 MG tablet Take 1/2 to 1 tablet (2.5mg to 5mg) by mouth daily, as directed.  Adjust dose based on INR results. 90 tablet 1     No current facility-administered medications for this visit.        No Known Allergies    Immunization History   Administered Date(s) Administered     Influenza high dose, seasonal 10/13/2016, 11/10/2017     Influenza, seasonal,quad inj 36+ mos 09/01/2015     Pneumo Conj 13-V (2010&after) 09/01/2015     Pneumo Polysac 23-V 05/03/2000, 05/08/2007     Tdap 05/04/2011       Patient Active Problem List   Diagnosis     Nephrolithiasis     Hydrarthrosis Of The Ankle / Foot     Compound Nevus     Actinic Keratosis      Seborrheic Keratosis     Skin Neoplasm Of Uncertain Behavior     Malignant Neoplasm Of The Prostate Gland     Transient Ischemic Attack     Cataract     Hypercholesteremia     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     Internal Derangement Of Knee     Persistent atrial fibrillation (H)     Coronary artery disease, NONOBSTRUCTIVE,  ASX, NEGATIVE STRESS TEST 2014     Obesity     Plantar fasciitis of right foot     Calculus of ureter     Hydronephrosis with urinary obstruction due to ureteral calculus       Past Medical History:   Diagnosis Date     Atrial fibrillation (H) 06/10/2014     Calculus of ureter 4/4/2018     Cardiomyopathy (H) 06/16/2014     Cataract 2012     Hydrarthrosis 2012     Hyperlipidemia 2012     Nephrolithiasis 2009    first stone at age 68     Obesity 9/1/2015     Prostate CA (H) 2011     Seborrheic keratosis 2012     Skin neoplasm     Of uncertain behavior     TIA (transient ischemic attack) 01/24/2012     Tubular adenoma of colon 2006       Social History     Social History     Marital status:      Spouse name: N/A     Number of children: N/A     Years of education: N/A     Occupational History     Not on file.     Social History Main Topics     Smoking status: Former Smoker     Quit date: 5/29/2014     Smokeless tobacco: Never Used     Alcohol use Yes      Comment: Occasionall     Drug use: No     Sexual activity: Not on file     Other Topics Concern     Not on file     Social History Narrative       Past Surgical History:   Procedure Laterality Date     PROSTATE SURGERY       URETEROSCOPY  2009       History of Present Illness  Recent Health  Fever: no  Chills: no  Fatigue: no  Chest Pain: no  Cough: no  Dyspnea: no  Urinary Frequency: no  Nausea: no  Vomiting: no  Diarrhea: no  Abdominal Pain: NO  Easy Bruising: yes  Lower Extremity Swelling: no  Poor Exercise Tolerance: no    Most recent Health Maintenance Visit:  > 1 YEAR     Pertinent History  Prior Anesthesia: yes  Previous  "Anesthesia Reaction:  no  Diabetes: no  Cardiovascular Disease: ASX NON-OBSTRUCTIVE WITH NEG STRESS TEST 2014  Pulmonary Disease: no  Renal Disease: no  GI Disease: no  Sleep Apnea: no  Thromboembolic Problems: no  Clotting Disorder: no  Bleeding Disorder: no  Transfusion Reaction: no  Impaired Immunity: no  Steroid use in the last 6 months: no  Frequent Aspirin use: no    Family history of MI (father)    Social history of patient does not wear denture or partial plates, there is no transfusion refusal and there are no concerns regarding care after surgery    After surgery, the patient plans to recover at home with family.    Review of Systems  A 12 point comprehensive review of systems was negative except as noted.       Tolerating normal activity without chestpain.  Doing a treadmill for 3-5 min daily  Without sxs.  No difficulty walking up or down stairs.  No shortness of breath or HANNAH.      Objective:         Vitals:    06/19/18 1049   BP: 112/62   Pulse: 84   Resp: 20   Temp: 97.5  F (36.4  C)   TempSrc: Oral   Weight: (!) 261 lb 6.4 oz (118.6 kg)   Height: 5' 11\" (1.803 m)       Physical Exam:  Physical Exam:  General Appearance: Alert, cooperative, no distress, appears stated age  Head: Normocephalic, without obvious abnormality, atraumatic  Eyes: PERRL, conjunctiva/corneas clear, EOM's intact  Ears: Normal TM's and external ear canals, both ears  Nose: Nares normal, septum midline,mucosa normal, no drainage  Throat: Lips, mucosa, and tongue normal; teeth and gums normal  Neck: Supple, symmetrical, trachea midline, no adenopathy;  thyroid: not enlarged, symmetric, no tenderness/mass/nodules; no carotid bruit or JVD  Back: Symmetric, no curvature, ROM normal, no CVA tenderness  Lungs: Clear to auscultation bilaterally, respirations unlabored  Heart: Regular rate WITH IRREG IRREG  rhythm, S1 and S2 normal, no murmur, rub, or gallop,  Abdomen: Soft, non-tender, bowel sounds active all four quadrants,  no masses, " no organomegaly  Genitourinary:not examined  Musculoskeletal: Normal range of motion. No joint swelling or deformity.   Extremities: Extremities normal, atraumatic, no cyanosis. +1 DISTAL LE EDEMA  Skin: Skin color, texture, turgor normal, no rashes or lesions  Lymph nodes: Cervical, supraclavicular, and axillary nodes normal  Neurologic: He is alert. He has normal reflexes.   Psychiatric: He has a normal mood and affect.       Lab Results   Component Value Date    INR 2.60 (H) 06/13/2018    INR 2.30 (H) 05/17/2018    INR 2.40 (H) 05/03/2018     Lab Results   Component Value Date    COLORU Yellow 06/14/2018    CLARITYU Clear 06/14/2018    GLUCOSEU Negative 06/14/2018    BILIRUBINUR Negative 06/14/2018    KETONESU Negative 06/14/2018    SPECGRAV 1.025 06/14/2018    HGBUA Negative 06/14/2018    PHUR 5.5 06/14/2018    PROTEINUA Negative 06/14/2018    UROBILINOGEN 1.0 E.U./dL 06/14/2018    NITRITE Negative 06/14/2018    LEUKOCYTESUR Negative 06/14/2018    BACTERIA Few (!) 04/04/2018    RBCUA  (!) 04/04/2018    WBCUA 0-5 04/04/2018    SQUAMEPI 5-10 (!) 04/04/2018       Results   ECG 12 lead with MUSE (Order [id number])   ECG 12 lead with MUSE   Order: [id number]   Status:  Final result   Visible to patient:  Yes (MyChart)   Next appt:  07/11/2018 at 08:00 AM in Lab (Lab)   Dx:  Research exam   Notes Recorded by Frederic Polk MD on 5/8/2018 at 7:59 PM  ECG dated 4/30/18 is reviewed  Atrial fibrillation is confirmed  ------    Notes Recorded by Poppy Almendarez RN on 5/1/2018 at 10:39 AM  [facility]        Ref Range & Units 4/30/18  8:45 AM     SYSTOLIC BLOOD PRESSURE mmHg    DIASTOLIC BLOOD PRESSURE mmHg    VENTRICULAR RATE BPM 89   ATRIAL RATE    P-R INTERVAL ms    QRS DURATION ms 84   Q-T INTERVAL ms 340   QTC CALCULATION (BEZET) ms 413   P Axis degrees    R AXIS degrees 34   T AXIS degrees 26   MUSE DIAGNOSIS  Atrial fibrillation   Abnormal ECG   When compared with ECG of 04-APR-2018 04:08,   No significant  change was found   Confirmed by ANTONY AVENDANO MD LOC:JN ([id number]) on 4/30/2018 3:51:42 PM         Resulting Agency  [facility]      Specimen Collected: 04/30/18  8:45 AM              Lab Results   Component Value Date    INR 2.60 (H) 06/13/2018    INR 2.30 (H) 05/17/2018    INR 2.40 (H) 05/03/2018   NM Exercise Stress Test (Order [id number])   Imaging   Date: 10/22/2014 Department: [facility]  Released By: Gricelda Kern (auto-released) Authorizing: Misha Mtz MD (Ted)   Study Result      ORDERING  PHYSICIAN:  Dr. Elver Mtz.     INDICATION:  Coronary artery disease, abnormal EKG.     STRESS SUMMARY:  The patient exercised for 5 minutes and 26 seconds according to the  Jj protocol, stopping due to fatigue.  Chest discomfort was not reported and  ischemic electrocardiographic changes were not noted by the supervising  cardiologist, Dr. Sneha Hicks.  The peak heart rate 130 with 89% of the age  predicted maximum.  The blood pressure 132/80, peak 184/82.     TECHNICAL COMMENTS:  Rest dose 4.03 mCi of thallium injected at rest and 42.4 mCi of  sestamibi. The  images are satisfactory.  Comparison is made with the  images of the examination of 05/13/2011.     FINDINGS: The exercise stress and rest images demonstrate normal left ventricular  size and tracer uptake pattern. The gated images reveal normal resting regional wall  motion and global systolic performance. The measured resting ejection fraction is  61.      CONCLUSION:  Exercise stress nuclear study is negative for inducible myocardial  ischemia or infarction.

## 2021-06-18 NOTE — PATIENT INSTRUCTIONS - HE
Patient Instructions by Foreign Tavares MD at 10/13/2020  7:00 AM     Author: Foreign Tavares MD Service: -- Author Type: Physician    Filed: 10/13/2020  7:14 AM Encounter Date: 10/13/2020 Status: Signed    : Foreign Tavares MD (Physician)         Patient Education   Understanding USDA MyPlate  The USDA (US Department of Agriculture) has guidelines to help you make healthy food choices. These are called MyPlate. MyPlate shows the food groups that make up healthy meals using the image of a place setting. Before you eat, think about the healthiest choices for what to put onto your plate or into your cup or bowl. To learn more about building a healthy plate, visit www.choosemyplate.gov.       The Food Groups    Fruits: Any fruit or 100% fruit juice counts as part of the Fruit Group. Fruits may be fresh, canned, frozen, or dried, and may be whole, cut-up, or pureed. Make half your plate fruits and vegetables.    Vegetables: Any vegetable or 100% vegetable juice counts as a member of the Vegetable Group. Vegetables may be fresh, frozen, canned, or dried. They can be served raw or cooked and may be whole, cut-up, or mashed. Make half your plate fruits and vegetables.     Grains: All foods made from grains are part of the Grains Group. These include wheat, rice, oats, cornmeal, and barley such as bread, pasta, oatmeal, cereal, tortillas, and grits. Grains should be no more than a quarter of your plate. At least half of your grains should be whole grains.    Protein: This group includes meat, poultry, seafood, beans and peas, eggs, processed soy products (like tofu), nuts (including nut butters), and seeds. Make protein choices no more than a quarter of your plate. Meat and poultry choices should be lean or low fat.    Dairy: All fluid milk products and foods made from milk that contain calcium, like yogurt and cheese are part of the Dairy Group. (Foods that have little calcium, such as cream, butter, and cream  cheese, are not part of the group.) Most dairy choices should be low-fat or fat-free.    Oils: These are fats that are liquid at room temperature. They include canola, corn, olive, soybean, and sunflower oil. Foods that are mainly oil include mayonnaise, certain salad dressings, and soft margarines. You should have only 5 to 7 teaspoons of oils a day. You probably already get this much from the food you eat.  Use [employer] to Help Build Your Meals  The [employer] can help you plan and track your meals and activity. You can look up individual foods to see or compare their nutritional value. You can get guidelines for what and how much you should eat. You can compare your food choices. And you can assess personal physical activities and see ways you can improve. Go to www.Houzz.gov/O2 Med[employer]/.    3401-1990 The [employer]. [address] [place] [address]. All rights reserved. This information is not intended as a substitute for professional medical care. Always follow your healthcare professional's instructions.           Patient Education   Preventing Falls in the Home  As you get older, falls are more likely. Thats because your reaction time slows. Your muscles and joints may also get stiffer, making them less flexible. Illness, medications, and vision changes can also affect your balance. A fall could leave you unable to live on your own. To make your home safer, follow these tips:    Floors    Put nonskid pads under area rugs.    Remove throw rugs.    Replace worn floor coverings.    Tack carpets firmly to each step on carpeted stairs. Put nonskid strips on the edges of uncarpeted stairs.    Keep floors and stairs free of clutter and cords.    Arrange furniture so there are clear pathways.    Clean up any spills right away.    Bathrooms    Install grab bars in the tub or shower.    Apply nonskid strips or put a nonskid rubber mat in the tub or shower.    Sit on a bath chair to  bathe.    Use bathmats with nonskid backing.    Lighting    Keep a flashlight in each room.    Put a nightlight along the pathway between the bedroom and the bathroom.    1512-3284 The [employer]. [address] [place] [address], [place] [address]. All rights reserved. This information is not intended as a substitute for professional medical care. Always follow your healthcare professional's instructions.         Patient Education   Understanding Advance Care Planning  Advance care planning is the process of deciding ones own future medical care. It helps ensure that if you cant speak for yourself, your wishes can still be carried out. The plan is a series of legal documents that note a persons wishes. The documents vary by state. Advance care planning may be done when a person has a serious illness that is expected to get worse. It may be done before major surgery. And it can help you and your family be prepared in case of a major illness or injury. Advance care planning helps with making decisions at these times.       A health care proxy is a person who acts as the voice of a patient when the patient cant speak for himself or herself. The name of this role varies by state. It may be called a Durable Medical Power of  or Durable Power of  for Healthcare. It may be called an agent, surrogate, or advocate. Or it may be called a representative or decision maker. It is an official duty that is identified by a legal document. The document also varies by state.    Why Is Advance Care Planning Important?  If a person communicates their healthcare wishes:    They will be given medical care that matches their values and goals.    Their family members will not be forced to make decisions in a crisis with no guidance.  Creating a Plan  Making an advance care plan is often done in 3 steps:    Thinking about ones wishes. To create an advance care plan, you should think about what kind of medical treatment you  would want if you lose the ability to communicate. Are there any situations in which you would refuse or stop treatment? Are there therapies you would want or not want? And whom do you want to make decisions for you? There are many places to learn more about how to plan for your care. Ask your doctor or  for resources.    Picking a health care proxy. This means choosing a trusted person to speak for you only when you cant speak for yourself. When you cannot make medical decisions, your proxy makes sure the instructions in your advance care plan are followed. A proxy does not make decisions based on his or her own opinions. They must put aside those opinions and values if needed, and carry out your wishes.    Filling out the legal documents. There are several kinds of legal documents for advance care planning. Each one tells health care providers your wishes. The documents may vary by state. They must be signed and may need to be witnessed or notarized. You can cancel or change them whenever you wish. Depending on your state, the documents may include a Healthcare Proxy form, Living Will, Durable Medical Power of , Advance Directive, or others.  The Familys Role  The best help a family can give is to support their loved ones wishes. Open and honest communication is vital. Family should express any concerns they have about the patients choices while the patient can still make decisions.    3409-6422 The [employer]. [address] [place] [address]. All rights reserved. This information is not intended as a substitute for professional medical care. Always follow your healthcare professional's instructions.         Also, [employer] [place] offers a free, downloadable health care directive that allows you to share your treatment choices and personal preferences if you cannot communicate your wishes. It also allows you to appoint another person (called a health care agent)  to make health care decisions if you are unable to do so. You can download an advance directive by going here: http://www.[facility].org/honoring-choices.[email or web address]     Patient Education   Personalized Prevention Plan  You are due for the preventive services outlined below.  Your care team is available to assist you in scheduling these services.  If you have already completed any of these items, please share that information with your care team to update in your medical record.  Health Maintenance   Topic Date Due   ? ZOSTER VACCINES (1 of 2) 04/[birth year]/1990   ? TD 18+ HE  05/04/2021   ? MEDICARE ANNUAL WELLNESS VISIT  10/13/2021   ? FALL RISK ASSESSMENT  10/13/2021   ? ADVANCE CARE PLANNING  09/19/2024   ? LIPID  07/08/2025   ? Pneumococcal Vaccine: 65+ Years  Completed   ? INFLUENZA VACCINE RULE BASED  Completed   ? Pneumococcal Vaccine: Pediatrics (0 to 5 Years) and At-Risk Patients (6 to 64 Years)  Aged Out   ? HEPATITIS B VACCINES  Aged Out

## 2021-06-18 NOTE — ANESTHESIA CARE TRANSFER NOTE
Last vitals:   Vitals:    06/22/18 0908   BP: 110/70   Pulse: 84   Resp: 8   Temp: 36.5  C (97.7  F)   SpO2: 97%     Patient's level of consciousness is drowsy  Spontaneous respirations: yes  Maintains airway independently: yes  Dentition unchanged: yes  Oropharynx: oropharynx clear of all foreign objects    QCDR Measures:  ASA# 20 - Surgical Safety Checklist: WHO surgical safety checklist completed prior to induction  PQRS# 430 - Adult PONV Prevention: 4558F - Pt received => 2 anti-emetic agents (different classes) preop & intraop  ASA# 8 - Peds PONV Prevention: NA - Not pediatric patient, not GA or 2 or more risk factors NOT present  PQRS# 424 - Teresa-op Temp Management: 4559F - At least one body temp DOCUMENTED => 35.5C or 95.9F within required timeframe  PQRS# 426 - PACU Transfer Protocol: - Transfer of care checklist used  ASA# 14 - Acute Post-op Pain: ASA14B - Patient did NOT experience pain >= 7 out of 10

## 2021-06-18 NOTE — PATIENT INSTRUCTIONS - HE
Patient Instructions by Sulma Fisher NP at 9/18/2020 11:15 AM     Author: Sulma Fisher NP Service: -- Author Type: Nurse Practitioner    Filed: 9/18/2020 11:45 AM Encounter Date: 9/18/2020 Status: Addendum    : Sulma Fisher NP (Nurse Practitioner)    Related Notes: Original Note by Sulma Fisher NP (Nurse Practitioner) filed at 9/18/2020 11:43 AM         You have pre-diabetes you need to limit your sweets and carbohydrates; limit pasta; crackers; chips    Try to reduce your weight to 240        Wound Care Instructions    Twice per week at the clinic we will Cleanse your right leg wound(s) with Dilute hibiclens 30cc in 500cc NS    Pat Dry with non-sterile gauze    Apply Lotion to the intact skin surrounding your wound and other dry skin locations. Some good lotions include: Remedy Skin Repair Cream, Sarna, Vanicream or Cetaphil    Apply silvercel into/onto the wounds    Cover with gauze    Secure with non-sterile roll gauze and tape as needed; avoid adhesive directly on the skin    Compression: 2 layer on the right leg    It is not ok to get your wound wet in the bath or shower    SEEK MEDICAL CARE IF:    You have an increase in swelling, pain, or redness around the wound.    You have an increase in the amount of pus coming from the wound.    There is a bad smell coming from the wound.    The wound appears to be worsening/enlarging    You have a fever greater than 101.5 F        It is ok to continue current wound care treatment/products for the next 2-3 days until new wound care supplies are ordered and arrive. If longer than this please contact our office at [phone].      Sulma Fisher DNP, RN, CNP, [facility]  [facility]  [phone]    It is recommended that you do not get your ulcer wet when showering.  Listed below are several ways of keeping it dry when you shower.     1. Wrap it with Press and Seal plastic wrap.  It can be found in the stores where the plastic wraps  or tin foil is kept.    2.  Some people take a bath and hang their leg/foot out of the tub.    3  Put your leg in a plastic bag and tape it on.         4. You can purchase a shower cover for casts at some pharmacies and through the Internet.

## 2021-06-18 NOTE — ANESTHESIA POSTPROCEDURE EVALUATION
Patient: Prosper Lopez  CYSTOSCOPY, LEFT  URETEROSCOPY, LASER LITHOTRIPSY STENT INSERTION  Anesthesia type: general    Patient location: PACU  Last vitals:   Vitals:    06/22/18 1045   BP: 110/67   Pulse: 82   Resp: 18   Temp: 36.4  C (97.5  F)   SpO2: 96%     Post vital signs: stable  Level of consciousness: awake and responds to simple questions  Post-anesthesia pain: pain controlled  Post-anesthesia nausea and vomiting: no  Pulmonary: unassisted, return to baseline  Cardiovascular: stable and blood pressure at baseline  Hydration: adequate  Anesthetic events: no    QCDR Measures:  ASA# 11 - Teresa-op Cardiac Arrest: ASA11B - Patient did NOT experience unanticipated cardiac arrest  ASA# 12 - Teresa-op Mortality Rate: ASA12B - Patient did NOT die  ASA# 13 - PACU Re-Intubation Rate: ASA13B - Patient did NOT require a new airway mgmt  ASA# 10 - Composite Anes Safety: ASA10A - No serious adverse event    Additional Notes:

## 2021-06-18 NOTE — ANESTHESIA PREPROCEDURE EVALUATION
Anesthesia Evaluation      Patient summary reviewed   No history of anesthetic complications     Airway   Mallampati: II  Neck ROM: limited   Pulmonary - normal exam    breath sounds clear to auscultation  (+) a smoker  (-) shortness of breath, sleep apnea                         Cardiovascular   Exercise tolerance: good  (+) hypertension, dysrhythmias, , hypercholesterolemia,     (-) angina  ECG reviewed  Rhythm: irregular  Rate: normal,      ROS comment: Cardiomyopathy EF 30%     Neuro/Psych - negative ROS     Comments: TIA    Endo/Other    (+) arthritis, obesity,      GI/Hepatic/Renal    (+) GERD,        Other findings: Neg chest pain, coumadin, cardiomyopathy-stable, snores      Dental - normal exam                        Anesthesia Plan  Planned anesthetic: general LMA    ASA 3   Induction: intravenous   Anesthetic plan and risks discussed with: patient  Anesthesia plan special considerations: increased risk of difficult airway,   Post-op plan: routine recovery

## 2021-06-19 NOTE — PROGRESS NOTES
Assessment/Plan:        Diagnoses and all orders for this visit:    Calculus of ureter  -     Cystoscopy with Stent Removal Education  -     CT Abdomen Pelvis Without Oral Without IV Contrast; Future; Expected date: 8/1/18  -     Patient Stated Goal: Prevent further stones    Urinary tract stones  -     Urinalysis Macroscopic  -     Culture, Urine- Future; Future; Expected date: 8/1/18  -     Culture, Urine- Future  -     lidocaine HCl 2 % topical jelly 10 mL (UROJET); Insert 10 mL into the urethra once.  -     ciprofloxacin HCl tablet 250 mg (CIPRO); Take 1 tablet (250 mg total) by mouth once.    Other orders  -     ciprofloxacin HCl (CIPRO) 250 MG tablet;   -     lidocaine HCl (UROJET) 2 % topical jelly;       Stone Management Plan  [facility] Stone Management 5/15/2018 6/14/2018 7/2/2018   Urinary Tract Infection No suspicion of infection No suspicion of infection No suspicion of infection   Renal Colic Well controlled symptoms Asymptomatic at this time Well controlled symptoms   Renal Failure No suspicion of renal failure No suspicion of renal failure No suspicion of renal failure   Current CT date 5/15/2018 6/14/2018 -   Right sided stones? Yes Yes -   R Number of ureteral stones No ureteral stones No ureteral stones -   R Number of kidney stones  Renal stones unchanged from last exam Renal stones unchanged from last exam -   R GSD of kidney stones 2 - 4 2 - 4 -   R Hydronephrosis None None -   R Stone Event No current event No current event No current event   R Current Plan Observe Observe -   Observe rationale Limited stone burden with good prognosis for spontaneous passage Limited stone burden with good prognosis for spontaneous passage -   Left sided stones? Yes Yes -   L Number of ureteral stones 1 1 -   L GSD of ureteral stones 5 5 -   L Location of ureteral stone Distal Distal -   L Number of kidney stones  No renal stones No renal stones -   L GSD of kidney stones N/A N/A -   L Hydronephrosis None None -   L  Stone Event Established event Established event Established event   Diagnosis date - - -   Initial location of primary symptomatic stone - - -   Initial GSD of primary symptomatic stone - - -   Post-op status - - Stent Removal   L MET Status No progression No progression -   L Current Plan MET Clear -   MET (No Data) - -   Clear rationale - MET failure -             Subjective:      HPI  Mr. Prosper Lopez is a 78 y.o.  male returning to the [facility] [place] for early postoperative follow up for anticipated stent removal.     He returns status post left ureteroscopic laser lithotripsy for distal ureteral stone. He has had no unanticipated post-operative events.    He has had no symptoms suspicious for infection and stent was very well tolerated.     Flexible cystoscopy is performed and indwelling stent is removed without incident.    He will follow up in the office in one month with imaging.    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 Will ensure that there is no hydronephrosis on follow up imaging.     ROS   Review of systems is negative except for HPI.    Past Medical History:   Diagnosis Date     Atrial fibrillation (H) 06/10/2014     Calculus of ureter 4/4/2018     Cardiomyopathy (H) 06/16/2014     Cataract 2012     Coronary artery disease      Hydrarthrosis 2012     Hyperlipidemia 2012     Nephrolithiasis 2009    first stone at age 68     Obesity 9/1/2015     Plantar fasciitis of right foot      Prostate CA (H) 2011     Seborrheic keratosis 2012     Skin neoplasm     Of uncertain behavior     TIA (transient ischemic attack) 01/24/2012     Tubular adenoma of colon 2006       Past Surgical History:   Procedure Laterality Date     [place] CYSTO/URETERO W/LITHOTRIPSY &INDWELL STENT INSRT Left 6/22/2018    Procedure: CYSTOSCOPY, LEFT  URETEROSCOPY, LASER LITHOTRIPSY STENT INSERTION;  Surgeon: Foreign Lakhani MD;  Location: [facility];  Service: Urology     PROSTATE SURGERY       URETEROSCOPY  2009       Current Outpatient Prescriptions   Medication Sig Dispense Refill     atorvastatin (LIPITOR) 20 MG tablet TAKE 1 TABLET BY MOUTH EVERY NIGHT AT BEDTIME 90 tablet 0     carvedilol (COREG) 25 MG tablet Take 1 tablet (25 mg total) by mouth 2 (two) times a day with meals. 120 tablet 5     cholecalciferol, vitamin D3, (VITAMIN D3) 2,000 unit cap Take 2,000 Units by mouth daily.       oxyCODONE (ROXICODONE) 5 MG immediate release tablet 1-2 tablets every four hours as required for severe pain 20 tablet 0     tamsulosin (FLOMAX) 0.4 mg Cp24 TAKE 1 CAPSULE(0.4 MG) BY MOUTH DAILY 30 capsule 0     warfarin (COUMADIN) 5 MG tablet Take 1/2 to 1 tablet (2.5mg to 5mg) by mouth daily, as directed.  Adjust dose based on INR results. 90 tablet 1     Current Facility-Administered Medications   Medication Dose Route Frequency Provider Last Rate Last Dose     ciprofloxacin HCl (CIPRO) 250 MG tablet              lidocaine HCl (UROJET) 2 % topical jelly                No Known Allergies    Social History     Social History     Marital status:      Spouse name: N/A     Number of children: N/A     Years of education: N/A     Occupational History     Not on file.     Social History Main Topics     Smoking status: Former Smoker     Quit date: 5/29/2014     Smokeless tobacco: Never Used     Alcohol use Yes      Comment: Occasional     Drug use: No     Sexual activity: Not on file     Other Topics Concern     Not on file     Social History Narrative       Family History   Problem Relation Age of Onset     Heart attack Father      Heart disease Father      Colon cancer Brother      Urolithiasis Brother      Objective:      Physical Exam  Vitals:    07/02/18 0933   BP: 120/70   Pulse: (!) 101   Temp: 97.4  F (36.3  C)     General - well developed, well nourished, appropriate for age. Appears no distress at this time  Abdomen - moderately obese soft,  non-tender, no hepatosplenomegaly, no masses.   - no flank tenderness, no suprapubic tenderness, kidney and bladder non-palpable  MSK - normal spinal curvature. no spinal tenderness. normal gait. muscular strength intact.  Psych - oriented to time, place, and person, normal mood and affect.      Labs   Urinalysis POC (Office):  Nitrite, UA   Date Value Ref Range Status   07/02/2018 Negative Negative Final   06/14/2018 Negative Negative Final   05/15/2018 Negative Negative Final       Lab Urinalysis:  Blood, UA   Date Value Ref Range Status   07/02/2018 Large (!) Negative Final   06/14/2018 Negative Negative Final   05/15/2018 Negative Negative Final     Nitrite, UA   Date Value Ref Range Status   07/02/2018 Negative Negative Final   06/14/2018 Negative Negative Final   05/15/2018 Negative Negative Final     Leukocytes, UA   Date Value Ref Range Status   07/02/2018 Small (!) Negative Final   06/14/2018 Negative Negative Final   05/15/2018 Negative Negative Final     pH, UA   Date Value Ref Range Status   07/02/2018 5.5 5.0 - 8.0 Final   06/14/2018 5.5 5.0 - 8.0 Final   05/15/2018 5.5 5.0 - 8.0 Final

## 2021-06-19 NOTE — PROGRESS NOTES
Assessment/Plan:        Diagnoses and all orders for this visit:    Calculus of kidney  -     Patient Stated Goal: Prevent further stones  -     Calcium Oxalate Stone Prevention Education  -     CT Abdomen Pelvis Without Oral Without IV Contrast; Future; Expected date: 8/6/19    Urinary tract stones  -     Urinalysis Macroscopic    Urinary calculus      Stone Management Plan  [facility] Stone Management 6/14/2018 7/2/2018 8/6/2018   Urinary Tract Infection No suspicion of infection No suspicion of infection No suspicion of infection   Renal Colic Asymptomatic at this time Well controlled symptoms Asymptomatic at this time   Renal Failure No suspicion of renal failure No suspicion of renal failure No suspicion of renal failure   Current CT date 6/14/2018 - 8/6/2018   Right sided stones? Yes - Yes   R Number of ureteral stones No ureteral stones - No ureteral stones   R Number of kidney stones  Renal stones unchanged from last exam - Renal stones unchanged from last exam   R GSD of kidney stones 2 - 4 - 2 - 4   R Hydronephrosis None - None   R Stone Event No current event No current event No current event   R Current Plan Observe - Observe   Observe rationale Limited stone burden with good prognosis for spontaneous passage - Significant stone burden amenable to emergency management   Left sided stones? Yes - No   L Number of ureteral stones 1 - -   L GSD of ureteral stones 5 - -   L Location of ureteral stone Distal - -   L Number of kidney stones  No renal stones - -   L GSD of kidney stones N/A - -   L Hydronephrosis None - None   L Stone Event Established event Established event Resolved event   Diagnosis date - - -   Initial location of primary symptomatic stone - - -   Initial GSD of primary symptomatic stone - - -   Resolved date - - 8/6/2018   Post-op status - Stent Removal No residual stone   L MET Status No progression - -   L Current Plan Clear - -   MET - - -   Clear rationale MET failure - -              Subjective:      HPI  Mr. Prosper Lopez is a 78 y.o.  male returning to the [facility] [place] for late postoperative follow-up.     He returns status post Left ureteroscopic laser lithotripsy for distal ureteral stone. He has had no unanticipated post-operative events.     He is asymptomatic at present. He denies symptoms of fever, chills, flank pain, nausea, vomiting, urinary frequency and dysuria.    New CT scan was personally reviewed and demonstrates complete clearance of targeted stone  with no hydronephrosis. There is a small right upper pole stone.    Stone composition was 100% calcium oxalate.     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 If he has further stone issues it should be assumed that thestone will not pass and he should proeed to surgery. Will check on existing right upper pole stone with a CT in one year.     ROS   Review of systems is negative except for HPI.    Past Medical History:   Diagnosis Date     Atrial fibrillation (H) 06/10/2014     Calculus of ureter 4/4/2018     Cardiomyopathy (H) 06/16/2014     Cataract 2012     Coronary artery disease      Hydrarthrosis 2012     Hyperlipidemia 2012     Nephrolithiasis 2009    first stone at age 68     Obesity 9/1/2015     Plantar fasciitis of right foot      Prostate CA (H) 2011     Seborrheic keratosis 2012     Skin neoplasm     Of uncertain behavior     TIA (transient ischemic attack) 01/24/2012     Tubular adenoma of colon 2006       Past Surgical History:   Procedure Laterality Date     [place] CYSTO/URETERO W/LITHOTRIPSY &INDWELL STENT INSRT Left 6/22/2018    Procedure: CYSTOSCOPY, LEFT  URETEROSCOPY, LASER LITHOTRIPSY STENT INSERTION;  Surgeon: Foreign Lakhani MD;  Location: [facility];  Service:  Urology     PROSTATE SURGERY       URETEROSCOPY  2009       Current Outpatient Prescriptions   Medication Sig Dispense Refill     atorvastatin (LIPITOR) 20 MG tablet TAKE 1 TABLET BY MOUTH EVERY NIGHT AT BEDTIME 90 tablet 0     carvedilol (COREG) 25 MG tablet Take 1 tablet (25 mg total) by mouth 2 (two) times a day with meals. 120 tablet 5     cholecalciferol, vitamin D3, (VITAMIN D3) 2,000 unit cap Take 2,000 Units by mouth daily.       warfarin (COUMADIN) 5 MG tablet Take 1/2 to 1 tablet (2.5mg to 5mg) by mouth daily, as directed.  Adjust dose based on INR results. 90 tablet 1     No current facility-administered medications for this visit.        No Known Allergies    Social History     Social History     Marital status:      Spouse name: N/A     Number of children: N/A     Years of education: N/A     Occupational History     Not on file.     Social History Main Topics     Smoking status: Former Smoker     Quit date: 5/29/2014     Smokeless tobacco: Never Used     Alcohol use Yes      Comment: Occasional     Drug use: No     Sexual activity: Not on file     Other Topics Concern     Not on file     Social History Narrative       Family History   Problem Relation Age of Onset     Heart attack Father      Heart disease Father      Colon cancer Brother      Urolithiasis Brother      Objective:      Physical Exam  Vitals:    08/06/18 1006   BP: 119/62   Pulse: 95   Temp: 97.8  F (36.6  C)     General - well developed, well nourished, appropriate for age. Appears no distress at this time  Abdomen - moderately obese soft, non-tender, no hepatosplenomegaly, no masses.   - no flank tenderness, no suprapubic tenderness, kidney and bladder non-palpable  MSK - normal spinal curvature. no spinal tenderness. normal gait. muscular strength intact.  Psych - oriented to time, place, and person, normal mood and affect.      Labs   Urinalysis POC (Office):  Nitrite, UA   Date Value Ref Range Status   08/06/2018 Negative  Negative Final   07/02/2018 Negative Negative Final   06/14/2018 Negative Negative Final       Lab Urinalysis:  Blood, UA   Date Value Ref Range Status   08/06/2018 Negative Negative Final   07/02/2018 Large (!) Negative Final   06/14/2018 Negative Negative Final     Nitrite, UA   Date Value Ref Range Status   08/06/2018 Negative Negative Final   07/02/2018 Negative Negative Final   06/14/2018 Negative Negative Final     Leukocytes, UA   Date Value Ref Range Status   08/06/2018 Negative Negative Final   07/02/2018 Small (!) Negative Final   06/14/2018 Negative Negative Final     pH, UA   Date Value Ref Range Status   08/06/2018 5.0 5.0 - 8.0 Final   07/02/2018 5.5 5.0 - 8.0 Final   06/14/2018 5.5 5.0 - 8.0 Final

## 2021-06-21 NOTE — LETTER
"Letter by Foreign Tavares MD at      Author: Foreign Tavares MD Service: -- Author Type: --    Filed:  Encounter Date: 2/10/2021 Status: (Other)         Prosper Lopez  [address] [place] [address]             February 10, 2021         Dear Mr. Lopez,    Below are the results from your recent visit:    Resulted Orders   Glycosylated Hemoglobin A1c   Result Value Ref Range    Hemoglobin A1c 5.7 (H) <=5.6 %   Basic Metabolic Panel   Result Value Ref Range    Sodium 141 136 - 145 mmol/L    Potassium 4.3 3.5 - 5.0 mmol/L    Chloride 107 98 - 107 mmol/L    CO2 28 22 - 31 mmol/L    Anion Gap, Calculation 6 5 - 18 mmol/L    Glucose 121 70 - 125 mg/dL    Calcium 9.9 8.5 - 10.5 mg/dL    BUN 17 8 - 28 mg/dL    Creatinine 0.97 0.70 - 1.30 mg/dL    GFR MDRD Af Amer >60 >60 mL/min/1.73m2    GFR MDRD Non Af Amer >60 >60 mL/min/1.73m2    Narrative    Fasting Glucose reference range is 70-99 mg/dL per  American Diabetes Association (ADA) guidelines.   HM2(CBC w/o Differential)   Result Value Ref Range    WBC 7.5 4.0 - 11.0 thou/uL    RBC 5.10 4.40 - 6.20 mill/uL    Hemoglobin 15.8 14.0 - 18.0 g/dL    Hematocrit 43.7 40.0 - 54.0 %    MCV 86 80 - 100 fL    MCH 31.0 27.0 - 34.0 pg    MCHC 36.2 (H) 32.0 - 36.0 g/dL    RDW 14.1 11.0 - 14.5 %    Platelets 112 (L) 140 - 440 thou/uL    MPV 9.6 7.0 - 10.0 fL       Your labs suggest \"pre-diabetes\".  Goal \"average blood sugar\" (i.e. A1c) is < 5.7%.  Your A1c was 5.7%. Ensure regular exercise, healthy diet, and weight loss modifications in order to further improve.  Weight goal < 250 pounds initially, < 240 pounds ideally.  We will continue to follow closely.      Your kidney function tests (i.e. Basic Metabolic Panel) were normal.     Your platelet count remains mildly low.  No current concern.  It appears stable.  Your complete blood count results were otherwise normal.  No evidence for anemia.     Please call with questions or contact us using " [employer].    Sincerely,        Electronically signed by Foreign Tavares MD

## 2021-06-23 ENCOUNTER — AMBULATORY - HEALTHEAST (OUTPATIENT)
Dept: LAB | Facility: CLINIC | Age: 81
End: 2021-06-23

## 2021-06-23 ENCOUNTER — COMMUNICATION - HEALTHEAST (OUTPATIENT)
Dept: ANTICOAGULATION | Facility: CLINIC | Age: 81
End: 2021-06-23

## 2021-06-23 DIAGNOSIS — I48.19 PERSISTENT ATRIAL FIBRILLATION (H): ICD-10-CM

## 2021-06-23 LAB — INR PPP: 2 (ref 0.9–1.1)

## 2021-06-23 NOTE — TELEPHONE ENCOUNTER
ANTICOAGULATION  MANAGEMENT    Assessment     Today's INR result of 2.7 is Therapeutic (goal INR of 2.0-3.0)        Warfarin taken as previously instructed    No new diet changes affecting INR    No new medication/supplements affecting INR    Continues to tolerate warfarin with no reported s/s of bleeding or thromboembolism     Previous INR was Therapeutic    Plan:     Spoke with Prosper regarding INR result and instructed:     Warfarin Dosing Instructions:  Continue current warfarin dose    2.5 mg on Tue, Fri; 5 mg all other days      (0 % change)    Instructed patient to follow up no later than: 4-6 weeks, appointment made.    Education provided: importance of therapeutic range, target INR goal and significance of current INR result and importance of notifying clinic for changes in medications    Prosper verbalizes understanding and agrees to warfarin dosing plan.    Instructed to call the [facility] for any changes, questions or concerns. (#[phone])   ?   Ling Guevara RN    Subjective/Objective:      Prosper Lopez, a 78 y.o. male is on warfarin.     Prosper reports:     Home warfarin dose: as updated on anticoagulation calendar per template     Missed doses: No     Medication changes:  No     S/S of bleeding or thromboembolism:  No     New Injury or illness:  No     Changes in diet or alcohol consumption:  No     Upcoming surgery, procedure or cardioversion:  No    Anticoagulation Episode Summary     Current INR goal:   2.0-3.0   TTR:   74.9 % (4 y)   Next INR check:   2/26/2019   INR from last check:   2.70 (1/15/2019)   Weekly max warfarin dose:      Target end date:   Indefinite   INR check location:      Preferred lab:      Send INR reminders to:   ANTICOAGULATION POOL C (DTN,VAD,CGR,GAV)    Indications    A-fib (H) (Resolved) [I48.91]           Comments:            Anticoagulation Care Providers     Provider Role Specialty Phone number    Demar Dickey MD Referring Family Medicine  [phone]

## 2021-06-23 NOTE — TELEPHONE ENCOUNTER
Fell down face first last week.    No LOC or head injury. No shortness of breath but hurts rib area when breathing deep.    Ribs sore still on right side.    Lower edge of rib cage.    No fever.    Wants to switch to [facility] Dr Tavares    Also wants to be scheduled for a future physical.    Transferred to scheduling for an appointment.    Jacquie Anand, RN, [facility] Nurse Triage/Med Refills RN

## 2021-06-23 NOTE — TELEPHONE ENCOUNTER
RN cannot approve Refill Request    RN can NOT refill this medication med is not covered by policy/route to provider. Last office visit: 4/19/2017 Demar Dickey MD Last Physical: 6/19/2018 Last MTM visit: Visit date not found Last visit same specialty: Visit date not found.  Next visit within 3 mo: Visit date not found  Next physical within 3 mo: Visit date not found      Jeana Cornelius, [facility] Triage/Med Refill 2/10/2019    Requested Prescriptions   Pending Prescriptions Disp Refills     warfarin (COUMADIN/JANTOVEN) 5 MG tablet [Pharmacy Med Name: WARFARIN SOD 5MG TABLETS (PEACH)] 90 tablet 0     Sig: TAKE 1/2 TO 1 TABLET BY MOUTH EVERY DAY AS DIRECTED, ADJUST DOSE BASED IN INR RESULTS    Warfarin Refill Protocol  Failed - 2/10/2019 11:14 AM       Failed -  Route to appropriate pool/provider    Last Anticoagulation Summary:   Anticoagulation Episode Summary     Current INR goal:   2.0-3.0   TTR:   74.9 % (4 y)   Next INR check:   2/26/2019   INR from last check:   2.70 (1/15/2019)   Weekly max warfarin dose:      Target end date:   Indefinite   INR check location:      Preferred lab:      Send INR reminders to:   ANTICOAGULATION POOL C (DTN,VAD,CGR,GAV)    Indications    A-fib (H) (Resolved) [I48.91]           Comments:            Anticoagulation Care Providers     Provider Role Specialty Phone number    Demar Dickey MD Referring Family Medicine [phone]               Passed - Provider visit in last year    Last office visit with prescriber/PCP: 4/19/2017 Demar Dickey MD OR same dept: Visit date not found OR same specialty: Visit date not found  Last physical: 6/19/2018 Last MTM visit: Visit date not found    Next appt within 3 mo: Visit date not found Next physical within 3 mo: Visit date not found  Prescriber OR PCP: Demar Dickey MD  Last diagnosis associated with med order: 1. Transient Ischemic Attack  - warfarin (COUMADIN/JANTOVEN) 5 MG tablet [Pharmacy  Med Name: WARFARIN SOD 5MG TABLETS (PEACH)]; TAKE 1/2 TO 1 TABLET BY MOUTH EVERY DAY AS DIRECTED, ADJUST DOSE BASED IN INR RESULTS  Dispense: 90 tablet; Refill: 0    2. Long term current use of anticoagulant therapy  - warfarin (COUMADIN/JANTOVEN) 5 MG tablet [Pharmacy Med Name: WARFARIN SOD 5MG TABLETS (PEACH)]; TAKE 1/2 TO 1 TABLET BY MOUTH EVERY DAY AS DIRECTED, ADJUST DOSE BASED IN INR RESULTS  Dispense: 90 tablet; Refill: 0    If protocol passes may refill for 6 months if within 3 months of last provider visit (or a total of 9 months).

## 2021-06-23 NOTE — TELEPHONE ENCOUNTER
Lab Results   Component Value Date    INR 2.70 (H) 01/15/2019    INR 2.60 (H) 12/18/2018    INR 3.00 (H) 11/20/2018       Patient's current Warfarin doses:    2.5 mg on Tue, Fri; 5 mg all other days       Next INR check is on 2/26/19      Patient's last OV with PCP was on 6/19/18    Warfarin prescription 3 month supply and one refill sent to patient's pharmacy today.    Ling Guevara RN

## 2021-06-23 NOTE — PROGRESS NOTES
Assessment/Plan:    1. Right-sided chest wall pain  Right sided anterolateral chest pain consistent with chest wall contusion status post fall.  X-rays without obvious rib fracture.  Will have radiologist review.  Conservative treatments discussed.  Symptomatic management noted.    2. Persistent atrial fibrillation (H)  History of persistent atrial fibrillation and continues warfarin anticoagulation.  Is on Coreg 25 mg twice daily.    3. Hypercholesteremia  Continues atorvastatin 20 mg daily.      Annual wellness visit to be completed this summer.      The following high BMI interventions were performed this visit: encouragement to exercise, weight monitoring, weight loss from baseline weight and lifestyle education regarding diet.  Ensure ongoing efforts to achieve weight goal < 250 pounds initially, < 240 pounds ideally.         Subjective:    Prosper Lopez is seen today for recent fall.  Happened a week ago.  Had left the [employer] in order to buy ice melt when patient slipped on ice falling forward landing on right side.  Discomfort persist.  Slow improvement.  No shortness of breath.  No hemoptysis.  Does have atrial fibrillation history.  Hypercholesterolemia.  Continues home medication as noted.  No presyncopal symptoms described.  No chest pain.  Has not noticed significant bruising or bleeding concerns following his fall.  Comprehensive review of systems as above otherwise all negative.  Patient states transferring to this office since closer to where he lives.     - [place]  5 sons -   1 daughter -  40 breast CA  Tobacco:  quit ~  (1 ppd x > 30 years) - had quit once for 14 years  EtOH:  occ  Mom -  92 y.o due to ? cancer  Dad -  56 heart attack  2 bros - one  age 78 heart attack and EtOHism  1 sis -  ? cancer   Surgeries:  prostate surgery due to cancer; kidney stones bilateral  Hospitalizations:  kidney stone    Work:  retired ()    Past  Surgical History:   Procedure Laterality Date     IN CYSTO/URETERO W/LITHOTRIPSY &INDWELL STENT INSRT Left 2018    Procedure: CYSTOSCOPY, LEFT  URETEROSCOPY, LASER LITHOTRIPSY STENT INSERTION;  Surgeon: Foreign Lakhani MD;  Location: [facility];  Service: Urology     PROSTATE SURGERY       URETEROSCOPY          Family History   Problem Relation Age of Onset     Heart attack Father      Heart disease Father      Colon cancer Brother      Urolithiasis Brother         Past Medical History:   Diagnosis Date     Atrial fibrillation (H) 06/10/2014     Calculus of ureter 2018     Cardiomyopathy (H) 2014     Cataract 2012     Coronary artery disease      Hydrarthrosis      Hyperlipidemia 2012     Nephrolithiasis 2009    first stone at age 68     Obesity 2015     Plantar fasciitis of right foot      Prostate CA (H) 2011     Seborrheic keratosis      Skin neoplasm     Of uncertain behavior     TIA (transient ischemic attack) 2012     Tubular adenoma of colon 2006        Social History     Tobacco Use     Smoking status: Former Smoker     Last attempt to quit: 2014     Years since quittin.7     Smokeless tobacco: Never Used   Substance Use Topics     Alcohol use: Yes     Comment: Occasional     Drug use: No        Current Outpatient Medications   Medication Sig Dispense Refill     atorvastatin (LIPITOR) 20 MG tablet TAKE 1 TABLET BY MOUTH EVERY NIGHT AT BEDTIME 90 tablet 2     carvedilol (COREG) 25 MG tablet TAKE 1 TABLET(25 MG) BY MOUTH TWICE DAILY WITH MEALS 180 tablet 1     cholecalciferol, vitamin D3, (VITAMIN D3) 2,000 unit cap Take 2,000 Units by mouth daily.       warfarin (COUMADIN/JANTOVEN) 5 MG tablet Take 0.5-1 tablets (2.5-5 mg total) by mouth daily. Adjust dose per INR results as instructed. 90 tablet 1     No current facility-administered medications for this visit.           Objective:    Vitals:    19 0740   BP: 120/70   Pulse: 90   SpO2: 94%    Weight: (!) 269 lb (122 kg)      Body mass index is 37.52 kg/m .    Alert.  No apparent distress at rest however does transfer somewhat slowly.  Obesity noted.  Right anterolateral chest wall tenderness along costal margin without crepitus.  Chest clear.  Extremities warm and dry.  No bruising or skin abrasion etc.      This note has been dictated using voice recognition software and as a result may contain minor grammatical errors and unintended word substitutions.

## 2021-06-23 NOTE — TELEPHONE ENCOUNTER
Reason for Disposition    Can't take a deep breath but no respiratory distress (e.g., hurts to take a deep breath)    Protocols used: CHEST INJURY-A-OH

## 2021-06-24 NOTE — TELEPHONE ENCOUNTER
ANTICOAGULATION  MANAGEMENT    Assessment     Today's INR result of 2.6 is Therapeutic (goal INR of 2.0-3.0)        Warfarin taken as previously instructed    No new diet changes affecting INR    No new medication/supplements affecting INR    Continues to tolerate warfarin with no reported s/s of bleeding or thromboembolism     Previous INR was Therapeutic     Updated patient that he will have a new primary nurse moving forward due to he transferred care to a different PCP/Clinic,    Plan:     Spoke with Prosper regarding INR result and instructed:     Warfarin Dosing Instructions:  Continue current warfarin dose    2.5 mg on Tue, Fri; 5 mg all other days     (0 % change)    Instructed patient to follow up no later than: 6 weeks- appointment made.    Education provided: importance of therapeutic range and target INR goal and significance of current INR result    Prosper verbalizes understanding and agrees to warfarin dosing plan.    Instructed to call the [facility] for any changes, questions or concerns. (#[phone])   ?   Ling Guevara RN    Subjective/Objective:      Prosper Lopez, a 78 y.o. male is on warfarin.     Prosper reports:     Home warfarin dose: as updated on anticoagulation calendar per template     Missed doses: No     Medication changes:  No     S/S of bleeding or thromboembolism:  No     New Injury or illness:  Yes: fall ( see triage encounter dated 2/11/19) - Seen by new PCP on 2/212/19     Changes in diet or alcohol consumption:  No     Upcoming surgery, procedure or cardioversion:  No    Anticoagulation Episode Summary     Current INR goal:   2.0-3.0   TTR:   75.6 % (4.1 y)   Next INR check:   4/9/2019   INR from last check:   2.60 (2/26/2019)   Weekly max warfarin dose:      Target end date:   Indefinite   INR check location:      Preferred lab:      Send INR reminders to:   ANTICOAGULATION POOL B (MPW,HUG,STW,RVL,OAK,RLN)    Indications    A-fib (H) (Resolved) [I48.91]            Comments:            Anticoagulation Care Providers     Provider Role Specialty Phone number    Demar Dickey MD Referring Family Medicine [phone]

## 2021-06-25 NOTE — PROGRESS NOTES
Progress Notes by Casandra Saha, RN at 7/25/2017  9:10 AM     Author: Casandra Saha RN Service: -- Author Type: Registered Nurse    Filed: 7/25/2017  2:55 PM Encounter Date: 7/25/2017 Status: Signed    : Casandra Saha RN (Registered Nurse)       RE: DCCV order  Received: Today        Return message rec'd 7-25-17 @ 1434:       OK   Misha Mtz MD (Ted)             Previous Messages       ----- Message -----      From: Casandra Saha RN      Sent: 7/25/2017   1:48 PM        To: Misha Mtz MD (Ted)   Subject: DCCV order                                       Should patient be seen in AF clinic next available to coordinate DCCV since his last INR was 6-23-17 and has not had consistent monthly INRs?  mg              Return message rec'd 7-25-17 @ 1242:       We should set him up for cardioversion.   Misha Mtz MD (Ted)

## 2021-06-25 NOTE — TELEPHONE ENCOUNTER
ANTICOAGULATION  MANAGEMENT PROGRAM    Prosper Lopez is overdue for INR check.     Left message to call and schedule INR appointment as soon as possible.      Jaz Sterling RN

## 2021-06-25 NOTE — TELEPHONE ENCOUNTER
ANTICOAGULATION  MANAGEMENT PROGRAM    Prosper Lopez is overdue for INR check.     Spoke with wife Frances. She will remind Waldo to have his INR checked      Jaz Sterling RN
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
## 2021-06-26 NOTE — PROGRESS NOTES
ANTICOAGULATION  MANAGEMENT PROGRAM    Prosper Lopez is overdue for INR check.     Reminder letter sent      Dayanara Lambert RN

## 2021-06-28 ENCOUNTER — RECORDS - HEALTHEAST (OUTPATIENT)
Dept: GENERAL RADIOLOGY | Facility: CLINIC | Age: 81
End: 2021-06-28

## 2021-06-28 ENCOUNTER — OFFICE VISIT - HEALTHEAST (OUTPATIENT)
Dept: FAMILY MEDICINE | Facility: CLINIC | Age: 81
End: 2021-06-28

## 2021-06-28 DIAGNOSIS — L03.119 CELLULITIS AND ABSCESS OF LEG: ICD-10-CM

## 2021-06-28 DIAGNOSIS — M25.572 PAIN IN LEFT ANKLE AND JOINTS OF LEFT FOOT: ICD-10-CM

## 2021-06-28 DIAGNOSIS — M25.572 PAIN IN JOINT, ANKLE AND FOOT, LEFT: ICD-10-CM

## 2021-06-28 DIAGNOSIS — L02.419 CELLULITIS AND ABSCESS OF LEG: ICD-10-CM

## 2021-06-29 NOTE — PROGRESS NOTES
Progress Notes by Alejandra Cortez LPN at 10/9/2020  2:00 PM     Author: Alejandra Cortez LPN Service: -- Author Type: Licensed Nurse    Filed: 10/9/2020  2:32 PM Encounter Date: 10/9/2020 Status: Signed    : Alejandra Cortez LPN (Licensed Nurse)           Nurse Visit    Chief Complaint: Patient presents to clinic for assement, and treatment of their ulcer and and swelling    Dressing on Arrival 2 layer compression wrap dry and intact upon arrival       Allergies: No Known Allergies    Medications:    Current Outpatient Medications:   ?  carvedilol (COREG) 25 MG tablet, TAKE 1 TABLET BY MOUTH TWICE DAILY WITH MEALS, Disp: 180 tablet, Rfl: 3  ?  cholecalciferol, vitamin D3, (VITAMIN D3) 2,000 unit cap, Take 2,000 Units by mouth daily., Disp: , Rfl:   ?  clindamycin (CLEOCIN) 300 MG capsule, Take 300 mg by mouth 3 (three) times a day., Disp: , Rfl:   ?  mupirocin (BACTROBAN) 2 % ointment, Apply 1 application topically 3 (three) times a day., Disp: , Rfl:   ?  warfarin ANTICOAGULANT (COUMADIN/JANTOVEN) 5 MG tablet, TAKE 1/2 TO 1 TABLET BY MOUTH EVERY DAY, ADJUST DOSE PER INR RESULTS AS DIRECTED, Disp: 90 tablet, Rfl: 1  ?  warfarin ANTICOAGULANT (COUMADIN/JANTOVEN) 5 MG tablet, , Disp: , Rfl:     Vital Signs: /64   Pulse 80   Temp 97.8  F (36.6  C)   Resp 18     Assessment:    General:  Patient presents to clinic in no apparent distress.  Psychiatric:  Alert and oriented x3.   Lower extremity:  edema is present.    Integumentary:  Skin is uniformly warm, dry and pink.    Wound size:   VASC Wound 09/18/20 Rt lateral shin (Active)   Pre Size Length 1 10/09/20 1400   Pre Size Width 0.4 10/09/20 1400   Pre Size Depth 0.2 10/09/20 1400   Pre Total Sq cm 0.64 10/05/20 0900   Prodcut Used Alginate 10/05/20 0900       Incision 06/22/18 Penis (Active)      Undermining is not present.    The periwoundskin is WNL      Plan:         1. Patient will follow up in 4 days         2. Treatment provided  to  the  right and calf ulceration(s) and will include irrigation and dressings to promote autolytic debridement and will be as listed below     Cleansed with: Hibiclens    Protected skin with: Remedy Skin Repair    Dressings Applied: Silver alginate and ABD's    Compression Compression Applied to Right  2-Layer Coban: I Applied the inner foam layer with the foot dorsiflexed and started atthe base of the fifth metatarsal head. I left the bottom of the heel exposed, and proceed by winding the foam up the leg using minimal overlap to just below the fibular head. I then applied the compression layer with the foot dorsiflexed and startingat the base of the fifth metatarsal head. I applied at full stretch and proceeded up the leg using 50% overlap. The bottom of the heel is covered with the compression layer up to the end at the fibular head just below the back of the knee and levelwith the top edge of the foam layer.  I gently pressed and conformed the entire surface of the system to ensurethat the two layers are firmly bound together    Offloading applied: None    Trial Products: no  Provider notified regarding concerns: no  Treatment Changes: no    Educational Barriers: none  Taught regarding: Activity, Compliance, Compression and Dressing  Teaching Method: Explanation and DC sheet

## 2021-06-29 NOTE — PROGRESS NOTES
Progress Notes by Gricelda Michel RN at 10/19/2020 10:00 AM     Author: Gricelda Michel RN Service: -- Author Type: Registered Nurse    Filed: 10/19/2020 10:43 AM Encounter Date: 10/19/2020 Status: Signed    : Gricelda Michel RN (Registered Nurse)           Right leg wound 10/19. Small scab present. Scab removed and revealed healed wound underneath    Nurse Visit    Chief Complaint: Patient presents to clinic for assessment and treatment of their right leg healing ulcer and and swelling    Dressing on Arrival: 2-layer right leg    Patient came in today for dressing change and transition from 2-layer compression to compression socks bilateral legs. Patient rated pain 0 out of 10. Removed dressings and cleansed skin with soap and cleaned wound bed with normal saline. There is a small scab present which was removed with cleansing. Applied lotion to intact skin.  Applied silvercel to wound and covered with Mepilex foam adhesive. Applied compression stocking to right leg. Educated on the importance of wearing compression consistently. Discussed that Waldo should keep a close eye on the newly healed wound over the next few weeks and call clinic if wound reopens or if there are any other concerns. He will apply a Mepilex foam adhesive and change every 2-3 days. Gave samples to take home. He also states he has some at home. Patient tolerated dressing change well. Scheduled follow up with Sulma Fisher CNP in 3 weeks to evaluate swelling control with compression socks and ensure wound is remaining healed.      Allergies: No Known Allergies    Medications:   Current Outpatient Medications:   ?  carvedilol (COREG) 25 MG tablet, TAKE 1 TABLET BY MOUTH TWICE DAILY WITH MEALS, Disp: 180 tablet, Rfl: 3  ?  cholecalciferol, vitamin D3, (VITAMIN D3) 2,000 unit cap, Take 2,000 Units by mouth daily., Disp: , Rfl:   ?  warfarin ANTICOAGULANT (COUMADIN/JANTOVEN) 5 MG tablet, TAKE 1/2 TO 1 TABLET BY MOUTH EVERY DAY, ADJUST DOSE PER  INR RESULTS AS DIRECTED, Disp: 90 tablet, Rfl: 1    Vital Signs: Pulse 72   Temp 97.8  F (36.6  C) (Temporal)   Resp 18       Assessment:    General:  Patient presents to clinic in no apparent distress.  Psychiatric:  Alert and oriented .   Lower extremity:  edema is present.    Integumentary:  Skin is uniformly warm, dry and pink.    Wound size:   VASC Wound 09/18/20 Rt lateral shin (Active)   Pre Size Length 0.6 10/12/20 0900   Pre Size Width 0.3 10/12/20 0900   Pre Size Depth 0.1 10/12/20 0900   Pre Total Sq cm 0.18 10/12/20 0900   Prodcut Used ABD Pad;Alginate 10/12/20 0900       Incision 06/22/18 Penis (Active)      Undermining is not present.    The periwoundskin is WNL      Vasc Edema 9/18/2020 9/22/2020 10/12/2020 10/19/2020   Right just above MTP 25.6 25 24 25.4   Right Ankle 26.7 26.5 24.5 23.8   Right Widest Calf 40.8 44 35.5 34.8   Left - just above MTP 25.4 25.5 25.5 25.4   Left Ankle 26.2 28 28 26.8   Left Widest Calf 38.1 39.0 38 36.8       Plan:         1. Patient will follow up on in 3 weeks with Sulma Fisher CNP for a recheck         2. Treatment provided will include irrigation and dressings to promote autolytic debridement and will be as listed below     Cleansed with: Normal Saline    Protected skin with: Remedy Skin Repair    Dressings Applied: Silver alginate and Mepilex foam adhesive.     Compression Applied to the Left Leg: compression stockings      Stocking    Compression Applied to the Right Leg: Compression stocking    Compression stocking    Offloading applied: None  Trial Products: no  Provider notified regarding concerns: no- did send message regarding newly healed wound and transition to compression stocking.  Treatment Changes: no    Educational Barriers: none  Taught Regarding: Activity, Compliance, Compression and Dressing  Teaching Method: Explanation and Handout

## 2021-06-29 NOTE — PROGRESS NOTES
Progress Notes by Gricelda Michel RN at 10/5/2020  9:00 AM     Author: Gricelda Michel RN Service: -- Author Type: Registered Nurse    Filed: 10/5/2020  9:44 AM Encounter Date: 10/5/2020 Status: Signed    : Gricelda Michel RN (Registered Nurse)           Right leg wound 10/5              Nurse Visit    Chief Complaint: Patient presents to clinic for assement, and treatment of their right leg ulcer and and swelling    Dressing on Arrival: 2-layer intact    Nursing Note: Patient here today for dressing change and 2 layer wrap per order from NP, Sulma Fisher. Patient rated pain 0 out of 10. Removed dressings and cleansed skin with soap and cleaned wound bed with diluted hibiclens. Applied lotion to intact skin. New granular tissue noted in 30% of wound bed.  Applied silver to wound and covered with  gauze. Rewrapped 2 layer compression wrap to right leg. Patient tolerated dressing change well.        Allergies: No Known Allergies    Medications:   Current Outpatient Medications:   ?  carvedilol (COREG) 25 MG tablet, TAKE 1 TABLET BY MOUTH TWICE DAILY WITH MEALS, Disp: 180 tablet, Rfl: 3  ?  cholecalciferol, vitamin D3, (VITAMIN D3) 2,000 unit cap, Take 2,000 Units by mouth daily., Disp: , Rfl:   ?  clindamycin (CLEOCIN) 300 MG capsule, Take 300 mg by mouth 3 (three) times a day., Disp: , Rfl:   ?  mupirocin (BACTROBAN) 2 % ointment, Apply 1 application topically 3 (three) times a day., Disp: , Rfl:   ?  warfarin ANTICOAGULANT (COUMADIN/JANTOVEN) 5 MG tablet, TAKE 1/2 TO 1 TABLET BY MOUTH EVERY DAY, ADJUST DOSE PER INR RESULTS AS DIRECTED, Disp: 90 tablet, Rfl: 1  ?  warfarin ANTICOAGULANT (COUMADIN/JANTOVEN) 5 MG tablet, , Disp: , Rfl:     Vital Signs: /74 (Patient Site: Left Arm, Patient Position: Semi-dahl, Cuff Size: Adult Large)   Pulse 80   Temp 97.7  F (36.5  C) (Temporal)   Resp 16       Assessment:    General:  Patient presents to clinic in no apparent distress.  Psychiatric:  Alert and  "oriented x3.   Lower extremity:  edema is present.    Integumentary:  Skin is WNL  Wound size:   VASC Wound 20 Rt lateral shin (Active)   Pre Size Length 1.6 10/05/20 0900   Pre Size Width 0.4 10/05/20 0900   Pre Size Depth 0.2 10/05/20 0900   Pre Total Sq cm 0.64 10/05/20 0900   Prodcut Used Alginate 10/05/20 0900       Incision 18 Penis (Active)      Undermining is not present.    The periwoundskin is WNL-pink      Plan:         1. Patient will in 3 days         2. Treatment provided will include irrigation and dressings to promote autolytic debridement and will be as listed below     Cleansed with: Hibiclens    Protected skin with: Remedy Skin Repair    Dressings Applied: silver alginate/gauze/roll gauze    Compression Applied to the Left Leg: Velcro\", Compression Stockings    Compression Applied to the Right Le-Layer Coban    Offloading applied: None    Trial Products: no  Provider notified regarding concerns: no  Treatment Changes: no    Educational Barriers: none  Taught Regarding: Activity, Compliance, Compression and Dressing  Teaching Method: Explanation and Handout     Compression Applied to Right  2-Layer Coban: I Applied the inner foam layer with the foot dorsiflexed and started atthe base of the fifth metatarsal head. I left the bottom of the heel exposed, and proceed by winding the foam up the leg using minimal overlap to just below the fibular head. I then applied the compression layer with the foot dorsiflexed and startingat the base of the fifth metatarsal head. I applied at full stretch and proceeded up the leg using 50% overlap. The bottom of the heel is covered with the compression layer up to the end at the fibular head just below the back of the knee and levelwith the top edge of the foam layer.  I gently pressed and conformed the entire surface of the system to ensurethat the two layers are firmly bound together           "

## 2021-06-29 NOTE — PROGRESS NOTES
Progress Notes by Arsenio Zepeda RN at 10/12/2020  9:00 AM     Author: Arsenio Zepeda RN Service: -- Author Type: Registered Nurse    Filed: 10/12/2020  9:38 AM Encounter Date: 10/12/2020 Status: Signed    : Arsenio Zepeda RN (Registered Nurse)                         Nurse Visit    Chief Complaint: Patient presents to clinic for assement, and treatment of their ulcer and and swelling greatly improved. Decreased visits to once per week. Advised to bring compression socks to next visit. Use compression sock on left on in am and off PM.    Dressing on Arrival right leg 2 layer intact denies pain. Silvercel,abd,no to minimal drainage.    Allergies: No Known Allergies    Medications:   Current Outpatient Medications:   ?  carvedilol (COREG) 25 MG tablet, TAKE 1 TABLET BY MOUTH TWICE DAILY WITH MEALS, Disp: 180 tablet, Rfl: 3  ?  cholecalciferol, vitamin D3, (VITAMIN D3) 2,000 unit cap, Take 2,000 Units by mouth daily., Disp: , Rfl:   ?  clindamycin (CLEOCIN) 300 MG capsule, Take 300 mg by mouth 3 (three) times a day., Disp: , Rfl:   ?  mupirocin (BACTROBAN) 2 % ointment, Apply 1 application topically 3 (three) times a day., Disp: , Rfl:   ?  warfarin ANTICOAGULANT (COUMADIN/JANTOVEN) 5 MG tablet, TAKE 1/2 TO 1 TABLET BY MOUTH EVERY DAY, ADJUST DOSE PER INR RESULTS AS DIRECTED, Disp: 90 tablet, Rfl: 1  ?  warfarin ANTICOAGULANT (COUMADIN/JANTOVEN) 5 MG tablet, , Disp: , Rfl:     Vital Signs: /60   Pulse 76   Temp 98.2  F (36.8  C)   Resp 12       Assessment:    General:  Patient presents to clinic in no apparent distress.  Psychiatric:  Alert and oriented x3.   Lower extremity:  edema is present.    Integumentary:  Skin is hemosciderin  Wound size:   VASC Wound 09/18/20 Rt lateral shin (Active)   Pre Size Length 0.6 10/12/20 0900   Pre Size Width 0.3 10/12/20 0900   Pre Size Depth 0.1 10/12/20 0900   Pre Total Sq cm 0.18 10/12/20 0900   Prodcut Used ABD Pad;Alginate 10/12/20 0900       Incision 06/22/18  Penis (Active)      Undermining is not present.    The periwoundskin is WNL      Plan:         1. Patient will in 7 days         2. Treatment provided will include irrigation and dressings to promote autolytic debridement and will be as listed below     Cleansed with: Hibiclens    Protected skin with: Remedy Skin Repair    Dressings Applied: silvercel,abd, 2 layer    Compression Applied to the Left Leg: None advised to start to use compression    Compression Applied to the Right Le-Layer Coban  Compression Applied to Right  2-Layer Coban: I Applied the inner foam layer with the foot dorsiflexed and started atthe base of the fifth metatarsal head. I left the bottom of the heel exposed, and proceed by winding the foam up the leg using minimal overlap to just below the fibular head. I then applied the compression layer with the foot dorsiflexed and startingat the base of the fifth metatarsal head. I applied at full stretch and proceeded up the leg using 50% overlap. The bottom of the heel is covered with the compression layer up to the end at the fibular head just below the back of the knee and levelwith the top edge of the foam layer.  I gently pressed and conformed the entire surface of the system to ensurethat the two layers are firmly bound together  Offloading applied: None    Trial Products: no  Provider notified regarding concerns: yes greatly improved  Treatment Changes: yes/decreased visits    Educational Barriers: none  Taught Regarding: Activity, Compliance, Compression, Disease and Dressing  Teaching Method: Explanation and DC sheet

## 2021-06-29 NOTE — PROGRESS NOTES
Progress Notes by Arsenio Zepeda RN at 9/22/2020  9:00 AM     Author: Arsenio Zepeda RN Service: -- Author Type: Registered Nurse    Filed: 9/22/2020 11:44 AM Encounter Date: 9/22/2020 Status: Signed    : Arsenio Zepeda RN (Registered Nurse)         Right shin wound    Bilateral leg swelling            Nurse Visit    Chief Complaint: Patient presents to clinic for assement, and treatment of their ulcer and and swelling    Dressing on Arrival right leg 2 layer,silvercel. With minimal drainage and slippage. Tolerated dressing change well. Patient states is trying to loose weight by watching sweets,sugar and carbs. No change in weight today.    Allergies: No Known Allergies    Medications:   Current Outpatient Medications:   ?  carvedilol (COREG) 25 MG tablet, TAKE 1 TABLET BY MOUTH TWICE DAILY WITH MEALS, Disp: 180 tablet, Rfl: 3  ?  cholecalciferol, vitamin D3, (VITAMIN D3) 2,000 unit cap, Take 2,000 Units by mouth daily., Disp: , Rfl:   ?  clindamycin (CLEOCIN) 300 MG capsule, Take 300 mg by mouth 3 (three) times a day., Disp: , Rfl:   ?  mupirocin (BACTROBAN) 2 % ointment, Apply 1 application topically 3 (three) times a day., Disp: , Rfl:   ?  warfarin ANTICOAGULANT (COUMADIN/JANTOVEN) 5 MG tablet, TAKE 1/2 TO 1 TABLET BY MOUTH EVERY DAY, ADJUST DOSE PER INR RESULTS AS DIRECTED, Disp: 90 tablet, Rfl: 1  ?  warfarin ANTICOAGULANT (COUMADIN/JANTOVEN) 5 MG tablet, , Disp: , Rfl:     Vital Signs: /60   Pulse 72   Temp 98.3  F (36.8  C)   Resp 12       Assessment:    General:  Patient presents to clinic in no apparent distress.  Psychiatric:  Alert and oriented x3.   Lower extremity:  edema is present.    Integumentary:  Skin is pink, no warmth.  Wound size:   VASC Wound 09/18/20 Rt lateral shin (Active)   Pre Size Length 1.5 09/22/20 1100   Pre Size Width 1 09/22/20 1100   Pre Size Depth 0.2 09/22/20 1100   Pre Total Sq cm 2.25 09/22/20 1100   Prodcut Used ABD Pad;Alginate 09/22/20 1100       Incision  18 Penis (Active)      Vasc Edema 2020   Right just above MTP 25.6 25   Right Ankle 26.7 26.5   Right Widest Calf 40.8 44   Left - just above MTP 25.4 25.5   Left Ankle 26.2 28   Left Widest Calf 38.1 39.0     Undermining is not present.    The periwoundskin is WNL  Pink no warmth    Plan:         1. Patient will in Friday days         2. Treatment provided will include irrigation and dressings to promote autolytic debridement and will be as listed below     Cleansed with: Hibiclens    Protected skin with: 3-M [place]    Dressings Applied: silvercel,abd,2 layer right leg  Compression Applied to Right  2-Layer Coban: I Applied the inner foam layer with the foot dorsiflexed and started atthe base of the fifth metatarsal head. I left the bottom of the heel exposed, and proceed by winding the foam up the leg using minimal overlap to just below the fibular head. I then applied the compression layer with the foot dorsiflexed and startingat the base of the fifth metatarsal head. I applied at full stretch and proceeded up the leg using 50% overlap. The bottom of the heel is covered with the compression layer up to the end at the fibular head just below the back of the knee and levelwith the top edge of the foam layer.  I gently pressed and conformed the entire surface of the system to ensurethat the two layers are firmly bound together    Compression Applied to the Left Leg: None    Compression Applied to the Right Le-Layer Coban    Offloading applied: None    Trial Products: no  Provider notified regarding concerns: no  Treatment Changes: no    Educational Barriers: none/ slight [place]  Taught Regarding: Activity, Compliance, Compression and Weight management  Teaching Method: Explanation, Demo and DC sheet

## 2021-06-29 NOTE — PROGRESS NOTES
Progress Notes by Katelynn Castañeda LPN at 9/29/2020  1:00 PM     Author: Katelynn Castañeda LPN Service: -- Author Type: Licensed Nurse    Filed: 9/29/2020  2:46 PM Encounter Date: 9/29/2020 Status: Signed    : Katelynn Castañeda LPN (Licensed Nurse)       Nurse Visit        Chief Complaint: Patient presents to clinic for assessment and treatment of right lateral ulcer and and swelling    Dressing on Arrival: two layer intact and clean. Scan serosanguinous. No pain    Patient came in today for dressing change and 2 layer rewrap per order from NP, Sulma Fisher. Patient rated pain 0 out of 10. Removed dressings and cleansed skin with soap and cleaned wound bed with diluted hibiclens. Applied lotion to intact skin.  Applied silver to wound and covered with  gauze. Rewrapped 2 layer compression wrap to right leg. Patient tolerated dressing change well.       Allergies: No Known Allergies    Medications:   Current Outpatient Medications:   ?  carvedilol (COREG) 25 MG tablet, TAKE 1 TABLET BY MOUTH TWICE DAILY WITH MEALS, Disp: 180 tablet, Rfl: 3  ?  cholecalciferol, vitamin D3, (VITAMIN D3) 2,000 unit cap, Take 2,000 Units by mouth daily., Disp: , Rfl:   ?  clindamycin (CLEOCIN) 300 MG capsule, Take 300 mg by mouth 3 (three) times a day., Disp: , Rfl:   ?  mupirocin (BACTROBAN) 2 % ointment, Apply 1 application topically 3 (three) times a day., Disp: , Rfl:   ?  warfarin ANTICOAGULANT (COUMADIN/JANTOVEN) 5 MG tablet, TAKE 1/2 TO 1 TABLET BY MOUTH EVERY DAY, ADJUST DOSE PER INR RESULTS AS DIRECTED, Disp: 90 tablet, Rfl: 1  ?  warfarin ANTICOAGULANT (COUMADIN/JANTOVEN) 5 MG tablet, , Disp: , Rfl:     Vital Signs: /64 (Patient Site: Left Arm, Patient Position: Sitting, Cuff Size: Adult Large)   Pulse 64   Temp 97.5  F (36.4  C) (Tympanic)   Resp 24       Assessment:    General:  Patient presents to clinic in no apparent distress.  Psychiatric:  Alert and oriented .   Lower extremity:  edema is present.     Integumentary:  Skin is uniformly warm, dry and pink.    Wound size:   VASC Wound 20 Rt lateral shin (Active)   Pre Size Length 1.5 20 1300   Pre Size Width 0.7 20 1300   Pre Size Depth 0.2 20 1300   Pre Total Sq cm 1.05 20 1300   Prodcut Used Gauze 20 1300       Incision 18 Penis (Active)      Undermining is not present.    The periwoundskin is WNL          Plan:         1. Patient will follow up on  with nurse         2. Treatment provided will include irrigation and dressings to promote autolytic debridement and will be as listed below     Cleansed with: Dilute Hibiclens    Protected skin with: Remedy Skin Repair    Dressings Applied: gauze and Silver alginate    Compression Applied to the Left Leg: None      none    Compression Applied to the Right Le-Layer Coban    2-Layer Coban:Applied the inner foam layer with the foot dorsiflexed and starting atthe base of the fifth metatarsal head. Leaving the bottom of the heel exposed, proceed by winding the foam up the leg using minimaloverlap to just below the fibular head. Apply the compression layer with the foot dorsiflexed and startingat the base of the fifth metatarsal head. Apply at full stretch and proceed up the leg using 50% overlap. The bottom of the heel is covered with the compression layer. The end at the fibular head or just below the back of the knee and levelwith the top edge of the foam layer.  Gently pressed and conformed the entire surface of the system to ensurethat the two layers are firmly bound together    Offloading applied: None  Trial Products: no  Provider notified regarding concerns: no  Treatment Changes: no    Educational Barriers: none  Taught Regarding: Activity, Compression and Dressing  Teaching Method: Explanation and DC sheet

## 2021-06-29 NOTE — PROGRESS NOTES
Progress Notes by Sulma Fisher NP at 9/18/2020 11:15 AM     Author: Sulma Fisher NP Service: -- Author Type: Nurse Practitioner    Filed: 9/18/2020  1:26 PM Encounter Date: 9/18/2020 Status: Signed    : Sulma Fisher NP (Nurse Practitioner)               [facility] Consult Note    Date of Service:  9/18/2020    Requesting Provider: Dr. Foreign Tavares    Chief Complaint: right leg ulcer and edema    History of Present Illness: Prosper Lopez is being seen at [facility] today regarding right leg ulcer and edema. They arrive to the clinic today alone. The patient reports that this past July he fell; struck right leg on wood; he is on chronic anticoagulation due to a fib. Sustained hematoma. Was currently/previously using bactroban gauze. Reports pain of 0/10; currently using nothing for pain. Has used nothing as compression in the past, is currently using nothing for compression. Denies any fevers, chills, or generalized ill feeling. + history of cancer prostate cancer; 2000; underwent prostatectomy no chemo or radiation; also with skin cancer on nose and arm. Sleeps in a bed with legs elevated.  Denies history of DVT, joint replacement and vein procedures. Positive history of cellulitis.I personally reviewed outside imaging, lab work, and progress noted through Care Everywhere and outside records. Currently on clindamycin tolerating well; has a few days left; no recent cultures done. Has pre-diabetes; he was not aware of this; we discussed diet options and weight loss.       Review of Systems:   Constitutional:  negative  for fever, chills or night sweats  EENTM: negative for glasses;  positive [place]  GI:  negative for nausea/vomiting;  negative for constipation  negative diarrhea;  negative for fecal incontinence negative weight loss  :  negative dysuria, negative incontinence  MS: patient is ambulatory;  does not use assistive devices  Cardiac:  positive afib on  anticoagulation  Respiratory:  negative SOB  Endocrine:  pre diabetes  Psych:  negative depression/anxiety    Past Medical History:    Past Medical History:   Diagnosis Date   ? Actinic keratosis     Created by Conversion    ? Atrial fibrillation (H) 06/10/2014   ? 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 Calculus of ureter 4/4/2018   ? Cardiomyopathy (H) 06/16/2014   ? Cataract 2012   ? Cataract     Created by Conversion    ? Compound Nevus     Created by Conversion  Replacement Utility updated for latest IMO load   ? Coronary artery disease    ? Hydrarthrosis 2012   ? Hydrarthrosis Of The Ankle / Foot     Created by Conversion    ? Hydronephrosis with urinary obstruction due to ureteral calculus 4/4/2018   ? Hypercholesteremia     Created by Conversion    ? Hyperlipidemia 2012   ? Impaired fasting glucose 9/19/2019   ? Internal derangement of knee     Created by Conversion  Replacement Utility updated for latest IMO load   ? Left hydrocele 9/19/2019   ? Malignant Neoplasm Of The Prostate Gland     Created by Conversion    ? 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 Nephrolithiasis 2009    first stone at age 68   ? Nephrolithiasis     Created by Conversion [facility] Annotation: May  4 2011  7:[id number] Dickey Demar: STENT AND LASER    ? Obesity 9/1/2015   ? Persistent atrial fibrillation (H)     First diagnosed in 7 of 2014 and appeared asymptomatic after cardioversion on 914. Reoccurrence on 7/24/2017 and again persistent [id number] score of 4 with history of TIA-on warfarin Asymptomatic and on rate control since July 2017.    ? Plantar fasciitis of right foot    ? Prostate CA (H) 2011   ? Seborrheic keratosis 2012   ? Seborrheic Keratosis     Created by Conversion    ? Skin neoplasm     Of uncertain behavior   ? Skin Neoplasm Of Uncertain Behavior     Created by Conversion    ? TIA  (transient ischemic attack) 01/24/2012   ? Transient Ischemic Attack     Created by Conversion  Replacement Utility updated for latest IMO load   ? Tubular adenoma of colon 2006   ? Ureterolithiasis         Surgical History:   Past Surgical History:   Procedure Laterality Date   ? TN CYSTO/URETERO W/LITHOTRIPSY &INDWELL STENT INSRT Left 6/22/2018    Procedure: CYSTOSCOPY, LEFT  URETEROSCOPY, LASER LITHOTRIPSY STENT INSERTION;  Surgeon: Foreign Lakhani MD;  Location: [facility];  Service: Urology   ? PROSTATE SURGERY     ? URETEROSCOPY  2009        Medications:    Current Outpatient Medications:   ?  carvedilol (COREG) 25 MG tablet, TAKE 1 TABLET BY MOUTH TWICE DAILY WITH MEALS, Disp: 180 tablet, Rfl: 3  ?  cholecalciferol, vitamin D3, (VITAMIN D3) 2,000 unit cap, Take 2,000 Units by mouth daily., Disp: , Rfl:   ?  clindamycin (CLEOCIN) 300 MG capsule, Take 300 mg by mouth 3 (three) times a day., Disp: , Rfl:   ?  mupirocin (BACTROBAN) 2 % ointment, Apply 1 application topically 3 (three) times a day., Disp: , Rfl:   ?  warfarin ANTICOAGULANT (COUMADIN/JANTOVEN) 5 MG tablet, TAKE 1/2 TO 1 TABLET BY MOUTH EVERY DAY, ADJUST DOSE PER INR RESULTS AS DIRECTED, Disp: 90 tablet, Rfl: 1  ?  warfarin ANTICOAGULANT (COUMADIN/JANTOVEN) 5 MG tablet, , Disp: , Rfl:     Allergies: No Known Allergies    Family history:   Family History   Problem Relation Age of Onset   ? Heart attack Father    ? Heart disease Father    ? Colon cancer Brother    ? Urolithiasis Brother         Social History:   Social History     Socioeconomic History   ? Marital status:      Spouse name: Not on file   ? Number of children: Not on file   ? Years of education: Not on file   ? Highest education level: Not on file   Occupational History   ? Not on file   Social Needs   ? Financial resource strain: Not on file   ? Food insecurity     Worry: Not on file     Inability: Not on file   ? Transportation needs     Medical: Not on file      Non-medical: Not on file   Tobacco Use   ? Smoking status: Former Smoker     Last attempt to quit: 2014     Years since quittin.3   ? Smokeless tobacco: Never Used   Substance and Sexual Activity   ? Alcohol use: Yes     Comment: Occasional   ? Drug use: No   ? Sexual activity: Not on file   Lifestyle   ? Physical activity     Days per week: Not on file     Minutes per session: Not on file   ? Stress: Not on file   Relationships   ? Social connections     Talks on phone: Not on file     Gets together: Not on file     Attends Restoration service: Not on file     Active member of club or organization: Not on file     Attends meetings of clubs or organizations: Not on file     Relationship status: Not on file   ? Intimate partner violence     Fear of current or ex partner: Not on file     Emotionally abused: Not on file     Physically abused: Not on file     Forced sexual activity: Not on file   Other Topics Concern   ? Not on file   Social History Narrative   ? Not on file        Physical Exam  Vitals: Blood pressure 118/66, pulse 84, temperature 97  F (36.1  C), resp. rate 18, height 6' (1.829 m), weight (!) 261 lb 12.8 oz (118.8 kg).  General: This is a 80 y.o. male who appears their reported age, not in acute distress  Head: normocephalic, Atraumatic; not wearing glasses; non-icteric sclera; no exudate; mild hearing loss   Respiratory: Clear throughout all lung fields; unlabored breathing; no cough   Cardiac: Irregularly irregular, Rate and Rhythm, no murmurs appreciated   Skin: Uniformly warm and dry  Psych: Alert and oriented x4; calm and cooperative throughout exam  Abdomen: Normal bowel sounds. Soft, rounded; symmetric, no guarding or rigidity, nontender with palpation.  No organomegaly or masses palpated.   Extremities: BLE with brawny edema; tissues are firm and fibrotic; worse on the right; there is a full thickness ulcer on the right lateral leg; slough covered; moderate drainage;  strength testing  revealed 4/4 to BLEs.    Sensation: Intact to pinprick and light touch throughout lower extremities bilaterally     Peripheral Vascular: normal dorsalis pedis, posterior tibial pulses to bilateral feet , using a handheld doppler these were bipshasic in nature.  Good capillary refill. No unusual venous distention. Positive for spider veins, telangiectasias, hemosiderin deposition or hyperpigmentation and fibrosis or scarring         Circumferential volume measures:    Vasc Edema 9/18/2020   Right just above MTP 25.6   Right Ankle 26.7   Right Widest Calf 40.8   Left - just above MTP 25.4   Left Ankle 26.2   Left Widest Calf 38.1       Ulceration(s)/Wound(s):     VASC Wound 09/18/20 Rt lateral shin (Active)   Pre Size Length 1.5 09/18/20 1100   Pre Size Width 1.5 09/18/20 1100   Pre Size Depth 0.2 09/18/20 1100   Pre Total Sq cm 2.25 09/18/20 1100       Incision 06/22/18 Penis (Active)       Laboratory studies:   No results found for: SEDRATE  Lab Results   Component Value Date    CREATININE 0.92 07/08/2020     Lab Results   Component Value Date    HGBA1C 5.8 07/08/2020     Lab Results   Component Value Date    BUN 15 07/08/2020     Lab Results   Component Value Date    ALBUMIN 3.8 04/04/2018     Vitamin D, Total (25-Hydroxy)   Date Value Ref Range Status   09/19/2019 27.7 (L) 30.0 - 80.0 ng/mL Final             Impression:   1. Leg hematoma, right, initial encounter     2. 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 Venous hypertension of lower extremity, bilateral     4. Venous insufficiency of both lower extremities     5. Dependent edema     6. Pre-diabetes     7. Class 2 severe obesity due to excess calories with serious comorbidity and body mass index (BMI) of 35.0 to 35.9 in adult (H)         9/18/2020 right leg          Assessment/Plan:  1. Debridement: After discussion of risk factors and verbal consent was obtained 2% Lidocaine HCL jelly was applied, under clean conditions, the right leg  ulceration(s) were debrided using currette. Devitalized and nonviable tissue, along with any fibrin and slough, was removed to improve granulation tissue formation, stimulate wound healing, decrease overall bacteria load, disrupt biofilm formation and decrease edge senescence.  Total excisional debridement was 2.25 sq cm from the epidermis/dermis area and into the subcutaneous tissue with a depth of 0.2 cm.   Ulcers were improved afterwards and .  Measures were unchanged after debridement.    2. Treatment: wound treatment will include irrigation and dressings to promote autolytic debridement which will include: will add silvercel; gauze; rolled gauze; come to clinic twice per week for changes; will consider venous insufficiency if he does not respond to treatment    3. Edema. We spoke at length regarding their swelling, potential causes, and treatment options. Answered all questions. Their swelling is likely multifactorial including but not limited to the following: their weight, dependency of the limbs for most hours of the day, reduced activity with reduced calf pump mechanism, venous hypertension, valvular incompetence and high sodium diet. I would like to first reduce their swelling down using 2 layer compression wraps and then transition them into compression garments; such as compression stockings or velcro wraps. The patient should continue to elevate their legs periodically throughout the day. Continue to take their diuretics per their PMD recommendations.       4. Offloading: na    5. Nutrition: we spoke about his weight; his PCP would like him to get to 240 lb or less; he has prediabetes; we spoke about low carb diet; his vitamin d was low he is taking supplement now    Patient to return to clinic in 4 week(s) for re-evaluation twice per week nurse visit until then . They were instructed to call the clinic sooner with any further questions or concerns. Answered all questions.              Sulma ABRAHAM  Phillip YARBROUGH, RN, CNP, CWOCN  [facility]  [phone]      This note was electronically signed by Sulma Fisher

## 2021-06-29 NOTE — PROGRESS NOTES
Progress Notes by Sulma Fisher NP at 11/10/2020 11:20 AM     Author: Sulma Fisher NP Service: -- Author Type: Nurse Practitioner    Filed: 11/10/2020 11:36 AM Encounter Date: 11/10/2020 Status: Signed    : Sulma Fisher NP (Nurse Practitioner)                   Follow up Vascular Visit       Date of Service:11/10/2020    Date Last Seen: 9/18/2020; 10/19/2020    Chief Complaint: BLE swelling; right leg ulcer        Pt returns to [facility] with regards to their BLE swelling and right leg ulcer.  They arrive today alone. We were able to get the wound on the right leg healed and he was transitioned fro 2 layer into compression stockings; he purchased these on line; he does not like these feels that they are too tight; too strong; he is going to order new ones. He is not applying lotion to the right leg and c/o of itching. They are currently using nothing to the wounds.  They are feeling well today. Denies fevers, chills. No shortness of breath.     Allergies: Patient has no known allergies.    Medications:   Current Outpatient Medications:   ?  carvediloL (COREG) 25 MG tablet, Take 1 tablet (25 mg total) by mouth 2 (two) times a day with meals., Disp: 180 tablet, Rfl: 3  ?  cholecalciferol, vitamin D3, (VITAMIN D3) 2,000 unit cap, Take 2,000 Units by mouth daily., Disp: , Rfl:   ?  warfarin ANTICOAGULANT (COUMADIN/JANTOVEN) 5 MG tablet, TAKE 1/2 TO 1 TABLET BY MOUTH EVERY DAY, ADJUST DOSE PER INR RESULTS AS DIRECTED, Disp: 90 tablet, Rfl: 1    History:   Past Medical History:   Diagnosis Date   ? Actinic keratosis     Created by Conversion    ? Atrial fibrillation (H) 06/10/2014   ? 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 Calculus of ureter 4/4/2018   ? Cardiomyopathy (H) 06/16/2014   ? Cataract 2012   ? Cataract     Created by Conversion    ? Compound Nevus     Created by Conversion   Replacement Utility updated for latest IMO load   ? Coronary artery disease    ? Hydrarthrosis 2012   ? Hydrarthrosis Of The Ankle / Foot     Created by Conversion    ? Hydronephrosis with urinary obstruction due to ureteral calculus 4/4/2018   ? Hypercholesteremia     Created by Conversion    ? Hyperlipidemia 2012   ? Impaired fasting glucose 9/19/2019   ? Internal derangement of knee     Created by Conversion  Replacement Utility updated for latest IMO load   ? Left hydrocele 9/19/2019   ? Malignant Neoplasm Of The Prostate Gland     Created by Conversion    ? 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 Nephrolithiasis 2009    first stone at age 68   ? Nephrolithiasis     Created by Conversion [facility] Annotation: May  4 2011  7:56LENNY - Keli, Demar: STENT AND LASER    ? Obesity 9/1/2015   ? Persistent atrial fibrillation (H)     First diagnosed in 7 of 2014 and appeared asymptomatic after cardioversion on 914. Reoccurrence on 7/24/2017 and again persistent [id number] score of 4 with history of TIA-on warfarin Asymptomatic and on rate control since July 2017.    ? Plantar fasciitis of right foot    ? Prostate CA (H) 2011   ? Seborrheic keratosis 2012   ? Seborrheic Keratosis     Created by Conversion    ? Skin neoplasm     Of uncertain behavior   ? Skin Neoplasm Of Uncertain Behavior     Created by Conversion    ? TIA (transient ischemic attack) 01/24/2012   ? Transient Ischemic Attack     Created by Conversion  Replacement Utility updated for latest IMO load   ? Tubular adenoma of colon 2006   ? Ureterolithiasis        Physical Exam:    /68   Pulse 100   Temp 97  F (36.1  C)   Resp 20   Wt (!) 266 lb 6.4 oz (120.8 kg)   BMI 37.42 kg/m      General:  Patient presents to clinic in no apparent distress.  Head: normocephalic atraumatic  Psychiatric:  Alert and oriented x3.   Respiratory: unlabored breathing; no cough  Integumentary:  Skin is uniformly warm, dry and pink.     Wound #1 Location: right lateral leg  Size: 0L x 0W x 0depth.  No sinus tract present, Wound base: healed in a divot  No undermining present. There is no drainage. Periwound: no denudement, erythema, induration, maceration or warmth.      Swelling is good in the right leg; 2+ edema in the left leg; excoriations on the right leg; otherwise skin intact    Circumferential volume measures:    Vasc Edema 9/18/2020 9/22/2020 10/12/2020 10/19/2020 11/10/2020   Right just above MTP 25.6 25 24 25.4 25   Right Ankle 26.7 26.5 24.5 23.8 25.3   Right Widest Calf 40.8 44 35.5 34.8 40   Left - just above MTP 25.4 25.5 25.5 25.4 26.5   Left Ankle 26.2 28 28 26.8 27.3   Left Widest Calf 38.1 39.0 38 36.8 41.8       Ulceration(s)/Wound(s):     VASC Wound 09/18/20 Rt lateral shin (Active)   Pre Size Length 0.6 10/12/20 0900   Pre Size Width 0.3 10/12/20 0900   Pre Size Depth 0.1 10/12/20 0900   Pre Total Sq cm 0.18 10/12/20 0900   Prodcut Used ABD Pad;Alginate 10/12/20 0900       Incision 06/22/18 Penis (Active)        Lab Values    No results found for: SEDRATE  Lab Results   Component Value Date    CREATININE 0.81 10/13/2020     Lab Results   Component Value Date    HGBA1C 5.9 (H) 10/13/2020     Lab Results   Component Value Date    BUN 14 10/13/2020     Lab Results   Component Value Date    ALBUMIN 3.8 04/04/2018     Vitamin D, Total (25-Hydroxy)   Date Value Ref Range Status   10/13/2020 26.8 (L) 30.0 - 80.0 ng/mL Final             Impression:  1. Chronic ulcer of right leg, with fat layer exposed (H)     2. Morbid obesity (H)     3. Leg hematoma, right, initial encounter     4. Venous hypertension of lower extremity, bilateral     5. Pre-diabetes     6. Venous insufficiency of both lower extremities     7. Dependent edema              Are any of these wounds new today: No; Location: na    Assessment/Plan:          1. Debridement: na     2.  Wound treatment: wound treatment will include irrigation and dressings to promote  autolytic debridement which will include:lotion daily; recommend anti itch sarna cream; provided handout; wounds healed Stable            3. Edema: swelling is worse in the left leg; unclear if pt has been consistent with the compression garments; will wrap the left leg with 2 layer; he was instructed to remove this in 4-7 days and then order different compression stockings; wear daily; encouraged elevatoin. The compression wraps were applied today in clinic. Worsened LLE           4. Nutrition: we spoke about his weight again; he is actually up 6 pounds today; encouraged to focus on protein and low sodium           5. Offloading: na     Patient will follow up with me PRN  for reevaluation. They were instructed to call the clinic sooner with any signs or symptoms of infection or any further questions/concerns. Answered all questions.          Sulma Fisher DNP, RN, CNP, CWOCN, CFCN, CLT  [facility]   [phone]        This note was electronically signed by Sulma Fisher

## 2021-06-30 ENCOUNTER — COMMUNICATION - HEALTHEAST (OUTPATIENT)
Dept: ANTICOAGULATION | Facility: CLINIC | Age: 81
End: 2021-06-30

## 2021-06-30 ENCOUNTER — COMMUNICATION - HEALTHEAST (OUTPATIENT)
Dept: SCHEDULING | Facility: CLINIC | Age: 81
End: 2021-06-30

## 2021-07-03 NOTE — ADDENDUM NOTE
Addendum Note by Chauncey Santos LPN at 11/10/2020 11:20 AM     Author: Chauncey Santos LPN Service: -- Author Type: Licensed Nurse    Filed: 11/10/2020 11:43 AM Encounter Date: 11/10/2020 Status: Signed    : Chauncey Santos LPN (Licensed Nurse)    Addended by: CHAUNCEY SANTOS on: 11/10/2020 11:43 AM        Modules accepted: Orders

## 2021-07-03 NOTE — ADDENDUM NOTE
Addendum Note by Delaney Hernandez MD at 12/28/2020  6:30 AM     Author: Delaney Hernandez MD Service: -- Author Type: Physician    Filed: 12/28/2020  6:30 AM Encounter Date: 12/23/2020 Status: Signed    : Delaney Hernandez MD (Physician)    Addended by: DELANEY HERNANDEZ on: 12/28/2020 06:30 AM        Modules accepted: Orders

## 2021-07-04 NOTE — LETTER
Letter by Dayanara Lambert RN at      Author: Dayanara Lambert RN Service: -- Author Type: --    Filed:  Encounter Date: 6/11/2021 Status: (Other)         Prosper Lopez  [address] [place] [address]      June 11, 2021      Dear Mr. Lopez,    You are currently under the care of [facility] for your warfarin (Coumadin ) therapy.  We are contacting you because our records show you were due for an INR on 5/18/21.    There are potentially serious risks when taking warfarin without careful monitoring and we want to make sure you are safely managed.  Routine INR monitoring is required for warfarin refills.     Please call [phone] as soon as possible to schedule an appointment.  If there has been a change in your care or other concerns, please let us know so we can help and or update our records.     Sincerely,       [facility]

## 2021-07-04 NOTE — TELEPHONE ENCOUNTER
Telephone Encounter by Dayanara Lambert, RN at 6/30/2021  4:33 PM     Author: Dayanara Lambert, RN Service: -- Author Type: Registered Nurse    Filed: 6/30/2021  4:40 PM Encounter Date: 6/30/2021 Status: Signed    : Dayanara Lambert, RN (Registered Nurse)       ANTICOAGULATION  MANAGEMENT     Interacting Medication Review    Interacting medication(s): Sulfamethoxazole-Trimethoprim (Bactrim) with warfarin.    Duration: 10 days  (6/30 to 7/9). Waldo confirms he did  the RX and plans to start tonight    Indication: worsening cellulitis    New medication?: Yes, interaction may increase INR and risk of bleeding       PLAN     Consulted with [facility] pharmacist Geri Perry, recommend to adjust dose to 2.5mg on Fri 7/2. Recheck INR on Mon 7/5    Spoke with Prosper Lopez, education provided and dosing instructed. Appointment scheduled.     Anticoagulation Calendar updated    Dayanara Lambert

## 2021-07-04 NOTE — TELEPHONE ENCOUNTER
"Telephone Encounter by Rukhsana Laguna RN at 6/30/2021  9:02 AM     Author: Rukhsana Laguna RN Service: -- Author Type: Registered Nurse    Filed: 6/30/2021  9:15 AM Encounter Date: 6/30/2021 Status: Signed    : Rukhsana Laguna RN (Registered Nurse)       Triage call:   Was in on 6/28/21 - leg injury  Infection in his foot- had injection in the clinic of antibiotics and is taking home antibiotics: keflex 500 mg, started on Monday. Taking 4 times per day, as prescribed.   Has an appointment tomorrow but is concerned that his symptoms are worsening today  Redness- patient thinks the redness is going up his leg and into his foot more  Pain remains  No fever and states otherwise he is feeling \"pretty good\"    Advised patient to be seen in the office today- reviewed additional care advice with patient and he verbalizes understanding. Assisted in transferring to scheduling.     Rukhsana Laguna RN [facility] Care Connection Triage/Med Refill 6/30/2021 9:13 AM    COVID 19 Nurse Triage Plan/Patient Instructions    Please be aware that novel coronavirus (COVID-19) may be circulating in the community. If you develop symptoms such as fever, cough, or SOB or if you have concerns about the presence of another infection including coronavirus (COVID-19), please contact your health care provider or visit  [email or web address].    Disposition/Instructions    In-Person Visit with provider recommended. Reference Visit Selection Guide.    Thank you for taking steps to prevent the spread of this virus.  o Limit your contact with others.  o Wear a simple mask to cover your cough.  o Wash your hands well and often.    Resources    [facility]: About COVID-19: [email or web address]    CDC: What to Do If You're Sick: www.cdc.gov/coronavirus/2019-ncov/about/steps-when-sick.html    CDC: Ending Home Isolation: www.cdc.gov/coronavirus/2019-ncov/hcp/disposition-in-home-patients.html     CDC: Caring for Someone: " www.cdc.gov/coronavirus/2019-ncov/if-you-are-sick/care-for-someone.html     [facility]: Interim Guidance for Hospital Discharge to Home: www.health.[facility].[place]/diseases/coronavirus/hcp/hospdischarge.pdf    [facility] clinical trials (COVID-19 research studies): clinicalaffairs.[facility]/umn-clinical-trials     Below are the COVID-19 hotlines at the [place] Department of Health ([facility]). Interpreters are available.   o For health questions: Call 201-468-9621 or 1-482.360.2168 (7 a.m. to 7 p.m.)  o For questions about schools and childcare: Call 494-543-7081 or 1-380.199.4381 (7 a.m. to 7 p.m.)         Reason for Disposition  ? [1] Taking antibiotic > 24 hours AND [2] cellulitis symptoms are WORSE (e.g., spreading redness, pain, swelling)    Additional Information  ? Negative: Shock suspected (e.g., cold/pale/clammy skin, too weak to stand, low BP, rapid pulse)  ? Negative: Sounds like a life-threatening emergency to the triager  ? Negative:  surgical wound infection suspected (post-op)  ? Negative: Surgical wound infection suspected (post-op)  ? Negative: [1] Widespread rash AND [2] drug rash suspected (i.e., allergic reaction to antibiotic)  ? Negative: Animal bite wound infection suspected  ? Negative: SEVERE pain  ? Negative: [1] SEVERE pain with bending of finger (or toe) AND [2] cellulitis on hand (or foot)  ? Negative: Fever > 104 F (40 C)  ? Negative: [1] Widespread rash AND [2] bright red, sunburn-like  ? Negative: Black (necrotic), dark purple, or blisters develop in area of cellulitis  ? Negative: Patient sounds very sick or weak to the triager  ? Negative: [1] Taking antibiotic > 24 hours AND [2] fever > 100.4 F (38.0 C)  ? Negative: [1] Taking antibiotic > 24 hours AND [2] red streak (or line) runs from area of infection  ? Negative: [1] Fever > 100.0 F (37.8 C) AND [2] new onset  ? Negative: [1] Red streak runs from area of infection AND [2] new onset  ? Negative: [1] Caller has URGENT  question AND [2] triager unable to answer question    Protocols used: CELLULITIS ON ANTIBIOTIC FOLLOW-UP CALL-A-AH

## 2021-07-05 ENCOUNTER — COMMUNICATION - HEALTHEAST (OUTPATIENT)
Dept: ANTICOAGULATION | Facility: CLINIC | Age: 81
End: 2021-07-05

## 2021-07-05 ENCOUNTER — AMBULATORY - HEALTHEAST (OUTPATIENT)
Dept: LAB | Facility: CLINIC | Age: 81
End: 2021-07-05

## 2021-07-05 DIAGNOSIS — I48.19 PERSISTENT ATRIAL FIBRILLATION (H): ICD-10-CM

## 2021-07-05 LAB — INR PPP: 2.3 (ref 0.9–1.1)

## 2021-07-05 NOTE — TELEPHONE ENCOUNTER
Telephone Encounter by Jaz Sterling RN at 7/5/2021 11:00 AM     Author: Jaz Sterling RN Service: -- Author Type: Registered Nurse    Filed: 7/5/2021 11:14 AM Encounter Date: 7/5/2021 Status: Signed    : Jaz Sterling RN (Registered Nurse)       ANTICOAGULATION MANAGEMENT     Prosper Lopez 81 y.o., male is on warfarin with Therapeutic INR result (goal range 2.0-3.0)    Recent labs: (last 7 days)     07/05/21  0801   INR 2.30*       ASSESSMENT     Source: Chart Review, Patient/Caregiver Call and Template      Warfarin dosing taken: Warfarin taken as instructed-- dose decreased 7/2 per PharmD    Diet: No new diet changes affecting INR    Illness, Injury or hospitalization: No    Medication changes: continues on bactrim through 7/9    Signs or symptoms of bleeding or clotting: No    Previous INR: therapeutic last 2(+) visits    Additional findings: None     PLAN     Recommended plan for temporary change(s) affecting INR:     Dosing instructions: take 5mg Trevino/M/F and 2.5mg AOD    Follow up no later than: 5-7 days     Telephone call with Waldo who agrees to plan and repeated back plan correctly    Lab visit scheduled already for 7/13    Education provided: target INR goal and significance of current INR result, monitoring for bleeding signs and symptoms and when to seek medical attention/emergency care    Plan made with [facility] Pharmacist Geri Perry, PharmD    Jaz Sterling  Anticoagulation Clinic   [phone]    Anticoagulation Episode Summary     Current INR goal:  2.0-3.0   TTR:  79.6 % (1 y)   Next INR check:  7/13/2021   INR from last check:  2.30 (7/5/2021)   Weekly max warfarin dose:     Target end date:  Indefinite   INR check location:     Preferred lab:     Send INR reminders to:  MAGDALENA TEJEDA    Indications    A-fib (H) (Resolved) [I48.91]           Comments:           Anticoagulation Care Providers     Provider Role Specialty Phone number    Foreign Tavares MD  [facility] [phone]

## 2021-07-06 VITALS
OXYGEN SATURATION: 97 % | SYSTOLIC BLOOD PRESSURE: 116 MMHG | DIASTOLIC BLOOD PRESSURE: 62 MMHG | HEART RATE: 100 BPM | WEIGHT: 262.2 LBS | BODY MASS INDEX: 35.56 KG/M2

## 2021-07-07 ENCOUNTER — COMMUNICATION - HEALTHEAST (OUTPATIENT)
Dept: FAMILY MEDICINE | Facility: CLINIC | Age: 81
End: 2021-07-07

## 2021-07-07 NOTE — TELEPHONE ENCOUNTER
Telephone Encounter by Arianna Jennings at 7/7/2021  8:03 AM     Author: Arianna Jennings Service: -- Author Type: Patient Access    Filed: 7/7/2021  8:08 AM Encounter Date: 7/7/2021 Status: Signed    : Arianna Jennings (Patient Access)       Reason for Call:  Other call back      Detailed comments: Patient has been keeping his leg wrapped but he is wondering if he should put anything topical on to help the healing and if so what would be most recommended. Please advise    Phone Number Patient can be reached at: Home number on file [phone] (home)    Best Time: anytime     Can we leave a detailed message on this number?: Yes    Call taken on 7/7/2021 at 8:04 AM by Arianna Jennings

## 2021-07-07 NOTE — TELEPHONE ENCOUNTER
Telephone Encounter by Renea Hernandez CMA at 7/7/2021  4:56 PM     Author: Renea Hernandez CMA Service: -- Author Type: Medical Assistant    Filed: 7/7/2021  4:56 PM Encounter Date: 7/7/2021 Status: Signed    : Renea Hernandez CMA (Medical Assistant)       Called and spoke to pt and relayed the information

## 2021-07-07 NOTE — TELEPHONE ENCOUNTER
Telephone Encounter by Indigo Tabares CNP at 7/7/2021  4:53 PM     Author: Indigo Tabares CNP Service: -- Author Type: Nurse Practitioner    Filed: 7/7/2021  4:54 PM Encounter Date: 7/7/2021 Status: Signed    : Indigo Tabares CNP (Nurse Practitioner)       I would not recommend putting topical medication on the wound. It would be okay to apply vaseline to keep moist.

## 2021-07-07 NOTE — PROGRESS NOTES
Assessment:     1. Pain in joint, ankle and foot, left  XR Ankle Left 3 or More VWS    cephalexin (KEFLEX) 500 MG capsule   2. Cellulitis and abscess of leg  cefTRIAXone (ROCEPHIN) injection 1 g    cephalexin (KEFLEX) 500 MG capsule       Plan:     1. Pain in joint, ankle and foot, left  The abraded skin which is losing clear fluid was dressed with a nonstick Telfa wrapped with Kerlix then wrapped with a absorbable adhesive Ace; the demonstration educated the patient and he was given supplies for the next 2 to 3 days; he will follow-up here at that time to make sure the inflammation has improved I am reluctant to jovany the area because of his thin skin and swelling  - XR Ankle Left 3 or More VWS; Future  - cephalexin (KEFLEX) 500 MG capsule; Take 1 capsule (500 mg total) by mouth 4 (four) times a day for 10 days.  Dispense: 40 capsule; Refill: 0    2. Cellulitis and abscess of leg  Treatment as follows  - cefTRIAXone (ROCEPHIN) injection 1 g  - cephalexin (KEFLEX) 500 MG capsule; Take 1 capsule (500 mg total) by mouth 4 (four) times a day for 10 days.  Dispense: 40 capsule; Refill: 0      Subjective:   Patient tripped fell abraded his left lower anterior tibial surface hyperflexed his ankle he did not hear any snapping or popping he was able to walk on it with discomfort his ankle did swell up.  He is noticed some purplish discoloration of his toes; when I had him ambulate down the x-ray I noticed that the back of his lower leg was also slightly discolored secondary to ecchymoses from soft tissue injury; we were concerned about internal derangement of the ankles.  I was able to visualize the mortise although was a little hazy I did not see any disruption of that I did see evidence of a soft tissue bulla left lateral lower tibial area; radiology to over read and this may necessitate a change in plan.  Patient is on Coumadin because of chronic atrial fib.  Medications include carvedilol for atrial fib.  Wound was  dressed area of cellulitis circumferential to abrasion area and is a concern because of deep tissue involvement possibly.  Patient had a similar episode 1 year ago which proved somewhat recalcitrant to treat with antibiotics we will be aggressive.  I am ceftriaxone given and oral Keflex.  Wound care discussed appropriate shoe care discussed elevation etc. patient will follow up with his primary care physician Dr. Foreign Tavares in the next 3 days just to make sure the wound is progressing satisfactorily.    Review of Systems: A complete 14 point review of systems was obtained and is negative or as stated in the history of present illness.    Past Medical History:   Diagnosis Date     Actinic keratosis     Created by Conversion      Atrial fibrillation (H) 06/10/2014     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     Created by Conversion [employer] Annotation: May  5 2011  1:17PM -  ,  : 2006      Calculus of ureter 4/4/2018     Cardiomyopathy (H) 06/16/2014     Cataract 2012     Cataract     Created by Conversion      Compound Nevus     Created by Conversion  Replacement Utility updated for latest IMO load     Coronary artery disease      Hydrarthrosis 2012     Hydrarthrosis Of The Ankle / Foot     Created by Conversion      Hydronephrosis with urinary obstruction due to ureteral calculus 4/4/2018     Hypercholesteremia     Created by Conversion      Hyperlipidemia 2012     Impaired fasting glucose 9/19/2019     Internal derangement of knee     Created by Conversion  Replacement Utility updated for latest IMO load     Left hydrocele 9/19/2019     Malignant Neoplasm Of The Prostate Gland     Created by Conversion      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 Reoccurrence on 2017 and again persistent [id number] score of 4 with history of TIA-on warfarin Asymptomatic and on rate control since 2017.      Plantar fasciitis of right foot      Prostate CA (H)      Seborrheic keratosis      Seborrheic Keratosis     Created by Conversion      Skin neoplasm     Of uncertain behavior     Skin Neoplasm Of Uncertain Behavior     Created by Conversion      TIA (transient ischemic attack) 2012     Transient Ischemic Attack     Created by Conversion  Replacement Utility updated for latest IMO load     Tubular adenoma of colon 2006     Ureterolithiasis      Family History   Problem Relation Age of Onset     Heart attack Father      Heart disease Father      Colon cancer Brother      Urolithiasis Brother      Past Surgical History:   Procedure Laterality Date     COLONOSCOPY W/ BIOPSIES N/A 2021    Procedure: COLONOSCOPY with polypectomy;  Surgeon: Raimundo Smith MD;  Location: [facility];  Service: Gastroenterology     VT COLONOSCOPY FLX DX W/COLLJ SPEC WHEN PFRMD N/A 2/15/2021    Procedure: COLONOSCOPY with polypectomy, tattoo, cautery;  Surgeon: Compa Miner DO;  Location: [facility] [place] OR;  Service: Gastroenterology     VT CYSTO/URETERO W/LITHOTRIPSY &INDWELL STENT INSRT Left 2018    Procedure: CYSTOSCOPY, LEFT  URETEROSCOPY, LASER LITHOTRIPSY STENT INSERTION;  Surgeon: Foreign Lakhani MD;  Location: [facility];  Service: Urology     PROSTATE SURGERY       URETEROSCOPY       Social History     Tobacco Use     Smoking status: Former Smoker     Quit date: 2014     Years since quittin.0     Smokeless tobacco: Never Used   Substance Use Topics     Alcohol use: Yes     Comment: Occasional     Drug use: No         Objective:   /62   Pulse 100   Wt (!) 262 lb 3.2 oz (118.9 kg)   SpO2 97%   BMI  35.56 kg/m      General Appearance:  Normal  Head:  Normal  Ears: Normal  Eyes:  Normal  Nose:  Normal  Throat:  Normal  Neck:  Normal  Back:  Normal  Chest/Breast:Normal  Lungs:  Normal  Heart: Chronic atrial fib noted l  Abdomen:  Normal  Musculoskeletal:  Normal  Lymphatic:  Normal  Skin/Hair/Nails:  Normal  Neurologic:  Normal  Extremities: Left lower extremity swollen area of abrasion with clear exudate; area of bullous inflammation inferior to this area circumferential rim of red expanding 5 to 7 cm out from primary abrasion.  Difficult to interpret a Homans' sign does have peripheral dependent ecchymoses of the lower ankle area and toes no signs DVT at this time  Genitourinary: Normal  Pulses:  Normal           This note has been dictated using voice recognition software. Any grammatical or context distortions are unintentional and inherent to the the software.

## 2021-07-11 DIAGNOSIS — I48.19 PERSISTENT ATRIAL FIBRILLATION (H): Primary | ICD-10-CM

## 2021-07-11 NOTE — PROGRESS NOTES
Standing POCT INR order placed.     Jaz Sterling, RN  [facility]  [phone]

## 2021-07-13 ENCOUNTER — LAB (OUTPATIENT)
Dept: LAB | Facility: CLINIC | Age: 81
End: 2021-07-13
Payer: COMMERCIAL

## 2021-07-13 ENCOUNTER — ANTICOAGULATION THERAPY VISIT (OUTPATIENT)
Dept: ANTICOAGULATION | Facility: CLINIC | Age: 81
End: 2021-07-13

## 2021-07-13 DIAGNOSIS — I48.19 PERSISTENT ATRIAL FIBRILLATION (H): Primary | ICD-10-CM

## 2021-07-13 DIAGNOSIS — I48.19 PERSISTENT ATRIAL FIBRILLATION (H): ICD-10-CM

## 2021-07-13 LAB — INR BLD: 2.5 (ref 0.9–1.1)

## 2021-07-13 PROCEDURE — 85610 PROTHROMBIN TIME: CPT

## 2021-07-13 PROCEDURE — 36415 COLL VENOUS BLD VENIPUNCTURE: CPT

## 2021-07-13 RX ORDER — MUPIROCIN 20 MG/G
OINTMENT TOPICAL
COMMUNITY
Start: 2020-09-02 | End: 2021-09-17

## 2021-07-13 RX ORDER — MAGNESIUM CARB/ALUMINUM HYDROX 105-160MG
TABLET,CHEWABLE ORAL
COMMUNITY
Start: 2021-02-02 | End: 2021-09-17

## 2021-07-13 RX ORDER — SULFAMETHOXAZOLE/TRIMETHOPRIM 800-160 MG
TABLET ORAL
COMMUNITY
Start: 2021-06-30 | End: 2021-09-17

## 2021-07-13 RX ORDER — POLYETHYLENE GLYCOL 3350 17 G/17G
POWDER, FOR SOLUTION ORAL
COMMUNITY
Start: 2021-02-02 | End: 2021-09-17

## 2021-07-13 NOTE — PROGRESS NOTES
ANTICOAGULATION MANAGEMENT     Prosper Lopez 81 year old male is on warfarin with therapeutictherapeutic INR result. (Goal INR 2.0-3.0)    Recent labs: (last 7 days)     07/13/21  0810   INR 2.5*       ASSESSMENT     Source(s): Patient/Caregiver Call and Template       Warfarin doses taken: Warfarin taken as instructed    Diet: No new diet changes identified    New illness, injury, or hospitalization: No    Medication/supplement changes: None noted-- Bactrim course completed 7/9    Signs or symptoms of bleeding or clotting: No    Previous INR: Therapeutic last 2(+) visits    Additional findings: None     PLAN     Recommended plan for no diet, medication or health factor changes affecting INR     Dosing Instructions: Continue your current warfarin dose with next INR in 2 weeks       Summary  As of 7/13/2021    Full warfarin instructions:  2.5 mg every Tue, Thu, Sat; 5 mg all other days   Next INR check:  8/10/2021             Telephone call with Prosper who verbalizes understanding and agrees to dosing plan-- does not want to schedule for 2 weeks    Patient elected to schedule next visit 8/10    Education provided: Target INR goal and significance of current INR result, Importance of following up for INR monitoring at instructed interval and Contact [phone] with any changes, questions or concerns.     Plan made per [facility] anticoagulation protocol    Jaz Sterling RN  Anticoagulation Clinic  7/13/2021    _______________________________________________________________________     Anticoagulation Episode Summary     Current INR goal:  2.0-3.0   TTR:  79.6 % (1 y)   Target end date:  Indefinite   Send INR reminders to:  MAGDALENA TEJEDA    Indications    Persistent atrial fibrillation (H) [I48.19]           Comments:           Anticoagulation Care Providers     Provider Role Specialty Phone number    Foreign Tavares MD Referring Family Medicine [phone]

## 2021-07-14 ENCOUNTER — OFFICE VISIT (OUTPATIENT)
Dept: VASCULAR SURGERY | Facility: CLINIC | Age: 81
End: 2021-07-14
Payer: COMMERCIAL

## 2021-07-14 VITALS
HEART RATE: 88 BPM | WEIGHT: 255.6 LBS | TEMPERATURE: 98.3 F | BODY MASS INDEX: 34.62 KG/M2 | DIASTOLIC BLOOD PRESSURE: 64 MMHG | RESPIRATION RATE: 20 BRPM | SYSTOLIC BLOOD PRESSURE: 118 MMHG | HEIGHT: 72 IN

## 2021-07-14 DIAGNOSIS — S80.11XA LEG HEMATOMA, RIGHT, INITIAL ENCOUNTER: ICD-10-CM

## 2021-07-14 DIAGNOSIS — I87.303 VENOUS HYPERTENSION OF LOWER EXTREMITY, BILATERAL: ICD-10-CM

## 2021-07-14 DIAGNOSIS — M79.89 LEFT LEG SWELLING: ICD-10-CM

## 2021-07-14 DIAGNOSIS — L03.116 CELLULITIS OF LEFT LOWER EXTREMITY: Primary | ICD-10-CM

## 2021-07-14 DIAGNOSIS — I87.2 VENOUS INSUFFICIENCY OF BOTH LOWER EXTREMITIES: ICD-10-CM

## 2021-07-14 DIAGNOSIS — I89.0 SECONDARY LYMPHEDEMA: ICD-10-CM

## 2021-07-14 DIAGNOSIS — M79.89 SWELLING OF THIGH: ICD-10-CM

## 2021-07-14 DIAGNOSIS — R60.9 DEPENDENT EDEMA: ICD-10-CM

## 2021-07-14 PROCEDURE — 11045 DBRDMT SUBQ TISS EACH ADDL: CPT

## 2021-07-14 PROCEDURE — 10140 I&D HMTMA SEROMA/FLUID COLLJ: CPT | Performed by: NURSE PRACTITIONER

## 2021-07-14 PROCEDURE — 11045 DBRDMT SUBQ TISS EACH ADDL: CPT | Performed by: NURSE PRACTITIONER

## 2021-07-14 PROCEDURE — 11042 DBRDMT SUBQ TIS 1ST 20SQCM/<: CPT | Performed by: NURSE PRACTITIONER

## 2021-07-14 PROCEDURE — 99214 OFFICE O/P EST MOD 30 MIN: CPT | Mod: 25 | Performed by: NURSE PRACTITIONER

## 2021-07-14 RX ORDER — CEPHALEXIN 500 MG/1
500 CAPSULE ORAL 4 TIMES DAILY
Qty: 56 CAPSULE | Refills: 0 | Status: SHIPPED | OUTPATIENT
Start: 2021-07-14 | End: 2021-07-28

## 2021-07-14 ASSESSMENT — MIFFLIN-ST. JEOR: SCORE: 1902.39

## 2021-07-14 ASSESSMENT — PAIN SCALES - GENERAL: PAINLEVEL: NO PAIN (0)

## 2021-07-14 NOTE — PATIENT INSTRUCTIONS
"Prescribed Keflex 500mg  Four times per day for 10 days    Patient can also  some probiotics such as Culturelle to help prevent Antibiotic associated diarrhea. They can take this 1 hour before or 2 hours after taking their antibiotic. Should not be taken at the same time as they can cancel out the effects.       If you are feeling ill; flu like symptoms; feverish; chills; confused; go to ER or call 911 right away    We will have you get ultrasound of the leg today to look for DVT or blood clot in the leg tomorrow out in [place]. You will have a nurse visit afterwards to rewrap your leg.     I will see you back on Friday    We will later get you scheduled for CT scan of the abdomen; pelvis and left thigh to further evaluate the cause of the chronic thigh swelling    We will later get you scheduled for venous insufficiency ultrasound looking at the veins and valves in the left leg       Wound Care Instructions    Today we will  , Cleanse your left leg wound(s) with Dilute hibiclens 30cc in 500cc NS.    Pat Dry with non-sterile gauze    Apply Lotion to the intact skin surrounding your wound and other dry skin locations. Some good lotions include: Remedy Skin Repair Cream, Sarna, Vanicream or Cetaphil    Primary Dressing: Apply silvercel into/onto the wounds    Secondary dressing: Cover with ABD;    Secure with non-sterile roll gauze (4\" x 75\" roll) and tape (1\" roll tape) as needed; avoid adhesive directly on the skin    Compression: tubular compression and short stretch    It is not ok to get your wound wet in the bath or shower    SEEK MEDICAL CARE IF:    You have an increase in swelling, pain, or redness around the wound.    You have an increase in the amount of pus coming from the wound.    There is a bad smell coming from the wound.    The wound appears to be worsening/enlarging    You have a fever greater than 101.5 F      It is ok to continue current wound care treatment/products for the next 2-3 days " until new wound care supplies are ordered and arrive. If longer than this please contact our office at [phone].    It is recommended that you do not get your ulcer wet when showering.  Listed below are several ways of keeping it dry when you shower.     1. Wrap it with Press and Seal plastic wrap.  It can be found in the stores where the plastic wraps or tin foil is kept.               2.  Some people take a bath and hang their leg/foot out of the tub.                        3  Put your leg in a plastic bag and tape it on.           4. You can purchase a shower cover for casts at some pharmacies and through the Internet.            5. Take a Bed Bath or wash up at the sink

## 2021-07-14 NOTE — PROGRESS NOTES
[facility] Consult Note    Date of Service:  7/14/2021    Requesting Provider: Indigo Tabares CNP    Chief Complaint: left shin wound; localized edema    History of Present Illness: Prosper is being seen at [facility] today regarding left shin wound; localized edema. They arrive to the clinic today alone. The patient reports that he fell in the garage 3 weeks ago; and struck the left shin; he has had left thigh swelling for 20 years; and has not had this worked up further with exception of when it first occurred; he was told this was benign and no cancer. It continues to get a little bigger every year; becoming more bothersome.  Was currently/previously using vaseline and abd; changing daily. He was treated with a course of bactrim; completed this tolerated well. He is on coumadin.  Reports pain of 0/10; currently using nothing for pain. Has used nothing as compression in the past, is currently using nothing for compression. Denies any fevers, chills, or generalized ill feeling. Pt was ambulatory in our clinic; without an assistive device. + history of cancer prostate cancer; underwent prostatectomy; no chemo or radiation; and skin cancer on the face. Sleeps in a bed/recliner with legs elevated.Denies history of DVT, Joint Replacement, Cellulitis and Vein Procedures.      Review of Systems:   Constitutional:  Negative   EENTM: negative for glasses;  negative Te-[place]ak  GI:  negative for nausea/vomiting;   negative for constipation   negative diarrhea;   negative for fecal incontinence  positive weight loss; 10 pounds over several months; intentional; monitoring portion control  :   negative dysuria,  negative incontinence  MS: patient is ambulatory;  does not use assistive devices  Cardiac: positive afib; on anti-coagulation  Respiratory:  negative SOB  Endocrine:  negative  diabetes  Psych: negative  depression/anxiety    Past Medical History:    Past Medical History:   Diagnosis Date      Actinic keratosis     Created by Conversion      Atrial fibrillation (H) 06/10/2014     Benign neoplasm of colon     Created by Conversion [employer] Annotation: May  5 2011  1:17PM -  ,  : 2006      Benign neoplasm of skin     Created by Conversion  Replacement Utility updated for latest IMO load     Calculus of kidney     Created by Conversion [employer] [facility] Annotation: May  4 2011  7:56AM - Keli, Demar: STENT AND LASER      Calculus of ureter 4/4/2018     Cardiomyopathy (H) 06/16/2014     Cataract 2012     Coronary artery disease      Effusion of ankle and foot joint     Created by Conversion      Hydrarthrosis 2012     Hydronephrosis with urinary obstruction due to ureteral calculus 4/4/2018     Hypercholesteremia     Created by Conversion      Hyperlipidemia 2012     Impaired fasting glucose 9/19/2019     Internal derangement of knee     Created by Conversion  Replacement Utility updated for latest IMO load     Left hydrocele 9/19/2019     Malignant neoplasm of prostate (H)     Created by Conversion      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 Reoccurrence on 7/24/2017 and again persistent [id number] score of 4 with history of TIA-on warfarin Asymptomatic and on rate control since July 2017.      Plantar fasciitis of right foot      Prostate CA (H) 2011     Seborrheic keratosis 2012     Skin neoplasm     Of uncertain behavior     TIA (transient ischemic attack) 01/24/2012     Transient cerebral ischemia     Created by Conversion  Replacement Utility updated for latest IMO load     Tubular adenoma of colon 2006     Unspecified cataract     Created by  Conversion      Ureterolithiasis        Surgical History:   Past Surgical History:   Procedure Laterality Date     COLONOSCOPY W/ BIOPSIES N/A 4/19/2021    Procedure: COLONOSCOPY with polypectomy;  Surgeon: Raimundo Smith MD;  Location: [facility];  Service: Gastroenterology     [place] CYSTO/URETERO W/LITHOTRIPSY &INDWELL STENT INSRT Left 6/22/2018    Procedure: CYSTOSCOPY, LEFT  URETEROSCOPY, LASER LITHOTRIPSY STENT INSERTION;  Surgeon: Foreign Lakhani MD;  Location: [facility];  Service: Urology     PROSTATE SURGERY       [facility]  [facility]/15/20[facility]    Procedure: COLONOSCOPY with polypectomy, tattoo, cautery;  Surgeon: Compa Miner DO;  Location: [facility];  Service: Gastroenterology        Medications:   Current Outpatient Medications   Medication Sig     carvediloL (COREG) 25 MG tablet [CARVEDILOL (COREG) 25 MG TABLET] Take 1 tablet (25 mg total) by mouth 2 (two) times a day with meals.     cholecalciferol, vitamin D3, (VITAMIN D3) 2,000 unit cap [CHOLECALCIFEROL, VITAMIN D3, (VITAMIN D3) 2,000 UNIT CAP] Take 2,000 Units by mouth daily.     polyethylene glycol (MIRALAX) 17 GM/Dose powder      warfarin ANTICOAGULANT (COUMADIN/JANTOVEN) 5 MG tablet [WARFARIN ANTICOAGULANT (COUMADIN/JANTOVEN) 5 MG TABLET] TAKE 1/2 TO 1 TABLET BY MOUTH EVERY DAY, ADJUST DOSE PER INR RESULTS AS DIRECTED     magnesium citrate 1.745 GM/30ML solution  (Patient not taking: Reported on 7/14/2021)     mupirocin (BACTROBAN) 2 % external ointment ELIZABET EXT AA TID (Patient not taking: Reported on 7/14/2021)     sulfamethoxazole-trimethoprim (BACTRIM DS) 800-160 MG tablet TAKE 1 TABLET BY MOUTH TWICE DAILY FOR 10 DAYS (Patient not taking: Reported on 7/14/2021)     No current facility-administered medications for this visit.       Allergies: No Known Allergies    Family history:   Family History   Problem Relation Age of Onset     Coronary Artery Disease Father       Heart Disease Father      Colon Cancer Brother      Urolithiasis Brother         Social History:   Social History     Socioeconomic History     Marital status:      Spouse name: Not on file     Number of children: Not on file     Years of education: Not on file     Highest education level: Not on file   Occupational History     Not on file   Tobacco Use     Smoking status: Former Smoker     Quit date: 2014     Years since quittin.1     Smokeless tobacco: Never Used   Substance and Sexual Activity     Alcohol use: Yes     Comment: Alcoholic Drinks/day: Occasional     Drug use: No     Sexual activity: Not on file   Other Topics Concern     Not on file   Social History Narrative     Not on file     Social Determinants of Health     Financial Resource Strain:      Difficulty of Paying Living Expenses:    Food Insecurity:      Worried About Running Out of Food in the Last Year:      Ran Out of Food in the Last Year:    Transportation Needs:      Lack of Transportation (Medical):      Lack of Transportation (Non-Medical):    Physical Activity:      Days of Exercise per Week:      Minutes of Exercise per Session:    Stress:      Feeling of Stress :    Social Connections:      Frequency of Communication with Friends and Family:      Frequency of Social Gatherings with Friends and Family:      Attends Synagogue Services:      Active Member of Clubs or Organizations:      Attends Club or Organization Meetings:      Marital Status:    Intimate Partner Violence:      Fear of Current or Ex-Partner:      Emotionally Abused:      Physically Abused:      Sexually Abused:         Physical Exam  Vitals: /64   Pulse 88   Temp 98.3  F (36.8  C)   Resp 20   Ht 6' (1.829 m)   Wt 255 lb 9.6 oz (115.9 kg)   BMI 34.67 kg/m    Weight is 255 lbs 9.6 oz          Body mass index is 34.67 kg/m .  General: This is a 81 year old male who appears their reported age, not in acute distress  Head: normocephalic, Atraumatic;  not wearing glasses; non-icteric sclera; no exudate; mild hearing loss   Respiratory: Clear throughout all lung fields; unlabored breathing; no cough   Cardiac: Regular, Rate and Rhythm, no murmurs appreciated   Skin: Uniformly warm and dry  Psych: Alert and oriented x4; calm and cooperative throughout exam  Abdomen: Normal bowel sounds. Soft, symmetric, no guarding or rigidity, nontender with palpation.  No organomegaly or masses palpated.   Lymphatics: No palpable nodes to bilateral groin   Extremities: left thigh with significant non-pitting edema; tissues feel somewhat firm and fibrotic no erytehma on the thigh; the left shin with full thickness ulcer which measures 8x4x0.1cm; the wound bed was mushy and fluctuant this was incised and 50cc of black old blood was expressed; underneath there was extensive circumferential undermining; which measures 2.5cm at 12 o'clock; 4.5cm at 3 o'clock; 3.5cm at 6 o'clock and 3.5cm at 9 o'clock; there is extensive erythema and warmth to the leg; it has receded some from the previous outline on the leg;  strength testing revealed 4/4 to BLEs. Calf was soft -Franca's sign    Sensation: Intact to pinprick and light touch throughout lower extremities bilaterally     Peripheral Vascular: normal dorsalis pedis, posterior tibial pulses to left foot , using a handheld doppler these were strong; biphasic in nature.  Foot was warm. Good capillary refill. No unusual venous distention. Positive for hemosiderin deposition or hyperpigmentation and fibrosis or scarring  Negative for spider veins and telangiectasias      Circumferential volume measures:    No flowsheet data found.    Ulceration(s)/Wound(s):     @[facility](10,16,17)@    Laboratory studies:     I personally reviewed the following lab results today and those on care everywhere, if indicated     CRP   Date Value Ref Range Status   04/04/2018 <0.1 0.0 - 0.8 mg/dL Final      No results found for: SED   Last Renal Panel:  Sodium   Date  Value Ref Range Status   02/09/2021 141 136 - 145 mmol/L Final     Potassium   Date Value Ref Range Status   02/09/2021 4.3 3.5 - 5.0 mmol/L Final     Chloride   Date Value Ref Range Status   02/09/2021 107 98 - 107 mmol/L Final     Carbon Dioxide (CO2)   Date Value Ref Range Status   02/09/2021 28 22 - 31 mmol/L Final     Anion Gap   Date Value Ref Range Status   02/09/2021 6 5 - 18 mmol/L Final     Glucose   Date Value Ref Range Status   02/09/2021 121 70 - 125 mg/dL Final     Urea Nitrogen   Date Value Ref Range Status   02/09/2021 17 8 - 28 mg/dL Final     Creatinine   Date Value Ref Range Status   02/09/2021 0.97 0.70 - 1.30 mg/dL Final     GFR Estimate   Date Value Ref Range Status   02/09/2021 >60 >60 mL/min/1.73m2 Final     Calcium   Date Value Ref Range Status   02/09/2021 9.9 8.5 - 10.5 mg/dL Final     Albumin   Date Value Ref Range Status   04/04/2018 3.8 3.5 - 5.0 g/dL Final      Lab Results   Component Value Date    WBC 7.5 02/09/2021     Lab Results   Component Value Date    RBC 5.10 02/09/2021     Lab Results   Component Value Date    HGB 15.8 02/09/2021     Lab Results   Component Value Date    HCT 43.7 02/09/2021     No components found for: MCT  Lab Results   Component Value Date    MCV 86 02/09/2021     Lab Results   Component Value Date    MCH 31.0 02/09/2021     Lab Results   Component Value Date    MCHC 36.2 02/09/2021     Lab Results   Component Value Date    RDW 14.1 02/09/2021     Lab Results   Component Value Date     02/09/2021      Lab Results   Component Value Date    A1C 5.7 02/09/2021    A1C 5.9 10/13/2020    A1C 5.8 07/08/2020    A1C 6.1 09/19/2019      No results found for: TSH   No results found for: VITDT     @LABRCNTIP[cult:*@              Impression:   No diagnosis found.          Assessment/Plan:  1. Debridement: After discussion of risk factors and verbal consent was obtained 2% Lidocaine HCL jelly was applied, under clean conditions, the left shin ulceration(s) were  debrided using currette or #15 blade scalpel. Devitalized and nonviable tissue, along with any fibrin and slough, was removed to improve granulation tissue formation, stimulate wound healing, decrease overall bacteria load, disrupt biofilm formation and decrease edge senescence.  Total excisional debridement was 32 sq cm from the epidermis/dermis area or into the subcutaneous tissue with a depth of 0.1 cm.   Ulcers were improved afterwards and .  Measures were unchanged after debridement.    2. Treatment: wound treatment will include irrigation and dressings to promote autolytic debridement which will include: concerning for continued cellulitis; will treat with course of keflex; see him back in 2 days; told him several times to monitor for worsening s/sx of infection and to go to ER; will apply silvercel; ABD; rolled gauze; will change in 2 days at his appt. Will obtain ultrasound to evaluate for DVT and also look for venous insufficiency. Sent message to surgeon as well; see note below    3. Edema. He has chronic swelling of the left thigh; will obtain abdominal pelvic CT to evaluate for proximal obstruction; will obtain left thigh CT to evaluate for underlying neoplasm; will later discuss case with Dr. Peters once he has dealt with his acute issues on the left shin    4. Offloading: na    5. Nutrition: will focus on protein; low sodium diet    6. Left leg hematoma: due to the quality of the wound bed the left leg wound was incised with 15 blade scalpel and was able to express approx 50cc of old black blood; there was extensive tissue loss underneath; measuring approx [id number]; I have sent Dr. Mcleod a message for added insight. He may want to see the patient in consultation vs; getting him admitted for washout      Patient to return to clinic in 2 days for re-evaluation. They were instructed to call the clinic sooner with any further questions or concerns. Answered all questions.              Sulma ABRAHAM  Phillip YARBROUGH, RN, CNP, CWOCN  [facility]  [phone]      This note was electronically signed by Sulma Fisher NP

## 2021-07-15 ENCOUNTER — ANCILLARY PROCEDURE (OUTPATIENT)
Dept: VASCULAR ULTRASOUND | Facility: CLINIC | Age: 81
End: 2021-07-15
Attending: NURSE PRACTITIONER
Payer: COMMERCIAL

## 2021-07-15 DIAGNOSIS — M79.89 LEFT LEG SWELLING: ICD-10-CM

## 2021-07-15 DIAGNOSIS — M79.89 SWELLING OF THIGH: ICD-10-CM

## 2021-07-15 DIAGNOSIS — L03.116 CELLULITIS OF LEFT LOWER EXTREMITY: ICD-10-CM

## 2021-07-15 DIAGNOSIS — I89.0 SECONDARY LYMPHEDEMA: ICD-10-CM

## 2021-07-15 PROCEDURE — 93971 EXTREMITY STUDY: CPT | Mod: 26 | Performed by: SURGERY

## 2021-07-15 PROCEDURE — 93971 EXTREMITY STUDY: CPT | Mod: LT

## 2021-07-16 ENCOUNTER — TELEPHONE (OUTPATIENT)
Dept: VASCULAR SURGERY | Facility: CLINIC | Age: 81
End: 2021-07-16

## 2021-07-16 ENCOUNTER — OFFICE VISIT (OUTPATIENT)
Dept: VASCULAR SURGERY | Facility: CLINIC | Age: 81
End: 2021-07-16
Payer: COMMERCIAL

## 2021-07-16 VITALS
HEART RATE: 92 BPM | SYSTOLIC BLOOD PRESSURE: 118 MMHG | DIASTOLIC BLOOD PRESSURE: 76 MMHG | TEMPERATURE: 97.7 F | WEIGHT: 257 LBS | BODY MASS INDEX: 34.86 KG/M2 | RESPIRATION RATE: 18 BRPM

## 2021-07-16 DIAGNOSIS — M79.89 LEFT LEG SWELLING: ICD-10-CM

## 2021-07-16 DIAGNOSIS — M79.89 SWELLING OF THIGH: ICD-10-CM

## 2021-07-16 DIAGNOSIS — I89.0 SECONDARY LYMPHEDEMA: ICD-10-CM

## 2021-07-16 DIAGNOSIS — L03.116 CELLULITIS OF LEFT LOWER EXTREMITY: Primary | ICD-10-CM

## 2021-07-16 DIAGNOSIS — I87.303 VENOUS HYPERTENSION OF LOWER EXTREMITY, BILATERAL: ICD-10-CM

## 2021-07-16 DIAGNOSIS — S80.11XA LEG HEMATOMA, RIGHT, INITIAL ENCOUNTER: ICD-10-CM

## 2021-07-16 DIAGNOSIS — R60.9 DEPENDENT EDEMA: ICD-10-CM

## 2021-07-16 DIAGNOSIS — I87.2 VENOUS INSUFFICIENCY OF BOTH LOWER EXTREMITIES: ICD-10-CM

## 2021-07-16 PROCEDURE — 11042 DBRDMT SUBQ TIS 1ST 20SQCM/<: CPT | Performed by: NURSE PRACTITIONER

## 2021-07-16 PROCEDURE — 11044 DBRDMT BONE 1ST 20 SQ CM/<: CPT | Performed by: NURSE PRACTITIONER

## 2021-07-16 PROCEDURE — 11045 DBRDMT SUBQ TISS EACH ADDL: CPT | Performed by: NURSE PRACTITIONER

## 2021-07-16 RX ORDER — LIDOCAINE HYDROCHLORIDE 20 MG/ML
JELLY TOPICAL PRN
Status: ACTIVE | OUTPATIENT
Start: 2021-07-16

## 2021-07-16 ASSESSMENT — PAIN SCALES - GENERAL: PAINLEVEL: MILD PAIN (2)

## 2021-07-16 NOTE — PROGRESS NOTES
Follow up Vascular Visit       Date of Service:07/16/21      Chief Complaint: left leg hematoma and cellulitis      Pt returns to [facility] with regards to their left leg hematoma and cellulits.  They arrive today alone. He was last seen 2 days ago; he had completed his course of bactrim but the erythema and warmth to the tissues persisted; he was started on a course of kelfex; he has started taking this and is tolerating well. At his last visit we drained the hematoma; and started him on compression; he has tolerated this well. They are currently using silvercel; ABD to the wounds. This is being done by staff at our clinic. They are using tubular compression and short stretch for compression. They are feeling well today. Denies fevers, chills. No shortness of breath. I conferred with Dr. Mcleod to review the case and he thought the patient would do well with conservative management at this time. Pt also has chronic issues of 20+year left thigh swelling; which I have ordered abdominal, pelvic, left thigh CT scans to further evaluate. We had him complete venous insufficiency yesterday this was negative for DVT and his valves in the veins were competent. Results were d/w him today and answered all questions.     Allergies: No Known Allergies    Medications:   Current Outpatient Medications:      carvediloL (COREG) 25 MG tablet, [CARVEDILOL (COREG) 25 MG TABLET] Take 1 tablet (25 mg total) by mouth 2 (two) times a day with meals., Disp: 180 tablet, Rfl: 3     cephALEXin (KEFLEX) 500 MG capsule, Take 1 capsule (500 mg) by mouth 4 times daily for 14 days, Disp: 56 capsule, Rfl: 0     cholecalciferol, vitamin D3, (VITAMIN D3) 2,000 unit cap, [CHOLECALCIFEROL, VITAMIN D3, (VITAMIN D3) 2,000 UNIT CAP] Take 2,000 Units by mouth daily., Disp: , Rfl:      magnesium citrate 1.745 GM/30ML solution, , Disp: , Rfl:      mupirocin (BACTROBAN) 2 % external ointment, ELIZABET EXT AA TID, Disp: , Rfl:       polyethylene glycol (MIRALAX) 17 GM/Dose powder, , Disp: , Rfl:      sulfamethoxazole-trimethoprim (BACTRIM DS) 800-160 MG tablet, TAKE 1 TABLET BY MOUTH TWICE DAILY FOR 10 DAYS, Disp: , Rfl:      warfarin ANTICOAGULANT (COUMADIN/JANTOVEN) 5 MG tablet, [WARFARIN ANTICOAGULANT (COUMADIN/JANTOVEN) 5 MG TABLET] TAKE 1/2 TO 1 TABLET BY MOUTH EVERY DAY, ADJUST DOSE PER INR RESULTS AS DIRECTED, Disp: 90 tablet, Rfl: 1    Current Facility-Administered Medications:      lidocaine (XYLOCAINE) 2 % external gel, , Topical, PRN, Sulma Fisher NP    History:   Past Medical History:   Diagnosis Date     Actinic keratosis     Created by Conversion      Atrial fibrillation (H) 06/10/2014     Benign neoplasm of colon     Created by Conversion [employer] [facility] Annotation: May  5 2011  1:17PM -  ,  : 2006      Benign neoplasm of skin     Created by Conversion  Replacement Utility updated for latest IMO load     Calculus of kidney     Created by Conversion [facility] Annotation: May  4 2011  7:56AM - Keli Demar: STENT AND LASER      Calculus of ureter 4/4/2018     Cardiomyopathy (H) 06/16/2014     Cataract 2012     Coronary artery disease      Effusion of ankle and foot joint     Created by Conversion      Hydrarthrosis 2012     Hydronephrosis with urinary obstruction due to ureteral calculus 4/4/2018     Hypercholesteremia     Created by Conversion      Hyperlipidemia 2012     Impaired fasting glucose 9/19/2019     Internal derangement of knee     Created by Conversion  Replacement Utility updated for latest IMO load     Left hydrocele 9/19/2019     Malignant neoplasm of prostate (H)     Created by Conversion      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 Reoccurrence on 7/24/2017 and again persistent [id number] score of 4 with history of TIA-on warfarin Asymptomatic and on rate control since July 2017.      Plantar fasciitis of right foot      Prostate CA (H) 2011     Seborrheic keratosis 2012     Skin neoplasm     Of uncertain behavior     TIA (transient ischemic attack) 01/24/2012     Transient cerebral ischemia     Created by Conversion  Replacement Utility updated for latest IMO load     Tubular adenoma of colon 2006     Unspecified cataract     Created by Conversion      Ureterolithiasis        Physical Exam:    /76   Pulse 92   Temp 97.7  F (36.5  C)   Resp 18   Wt 257 lb (116.6 kg)   BMI 34.86 kg/m      General:  Patient presents to clinic in no apparent distress.  Head: normocephalic atraumatic  Psychiatric:  Alert and oriented x3.   Respiratory: unlabored breathing; no cough  Integumentary:  Skin is uniformly warm, dry and pink.    Wound #1 Location: left shin  Size: 7L x 3W x 1depth.  No sinus tract present, Wound base: initially there was a centrally black area this was trimmed out to reveal a much larger wound underneath; with extensive  undermining present. See undermining measures below in the fs Wound is full thickness. There is heavy drainage. Periwound: no denudement, erythema, induration, maceration or warmth.      VASC Wound Lt lower shin (Active)   Pre Size Length 7 07/16/21 0800   Pre Size Width 3 07/16/21 0800   Pre Size Depth 1 07/16/21 0800   Pre Total Sq cm 21 07/16/21 0800   Undermined circumferential @12 oclock 2.5ccm; @3o'clock 4.5cm @6o'clock 3.5 cm; @9 o'clock 3.5cm 07/16/21 0800   Number of days: 2       Circumferential Measures (cm)  Left - just above MTP: 27.3  Left Ankle: 25.5  Left Widest Calf: 40    Labs:    I personally reviewed the following lab results today and those on care everywhere, if indicated     CRP   Date Value Ref Range Status    04/04/2018 <0.1 0.0 - 0.8 mg/dL Final      No results found for: SED   Last Renal Panel:  Sodium   Date Value Ref Range Status   02/09/2021 141 136 - 145 mmol/L Final     Potassium   Date Value Ref Range Status   02/09/2021 4.3 3.5 - 5.0 mmol/L Final     Chloride   Date Value Ref Range Status   02/09/2021 107 98 - 107 mmol/L Final     Carbon Dioxide (CO2)   Date Value Ref Range Status   02/09/2021 28 22 - 31 mmol/L Final     Anion Gap   Date Value Ref Range Status   02/09/2021 6 5 - 18 mmol/L Final     Glucose   Date Value Ref Range Status   02/09/2021 121 70 - 125 mg/dL Final     Urea Nitrogen   Date Value Ref Range Status   02/09/2021 17 8 - 28 mg/dL Final     Creatinine   Date Value Ref Range Status   02/09/2021 0.97 0.70 - 1.30 mg/dL Final     GFR Estimate   Date Value Ref Range Status   02/09/2021 >60 >60 mL/min/1.73m2 Final     Calcium   Date Value Ref Range Status   02/09/2021 9.9 8.5 - 10.5 mg/dL Final     Albumin   Date Value Ref Range Status   04/04/2018 3.8 3.5 - 5.0 g/dL Final      Lab Results   Component Value Date    WBC 7.5 02/09/2021     Lab Results   Component Value Date    RBC 5.10 02/09/2021     Lab Results   Component Value Date    HGB 15.8 02/09/2021     Lab Results   Component Value Date    HCT 43.7 02/09/2021     No components found for: MCT  Lab Results   Component Value Date    MCV 86 02/09/2021     Lab Results   Component Value Date    MCH 31.0 02/09/2021     Lab Results   Component Value Date    MCHC 36.2 02/09/2021     Lab Results   Component Value Date    RDW 14.1 02/09/2021     Lab Results   Component Value Date     02/09/2021      Lab Results   Component Value Date    A1C 5.7 02/09/2021    A1C 5.9 10/13/2020    A1C 5.8 07/08/2020    A1C 6.1 09/19/2019      No results found for: TSH   No results found for: VITDT                Impression:  Encounter Diagnoses   Name Primary?     Cellulitis of left lower extremity Yes     Left leg swelling      Secondary lymphedema      Swelling  of thigh      Leg hematoma, right, initial encounter      Venous hypertension of lower extremity, bilateral      Venous insufficiency of both lower extremities      Dependent edema                      Are any of these wounds new today: No; Location: na    Assessment/Plan:          1. Debridement: After discussion of risk factors and verbal consent was obtained 2% Lidocaine HCL jelly was applied, under clean conditions, the left leg ulceration(s) were debrided using currette. Devitalized and nonviable tissue, along with any fibrin and slough, was removed to improve granulation tissue formation, stimulate wound healing, decrease overall bacteria load, disrupt biofilm formation and decrease edge senescence.  Total excisional debridement was 21 sq cm from the epidermis/dermis area and into the subcutaneous tissue with a depth of 1 cm.   Ulcers were improved afterwards and .  Measures were as noted on the flow sheet. Additional hematoma was evacuated using syringe; pulse technique; tolerated well.        2.  Wound treatment: wound treatment will include irrigation and dressings to promote autolytic debridement which will include:I have discussed the case with Dr. Mcleod; he wants to stay with conservative management for now; we do have better access to the wound bed today; will consider wound vac in the future; irrigated with copious dilute hibiclens today; will tuck wick long wick of silvercel; cover with ABD; rolled gauze; will continue on antibiotics; again discussed s/sx of worsening infection and when to seek medical attention; the erythema was improved today and their was no warmth to the tissues will have him see [facility] on Monday; I will see next Wednesday and Friday. Stable Continue keflex; will have him check INR on Monday. ADDENDUM: pt came back to the clinic 6 hours later to report bleeding through the dressing; this appears to be irrigation fluid with blood; we changed the dressing; told him to change  the outermost dressing as needed; may need to go to  over the weekend for dressing change.           3. Edema: swelling was improved today; will continue with elevation; tubular compression and short stretch; ultrasound was negative for DVT and incompetence; results were d/w patient today and answered all questions; will have him get scheduled for CT of the left thigh; abdomen and pelvis to work up the left thigh edema; if this negative will refer him to lymphedema therapy in a few months after the hematoma/wound is under better control. The compression wraps were applied today in clinic. Stable            4. Nutrition: focus on protein; low sodium           5. Offloading: na     Patient will follow up with me in 1 weeks for reevaluation. They were instructed to call the clinic sooner with any signs or symptoms of infection or any further questions/concerns. Answered all questions.          Sulma Fisher DNP, RN, CNP, CWOCN, CFCN, CLT  [facility]   [phone]        This note was electronically signed by Sulma Fisher NP

## 2021-07-16 NOTE — PATIENT INSTRUCTIONS
"Continue on Keflex 500mg  Four times per day for 10 days; Get INR checked on Monday    Patient can also  some probiotics such as Culturelle to help prevent Antibiotic associated diarrhea. They can take this 1 hour before or 2 hours after taking their antibiotic. Should not be taken at the same time as they can cancel out the effects.       If you are feeling ill; flu like symptoms; feverish; chills; confused; go to ER or call 911 right away    Your ultrasound of the leg was negative for DVT or blood clot in the leg   We also looked at the valves in your veins and these were all working correctly    We will get you scheduled for CT scan of the abdomen; pelvis and left thigh to further evaluate the cause of the chronic thigh swelling        Wound Care Instructions    Every M, W, F Irrigate with copious dilute hibiclens solution (30cc in 500cc) using 30cc syringe to your left leg wound(s) with Dilute hibiclens 30cc in 500cc NS.    Pat Dry with non-sterile gauze    Apply Lotion to the intact skin surrounding your wound and other dry skin locations. Some good lotions include: Remedy Skin Repair Cream, Sarna, Vanicream or Cetaphil    Primary Dressing: Apply silvercel into/onto the wounds; cut into long narrow strip; only use one strip; leave tail hanging out for easy retrieval     Secondary dressing: Cover with ABD;    Secure with non-sterile roll gauze (4\" x 75\" roll) and tape (1\" roll tape) as needed; avoid adhesive directly on the skin    Compression: tubular compression and short stretch    It is not ok to get your wound wet in the bath or shower    Elevate the leg throughout the day and night    SEEK MEDICAL CARE IF:    You have an increase in swelling, pain, or redness around the wound.    You have an increase in the amount of pus coming from the wound.    There is a bad smell coming from the wound.    The wound appears to be worsening/enlarging    You have a fever greater than 101.5 F      It is ok to " continue current wound care treatment/products for the next 2-3 days until new wound care supplies are ordered and arrive. If longer than this please contact our office at [phone].    It is recommended that you do not get your ulcer wet when showering.  Listed below are several ways of keeping it dry when you shower.     1. Wrap it with Press and Seal plastic wrap.  It can be found in the stores where the plastic wraps or tin foil is kept.               2.  Some people take a bath and hang their leg/foot out of the tub.                        3  Put your leg in a plastic bag and tape it on.           4. You can purchase a shower cover for casts at some pharmacies and through the Internet.            5. Take a Bed Bath or wash up at the sink

## 2021-07-16 NOTE — TELEPHONE ENCOUNTER
Writer left message to call [phone] for central scheduling to schedule the ct scan. Wound clinic phone number provided as well.

## 2021-07-16 NOTE — TELEPHONE ENCOUNTER
----- Message from Sulma Fisher NP sent at 7/16/2021 12:55 PM CDT -----  Regarding: schedule CT  Can you call the patient and assist him getting CT scan of the left thigh; abdomen and pelvis?    LK

## 2021-07-16 NOTE — TELEPHONE ENCOUNTER
Patient's leg was rewrapped in clinic with Sulma Fisher this morning and he is now bleeding through that wrap. Please call back at: [phone]

## 2021-07-19 ENCOUNTER — ANTICOAGULATION THERAPY VISIT (OUTPATIENT)
Dept: ANTICOAGULATION | Facility: CLINIC | Age: 81
End: 2021-07-19

## 2021-07-19 ENCOUNTER — LAB (OUTPATIENT)
Dept: LAB | Facility: CLINIC | Age: 81
End: 2021-07-19
Payer: COMMERCIAL

## 2021-07-19 ENCOUNTER — ALLIED HEALTH/NURSE VISIT (OUTPATIENT)
Dept: VASCULAR SURGERY | Facility: CLINIC | Age: 81
End: 2021-07-19
Payer: COMMERCIAL

## 2021-07-19 VITALS
SYSTOLIC BLOOD PRESSURE: 110 MMHG | DIASTOLIC BLOOD PRESSURE: 68 MMHG | TEMPERATURE: 97.7 F | RESPIRATION RATE: 16 BRPM | HEART RATE: 84 BPM

## 2021-07-19 DIAGNOSIS — I48.19 PERSISTENT ATRIAL FIBRILLATION (H): ICD-10-CM

## 2021-07-19 DIAGNOSIS — I48.19 PERSISTENT ATRIAL FIBRILLATION (H): Primary | ICD-10-CM

## 2021-07-19 DIAGNOSIS — L03.116 CELLULITIS OF LEFT LOWER EXTREMITY: Primary | ICD-10-CM

## 2021-07-19 LAB — INR BLD: 2.1 (ref 0.9–1.1)

## 2021-07-19 PROCEDURE — 29581 APPL MULTLAYER CMPRN SYS LEG: CPT

## 2021-07-19 PROCEDURE — 85610 PROTHROMBIN TIME: CPT

## 2021-07-19 PROCEDURE — 36415 COLL VENOUS BLD VENIPUNCTURE: CPT

## 2021-07-19 ASSESSMENT — PAIN SCALES - GENERAL: PAINLEVEL: MILD PAIN (2)

## 2021-07-19 NOTE — PATIENT INSTRUCTIONS
"Wound Care Instructions     Every M, W, F Irrigate with copious dilute hibiclens solution (30cc in 500cc) using 30cc syringe to your left leg wound(s) with Dilute hibiclens 30cc in 500cc NS.     Pat Dry with non-sterile gauze     Apply Lotion to the intact skin surrounding your wound and other dry skin locations. Some good lotions include: Remedy Skin Repair Cream, Sarna, Vanicream or Cetaphil     Primary Dressing: Apply silvercel into/onto the wounds; cut into long narrow strip; only use one strip; leave tail hanging out for easy retrieval      Secondary dressing: Cover with ABD;     Secure with non-sterile roll gauze (4\" x 75\" roll) and tape (1\" roll tape) as needed; avoid adhesive directly on the skin     Compression: tubular compression and short stretch     It is not ok to get your wound wet in the bath or shower     Elevate the leg throughout the day and night     SEEK MEDICAL CARE IF:    You have an increase in swelling, pain, or redness around the wound.    You have an increase in the amount of pus coming from the wound.    There is a bad smell coming from the wound.    The wound appears to be worsening/enlarging    You have a fever greater than 101.5 F        It is ok to continue current wound care treatment/products for the next 2-3 days until new wound care supplies are ordered and arrive. If longer than this please contact our office at [phone].     It is recommended that you do not get your ulcer wet when showering.  Listed below are several ways of keeping it dry when you shower.      1. Wrap it with Press and Seal plastic wrap.  It can be found in the stores where the plastic wraps or tin foil is kept.                  2.  Some people take a bath and hang their leg/foot out of the tub.                             3  Put your leg in a plastic bag and tape it on.             4. You can purchase a shower cover for casts at some pharmacies and through the Internet.                       5. Take a Bed " Bath or wash up at the sink

## 2021-07-19 NOTE — PROGRESS NOTES
Anticoagulation Management    Unable to reach Waldo today.    Today's INR result of 2.1 is therapeutic (goal INR of 2.0-3.0).  Result received from: Clinic Lab    Follow up required to give dosing/follow up instructions    No instructions provided. Unable to leave voicemail.      Anticoagulation clinic to follow up    Jaz Sterling RN     18

## 2021-07-19 NOTE — PROGRESS NOTES
I was asked by nursing staff to come check in on patient. He had a good weekend; did not need to go to  for evaluation or dressing change; he had minimal strike through on the old dressing. No pain. No fevers. Tolerating the antibiotics. The erythema continues to fade; no warmth of the tissues; the undermining is improved significantly.  We will tuck silvercel wick into the wound and have him return to clinic on Wednesday for dressing change again.

## 2021-07-19 NOTE — PROGRESS NOTES
Nurse Visit            Chief Complaint: Patient presents to clinic for assessment and treatment of their ulcer and and swelling    Dressing on Arrival: Moistened gauze tucked into wound and an ABD pad. Patient stated he took off his compression wrap yesterday.    Patient came in today for dressing change per order from Sulma Fisher CNP. Patient rated pain 2 out of 10. Removed dressings and cleaned skin with soap and cleaned wound bed with dilute Hibiclens using 30 mL syringe to irrigate. Applied lotion to intact skin.  Lightly packed silvercel to wound and covered with ABD, secured with roll gauze. Applied size F tubular compression wrap followed by short stretch wrap to the left leg. Patient tolerated dressing change well.     Allergies: No Known Allergies    Medications:   Current Outpatient Medications:      carvediloL (COREG) 25 MG tablet, [CARVEDILOL (COREG) 25 MG TABLET] Take 1 tablet (25 mg total) by mouth 2 (two) times a day with meals., Disp: 180 tablet, Rfl: 3     cephALEXin (KEFLEX) 500 MG capsule, Take 1 capsule (500 mg) by mouth 4 times daily for 14 days, Disp: 56 capsule, Rfl: 0     cholecalciferol, vitamin D3, (VITAMIN D3) 2,000 unit cap, [CHOLECALCIFEROL, VITAMIN D3, (VITAMIN D3) 2,000 UNIT CAP] Take 2,000 Units by mouth daily., Disp: , Rfl:      magnesium citrate 1.745 GM/30ML solution, , Disp: , Rfl:      mupirocin (BACTROBAN) 2 % external ointment, ELIZABET EXT AA TID, Disp: , Rfl:      polyethylene glycol (MIRALAX) 17 GM/Dose powder, , Disp: , Rfl:      sulfamethoxazole-trimethoprim (BACTRIM DS) 800-160 MG tablet, TAKE 1 TABLET BY MOUTH TWICE DAILY FOR 10 DAYS, Disp: , Rfl:      warfarin ANTICOAGULANT (COUMADIN/JANTOVEN) 5 MG tablet, [WARFARIN ANTICOAGULANT (COUMADIN/JANTOVEN) 5 MG TABLET] TAKE 1/2 TO 1 TABLET BY MOUTH EVERY DAY, ADJUST DOSE PER INR RESULTS AS DIRECTED, Disp: 90 tablet, Rfl: 1    Current Facility-Administered Medications:      lidocaine (XYLOCAINE) 2 % external gel, , Topical, PRN,  Sulma Fisher, NP    Vital Signs: /68   Pulse 84   Temp 97.7  F (36.5  C) (Oral)   Resp 16       Assessment:    General:  Patient presents to clinic in no apparent distress.  Psychiatric:  Alert and oriented .   Lower extremity:  edema is present.    Integumentary:  Skin is uniformly warm, dry and pink.    Wound size:   VASC Wound Lt lower shin (Active)   Pre Size Length 1.5 07/19/21 0900   Pre Size Width 1.5 07/19/21 0900   Pre Size Depth 0.8 07/19/21 0900   Pre Total Sq cm 21 07/16/21 0800   Undermined 3-10 o'clock- deepest 2 cm @ 5  07/19/21 0900      Undermining is present at 3-10 o'clock.    The periwoundskin is pink      Plan:         1. Patient will follow up on 7/21/21         2. Treatment provided will include irrigation and dressings to promote autolytic debridement and will be as listed below     Cleansed with: Dilute Hibiclens    Protected skin with: Remedy Skin Repair    Dressings Applied: Silver alginate    Compression Applied to the Left Leg: Short Stretch    Short Stretch was applied from base of toes to just below the bend of knee with tubular compression underneath     Compression Applied to the Right Leg: None    Offloading applied: None  Trial Products: no  Provider notified regarding concerns: no  Treatment Changes: no    Educational Barriers: None  Taught Regarding: Compression, wound care, elevation  Teaching Method: Verbal, AVS        '

## 2021-07-20 NOTE — PROGRESS NOTES
ACN called patient again to follow-up on INR from yesterday.   Waldo did answer phone, when writer attempted to confirm he was the patient, he hung up on writer.     Will send [employer] message and close encounter.       Jaz Sterling RN  [facility]  [phone]

## 2021-07-21 ENCOUNTER — OFFICE VISIT (OUTPATIENT)
Dept: VASCULAR SURGERY | Facility: CLINIC | Age: 81
End: 2021-07-21
Payer: COMMERCIAL

## 2021-07-21 VITALS
RESPIRATION RATE: 18 BRPM | SYSTOLIC BLOOD PRESSURE: 108 MMHG | HEART RATE: 80 BPM | TEMPERATURE: 97.7 F | DIASTOLIC BLOOD PRESSURE: 62 MMHG

## 2021-07-21 DIAGNOSIS — L03.116 CELLULITIS OF LEFT LOWER EXTREMITY: Primary | ICD-10-CM

## 2021-07-21 DIAGNOSIS — M79.89 SWELLING OF THIGH: ICD-10-CM

## 2021-07-21 DIAGNOSIS — I87.303 VENOUS HYPERTENSION OF LOWER EXTREMITY, BILATERAL: ICD-10-CM

## 2021-07-21 DIAGNOSIS — I89.0 SECONDARY LYMPHEDEMA: ICD-10-CM

## 2021-07-21 DIAGNOSIS — M79.89 LEFT LEG SWELLING: ICD-10-CM

## 2021-07-21 DIAGNOSIS — R60.9 DEPENDENT EDEMA: ICD-10-CM

## 2021-07-21 DIAGNOSIS — E66.01 CLASS 2 SEVERE OBESITY DUE TO EXCESS CALORIES WITH SERIOUS COMORBIDITY AND BODY MASS INDEX (BMI) OF 35.0 TO 35.9 IN ADULT (H): ICD-10-CM

## 2021-07-21 DIAGNOSIS — E66.812 CLASS 2 SEVERE OBESITY DUE TO EXCESS CALORIES WITH SERIOUS COMORBIDITY AND BODY MASS INDEX (BMI) OF 35.0 TO 35.9 IN ADULT (H): ICD-10-CM

## 2021-07-21 DIAGNOSIS — I87.2 VENOUS INSUFFICIENCY OF BOTH LOWER EXTREMITIES: ICD-10-CM

## 2021-07-21 DIAGNOSIS — S80.11XA LEG HEMATOMA, RIGHT, INITIAL ENCOUNTER: ICD-10-CM

## 2021-07-21 PROCEDURE — 250N000009 HC RX 250: Performed by: NURSE PRACTITIONER

## 2021-07-21 PROCEDURE — 11042 DBRDMT SUBQ TIS 1ST 20SQCM/<: CPT | Performed by: NURSE PRACTITIONER

## 2021-07-21 RX ADMIN — LIDOCAINE HYDROCHLORIDE: 20 JELLY TOPICAL at 08:24

## 2021-07-21 ASSESSMENT — PAIN SCALES - GENERAL: PAINLEVEL: NO PAIN (0)

## 2021-07-21 NOTE — PATIENT INSTRUCTIONS
"Wound Care Instructions     Every M, W, F Irrigate with copious dilute hibiclens solution (30cc in 500cc) using 30cc syringe to your left leg wound(s) with Dilute hibiclens 30cc in 500cc NS.     Pat Dry with non-sterile gauze     Apply Lotion to the intact skin surrounding your wound and other dry skin locations. Some good lotions include: Remedy Skin Repair Cream, Sarna, Vanicream or Cetaphil     Primary Dressing: Apply silvercel into/onto the wounds; cut into long narrow strip; only use one strip; leave tail hanging out for easy retrieval      Apply skin prep to the skin surrounding the wound    Secondary dressing: Cover with plain calcium alginate sheet and then ABD;     Secure with non-sterile roll gauze (4\" x 75\" roll) and tape (1\" roll tape) as needed; avoid adhesive directly on the skin     Compression: tubular compression and short stretch     It is not ok to get your wound wet in the bath or shower     Elevate the leg throughout the day and night     SEEK MEDICAL CARE IF:    You have an increase in swelling, pain, or redness around the wound.    You have an increase in the amount of pus coming from the wound.    There is a bad smell coming from the wound.    The wound appears to be worsening/enlarging    You have a fever greater than 101.5 F        It is ok to continue current wound care treatment/products for the next 2-3 days until new wound care supplies are ordered and arrive. If longer than this please contact our office at [phone].     It is recommended that you do not get your ulcer wet when showering.  Listed below are several ways of keeping it dry when you shower.      1. Wrap it with Press and Seal plastic wrap.  It can be found in the stores where the plastic wraps or tin foil is kept.                  2.  Some people take a bath and hang their leg/foot out of the tub.                             3  Put your leg in a plastic bag and tape it on.             4. You can purchase a shower cover " for casts at some pharmacies and through the Internet.                       5. Take a Bed Bath or wash up at the sink

## 2021-07-21 NOTE — PROGRESS NOTES
Follow up Vascular Visit       Date of Service:07/21/21      Chief Complaint: left leg hematoma; acute; left thigh swelling; chronic      Pt returns to [facility] with regards to their left leg hematoma, acute and left thigh swelling chronic.  They arrive today alone. They are currently using silvercel to the wounds. This is being done by staff at our clinic 3 days per week. They are using tubular compression and short stretch for compression. They are feeling well today. Denies fevers, chills. No shortness of breath. He continues on Keflex tolerating well.     Allergies: No Known Allergies    Medications:   Current Outpatient Medications:      carvediloL (COREG) 25 MG tablet, [CARVEDILOL (COREG) 25 MG TABLET] Take 1 tablet (25 mg total) by mouth 2 (two) times a day with meals., Disp: 180 tablet, Rfl: 3     cephALEXin (KEFLEX) 500 MG capsule, Take 1 capsule (500 mg) by mouth 4 times daily for 14 days, Disp: 56 capsule, Rfl: 0     cholecalciferol, vitamin D3, (VITAMIN D3) 2,000 unit cap, [CHOLECALCIFEROL, VITAMIN D3, (VITAMIN D3) 2,000 UNIT CAP] Take 2,000 Units by mouth daily., Disp: , Rfl:      magnesium citrate 1.745 GM/30ML solution, , Disp: , Rfl:      mupirocin (BACTROBAN) 2 % external ointment, ELIZABET EXT AA TID, Disp: , Rfl:      polyethylene glycol (MIRALAX) 17 GM/Dose powder, , Disp: , Rfl:      sulfamethoxazole-trimethoprim (BACTRIM DS) 800-160 MG tablet, TAKE 1 TABLET BY MOUTH TWICE DAILY FOR 10 DAYS, Disp: , Rfl:      warfarin ANTICOAGULANT (COUMADIN/JANTOVEN) 5 MG tablet, [WARFARIN ANTICOAGULANT (COUMADIN/JANTOVEN) 5 MG TABLET] TAKE 1/2 TO 1 TABLET BY MOUTH EVERY DAY, ADJUST DOSE PER INR RESULTS AS DIRECTED, Disp: 90 tablet, Rfl: 1    Current Facility-Administered Medications:      lidocaine (XYLOCAINE) 2 % external gel, , Topical, PRN, Sulma Fisher, NP, Given at 07/21/21 0824    History:   Past Medical History:   Diagnosis Date     Actinic keratosis     Created by Conversion       Atrial fibrillation (H) 06/10/2014     Benign neoplasm of colon     Created by Conversion [employer] Annotation: May  5 2011  1:17PM -  ,  : 2006      Benign neoplasm of skin     Created by Conversion  Replacement Utility updated for latest IMO load     Calculus of kidney     Created by Conversion [facility] Annotation: May  4 2011  7:56AM - Keli, Demar: STENT AND LASER      Calculus of ureter 4/4/2018     Cardiomyopathy (H) 06/16/2014     Cataract 2012     Coronary artery disease      Effusion of ankle and foot joint     Created by Conversion      Hydrarthrosis 2012     Hydronephrosis with urinary obstruction due to ureteral calculus 4/4/2018     Hypercholesteremia     Created by Conversion      Hyperlipidemia 2012     Impaired fasting glucose 9/19/2019     Internal derangement of knee     Created by Conversion  Replacement Utility updated for latest IMO load     Left hydrocele 9/19/2019     Malignant neoplasm of prostate (H)     Created by Conversion      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 Reoccurrence on 7/24/2017 and again persistent [id number] score of 4 with history of TIA-on warfarin Asymptomatic and on rate control since July 2017.      Plantar fasciitis of right foot      Prostate CA (H) 2011     Seborrheic keratosis 2012     Skin neoplasm     Of uncertain behavior     TIA (transient ischemic attack) 01/24/2012     Transient cerebral ischemia     Created by Conversion  Replacement Utility updated for latest IMO load     Tubular adenoma of colon 2006     Unspecified cataract     Created by Conversion      Ureterolithiasis        Physical Exam:     /62   Pulse 80   Temp 97.7  F (36.5  C)   Resp 18     General:  Patient presents to clinic in no apparent distress.  Head: normocephalic atraumatic  Psychiatric:  Alert and oriented x3.   Respiratory: unlabored breathing; no cough  Integumentary:  Skin is uniformly warm, dry and pink.    Wound #1 Location: left shin  Size: 1.4L x 1.5W x 0.5depth.  No sinus tract present, Wound base: rd; viable  +undermining present; 2.5cm from 2-5 o'clock. Wound is full thickness. There is heavy drainage. Periwound: no denudement, erythema, induration, maceration or warmth.      VASC Wound Lt lower shin (Active)   Pre Size Length 1.4 07/21/21 0800   Pre Size Width 1.5 07/21/21 0800   Pre Size Depth 0.5 07/21/21 0800   Pre Total Sq cm 2.1 07/21/21 0800   Undermined 2.5 07/21/21 0800   Number of days: 7            Circumferential volume measures:             Labs:    I personally reviewed the following lab results today and those on care everywhere, if indicated     CRP   Date Value Ref Range Status   04/04/2018 <0.1 0.0 - 0.8 mg/dL Final      No results found for: SED   Last Renal Panel:  Sodium   Date Value Ref Range Status   02/09/2021 141 136 - 145 mmol/L Final     Potassium   Date Value Ref Range Status   02/09/2021 4.3 3.5 - 5.0 mmol/L Final     Chloride   Date Value Ref Range Status   02/09/2021 107 98 - 107 mmol/L Final     Carbon Dioxide (CO2)   Date Value Ref Range Status   02/09/2021 28 22 - 31 mmol/L Final     Anion Gap   Date Value Ref Range Status   02/09/2021 6 5 - 18 mmol/L Final     Glucose   Date Value Ref Range Status   02/09/2021 121 70 - 125 mg/dL Final     Urea Nitrogen   Date Value Ref Range Status   02/09/2021 17 8 - 28 mg/dL Final     Creatinine   Date Value Ref Range Status   02/09/2021 0.97 0.70 - 1.30 mg/dL Final     GFR Estimate   Date Value Ref Range Status   02/09/2021 >60 >60 mL/min/1.73m2 Final     Calcium   Date Value Ref Range Status   02/09/2021 9.9 8.5 - 10.5 mg/dL Final     Albumin    Date Value Ref Range Status   04/04/2018 3.8 3.5 - 5.0 g/dL Final      Lab Results   Component Value Date    WBC 7.5 02/09/2021     Lab Results   Component Value Date    RBC 5.10 02/09/2021     Lab Results   Component Value Date    HGB 15.8 02/09/2021     Lab Results   Component Value Date    HCT 43.7 02/09/2021     No components found for: MCT  Lab Results   Component Value Date    MCV 86 02/09/2021     Lab Results   Component Value Date    MCH 31.0 02/09/2021     Lab Results   Component Value Date    MCHC 36.2 02/09/2021     Lab Results   Component Value Date    RDW 14.1 02/09/2021     Lab Results   Component Value Date     02/09/2021      Lab Results   Component Value Date    A1C 5.7 02/09/2021    A1C 5.9 10/13/2020    A1C 5.8 07/08/2020    A1C 6.1 09/19/2019      No results found for: TSH   No results found for: VITDT                Impression:  Encounter Diagnoses   Name Primary?     Cellulitis of left lower extremity Yes     Left leg swelling      Secondary lymphedema      Swelling of thigh      Leg hematoma, right, initial encounter      Venous hypertension of lower extremity, bilateral      Venous insufficiency of both lower extremities      Dependent edema      Class 2 severe obesity due to excess calories with serious comorbidity and body mass index (BMI) of 35.0 to 35.9 in adult (H)           7/21/2021 left shin         7/14/2021 left shin            Are any of these wounds new today: No; Location: na    Assessment/Plan:          1. Debridement: After discussion of risk factors and verbal consent was obtained 2% Lidocaine HCL jelly was applied, under clean conditions, the left shin ulceration(s) were debrided using currette. Devitalized and nonviable tissue, along with any fibrin and slough, was removed to improve granulation tissue formation, stimulate wound healing, decrease overall bacteria load, disrupt biofilm formation and decrease edge senescence.  Total excisional debridement was 2.1 sq  cm from the epidermis/dermis area and into the subcutaneous tissue with a depth of 0.5 cm.   Ulcers were improved afterwards and .  Measures were unchanged after debridement.       2.  Wound treatment: wound treatment will include irrigation and dressings to promote autolytic debridement which will include:will continue to irrigate with dilute hibiclens; flush with syringe; pat dry; apply silvercel into the wound cavity; cover with ABD; rolled gauze; will change at clinic 2-3 times per week; continue on keflex INRs have been stable; managing with anticoagulation clinic; Improved undermining is significantly improved; cellultis; is nearly resolved; no further tissue necrosis           3. Edema: continue with tubular compression and short stretch; will get scheduled for abd/pelvic/LLE CT scan to evaluate the chronic left thigh swelling; if this is benign will refer to lymphedema therapy. The compression wraps were applied today in clinic. Stable            4. Nutrition: continue to focus on protein in the diet           5. Offloading: na     Patient will follow up with me in 2 days for reevaluation. They were instructed to call the clinic sooner with any signs or symptoms of infection or any further questions/concerns. Answered all questions.          Sulma Fisher DNP, RN, CNP, CWOCN, CFCN, CLT  [facility]   [phone]        This note was electronically signed by Sulma Fisher NP

## 2021-07-23 ENCOUNTER — OFFICE VISIT (OUTPATIENT)
Dept: VASCULAR SURGERY | Facility: CLINIC | Age: 81
End: 2021-07-23
Attending: NURSE PRACTITIONER
Payer: COMMERCIAL

## 2021-07-23 VITALS
SYSTOLIC BLOOD PRESSURE: 112 MMHG | RESPIRATION RATE: 18 BRPM | DIASTOLIC BLOOD PRESSURE: 68 MMHG | HEART RATE: 88 BPM | TEMPERATURE: 98 F

## 2021-07-23 DIAGNOSIS — S80.12XD LEG HEMATOMA, LEFT, SUBSEQUENT ENCOUNTER: ICD-10-CM

## 2021-07-23 DIAGNOSIS — I89.0 SECONDARY LYMPHEDEMA: ICD-10-CM

## 2021-07-23 DIAGNOSIS — M79.89 SWELLING OF THIGH: ICD-10-CM

## 2021-07-23 DIAGNOSIS — I87.2 VENOUS INSUFFICIENCY OF BOTH LOWER EXTREMITIES: ICD-10-CM

## 2021-07-23 DIAGNOSIS — I87.303 VENOUS HYPERTENSION OF LOWER EXTREMITY, BILATERAL: ICD-10-CM

## 2021-07-23 DIAGNOSIS — M79.89 LEFT LEG SWELLING: ICD-10-CM

## 2021-07-23 DIAGNOSIS — L03.116 CELLULITIS OF LEFT LOWER EXTREMITY: Primary | ICD-10-CM

## 2021-07-23 DIAGNOSIS — R60.9 DEPENDENT EDEMA: ICD-10-CM

## 2021-07-23 PROCEDURE — 11042 DBRDMT SUBQ TIS 1ST 20SQCM/<: CPT | Performed by: NURSE PRACTITIONER

## 2021-07-23 RX ADMIN — LIDOCAINE HYDROCHLORIDE: 20 JELLY TOPICAL at 09:20

## 2021-07-23 ASSESSMENT — PAIN SCALES - GENERAL: PAINLEVEL: NO PAIN (0)

## 2021-07-23 NOTE — PROGRESS NOTES
Follow up Vascular Visit       Date of Service:07/23/21      Chief Complaint: left leg hematoma; acute; left thigh swelling; chronic      Pt returns to [facility] with regards to their left leg hematoma; acute and left thigh swelling; chronic.  They arrive today alone. They are currently using silvercel; plain alginate; ABD to the wounds. This is being done by staff at our clinic 2-3 days per week. They are using tubular compression and short stretch for compression. They are feeling well today. Denies fevers, chills. No shortness of breath.     Allergies: No Known Allergies    Medications:   Current Outpatient Medications:      carvediloL (COREG) 25 MG tablet, [CARVEDILOL (COREG) 25 MG TABLET] Take 1 tablet (25 mg total) by mouth 2 (two) times a day with meals., Disp: 180 tablet, Rfl: 3     cephALEXin (KEFLEX) 500 MG capsule, Take 1 capsule (500 mg) by mouth 4 times daily for 14 days, Disp: 56 capsule, Rfl: 0     cholecalciferol, vitamin D3, (VITAMIN D3) 2,000 unit cap, [CHOLECALCIFEROL, VITAMIN D3, (VITAMIN D3) 2,000 UNIT CAP] Take 2,000 Units by mouth daily., Disp: , Rfl:      magnesium citrate 1.745 GM/30ML solution, , Disp: , Rfl:      mupirocin (BACTROBAN) 2 % external ointment, ELIZABET EXT AA TID, Disp: , Rfl:      polyethylene glycol (MIRALAX) 17 GM/Dose powder, , Disp: , Rfl:      sulfamethoxazole-trimethoprim (BACTRIM DS) 800-160 MG tablet, TAKE 1 TABLET BY MOUTH TWICE DAILY FOR 10 DAYS, Disp: , Rfl:      warfarin ANTICOAGULANT (COUMADIN/JANTOVEN) 5 MG tablet, [WARFARIN ANTICOAGULANT (COUMADIN/JANTOVEN) 5 MG TABLET] TAKE 1/2 TO 1 TABLET BY MOUTH EVERY DAY, ADJUST DOSE PER INR RESULTS AS DIRECTED, Disp: 90 tablet, Rfl: 1    Current Facility-Administered Medications:      lidocaine (XYLOCAINE) 2 % external gel, , Topical, PRN, Sulma Fisher, NP, Given at 07/23/21 0920    History:   Past Medical History:   Diagnosis Date     Actinic keratosis     Created by Conversion      Atrial  fibrillation (H) 06/10/2014     Benign neoplasm of colon     Created by Conversion [employer] Annotation: May  5 2011  1:17PM -  ,  : 2006      Benign neoplasm of skin     Created by Conversion  Replacement Utility updated for latest IMO load     Calculus of kidney     Created by Conversion [facility] Annotation: May  4 2011  7:56AM - Keli, Demar: STENT AND LASER      Calculus of ureter 4/4/2018     Cardiomyopathy (H) 06/16/2014     Cataract 2012     Coronary artery disease      Effusion of ankle and foot joint     Created by Conversion      Hydrarthrosis 2012     Hydronephrosis with urinary obstruction due to ureteral calculus 4/4/2018     Hypercholesteremia     Created by Conversion      Hyperlipidemia 2012     Impaired fasting glucose 9/19/2019     Internal derangement of knee     Created by Conversion  Replacement Utility updated for latest IMO load     Left hydrocele 9/19/2019     Malignant neoplasm of prostate (H)     Created by Conversion      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 Reoccurrence on 7/24/2017 and again persistent [id number] score of 4 with history of TIA-on warfarin Asymptomatic and on rate control since July 2017.      Plantar fasciitis of right foot      Prostate CA (H) 2011     Seborrheic keratosis 2012     Skin neoplasm     Of uncertain behavior     TIA (transient ischemic attack) 01/24/2012     Transient cerebral ischemia     Created by Conversion  Replacement Utility updated for latest IMO load     Tubular adenoma of colon 2006     Unspecified cataract     Created by Conversion      Ureterolithiasis        Physical Exam:    BP  112/68   Pulse 88   Temp 98  F (36.7  C)   Resp 18     General:  Patient presents to clinic in no apparent distress.  Head: normocephalic atraumatic  Psychiatric:  Alert and oriented x3.   Respiratory: unlabored breathing; no cough  Integumentary:  Skin is uniformly warm, dry and pink.    Wound #1 Location: left shin  Size: 1.4L x 1.5W x 0.5depth.  No sinus tract present, Wound base: slough + undermining present 2.2cm from 2-5 o'clock Wound is full thickness. There is moderate to heavy drainage. Periwound: no denudement, erythema, induration, maceration or warmth.      VASC Wound Lt lower shin (Active)   Pre Size Length 1.4 07/23/21 0900   Pre Size Width 1.5 07/23/21 0900   Pre Size Depth 0.5 07/23/21 0900   Pre Total Sq cm 2.1 07/23/21 0900   Undermined 2.5cm from 2-5 o'clock 07/21/21 0800   Number of days: 9            Circumferential volume measures:             Labs:    I personally reviewed the following lab results today and those on care everywhere, if indicated     CRP   Date Value Ref Range Status   04/04/2018 <0.1 0.0 - 0.8 mg/dL Final      No results found for: SED   Last Renal Panel:  Sodium   Date Value Ref Range Status   02/09/2021 141 136 - 145 mmol/L Final     Potassium   Date Value Ref Range Status   02/09/2021 4.3 3.5 - 5.0 mmol/L Final     Chloride   Date Value Ref Range Status   02/09/2021 107 98 - 107 mmol/L Final     Carbon Dioxide (CO2)   Date Value Ref Range Status   02/09/2021 28 22 - 31 mmol/L Final     Anion Gap   Date Value Ref Range Status   02/09/2021 6 5 - 18 mmol/L Final     Glucose   Date Value Ref Range Status   02/09/2021 121 70 - 125 mg/dL Final     Urea Nitrogen   Date Value Ref Range Status   02/09/2021 17 8 - 28 mg/dL Final     Creatinine   Date Value Ref Range Status   02/09/2021 0.97 0.70 - 1.30 mg/dL Final     GFR Estimate   Date Value Ref Range Status   02/09/2021 >60 >60 mL/min/1.73m2 Final     Calcium   Date Value Ref Range Status   02/09/2021 9.9 8.5 - 10.5 mg/dL  Final     Albumin   Date Value Ref Range Status   04/04/2018 3.8 3.5 - 5.0 g/dL Final      Lab Results   Component Value Date    WBC 7.5 02/09/2021     Lab Results   Component Value Date    RBC 5.10 02/09/2021     Lab Results   Component Value Date    HGB 15.8 02/09/2021     Lab Results   Component Value Date    HCT 43.7 02/09/2021     No components found for: MCT  Lab Results   Component Value Date    MCV 86 02/09/2021     Lab Results   Component Value Date    MCH 31.0 02/09/2021     Lab Results   Component Value Date    MCHC 36.2 02/09/2021     Lab Results   Component Value Date    RDW 14.1 02/09/2021     Lab Results   Component Value Date     02/09/2021      Lab Results   Component Value Date    A1C 5.7 02/09/2021    A1C 5.9 10/13/2020    A1C 5.8 07/08/2020    A1C 6.1 09/19/2019      No results found for: TSH   No results found for: VITDT                    Impression:  Encounter Diagnoses   Name Primary?     Cellulitis of left lower extremity Yes     Left leg swelling      Secondary lymphedema      Venous hypertension of lower extremity, bilateral      Leg hematoma, left, subsequent encounter      Swelling of thigh      Venous insufficiency of both lower extremities      Dependent edema                   Are any of these wounds new today: No; Location: na    Assessment/Plan:          1. Debridement: After discussion of risk factors and verbal consent was obtained 2% Lidocaine HCL jelly was applied, under clean conditions, the left  ulceration(s) were debrided using currette. Devitalized and nonviable tissue, along with any fibrin and slough, was removed to improve granulation tissue formation, stimulate wound healing, decrease overall bacteria load, disrupt biofilm formation and decrease edge senescence.  Total excisional debridement was 2.1 sq cm from the epidermis/dermis area and into the subcutaneous tissue with a depth of 0.5 cm.   Ulcers were improved afterwards and .  Measures were unchanged  after debridement.       2.  Wound treatment: wound treatment will include irrigation and dressings to promote autolytic debridement which will include:will continue silvercel; alginate; abd; rolled gauze; change twice per week at the clinic; pt did not feel that he could do this on his own Improved            3. Edema: continue with tubular compression; short stretch; elevation; awaiting CT of the abd/pelvis and LLE to rule out neoplasm and proximal obstruction scheduled for 7/25; venous insufficiency was normal and negative DVT; if this is all clear will refer him to lymphedema therapy. The compression wraps were applied today in clinic. Stable            4. Nutrition: focus on protein; low sodium diet           5. Offloading: na     Patient will follow up with me in 4  weeks for reevaluation. They were instructed to call the clinic sooner with any signs or symptoms of infection or any further questions/concerns. Answered all questions.          Sulma Fisher DNP, RN, CNP, CWOCN, CFCN, CLT  [facility]   [phone]        This note was electronically signed by Sulma Fisher NP

## 2021-07-23 NOTE — PATIENT INSTRUCTIONS
"Wound Care Instructions     Twice per week at the clinic we will cleanse the left leg wound with  dilute hibiclens solution (30cc in 500cc)      Pat Dry with non-sterile gauze     Apply Lotion to the intact skin surrounding your wound and other dry skin locations. Some good lotions include: Remedy Skin Repair Cream, Sarna, Vanicream or Cetaphil     Primary Dressing: Apply silvercel into/onto the wounds; cut into long narrow strip; only use one strip; leave tail hanging out for easy retrieval      Apply skin prep to the skin surrounding the wound    Secondary dressing: Cover with plain calcium alginate sheet and then ABD;     Secure with non-sterile roll gauze (4\" x 75\" roll) and tape (1\" roll tape) as needed; avoid adhesive directly on the skin     Compression: tubular compression and short stretch     It is not ok to get your wound wet in the bath or shower     Elevate the leg throughout the day and night     SEEK MEDICAL CARE IF:    You have an increase in swelling, pain, or redness around the wound.    You have an increase in the amount of pus coming from the wound.    There is a bad smell coming from the wound.    The wound appears to be worsening/enlarging    You have a fever greater than 101.5 F        It is ok to continue current wound care treatment/products for the next 2-3 days until new wound care supplies are ordered and arrive. If longer than this please contact our office at [phone].     It is recommended that you do not get your ulcer wet when showering.  Listed below are several ways of keeping it dry when you shower.      1. Wrap it with Press [employer] Se[employer] plastic wrap.  It can be found in the stores where the plastic wraps or tin foil is kept.                  2.  Some people take a bath and hang their leg/foot out of the tub.                             3  Put your leg in a plastic bag and tape it on.             4. You can purchase a shower cover for casts at some pharmacies and through the " Internet.                       5. Take a Bed Bath or wash up at the sink

## 2021-07-25 ENCOUNTER — HOSPITAL ENCOUNTER (OUTPATIENT)
Dept: CT IMAGING | Facility: CLINIC | Age: 81
End: 2021-07-25
Attending: NURSE PRACTITIONER
Payer: COMMERCIAL

## 2021-07-25 DIAGNOSIS — I89.0 SECONDARY LYMPHEDEMA: ICD-10-CM

## 2021-07-25 DIAGNOSIS — M79.89 SWELLING OF THIGH: ICD-10-CM

## 2021-07-25 DIAGNOSIS — L03.116 CELLULITIS OF LEFT LOWER EXTREMITY: ICD-10-CM

## 2021-07-25 DIAGNOSIS — M79.89 LEFT LEG SWELLING: ICD-10-CM

## 2021-07-25 PROCEDURE — 74176 CT ABD & PELVIS W/O CONTRAST: CPT

## 2021-07-26 ENCOUNTER — HOSPITAL ENCOUNTER (OUTPATIENT)
Dept: MRI IMAGING | Facility: CLINIC | Age: 81
Discharge: HOME | End: 2021-07-26
Attending: NURSE PRACTITIONER | Admitting: NURSE PRACTITIONER
Payer: COMMERCIAL

## 2021-07-26 DIAGNOSIS — M79.89 LEFT LEG SWELLING: ICD-10-CM

## 2021-07-26 DIAGNOSIS — L03.116 CELLULITIS OF LEFT LOWER EXTREMITY: ICD-10-CM

## 2021-07-26 DIAGNOSIS — M79.89 SWELLING OF THIGH: ICD-10-CM

## 2021-07-26 DIAGNOSIS — I89.0 SECONDARY LYMPHEDEMA: ICD-10-CM

## 2021-07-26 PROCEDURE — 255N000002 HC RX 255 OP 636: Performed by: NURSE PRACTITIONER

## 2021-07-26 PROCEDURE — 73720 MRI LWR EXTREMITY W/O&W/DYE: CPT | Mod: LT

## 2021-07-26 PROCEDURE — A9585 GADOBUTROL INJECTION: HCPCS | Performed by: NURSE PRACTITIONER

## 2021-07-26 RX ORDER — GADOBUTROL 604.72 MG/ML
10 INJECTION INTRAVENOUS ONCE
Status: COMPLETED | OUTPATIENT
Start: 2021-07-26 | End: 2021-07-26

## 2021-07-26 RX ADMIN — GADOBUTROL 10 ML: 604.72 INJECTION INTRAVENOUS at 17:25

## 2021-07-27 ENCOUNTER — ALLIED HEALTH/NURSE VISIT (OUTPATIENT)
Dept: VASCULAR SURGERY | Facility: CLINIC | Age: 81
End: 2021-07-27
Attending: NURSE PRACTITIONER
Payer: COMMERCIAL

## 2021-07-27 VITALS — HEART RATE: 76 BPM | RESPIRATION RATE: 12 BRPM | TEMPERATURE: 98.5 F

## 2021-07-27 DIAGNOSIS — M79.89 LEFT LEG SWELLING: ICD-10-CM

## 2021-07-27 DIAGNOSIS — L03.116 CELLULITIS OF LEFT LOWER EXTREMITY: Primary | ICD-10-CM

## 2021-07-27 DIAGNOSIS — S80.12XD LEG HEMATOMA, LEFT, SUBSEQUENT ENCOUNTER: ICD-10-CM

## 2021-07-27 DIAGNOSIS — C49.22 LIPOSARCOMA OF LOWER EXTREMITY, LEFT (H): ICD-10-CM

## 2021-07-27 DIAGNOSIS — R22.42 MASS OF LEFT THIGH: ICD-10-CM

## 2021-07-27 PROCEDURE — 97602 WOUND(S) CARE NON-SELECTIVE: CPT

## 2021-07-27 ASSESSMENT — PAIN SCALES - GENERAL: PAINLEVEL: NO PAIN (0)

## 2021-07-27 NOTE — PROGRESS NOTES
Left shin hematoma/wound    Leg swelling    [facility]  -  Nurse Visit      Chief Complaint: Patient presents to clinic for assement, and treatment of their wound(s) and swelling. Sulma Fisher CNP reviewed Mri results with patient. Patient is awaiting a call from [facility] in [place] . Patient given number to call and schedule if doesn't hear from them shortly. Wound improving, swelling stable but did not put compression on since MRI.    Dressing on Arrival: gauze/abd/tape around leg and on to skin.Patient applied this.No compression. Radiology removed silvercel yesterday to do an MRI      ICD-10-CM    1. Cellulitis of left lower extremity  L03.116    2. Left leg swelling  M79.89    3. Leg hematoma, left, subsequent encounter  S80.12XD    4. Mass of left thigh  R22.42 Adult Oncology/Hematology Referral   5. Liposarcoma of lower extremity, left (H)  C49.22 Adult Oncology/Hematology Referral         Allergies: No Known Allergies    Medications:   Current Outpatient Medications:      carvediloL (COREG) 25 MG tablet, [CARVEDILOL (COREG) 25 MG TABLET] Take 1 tablet (25 mg total) by mouth 2 (two) times a day with meals., Disp: 180 tablet, Rfl: 3     cephALEXin (KEFLEX) 500 MG capsule, Take 1 capsule (500 mg) by mouth 4 times daily for 14 days, Disp: 56 capsule, Rfl: 0     cholecalciferol, vitamin D3, (VITAMIN D3) 2,000 unit cap, [CHOLECALCIFEROL, VITAMIN D3, (VITAMIN D3) 2,000 UNIT CAP] Take 2,000 Units by mouth daily., Disp: , Rfl:      magnesium citrate 1.745 GM/30ML solution, , Disp: , Rfl:      mupirocin (BACTROBAN) 2 % external ointment, ELIZABET EXT AA TID, Disp: , Rfl:      polyethylene glycol (MIRALAX) 17 GM/Dose powder, , Disp: , Rfl:      sulfamethoxazole-trimethoprim (BACTRIM DS) 800-160 MG tablet, TAKE 1 TABLET BY MOUTH TWICE DAILY FOR 10 DAYS, Disp: , Rfl:      warfarin ANTICOAGULANT (COUMADIN/JANTOVEN) 5 MG tablet, [WARFARIN ANTICOAGULANT (COUMADIN/JANTOVEN) 5 MG  TABLET] TAKE 1/2 TO 1 TABLET BY MOUTH EVERY DAY, ADJUST DOSE PER INR RESULTS AS DIRECTED, Disp: 90 tablet, Rfl: 1    Current Facility-Administered Medications:      lidocaine (XYLOCAINE) 2 % external gel, , Topical, PRN, Sulma Fisher, NP, Given at 07/23/21 0920    Vital Signs: Pulse 76   Temp 98.5  F (36.9  C)   Resp 12         Assessment:    General:  Patient presents to clinic in no apparent distress.  Psychiatric:  Alert and oriented x3.   Lower extremity:  edema is present.    Integumentary:  Skin is hemosciderin    Wound info:  VASC Wound Lt lower shin (Active)   Pre Size Length 1.2 07/27/21 1400   Pre Size Width 1.4 07/27/21 1400   Pre Size Depth 0.5 07/27/21 1400   Pre Total Sq cm 1.68 07/27/21 1400   Undermined 0.6 at 1 oclcok 07/27/21 1400   Product Used Alginate 07/27/21 1400       Undermining is present.    The periwoundskin is WNL    Plan:         1. Patient will follow up in on Friday with nurse visit.          2. Treatment provided will include irrigation and dressings to promote autolytic debridement and will be as listed below     Cleansed with: Dilute hibiclens 30cc in 500cc NS    Protected skin with: Remedy skin repair lotion    Dressings Applied: Silver alginate one piece tucked into wound, covered with alginate, abd,roll gauze size g tubular refused short stretch    Compression Applied to the Left Leg: Tubular compression size F    Compression Applied to the Right Leg: None    Offloading applied: None    Trial Products: No  Provider notified regarding concerns: No  Treatment Changes: No/ only to go to oncology        Arsenio Zepeda RN

## 2021-07-27 NOTE — PATIENT INSTRUCTIONS
"Wound Care Instructions     Twice per week at the clinic we will cleanse the left leg wound with  dilute hibiclens solution (30cc in 500cc)      Pat Dry with non-sterile gauze     Apply Lotion to the intact skin surrounding your wound and other dry skin locations. Some good lotions include: Remedy Skin Repair Cream, Sarna, Vanicream or Cetaphil     Primary Dressing: Apply silvercel into/onto the wounds; cut into long narrow strip; only use one strip; leave tail hanging out for easy retrieval      Apply skin prep to the skin surrounding the wound     Secondary dressing: Cover with plain calcium alginate sheet and then ABD;     Secure with non-sterile roll gauze (4\" x 75\" roll) and tape (1\" roll tape) as needed; avoid adhesive directly on the skin     Compression: tubular compression and short stretch     It is not ok to get your wound wet in the bath or shower     Elevate the leg throughout the day and night     SEEK MEDICAL CARE IF:    You have an increase in swelling, pain, or redness around the wound.    You have an increase in the amount of pus coming from the wound.    There is a bad smell coming from the wound.    The wound appears to be worsening/enlarging    You have a fever greater than 101.5 F        It is ok to continue current wound care treatment/products for the next 2-3 days until new wound care supplies are ordered and arrive. If longer than this please contact our office at [phone].     It is recommended that you do not get your ulcer wet when showering.  Listed below are several ways of keeping it dry when you shower.      1. Wrap it with Press and Seal plastic wrap.  It can be found in the stores where the plastic wraps or tin foil is kept.                  2.  Some people take a bath and hang their leg/foot out of the tub.                             3  Put your leg in a plastic bag and tape it on.             4. You can purchase a shower cover for casts at some pharmacies and through the " Internet.                       5. Take a Bed Bath or wash up at the sink

## 2021-07-28 NOTE — PROGRESS NOTES
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 Mcleod he recommended referred to [facility] surgical oncology department; this was entered; provided the patient the phone number as well and was instructed to call if he is not contacted by Friday

## 2021-07-30 ENCOUNTER — ALLIED HEALTH/NURSE VISIT (OUTPATIENT)
Dept: VASCULAR SURGERY | Facility: CLINIC | Age: 81
End: 2021-07-30
Attending: NURSE PRACTITIONER
Payer: COMMERCIAL

## 2021-07-30 ENCOUNTER — TELEPHONE (OUTPATIENT)
Dept: ORTHOPEDICS | Facility: CLINIC | Age: 81
End: 2021-07-30

## 2021-07-30 VITALS
TEMPERATURE: 97.8 F | HEART RATE: 60 BPM | SYSTOLIC BLOOD PRESSURE: 90 MMHG | DIASTOLIC BLOOD PRESSURE: 60 MMHG | RESPIRATION RATE: 16 BRPM

## 2021-07-30 DIAGNOSIS — L03.116 CELLULITIS OF LEFT LOWER EXTREMITY: Primary | ICD-10-CM

## 2021-07-30 DIAGNOSIS — M79.89 LEFT LEG SWELLING: ICD-10-CM

## 2021-07-30 DIAGNOSIS — S80.12XD LEG HEMATOMA, LEFT, SUBSEQUENT ENCOUNTER: ICD-10-CM

## 2021-07-30 PROCEDURE — 97602 WOUND(S) CARE NON-SELECTIVE: CPT

## 2021-07-30 PROCEDURE — 29581 APPL MULTLAYER CMPRN SYS LEG: CPT

## 2021-07-30 ASSESSMENT — PAIN SCALES - GENERAL: PAINLEVEL: NO PAIN (0)

## 2021-07-30 NOTE — NURSING NOTE
[facility]  -  Nurse Visit      L leg      L leg wound        Chief Complaint: Patient presents to clinic for assement, and treatment of their wound(s) and swelling.     Dressing on Arrival: dressing intact and tubigrip. Tubigrip was only on the leg, not applied to the foot. Pt refused short stretch at last visit.    No diagnosis found.      Allergies: No Known Allergies    Medications:   Current Outpatient Medications:      carvediloL (COREG) 25 MG tablet, [CARVEDILOL (COREG) 25 MG TABLET] Take 1 tablet (25 mg total) by mouth 2 (two) times a day with meals., Disp: 180 tablet, Rfl: 3     cholecalciferol, vitamin D3, (VITAMIN D3) 2,000 unit cap, [CHOLECALCIFEROL, VITAMIN D3, (VITAMIN D3) 2,000 UNIT CAP] Take 2,000 Units by mouth daily., Disp: , Rfl:      magnesium citrate 1.745 GM/30ML solution, , Disp: , Rfl:      mupirocin (BACTROBAN) 2 % external ointment, ELIZABET EXT AA TID, Disp: , Rfl:      polyethylene glycol (MIRALAX) 17 GM/Dose powder, , Disp: , Rfl:      sulfamethoxazole-trimethoprim (BACTRIM DS) 800-160 MG tablet, TAKE 1 TABLET BY MOUTH TWICE DAILY FOR 10 DAYS, Disp: , Rfl:      warfarin ANTICOAGULANT (COUMADIN/JANTOVEN) 5 MG tablet, [WARFARIN ANTICOAGULANT (COUMADIN/JANTOVEN) 5 MG TABLET] TAKE 1/2 TO 1 TABLET BY MOUTH EVERY DAY, ADJUST DOSE PER INR RESULTS AS DIRECTED, Disp: 90 tablet, Rfl: 1    Current Facility-Administered Medications:      lidocaine (XYLOCAINE) 2 % external gel, , Topical, PRN, Sulma Fisher, NP, Given at 07/23/21 0920    Vital Signs: BP 90/60   Pulse 60   Temp 97.8  F (36.6  C)   Resp 16         Assessment:    General:  Patient presents to clinic in no apparent distress.  Psychiatric:  Alert and oriented x3.   Lower extremity:  edema is present.    Integumentary:  Skin is pink      See lymphedema measurements from 7/27/21.  Wound info:  VASC Wound Lt lower shin (Active)   Pre Size Length 1.5 07/30/21 0800   Pre Size Width 1.5 07/30/21 0800   Pre Size Depth 0.5  07/30/21 0800   Pre Total Sq cm 2.25 07/30/21 0800   Undermined 0.6 at 1 o clock 07/30/21 0800   Product Used Alginate 07/30/21 0800       Undermining is not present.    The periwoundskin is warmth    Plan:         1. Patient will follow up in 4 days for a nurse visit.          2. Treatment provided will include irrigation and dressings to promote autolytic debridement and will be as listed below. Pt tolerated the dressing change. No pain or odor. Pink/redness noted on the baltazar wound. Pt educated pt why short stretch should be applied for swelling and wound healing improvement. Pt asked if it could only be applied to the leg and not the foot so he could wear his shoe. Writer explained the risks of only applying the short stretch to the foot, pt agreed to have short stretch applied the proper way. New dressing order entered to change twice weekly instead of three times weekly per Sulma note on 7/23/21.     Cleansed with: Dilute hibiclens 30cc in 500cc NS    Protected skin with: 3M Cavilon and Remedy skin repair lotion    Dressings Applied: Calcium alginate, Silver alginate, ABD and Secured with roll gauze and tape.     Compression Applied to the Left Leg: Tubular compression and Short stretch    Compression Applied to the Right Leg: None    Offloading applied: None    Trial Products: No  Provider notified regarding concerns: No  Treatment Changes: No        Breanna Euceda, GARETT

## 2021-07-30 NOTE — TELEPHONE ENCOUNTER
"Reason for call:  Other   Patient called regarding (reason for call): appointment  Additional comments: Patient has a diagnosis of \"Mass of left thigh\" please assist with scheduling with in the appropriate time frame.     Phone number to reach patient:  Home number on file [phone] (home)    Best Time:  asap    Can we leave a detailed message on this number?  YES    Travel screening: Not Applicable     Alhaji GORE on 7/30/2021 at 3:20 PM  Orthopedic  Care Coordinator         "

## 2021-07-30 NOTE — PATIENT INSTRUCTIONS
Compression bandages (and compression garments - see below) used in the management of lymphedema should be properly washed on a regular basis, so skin cells and oils won t become trapped in the fibers of the bandages and damage the integrity of the textile. Compression bandages may be machine or hand washed; machine wash is generally the preferred method. Once the bandages go through the spin cycle they are easy to hang and will dry much faster.  Daily washing is recommended, especially if lotions or creams are being used. If the bandages are machine washed it is recommended to place the unrolled bandages in a mesh laundry bag in order to protect the fabric during the washing cycle (the gentle cycle should be utilized).  Bandages are best washed in warm water (between 108 - 140oF); if the bandages are very dirty, they may be boil-washed up to 203oF.  It is best to have more than one set of bandages - one to wear, one to wash. The sets should be applied alternately to allow the  build in  elasticity of the bandages to recover and to prolong their effectiveness.  Tips for hand washing procedures:  1. Start by filling a bowl, bucket, sink, or small tub with water.   2. The compression bandages should be dipped gently into the water to dampen.   3. Add a small amount of washing solution (see below).   4. Let the compression bandages soak for a few minutes.   5. For better cleaning, gently rub the fibers of the compression bandages together without stretching them excessively.   6. Then, empty the tub and refill with water - dip or rinse the clean compression bandages thoroughly to rid the bandages of residual salts and oils from perspiration.   7. Gently squeeze the compression bandages to remove excess water.   8. Refer to the drying options below   Washing solutions  Harsh cleaning agents, solvents, petroleum-based  etc. can destroy the thin fibers of compression bandages. Mild soaps or detergents should  be used, free of bleach, chlorine, fabric softeners or other laundry additives.  Some compression garment manufacturers offer garment washing solutions, which are formulated to remove oil, body acids, and skin salts quickly and easily without damage to the fabric; using these specially-designed solutions is also recommended for compression bandages and will help extending their life span and keep them firm.  Drying      Compression bandages should be air dried. If using a dryer, the dial should be set on a no-heat (maximum low-heat) air drying cycle because excessive heat exposure may weaken or even damage the textile of the bandages.  When bandages are air-dried, it is important not to pull, squeeze or wring out the residual water from the material excessively. Rolling up the compression bandages in a towel and gently squeezing the towel before laying them out to dry, speeds up the drying process; bandages should never be left rolled up in a towel.  Whether bandages are line-dried, or laid flat to dry, exposure to direct sunlight should be avoided. It is recommended to place a towel on a drying rack and lay the bandages on top to dry. If hanging the bandages directly on a rack or pole to drip dry, the weight of the water could stretch the material

## 2021-08-02 NOTE — TELEPHONE ENCOUNTER
I called the patient to get him scheduled with Dr. Yung either virtually on Tuesday or in person on Thursday. I left our phone number for him to call us back to schedule an appointment.    Mary Anne

## 2021-08-03 ENCOUNTER — ALLIED HEALTH/NURSE VISIT (OUTPATIENT)
Dept: VASCULAR SURGERY | Facility: CLINIC | Age: 81
End: 2021-08-03
Payer: COMMERCIAL

## 2021-08-03 VITALS
TEMPERATURE: 97.8 F | SYSTOLIC BLOOD PRESSURE: 110 MMHG | RESPIRATION RATE: 14 BRPM | HEART RATE: 76 BPM | DIASTOLIC BLOOD PRESSURE: 62 MMHG

## 2021-08-03 DIAGNOSIS — M79.89 LEFT LEG SWELLING: ICD-10-CM

## 2021-08-03 DIAGNOSIS — S80.12XD LEG HEMATOMA, LEFT, SUBSEQUENT ENCOUNTER: Primary | ICD-10-CM

## 2021-08-03 PROCEDURE — 97602 WOUND(S) CARE NON-SELECTIVE: CPT

## 2021-08-03 ASSESSMENT — PAIN SCALES - GENERAL: PAINLEVEL: NO PAIN (0)

## 2021-08-03 NOTE — PROGRESS NOTES
Leg swelling    Left lower leg wound    [facility]  -  Nurse Visit      Chief Complaint: Patient presents to clinic for assement, and treatment of their wound(s) left lower leg and swelling. Tolerated dressing change well, denies pain. Cancer center did call patient and they are playing phone tag. He will try them again. Patient removed short stretch this morning as it was bunched up at ankle and was falling down.     Dressing on Arrival: Size F tubular on left leg,toes to knee. no compression on right. Silver seymour,alginate,abd, roll gauze left shin      ICD-10-CM    1. Leg hematoma, left, subsequent encounter  S80.12XD    2. Left leg swelling  M79.89          Allergies: No Known Allergies    Medications:   Current Outpatient Medications:      carvediloL (COREG) 25 MG tablet, [CARVEDILOL (COREG) 25 MG TABLET] Take 1 tablet (25 mg total) by mouth 2 (two) times a day with meals., Disp: 180 tablet, Rfl: 3     cholecalciferol, vitamin D3, (VITAMIN D3) 2,000 unit cap, [CHOLECALCIFEROL, VITAMIN D3, (VITAMIN D3) 2,000 UNIT CAP] Take 2,000 Units by mouth daily., Disp: , Rfl:      magnesium citrate 1.745 GM/30ML solution, , Disp: , Rfl:      mupirocin (BACTROBAN) 2 % external ointment, ELIZABET EXT AA TID, Disp: , Rfl:      polyethylene glycol (MIRALAX) 17 GM/Dose powder, , Disp: , Rfl:      sulfamethoxazole-trimethoprim (BACTRIM DS) 800-160 MG tablet, TAKE 1 TABLET BY MOUTH TWICE DAILY FOR 10 DAYS, Disp: , Rfl:      warfarin ANTICOAGULANT (COUMADIN/JANTOVEN) 5 MG tablet, [WARFARIN ANTICOAGULANT (COUMADIN/JANTOVEN) 5 MG TABLET] TAKE 1/2 TO 1 TABLET BY MOUTH EVERY DAY, ADJUST DOSE PER INR RESULTS AS DIRECTED, Disp: 90 tablet, Rfl: 1    Current Facility-Administered Medications:      lidocaine (XYLOCAINE) 2 % external gel, , Topical, PRN, Sulma Fisher, NP, Given at 07/23/21 0920    Vital Signs: /62   Pulse 76   Temp 97.8  F (36.6  C)   Resp 14         Assessment:    General:  Patient presents to  clinic in no apparent distress.  Psychiatric:  Alert and oriented x3.   Lower extremity:  edema is present.    Integumentary:  Skin is hemosciderin,pink no warmth    Swelling Measurements:  Circumferential Measures 11/10/2020 7/14/2021 7/16/2021 7/27/2021 8/3/2021   Right just above MTP 25 24.8 - 25 24.5   Right Ankle 25.3 25.2 - 26 27   Right Widest Calf 40 37.5 - 37 37.5   Right Thigh Up 10cm - 46 - - 43   Right Knee to Ankle - 36 - - -   Left - just above MTP 26.5 26.4 27.3 26.5 26.2   Left Ankle 27.3 26.3 25.5 26 31   Left Widest Calf 41.8 42.6 40 41.5 41   Left Thigh Up 10cm - 70.5 - - 65   Left Knee to Ankle - 36 - - -       Wound info:  VASC Wound Lt lower shin (Active)   Pre Size Length 1.2 08/03/21 1100   Pre Size Width 1.5 08/03/21 1100   Pre Size Depth 0.5 08/03/21 1100   Pre Total Sq cm 1.8 08/03/21 1100   Undermined 0.4 at 1 o'clock 08/03/21 1100   Product Used Alginate 08/03/21 1100       Undermining is present.    The periwoundskin is     Plan:         1. Patient will follow up in Friday for nurse visit.          2. Treatment provided will include irrigation and dressings to promote autolytic debridement and will be as listed below     Cleansed with: Dilute hibiclens 30cc in 500cc NS    Protected skin with: Remedy skin repair lotion    Dressings Applied: Silver alginate strip tuck in tunnel,calium alginate,abd,roll gauze Size F tubular and short stretch toes to knee on left.     Compression Applied to the Left Leg: Short stretch    Compression Applied to the Right Leg: None/ said doesn't wear compression sock but should    Offloading applied: None    Trial Products: No  Provider notified regarding concerns: No  Treatment Changes: No        Arsenio Zepeda RN

## 2021-08-03 NOTE — PATIENT INSTRUCTIONS
"Wound Care Instructions     Twice per week at the clinic we will cleanse the left leg wound with  dilute hibiclens solution (30cc in 500cc)      Pat Dry with non-sterile gauze     Apply Lotion to the intact skin surrounding your wound and other dry skin locations. Some good lotions include: Remedy Skin Repair Cream, Sarna, Vanicream or Cetaphil     Primary Dressing: Apply silvercel into/onto the wounds; cut into long narrow strip; only use one strip; leave tail hanging out for easy retrieval      Apply skin prep to the skin surrounding the wound     Secondary dressing: Cover with plain calcium alginate sheet and then ABD;     Secure with non-sterile roll gauze (4\" x 75\" roll) and tape (1\" roll tape) as needed; avoid adhesive directly on the skin     Compression: tubular compression and short stretch     It is not ok to get your wound wet in the bath or shower     Elevate the leg throughout the day and night     SEEK MEDICAL CARE IF:    You have an increase in swelling, pain, or redness around the wound.    You have an increase in the amount of pus coming from the wound.    There is a bad smell coming from the wound.    The wound appears to be worsening/enlarging    You have a fever greater than 101.5 F        It is ok to continue current wound care treatment/products for the next 2-3 days until new wound care supplies are ordered and arrive. If longer than this please contact our office at [phone].     It is recommended that you do not get your ulcer wet when showering.  Listed below are several ways of keeping it dry when you shower.      1. Wrap it with Press and Seal plastic wrap.  It can be found in the stores where the plastic wraps or tin foil is kept.                  2.  Some people take a bath and hang their leg/foot out of the tub.                             3  Put your leg in a plastic bag and tape it on.             4. You can purchase a shower cover for casts at some pharmacies and through the " Internet.                       5. Take a Bed Bath or wash up at the sink

## 2021-08-06 ENCOUNTER — ALLIED HEALTH/NURSE VISIT (OUTPATIENT)
Dept: VASCULAR SURGERY | Facility: CLINIC | Age: 81
End: 2021-08-06
Attending: NURSE PRACTITIONER
Payer: COMMERCIAL

## 2021-08-06 VITALS
RESPIRATION RATE: 18 BRPM | SYSTOLIC BLOOD PRESSURE: 116 MMHG | HEART RATE: 80 BPM | TEMPERATURE: 97.3 F | DIASTOLIC BLOOD PRESSURE: 76 MMHG

## 2021-08-06 DIAGNOSIS — S80.12XD LEG HEMATOMA, LEFT, SUBSEQUENT ENCOUNTER: Primary | ICD-10-CM

## 2021-08-06 DIAGNOSIS — M79.89 LEFT LEG SWELLING: ICD-10-CM

## 2021-08-06 PROCEDURE — 97602 WOUND(S) CARE NON-SELECTIVE: CPT

## 2021-08-06 ASSESSMENT — PAIN SCALES - GENERAL: PAINLEVEL: NO PAIN (0)

## 2021-08-06 NOTE — NURSING NOTE
[facility]  -  Nurse Visit          Chief Complaint: Patient presents to clinic for assement, and treatment of their wound(s) and swelling.     Dressing on Arrival: silvercel, plain alginate, ABD tubi and ss    No diagnosis found.      Allergies: No Known Allergies    Medications:   Current Outpatient Medications:      carvediloL (COREG) 25 MG tablet, [CARVEDILOL (COREG) 25 MG TABLET] Take 1 tablet (25 mg total) by mouth 2 (two) times a day with meals., Disp: 180 tablet, Rfl: 3     cholecalciferol, vitamin D3, (VITAMIN D3) 2,000 unit cap, [CHOLECALCIFEROL, VITAMIN D3, (VITAMIN D3) 2,000 UNIT CAP] Take 2,000 Units by mouth daily., Disp: , Rfl:      magnesium citrate 1.745 GM/30ML solution, , Disp: , Rfl:      mupirocin (BACTROBAN) 2 % external ointment, ELIZABET EXT AA TID, Disp: , Rfl:      polyethylene glycol (MIRALAX) 17 GM/Dose powder, , Disp: , Rfl:      sulfamethoxazole-trimethoprim (BACTRIM DS) 800-160 MG tablet, TAKE 1 TABLET BY MOUTH TWICE DAILY FOR 10 DAYS, Disp: , Rfl:      warfarin ANTICOAGULANT (COUMADIN/JANTOVEN) 5 MG tablet, [WARFARIN ANTICOAGULANT (COUMADIN/JANTOVEN) 5 MG TABLET] TAKE 1/2 TO 1 TABLET BY MOUTH EVERY DAY, ADJUST DOSE PER INR RESULTS AS DIRECTED, Disp: 90 tablet, Rfl: 1    Current Facility-Administered Medications:      lidocaine (XYLOCAINE) 2 % external gel, , Topical, PRN, Sulma Fisher, NP, Given at 07/23/21 0920    Vital Signs: /76   Pulse 80   Temp 97.3  F (36.3  C)   Resp 18         Assessment:    General:  Patient presents to clinic in no apparent distress.  Psychiatric:  Alert and oriented x3.   Lower extremity:  edema is present.    Integumentary:  Skin is WNL    Swelling Measurements:  Circumferential Measures 11/10/2020 7/14/2021 7/16/2021 7/27/2021 8/3/2021   Right just above MTP 25 24.8 - 25 24.5   Right Ankle 25.3 25.2 - 26 27   Right Widest Calf 40 37.5 - 37 37.5   Right Thigh Up 10cm - 46 - - 43   Right Knee to Ankle - 36 - - -   Left - just  above MTP 26.5 26.4 27.3 26.5 26.2   Left Ankle 27.3 26.3 25.5 26 31   Left Widest Calf 41.8 42.6 40 41.5 41   Left Thigh Up 10cm - 70.5 - - 65   Left Knee to Ankle - 36 - - -       Wound info:  VASC Wound Lt lower shin (Active)   Pre Size Length 1.4 08/06/21 1100   Pre Size Width 1.4 08/06/21 1100   Pre Size Depth 0.5 08/06/21 1100   Pre Total Sq cm 1.96 08/06/21 1100   Undermined 0.3 08/06/21 1100   Product Used Alginate 08/03/21 1100       Undermining is present.    The periwoundskin is WNL    Plan:         1. Patient will follow up in 1 wk.          2. Treatment provided will include irrigation and dressings to promote autolytic debridement and will be as listed below     Cleansed with: Dilute hibiclens 30cc in 500cc NS    Protected skin with: 3M Cavilon    Dressings Applied: Calcium alginate, Silver alginate, ABD and Secured with roll gauze and tape.     Compression Applied to the Left Leg: Size G Tubular compression and Short stretch           Compression Applied to the Right Leg: None    Offloading applied: None    Trial Products: No  Provider notified regarding concerns: No  Treatment Changes: No        CHAUNCEY SANTOS

## 2021-08-10 ENCOUNTER — LAB (OUTPATIENT)
Dept: LAB | Facility: CLINIC | Age: 81
End: 2021-08-10
Payer: COMMERCIAL

## 2021-08-10 ENCOUNTER — ANTICOAGULATION THERAPY VISIT (OUTPATIENT)
Dept: ANTICOAGULATION | Facility: CLINIC | Age: 81
End: 2021-08-10

## 2021-08-10 ENCOUNTER — OFFICE VISIT (OUTPATIENT)
Dept: VASCULAR SURGERY | Facility: CLINIC | Age: 81
End: 2021-08-10
Attending: NURSE PRACTITIONER
Payer: COMMERCIAL

## 2021-08-10 VITALS
TEMPERATURE: 97.8 F | DIASTOLIC BLOOD PRESSURE: 66 MMHG | SYSTOLIC BLOOD PRESSURE: 106 MMHG | RESPIRATION RATE: 18 BRPM | HEART RATE: 78 BPM

## 2021-08-10 DIAGNOSIS — I48.19 PERSISTENT ATRIAL FIBRILLATION (H): ICD-10-CM

## 2021-08-10 DIAGNOSIS — S80.12XD LEG HEMATOMA, LEFT, SUBSEQUENT ENCOUNTER: Primary | ICD-10-CM

## 2021-08-10 DIAGNOSIS — I48.19 PERSISTENT ATRIAL FIBRILLATION (H): Primary | ICD-10-CM

## 2021-08-10 DIAGNOSIS — M79.89 LEFT LEG SWELLING: ICD-10-CM

## 2021-08-10 LAB — INR BLD: 2.2 (ref 0.9–1.1)

## 2021-08-10 PROCEDURE — 36415 COLL VENOUS BLD VENIPUNCTURE: CPT

## 2021-08-10 PROCEDURE — 29581 APPL MULTLAYER CMPRN SYS LEG: CPT

## 2021-08-10 PROCEDURE — 97602 WOUND(S) CARE NON-SELECTIVE: CPT

## 2021-08-10 PROCEDURE — 85610 PROTHROMBIN TIME: CPT

## 2021-08-10 ASSESSMENT — PAIN SCALES - GENERAL: PAINLEVEL: NO PAIN (0)

## 2021-08-10 NOTE — PROGRESS NOTES
ANTICOAGULATION MANAGEMENT     Prosper Lopez 81 year old male is on warfarin with therapeutic INR result. (Goal INR 2.0-3.0)    Recent labs: (last 7 days)     08/10/21  0801   INR 2.2*       ASSESSMENT     Source(s): Patient/Caregiver Call and Template       Warfarin doses taken: Warfarin taken as instructed    Diet: No new diet changes identified    New illness, injury, or hospitalization: No    Medication/supplement changes: None noted    Signs or symptoms of bleeding or clotting: No    Previous INR: Therapeutic last 2(+) visits    Additional findings: None     PLAN     Recommended plan for no diet, medication or health factor changes affecting INR     Dosing Instructions: Continue your current warfarin dose with next INR in 5 weeks       Summary  As of 8/10/2021    Full warfarin instructions:  2.5 mg every Tue, Thu, Sat; 5 mg all other days   Next INR check:  9/14/2021             Telephone call with Prosper who verbalizes understanding and agrees to plan    Lab visit scheduled    Education provided: Target INR goal and significance of current INR result and Contact [phone] with any changes, questions or concerns.     Plan made per [facility] anticoagulation protocol    Jaz Sterling RN  Anticoagulation Clinic  8/10/2021    _______________________________________________________________________     Anticoagulation Episode Summary     Current INR goal:  2.0-3.0   TTR:  79.6 % (1 y)   Target end date:  Indefinite   Send INR reminders to:  MAGDALENA TEJEDA    Indications    Persistent atrial fibrillation (H) [I48.19]           Comments:           Anticoagulation Care Providers     Provider Role Specialty Phone number    Foreign Tavares MD Referring Family Medicine [phone]

## 2021-08-10 NOTE — PATIENT INSTRUCTIONS
"Wound Care Instructions     Twice per week at the clinic we will cleanse the left leg wound with  dilute hibiclens solution (30cc in 500cc)      Pat Dry with non-sterile gauze     Apply Lotion to the intact skin surrounding your wound and other dry skin locations. Some good lotions include: Remedy Skin Repair Cream, Sarna, Vanicream or Cetaphil     Primary Dressing: Apply silvercel into/onto the wounds; cut into long narrow strip; only use one strip; leave tail hanging out for easy retrieval      Apply skin prep to the skin surrounding the wound     Secondary dressing: Cover with plain calcium alginate sheet and then ABD;     Secure with non-sterile roll gauze (4\" x 75\" roll) and tape (1\" roll tape) as needed; avoid adhesive directly on the skin     Compression: tubular compression and short stretch     It is not ok to get your wound wet in the bath or shower     Elevate the leg throughout the day and night     SEEK MEDICAL CARE IF:    You have an increase in swelling, pain, or redness around the wound.    You have an increase in the amount of pus coming from the wound.    There is a bad smell coming from the wound.    The wound appears to be worsening/enlarging    You have a fever greater than 101.5 F        It is ok to continue current wound care treatment/products for the next 2-3 days until new wound care supplies are ordered and arrive. If longer than this please contact our office at [phone].     It is recommended that you do not get your ulcer wet when showering.  Listed below are several ways of keeping it dry when you shower.      1. Wrap it with Press and Seal plastic wrap.  It can be found in the stores where the plastic wraps or tin foil is kept.                  2.  Some people take a bath and hang their leg/foot out of the tub.                             3  Put your leg in a plastic bag and tape it on.             4. You can purchase a shower cover for casts at some pharmacies and through the " Internet.                       5. Take a Bed Bath or wash up at the sink

## 2021-08-10 NOTE — PROGRESS NOTES
Left shin wound 8/10    Nurse Visit-[facility]    Chief Complaint: Patient presents to clinic for assessment and treatment of their left leg wound as a result of traumatic injury    Dressing on Arrival: tubular compression size F, short stretch. Silver alginate, alginate and ABD to wound.    Patient came in today for dressing change and compression rewrap per order from Sulma Fisher CNP. Patient rated pain 0 out of 10. Removed dressings and cleansed skin with soap and cleaned wound bed with dilute hibilens. Applied lotion to intact skin.  Applied silver alginate strip to wound and covered with calcium alginate and ABD pad. Applied tubular compression size F and short stretch for compression to left leg. No compression on right leg. Patient tolerated dressing change well.       Allergies: No Known Allergies    Medications:   Current Outpatient Medications:      carvediloL (COREG) 25 MG tablet, [CARVEDILOL (COREG) 25 MG TABLET] Take 1 tablet (25 mg total) by mouth 2 (two) times a day with meals., Disp: 180 tablet, Rfl: 3     cholecalciferol, vitamin D3, (VITAMIN D3) 2,000 unit cap, [CHOLECALCIFEROL, VITAMIN D3, (VITAMIN D3) 2,000 UNIT CAP] Take 2,000 Units by mouth daily., Disp: , Rfl:      magnesium citrate 1.745 GM/30ML solution, , Disp: , Rfl:      mupirocin (BACTROBAN) 2 % external ointment, ELIZABET EXT AA TID, Disp: , Rfl:      polyethylene glycol (MIRALAX) 17 GM/Dose powder, , Disp: , Rfl:      sulfamethoxazole-trimethoprim (BACTRIM DS) 800-160 MG tablet, TAKE 1 TABLET BY MOUTH TWICE DAILY FOR 10 DAYS, Disp: , Rfl:      warfarin ANTICOAGULANT (COUMADIN/JANTOVEN) 5 MG tablet, [WARFARIN ANTICOAGULANT (COUMADIN/JANTOVEN) 5 MG TABLET] TAKE 1/2 TO 1 TABLET BY MOUTH EVERY DAY, ADJUST DOSE PER INR RESULTS AS DIRECTED, Disp: 90 tablet, Rfl: 1    Current Facility-Administered Medications:      lidocaine (XYLOCAINE) 2 % external gel, , Topical, PRN, Sulma Fisher, NP, Given at 07/23/21  "0920    Vital Signs: /66 (BP Location: Left arm, Patient Position: Sitting, Cuff Size: Adult Large)   Pulse 78   Temp 97.8  F (36.6  C) (Temporal)   Resp 18       Assessment:    General:  Patient presents to clinic in no apparent distress.  Psychiatric:  Alert and oriented .   Lower extremity:  edema is present.    Integumentary:  Skin is uniformly warm, dry and pink.    Wound size:   VASC Wound Lt lower shin (Active)   Pre Size Length 1.4 08/10/21 1400   Pre Size Width 1.4 08/10/21 1400   Pre Size Depth 0.5 08/10/21 1400   Pre Total Sq cm 1.96 08/10/21 1400   Undermined 0.3 at 12 o'clock 08/10/21 1400   Product Used Alginate 08/10/21 1400      Undermining is present at 12 o'clock 0.3   The periwoundskin is pink     Please see flow sheet for leg swelling measurements    Plan:         1. Patient will follow up on Friday for nurse visit/dressing change         2. Treatment provided will include irrigation and dressings to promote autolytic debridement and will be as listed below     Cleansed with: Dilute Hibiclens    Protected skin with: Remedy Skin Repair    Dressings Applied:     Wound Care Instructions     Twice per week at the clinic we will cleanse the left leg wound with  dilute hibiclens solution (30cc in 500cc)      Pat Dry with non-sterile gauze     Apply Lotion to the intact skin surrounding your wound and other dry skin locations. Some good lotions include: Remedy Skin Repair Cream, Sarna, Vanicream or Cetaphil     Primary Dressing: Apply silvercel into/onto the wounds; cut into long narrow strip; only use one strip; leave tail hanging out for easy retrieval      Apply skin prep to the skin surrounding the wound     Secondary dressing: Cover with plain calcium alginate sheet and then ABD;     Secure with non-sterile roll gauze (4\" x 75\" roll) and tape (1\" roll tape) as needed; avoid adhesive directly on the skin     Compression: tubular compression and short stretch     It is not ok to get your " wound wet in the bath or shower     Elevate the leg throughout the day and night     SEEK MEDICAL CARE IF:    You have an increase in swelling, pain, or redness around the wound.    You have an increase in the amount of pus coming from the wound.    There is a bad smell coming from the wound.    The wound appears to be worsening/enlarging    You have a fever greater than 101.5 F        Compression Applied to the Left Leg: tubular compression size F and short stretch      Short Stretch was applied from base of toes to just below the bend of knee     Compression Applied to the Right Leg: None    Offloading applied: None  Trial Products: no  Provider notified regarding concerns: no  Treatment Changes: no    Educational Barriers: none  Taught Regarding: activity, compliance, compression, wound healing, elevation  Teaching Method: discussion and handout.

## 2021-08-13 ENCOUNTER — ALLIED HEALTH/NURSE VISIT (OUTPATIENT)
Dept: VASCULAR SURGERY | Facility: CLINIC | Age: 81
End: 2021-08-13
Attending: NURSE PRACTITIONER
Payer: COMMERCIAL

## 2021-08-13 VITALS
TEMPERATURE: 97.3 F | RESPIRATION RATE: 24 BRPM | HEART RATE: 64 BPM | SYSTOLIC BLOOD PRESSURE: 112 MMHG | DIASTOLIC BLOOD PRESSURE: 68 MMHG

## 2021-08-13 DIAGNOSIS — I87.2 VENOUS INSUFFICIENCY OF BOTH LOWER EXTREMITIES: ICD-10-CM

## 2021-08-13 DIAGNOSIS — M79.89 LEFT LEG SWELLING: Primary | ICD-10-CM

## 2021-08-13 DIAGNOSIS — S80.12XD LEG HEMATOMA, LEFT, SUBSEQUENT ENCOUNTER: ICD-10-CM

## 2021-08-13 DIAGNOSIS — I89.0 SECONDARY LYMPHEDEMA: ICD-10-CM

## 2021-08-13 PROCEDURE — 97602 WOUND(S) CARE NON-SELECTIVE: CPT

## 2021-08-13 ASSESSMENT — PAIN SCALES - GENERAL: PAINLEVEL: NO PAIN (0)

## 2021-08-13 NOTE — NURSING NOTE
[facility]  -  Nurse Visit      Chief Complaint: Patient presents to clinic for assement, and treatment of left shin ulcer    Dressing on Arrival: short stretch intact and clean. Silvelcer/ plain alginate, ABd pad -primary dressing. Scan serosanguinous drainage.      Patient came in for left shin dressing change and short strecth wrap per NP Sulma everett.  Cleaned wound with diluted hibiclens, and wash leg with soap and water water. periwound skin is intact. Applied gentamycin into wound, then applied silvercel and plain alginate. Covered wound with ABD pad, and secured with roll gauze. Patient tolerated dressing well. Patient denied pain with wound care and prior wound care. Continue current wound care    No diagnosis found.       Allergies: No Known Allergies    Medications:   Current Outpatient Medications:      carvediloL (COREG) 25 MG tablet, [CARVEDILOL (COREG) 25 MG TABLET] Take 1 tablet (25 mg total) by mouth 2 (two) times a day with meals., Disp: 180 tablet, Rfl: 3     cholecalciferol, vitamin D3, (VITAMIN D3) 2,000 unit cap, [CHOLECALCIFEROL, VITAMIN D3, (VITAMIN D3) 2,000 UNIT CAP] Take 2,000 Units by mouth daily., Disp: , Rfl:      magnesium citrate 1.745 GM/30ML solution, , Disp: , Rfl:      mupirocin (BACTROBAN) 2 % external ointment, ELIZABET EXT AA TID, Disp: , Rfl:      polyethylene glycol (MIRALAX) 17 GM/Dose powder, , Disp: , Rfl:      sulfamethoxazole-trimethoprim (BACTRIM DS) 800-160 MG tablet, TAKE 1 TABLET BY MOUTH TWICE DAILY FOR 10 DAYS, Disp: , Rfl:      warfarin ANTICOAGULANT (COUMADIN/JANTOVEN) 5 MG tablet, [WARFARIN ANTICOAGULANT (COUMADIN/JANTOVEN) 5 MG TABLET] TAKE 1/2 TO 1 TABLET BY MOUTH EVERY DAY, ADJUST DOSE PER INR RESULTS AS DIRECTED, Disp: 90 tablet, Rfl: 1    Current Facility-Administered Medications:      lidocaine (XYLOCAINE) 2 % external gel, , Topical, PRN, Sulma Everett, NP, Given at 07/23/21 0920    Vital Signs: /68 (BP Location: Left arm, Patient  Position: Sitting, Cuff Size: Adult Regular)   Pulse 64   Temp 97.3  F (36.3  C) (Temporal)   Resp 24         Assessment:    General:  Patient presents to clinic in no apparent distress.  Psychiatric:  Alert and oriented x3.   Lower extremity:  edema is present.    Integumentary:  Skin is pink with \in ethinicity    Swelling Measurements:  Circumferential Measures 7/14/2021 7/16/2021 7/27/2021 8/3/2021 8/10/2021   Right just above MTP 24.8 - 25 24.5 24.5   Right Ankle 25.2 - 26 27 27.4   Right Widest Calf 37.5 - 37 37.5 38.0   Right Thigh Up 10cm 46 - - 43 -   Right Knee to Ankle 36 - - - -   Left - just above MTP 26.4 27.3 26.5 26.2 25.4   Left Ankle 26.3 25.5 26 31 25.2   Left Widest Calf 42.6 40 41.5 41 41.4   Left Thigh Up 10cm 70.5 - - 65 -   Left Knee to Ankle 36 - - - -       Wound info:  VASC Wound Lt lower shin (Active)   Pre Size Length 1.4 08/13/21 0800   Pre Size Width 1.4 08/13/21 0800   Pre Size Depth 0.5 08/10/21 1400   Pre Total Sq cm 3 08/13/21 0800   Undermined .2 cm @ 1 clovk' 08/13/21 0800   Product Used Alginate 08/10/21 1400       Undermining is present.    The periwoundskin is WNL    Plan:         1. Patient will follow up on Tuesday, 8/17/21 with nurse visit         2. Treatment provided will include irrigation and dressings to promote autolytic debridement and will be as listed below     Cleansed with: Dilute hibiclens 30cc in 500cc NS    Protected skin with: Remedy skin repair lotion    Dressings Applied: Calcium alginate, Silver alginate and ABD    Compression Applied to the Left Leg: Short stretch    Offloading applied: None    Trial Products: No  Provider notified regarding concerns: No  Treatment Changes: Sophia Castañeda

## 2021-08-17 ENCOUNTER — OFFICE VISIT (OUTPATIENT)
Dept: VASCULAR SURGERY | Facility: CLINIC | Age: 81
End: 2021-08-17
Attending: NURSE PRACTITIONER
Payer: COMMERCIAL

## 2021-08-17 VITALS
DIASTOLIC BLOOD PRESSURE: 76 MMHG | RESPIRATION RATE: 26 BRPM | SYSTOLIC BLOOD PRESSURE: 104 MMHG | HEART RATE: 76 BPM | TEMPERATURE: 97.1 F

## 2021-08-17 DIAGNOSIS — L03.116 CELLULITIS OF LEFT LOWER EXTREMITY: ICD-10-CM

## 2021-08-17 DIAGNOSIS — M79.89 LEFT LEG SWELLING: Primary | ICD-10-CM

## 2021-08-17 PROCEDURE — 97602 WOUND(S) CARE NON-SELECTIVE: CPT

## 2021-08-17 ASSESSMENT — PAIN SCALES - GENERAL: PAINLEVEL: NO PAIN (0)

## 2021-08-17 NOTE — NURSING NOTE
[facility]  -  Nurse Visit             Chief Complaint: Patient presents to clinic for assement, and treatment of left shin wound  and swelling.      Dressing on Arrival: short stretch wrap intact and scan serosanguinous drainage noted on primary dressing, silvercel, and plain alginate and ABD pad. No odor or s/s/ of infection.     No diagnosis found.     Patient came in for dressing change and wrap leg with short stretch per NP, Sulma Fisher.   leg was cleaned with soap and warm water. Wound cleaned with diluted hibiclens, pad and dry. Silvercel, and plain alginate applied into wound and covered with ABD pad. Secured with roller gauze. dermafit and comprilan wrap was applied on left leg. Pt denied pain.      Allergies: No Known Allergies     Medications:   Current Outpatient Medications:      carvediloL (COREG) 25 MG tablet, [CARVEDILOL (COREG) 25 MG TABLET] Take 1 tablet (25 mg total) by mouth 2 (two) times a day with meals., Disp: 180 tablet, Rfl: 3     cholecalciferol, vitamin D3, (VITAMIN D3) 2,000 unit cap, [CHOLECALCIFEROL, VITAMIN D3, (VITAMIN D3) 2,000 UNIT CAP] Take 2,000 Units by mouth daily., Disp: , Rfl:      magnesium citrate 1.745 GM/30ML solution, , Disp: , Rfl:      mupirocin (BACTROBAN) 2 % external ointment, ELZIABET EXT AA TID, Disp: , Rfl:      polyethylene glycol (MIRALAX) 17 GM/Dose powder, , Disp: , Rfl:      sulfamethoxazole-trimethoprim (BACTRIM DS) 800-160 MG tablet, TAKE 1 TABLET BY MOUTH TWICE DAILY FOR 10 DAYS, Disp: , Rfl:      warfarin ANTICOAGULANT (COUMADIN/JANTOVEN) 5 MG tablet, [WARFARIN ANTICOAGULANT (COUMADIN/JANTOVEN) 5 MG TABLET] TAKE 1/2 TO 1 TABLET BY MOUTH EVERY DAY, ADJUST DOSE PER INR RESULTS AS DIRECTED, Disp: 90 tablet, Rfl: 1     Current Facility-Administered Medications:      lidocaine (XYLOCAINE) 2 % external gel, , Topical, PRN, Sulma Fisher, NP, Given at 07/23/21 0920     Vital Signs: /76 (BP Location: Left arm, Patient Position: Sitting,  Cuff Size: Adult Regular)   Pulse 76   Temp 97.1  F (36.2  C) (Tympanic)   Resp 26            Assessment:    General:  Patient presents to clinic in no apparent distress.  Psychiatric:  Alert and oriented x3.   Lower extremity:  edema is present.    Integumentary:  Skin is intact     Swelling Measurements:  Circumferential Measures 7/14/2021 7/16/2021 7/27/2021 8/3/2021 8/10/2021   Right just above MTP 24.8 - 25 24.5 24.5   Right Ankle 25.2 - 26 27 27.4   Right Widest Calf 37.5 - 37 37.5 38.0   Right Thigh Up 10cm 46 - - 43 -   Right Knee to Ankle 36 - - - -   Left - just above MTP 26.4 27.3 26.5 26.2 25.4   Left Ankle 26.3 25.5 26 31 25.2   Left Widest Calf 42.6 40 41.5 41 41.4   Left Thigh Up 10cm 70.5 - - 65 -   Left Knee to Ankle 36 - - - -         Wound info:       VASC Wound Lt lower shin (Active)   Pre Size Length 1.3 08/17/21 1100   Pre Size Width 1.4 08/17/21 1100   Pre Size Depth 0.2 08/17/21 1100   Pre Total Sq cm 1.82 08/17/21 1100   Undermined .2cm @ 1 clock  08/17/21 1100   Tunneling no 08/17/21 1100   Description pink 08/17/21 1100   Product Used Alginate 08/17/21 1100         Undermining is present.    The periwoundskin is WNL     Plan:         1. Patient will follow up in Friday with nurse visit.          2. Treatment provided will include irrigation and dressings to promote autolytic debridement and will be as listed below                Cleansed with: Dilute hibiclens 30cc in 500cc NS     Protected skin with: Remedy skin repair lotion     Dressings Applied: Calcium alginate, Silver alginate and ABD     Compression Applied to the Left Leg: Applied one layer of dermafit  and wrap with sort stretch from below toes to 2 fingers below knee cap.     Offloading applied: None     Trial Products: No  Provider notified regarding concerns: No  Treatment Changes: Sophia Castañeda

## 2021-08-17 NOTE — NURSING NOTE
[facility]  -  Nurse Visit          Chief Complaint: Patient presents to clinic for assement, and treatment of left shin wound  and swelling.     Dressing on Arrival: short stretch wrap intact and scan serosanguinous drainage noted on primary dressing, silvercel, and plain alginate and ABD pad. No odor or s/s/ of infection.    No diagnosis found.    Patient came in for dressing change and wrap leg with short stretch per NP, Sulma Fisher.   leg was cleaned with soap and warm water. Wound cleaned with diluted hibiclens, pad and dry. Silvercel, and plain alginate applied into wound and covered with ABD pad. Secured with roller gauze. dermafit and comprilan wrap was applied on left leg. Pt denied pain.     Allergies: No Known Allergies    Medications:   Current Outpatient Medications:      carvediloL (COREG) 25 MG tablet, [CARVEDILOL (COREG) 25 MG TABLET] Take 1 tablet (25 mg total) by mouth 2 (two) times a day with meals., Disp: 180 tablet, Rfl: 3     cholecalciferol, vitamin D3, (VITAMIN D3) 2,000 unit cap, [CHOLECALCIFEROL, VITAMIN D3, (VITAMIN D3) 2,000 UNIT CAP] Take 2,000 Units by mouth daily., Disp: , Rfl:      magnesium citrate 1.745 GM/30ML solution, , Disp: , Rfl:      mupirocin (BACTROBAN) 2 % external ointment, ELIZABET EXT AA TID, Disp: , Rfl:      polyethylene glycol (MIRALAX) 17 GM/Dose powder, , Disp: , Rfl:      sulfamethoxazole-trimethoprim (BACTRIM DS) 800-160 MG tablet, TAKE 1 TABLET BY MOUTH TWICE DAILY FOR 10 DAYS, Disp: , Rfl:      warfarin ANTICOAGULANT (COUMADIN/JANTOVEN) 5 MG tablet, [WARFARIN ANTICOAGULANT (COUMADIN/JANTOVEN) 5 MG TABLET] TAKE 1/2 TO 1 TABLET BY MOUTH EVERY DAY, ADJUST DOSE PER INR RESULTS AS DIRECTED, Disp: 90 tablet, Rfl: 1    Current Facility-Administered Medications:      lidocaine (XYLOCAINE) 2 % external gel, , Topical, PRN, Sulma Fisher, NP, Given at 07/23/21 0920    Vital Signs: /76 (BP Location: Left arm, Patient Position: Sitting, Cuff Size:  Adult Regular)   Pulse 76   Temp 97.1  F (36.2  C) (Tympanic)   Resp 26         Assessment:    General:  Patient presents to clinic in no apparent distress.  Psychiatric:  Alert and oriented x3.   Lower extremity:  edema is present.    Integumentary:  Skin is intact    Swelling Measurements:  Circumferential Measures 7/14/2021 7/16/2021 7/27/2021 8/3/2021 8/10/2021   Right just above MTP 24.8 - 25 24.5 24.5   Right Ankle 25.2 - 26 27 27.4   Right Widest Calf 37.5 - 37 37.5 38.0   Right Thigh Up 10cm 46 - - 43 -   Right Knee to Ankle 36 - - - -   Left - just above MTP 26.4 27.3 26.5 26.2 25.4   Left Ankle 26.3 25.5 26 31 25.2   Left Widest Calf 42.6 40 41.5 41 41.4   Left Thigh Up 10cm 70.5 - - 65 -   Left Knee to Ankle 36 - - - -       Wound info:  VASC Wound Lt lower shin (Active)   Pre Size Length 1.3 08/17/21 1100   Pre Size Width 1.4 08/17/21 1100   Pre Size Depth 0.2 08/17/21 1100   Pre Total Sq cm 1.82 08/17/21 1100   Undermined .2cm @ 1 clock  08/17/21 1100   Tunneling no 08/17/21 1100   Description pink 08/17/21 1100   Product Used Alginate 08/17/21 1100       Undermining is present.    The periwoundskin is WNL    Plan:         1. Patient will follow up in Friday with nurse visit.          2. Treatment provided will include irrigation and dressings to promote autolytic debridement and will be as listed below     Cleansed with: Dilute hibiclens 30cc in 500cc NS    Protected skin with: Remedy skin repair lotion    Dressings Applied: Calcium alginate, Silver alginate and ABD    Compression Applied to the Left Leg: Applied one layer of dermafit  and wrap with sort stretch from below toes to 2 fingers below knee cap.    Offloading applied: None    Trial Products: No  Provider notified regarding concerns: No  Treatment Changes: No        Katelynn Castañeda

## 2021-08-20 ENCOUNTER — ALLIED HEALTH/NURSE VISIT (OUTPATIENT)
Dept: VASCULAR SURGERY | Facility: CLINIC | Age: 81
End: 2021-08-20
Attending: NURSE PRACTITIONER
Payer: COMMERCIAL

## 2021-08-20 VITALS
RESPIRATION RATE: 18 BRPM | DIASTOLIC BLOOD PRESSURE: 76 MMHG | TEMPERATURE: 97.8 F | SYSTOLIC BLOOD PRESSURE: 134 MMHG | HEART RATE: 86 BPM

## 2021-08-20 DIAGNOSIS — S80.12XD LEG HEMATOMA, LEFT, SUBSEQUENT ENCOUNTER: ICD-10-CM

## 2021-08-20 DIAGNOSIS — M79.89 LEFT LEG SWELLING: Primary | ICD-10-CM

## 2021-08-20 PROCEDURE — 97602 WOUND(S) CARE NON-SELECTIVE: CPT

## 2021-08-20 ASSESSMENT — PAIN SCALES - GENERAL: PAINLEVEL: NO PAIN (0)

## 2021-08-20 NOTE — NURSING NOTE
[facility]  -  Nurse Visit          Chief Complaint: Patient presents to clinic for assement, and treatment of their wound(s) and swelling.     Dressing on Arrival: LLE silvercel, plain alginate, ABD, tubi and short stretch compression      ICD-10-CM    1. Left leg swelling  M79.89    2. Leg hematoma, left, subsequent encounter  S80.12XD          Allergies: No Known Allergies    Medications:   Current Outpatient Medications:      carvediloL (COREG) 25 MG tablet, [CARVEDILOL (COREG) 25 MG TABLET] Take 1 tablet (25 mg total) by mouth 2 (two) times a day with meals., Disp: 180 tablet, Rfl: 3     cholecalciferol, vitamin D3, (VITAMIN D3) 2,000 unit cap, [CHOLECALCIFEROL, VITAMIN D3, (VITAMIN D3) 2,000 UNIT CAP] Take 2,000 Units by mouth daily., Disp: , Rfl:      magnesium citrate 1.745 GM/30ML solution, , Disp: , Rfl:      mupirocin (BACTROBAN) 2 % external ointment, ELIZABET EXT AA TID, Disp: , Rfl:      polyethylene glycol (MIRALAX) 17 GM/Dose powder, , Disp: , Rfl:      sulfamethoxazole-trimethoprim (BACTRIM DS) 800-160 MG tablet, TAKE 1 TABLET BY MOUTH TWICE DAILY FOR 10 DAYS, Disp: , Rfl:      warfarin ANTICOAGULANT (COUMADIN/JANTOVEN) 5 MG tablet, [WARFARIN ANTICOAGULANT (COUMADIN/JANTOVEN) 5 MG TABLET] TAKE 1/2 TO 1 TABLET BY MOUTH EVERY DAY, ADJUST DOSE PER INR RESULTS AS DIRECTED, Disp: 90 tablet, Rfl: 1    Current Facility-Administered Medications:      lidocaine (XYLOCAINE) 2 % external gel, , Topical, PRN, Sulma Fisher, NP, Given at 07/23/21 0920    Vital Signs: /76   Pulse 86   Temp 97.8  F (36.6  C)   Resp 18         Assessment:    General:  Patient presents to clinic in no apparent distress.  Psychiatric:  Alert and oriented x3.   Lower extremity:  edema is present.    Integumentary:  Skin is wnl    Swelling Measurements:  Circumferential Measures 7/14/2021 7/16/2021 7/27/2021 8/3/2021 8/10/2021   Right just above MTP 24.8 - 25 24.5 24.5   Right Ankle 25.2 - 26 27 27.4   Right  Widest Calf 37.5 - 37 37.5 38.0   Right Thigh Up 10cm 46 - - 43 -   Right Knee to Ankle 36 - - - -   Left - just above MTP 26.4 27.3 26.5 26.2 25.4   Left Ankle 26.3 25.5 26 31 25.2   Left Widest Calf 42.6 40 41.5 41 41.4   Left Thigh Up 10cm 70.5 - - 65 -   Left Knee to Ankle 36 - - - -       Wound info:  VASC Wound Lt lower shin (Active)   Pre Size Length 1.2 08/20/21 0800   Pre Size Width 1.4 08/20/21 0800   Pre Size Depth 0.2 08/20/21 0800   Pre Total Sq cm 1.56 08/20/21 0800   Undermined .2cm @ 1 clock  08/17/21 1100   Tunneling no 08/17/21 1100   Description pink 08/17/21 1100   Product Used Alginate 08/17/21 1100       Undermining is not present.    The periwoundskin is WNL    Plan:         1. Patient will follow up in 1 wk.          2. Treatment provided will include irrigation and dressings to promote autolytic debridement and will be as listed below     Cleansed with: Dilute hibiclens 30cc in 500cc NS    Protected skin with: sween    Dressings Applied: Calcium alginate, Silver alginate, ABD and Secured with roll gauze and tape.     Compression Applied to the Left Leg: Short stretch with tubular compression size F    Compression Applied to the Right Leg: None    Offloading applied: None    Trial Products: No  Provider notified regarding concerns: No  Treatment Changes: No        CHAUNCEY SANTOS

## 2021-08-25 ENCOUNTER — OFFICE VISIT (OUTPATIENT)
Dept: VASCULAR SURGERY | Facility: CLINIC | Age: 81
End: 2021-08-25
Attending: NURSE PRACTITIONER
Payer: COMMERCIAL

## 2021-08-25 VITALS
SYSTOLIC BLOOD PRESSURE: 108 MMHG | TEMPERATURE: 97.2 F | RESPIRATION RATE: 18 BRPM | HEART RATE: 76 BPM | DIASTOLIC BLOOD PRESSURE: 66 MMHG

## 2021-08-25 DIAGNOSIS — S80.12XD LEG HEMATOMA, LEFT, SUBSEQUENT ENCOUNTER: ICD-10-CM

## 2021-08-25 DIAGNOSIS — M79.89 LEFT LEG SWELLING: Primary | ICD-10-CM

## 2021-08-25 PROCEDURE — 97602 WOUND(S) CARE NON-SELECTIVE: CPT

## 2021-08-25 PROCEDURE — 29581 APPL MULTLAYER CMPRN SYS LEG: CPT

## 2021-08-25 ASSESSMENT — PAIN SCALES - GENERAL: PAINLEVEL: NO PAIN (0)

## 2021-08-25 NOTE — PROGRESS NOTES
"    Left leg 8/25    [facility]  -  Nurse Visit      Chief Complaint: Patient presents to clinic for assement, and treatment of their left leg wound and swelling     Dressing on Arrival: silvercel, calcium alginate, ABD, tubular compression and short stretch    Nursing Note: Waldo arrives today for routine dressing change to left leg wound. He has had no problems tolerating the compression and states that his pain is \"0\" out of 10. Only scant amount of brown drainage on old dressing. Wound edges are rolled (epibole). Discussed with Waldo that Sulma may work on the wound edges on Friday to assist with healing and progress. Waldo is understanding. No signs or symptoms of infection present. Plan is to follow up on Friday with Sulma Fisher CNP.      ICD-10-CM    1. Left leg swelling  M79.89    2. Leg hematoma, left, subsequent encounter  S80.12XD          Allergies: No Known Allergies    Medications:   Current Outpatient Medications:      carvediloL (COREG) 25 MG tablet, [CARVEDILOL (COREG) 25 MG TABLET] Take 1 tablet (25 mg total) by mouth 2 (two) times a day with meals., Disp: 180 tablet, Rfl: 3     cholecalciferol, vitamin D3, (VITAMIN D3) 2,000 unit cap, [CHOLECALCIFEROL, VITAMIN D3, (VITAMIN D3) 2,000 UNIT CAP] Take 2,000 Units by mouth daily., Disp: , Rfl:      magnesium citrate 1.745 GM/30ML solution, , Disp: , Rfl:      mupirocin (BACTROBAN) 2 % external ointment, ELIZABET EXT AA TID, Disp: , Rfl:      polyethylene glycol (MIRALAX) 17 GM/Dose powder, , Disp: , Rfl:      sulfamethoxazole-trimethoprim (BACTRIM DS) 800-160 MG tablet, TAKE 1 TABLET BY MOUTH TWICE DAILY FOR 10 DAYS, Disp: , Rfl:      warfarin ANTICOAGULANT (COUMADIN/JANTOVEN) 5 MG tablet, [WARFARIN ANTICOAGULANT (COUMADIN/JANTOVEN) 5 MG TABLET] TAKE 1/2 TO 1 TABLET BY MOUTH EVERY DAY, ADJUST DOSE PER INR RESULTS AS DIRECTED, Disp: 90 tablet, Rfl: 1    Current Facility-Administered Medications:      lidocaine (XYLOCAINE) 2 % external gel, " , Topical, PRN, Sulma Fisehr, NP, Given at 07/23/21 0920    Vital Signs: /66   Pulse 76   Temp 97.2  F (36.2  C) (Temporal)   Resp 18         Assessment:    General:  Patient presents to clinic in no apparent distress.  Psychiatric:  Alert and oriented x3.   Lower extremity:  edema is present.    Integumentary:  Skin is WNL    Swelling Measurements:  Circumferential Measures 7/14/2021 7/16/2021 7/27/2021 8/3/2021 8/10/2021   Right just above MTP 24.8 - 25 24.5 24.5   Right Ankle 25.2 - 26 27 27.4   Right Widest Calf 37.5 - 37 37.5 38.0   Right Thigh Up 10cm 46 - - 43 -   Right Knee to Ankle 36 - - - -   Left - just above MTP 26.4 27.3 26.5 26.2 25.4   Left Ankle 26.3 25.5 26 31 25.2   Left Widest Calf 42.6 40 41.5 41 41.4   Left Thigh Up 10cm 70.5 - - 65 -   Left Knee to Ankle 36 - - - -       Wound info:  VASC Wound Lt lower shin (Active)   Pre Size Length 1 08/25/21 1100   Pre Size Width 1.2 08/25/21 1100   Pre Size Depth 0.2 08/25/21 1100   Pre Total Sq cm 1.2 08/25/21 1100   Undermined .2cm @ 1 clock  08/17/21 1100   Tunneling no 08/17/21 1100   Description pink 08/17/21 1100   Product Used ABD Pad 08/25/21 1100       Undermining is not present.    The periwoundskin is pink. Wound edges are epibole    Plan:         1. Patient will follow up in 2 days with Sulma Fisher CNP.          2. Treatment provided will include irrigation and dressings to promote autolytic debridement and will be as listed below     Cleansed with: Dilute hibiclens 30cc in 500cc NS    Protected skin with: Remedy skin repair lotion/3M Cavilon to baltazar wound    Dressings Applied: silvercel, calcium alginate, ABD, roll gauze    Compression Applied to the Left Leg: Tubular compression size F and short stretch    Compression Applied to the Right Leg: None    Offloading applied: None    Trial Products: No  Provider notified regarding concerns: No  Treatment Changes: No        Gricelda Michel RN

## 2021-08-25 NOTE — PATIENT INSTRUCTIONS
"  Return to clinic on Friday for clinic visit with Sulma Fisher CNP    Wound Care Instructions       Twice weekly Irrigate with copious dilute hibiclens solution (30cc in 500cc) using 30cc syringe to your left leg wound(s) with Dilute hibiclens 30cc in 500cc NS.       Pat Dry with non-sterile gauze       Apply Lotion to the intact skin surrounding your wound and other dry skin locations. Some good lotions include: Remedy Skin Repair Cream, Sarna, Vanicream or Cetaphil       Primary Dressing: Apply silvercel into/onto the wounds; cut into long narrow strip; only use one strip; leave tail hanging out for easy retrieval       Apply skin prep to the skin surrounding the wound      Secondary dressing: Cover with plain calcium alginate sheet and then ABD;       Secure with non-sterile roll gauze (4\" x 75\" roll) and tape (1\" roll tape) as needed; avoid adhesive directly on the skin       Compression: tubular compression and short stretch       It is not ok to get your wound wet in the bath or shower       Elevate the leg throughout the day and night        "

## 2021-08-27 ENCOUNTER — OFFICE VISIT (OUTPATIENT)
Dept: VASCULAR SURGERY | Facility: CLINIC | Age: 81
End: 2021-08-27
Attending: NURSE PRACTITIONER
Payer: COMMERCIAL

## 2021-08-27 VITALS
HEART RATE: 92 BPM | RESPIRATION RATE: 20 BRPM | TEMPERATURE: 98 F | SYSTOLIC BLOOD PRESSURE: 110 MMHG | DIASTOLIC BLOOD PRESSURE: 60 MMHG

## 2021-08-27 DIAGNOSIS — I87.2 VENOUS INSUFFICIENCY OF BOTH LOWER EXTREMITIES: ICD-10-CM

## 2021-08-27 DIAGNOSIS — S80.12XD LEG HEMATOMA, LEFT, SUBSEQUENT ENCOUNTER: Primary | ICD-10-CM

## 2021-08-27 DIAGNOSIS — R73.03 PRE-DIABETES: ICD-10-CM

## 2021-08-27 DIAGNOSIS — I87.303 VENOUS HYPERTENSION OF LOWER EXTREMITY, BILATERAL: ICD-10-CM

## 2021-08-27 DIAGNOSIS — I89.0 SECONDARY LYMPHEDEMA: ICD-10-CM

## 2021-08-27 DIAGNOSIS — M79.89 LEFT LEG SWELLING: ICD-10-CM

## 2021-08-27 DIAGNOSIS — R22.42 MASS OF LEFT THIGH: ICD-10-CM

## 2021-08-27 PROCEDURE — 11042 DBRDMT SUBQ TIS 1ST 20SQCM/<: CPT | Performed by: NURSE PRACTITIONER

## 2021-08-27 ASSESSMENT — PAIN SCALES - GENERAL: PAINLEVEL: NO PAIN (0)

## 2021-08-27 NOTE — PATIENT INSTRUCTIONS
"Wound Care Instructions    2 TIMES PER WEEK and as needed, ok for nurse debridement; Cleanse your left shin wound(s) with Dilute hibiclens 30cc in 500cc NS.    Pat Dry with non-sterile gauze    Apply Lotion to the intact skin surrounding your wound and other dry skin locations. Some good lotions include: Remedy Skin Repair Cream, Sarna, Vanicream or Cetaphil    Primary Dressing: Apply endoform collagen into/onto the wounds    Secondary dressing: Cover with gauze    Secure with non-sterile roll gauze (4\" x 75\" roll) and tape (1\" roll tape) as needed; avoid adhesive directly on the skin    Compression: tubular compression and short stretch    It is not ok to get your wound wet in the bath or shower    SEEK MEDICAL CARE IF:    You have an increase in swelling, pain, or redness around the wound.    You have an increase in the amount of pus coming from the wound.    There is a bad smell coming from the wound.    The wound appears to be worsening/enlarging    You have a fever greater than 101.5 F      It is ok to continue current wound care treatment/products for the next 2-3 days until new wound care supplies are ordered and arrive. If longer than this please contact our office at [phone].    "

## 2021-08-27 NOTE — PROGRESS NOTES
Follow up Vascular Visit       Date of Service:08/27/21      Chief Complaint: left shin wound; and left thigh swelling/mass; chronic      Pt returns to [facility] with regards to their left shin wound and left thigh swelling/mass; chronic.  They arrive today alone. They are currently using silvercel; ABD to the wounds. This is being done by staff at our clinic twice per week; he has declined home care; he is not able to do this on his own. They are using tubular compression and short stretch for compression. They are feeling well today. Denies fevers, chills. No shortness of breath.     Allergies: No Known Allergies    Medications:   Current Outpatient Medications:      carvediloL (COREG) 25 MG tablet, [CARVEDILOL (COREG) 25 MG TABLET] Take 1 tablet (25 mg total) by mouth 2 (two) times a day with meals., Disp: 180 tablet, Rfl: 3     cholecalciferol, vitamin D3, (VITAMIN D3) 2,000 unit cap, [CHOLECALCIFEROL, VITAMIN D3, (VITAMIN D3) 2,000 UNIT CAP] Take 2,000 Units by mouth daily., Disp: , Rfl:      magnesium citrate 1.745 GM/30ML solution, , Disp: , Rfl:      mupirocin (BACTROBAN) 2 % external ointment, ELIZABET EXT AA TID, Disp: , Rfl:      polyethylene glycol (MIRALAX) 17 GM/Dose powder, , Disp: , Rfl:      sulfamethoxazole-trimethoprim (BACTRIM DS) 800-160 MG tablet, TAKE 1 TABLET BY MOUTH TWICE DAILY FOR 10 DAYS, Disp: , Rfl:      warfarin ANTICOAGULANT (COUMADIN/JANTOVEN) 5 MG tablet, [WARFARIN ANTICOAGULANT (COUMADIN/JANTOVEN) 5 MG TABLET] TAKE 1/2 TO 1 TABLET BY MOUTH EVERY DAY, ADJUST DOSE PER INR RESULTS AS DIRECTED, Disp: 90 tablet, Rfl: 1    Current Facility-Administered Medications:      lidocaine (XYLOCAINE) 2 % external gel, , Topical, PRN, Sulma Fisher NP, Given at 07/23/21 0920    History:   Past Medical History:   Diagnosis Date     Actinic keratosis     Created by Conversion      Atrial fibrillation (H) 06/10/2014     Benign neoplasm of colon     Created by Conversion  [facility] Annotation: May  5 2011  1:17PM -  ,  : 2006      Benign neoplasm of skin     Created by Conversion  Replacement Utility updated for latest IMO load     Calculus of kidney     Created by Conversion [facility] Annotation: May  4 2011  7:56AM - KeliDemar: STENT AND LASER      Calculus of ureter 4/4/2018     Cardiomyopathy (H) 06/16/2014     Cataract 2012     Coronary artery disease      Effusion of ankle and foot joint     Created by Conversion      Hydrarthrosis 2012     Hydronephrosis with urinary obstruction due to ureteral calculus 4/4/2018     Hypercholesteremia     Created by Conversion      Hyperlipidemia 2012     Impaired fasting glucose 9/19/2019     Internal derangement of knee     Created by Conversion  Replacement Utility updated for latest IMO load     Left hydrocele 9/19/2019     Malignant neoplasm of prostate (H)     Created by Conversion      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 Reoccurrence on 7/24/2017 and again persistent [id number] score of 4 with history of TIA-on warfarin Asymptomatic and on rate control since July 2017.      Plantar fasciitis of right foot      Prostate CA (H) 2011     Seborrheic keratosis 2012     Skin neoplasm     Of uncertain behavior     TIA (transient ischemic attack) 01/24/2012     Transient cerebral ischemia     Created by Conversion  Replacement Utility updated for latest IMO load     Tubular adenoma of colon 2006     Unspecified cataract     Created by Conversion      Ureterolithiasis        Physical Exam:    /60   Pulse 92   Temp 98  F (36.7  C)   Resp 20     General:  Patient presents to  clinic in no apparent distress.  Head: normocephalic atraumatic  Psychiatric:  Alert and oriented x3.   Respiratory: unlabored breathing; no cough  Integumentary:  Skin is uniformly warm, dry and pink.    Wound #1 Location: left shiin  Size: 1L x 1W x 0.1depth.  No sinus tract present, Wound base: slough; +epibole  noundermining present. Wound is full thickness. There is moderate drainage. Periwound: no denudement, erythema, induration, maceration or warmth.      VASC Wound Lt lower shin (Active)   Pre Size Length 1 08/27/21 0900   Pre Size Width 1 08/27/21 0900   Pre Size Depth 0.1 08/27/21 0900   Pre Total Sq cm 1 08/27/21 0900   Undermined .2cm @ 1 clock  08/17/21 1100   Tunneling no 08/17/21 1100   Description pink 08/17/21 1100   Product Used ABD Pad 08/25/21 1100   Number of days: 44            Circumferential volume measures:      Circumferential Measures 7/14/2021 7/16/2021 7/27/2021 8/3/2021 8/10/2021   Right just above MTP 24.8 - 25 24.5 24.5   Right Ankle 25.2 - 26 27 27.4   Right Widest Calf 37.5 - 37 37.5 38.0   Right Thigh Up 10cm 46 - - 43 -   Right Knee to Ankle 36 - - - -   Left - just above MTP 26.4 27.3 26.5 26.2 25.4   Left Ankle 26.3 25.5 26 31 25.2   Left Widest Calf 42.6 40 41.5 41 41.4   Left Thigh Up 10cm 70.5 - - 65 -   Left Knee to Ankle 36 - - - -       Labs:    I personally reviewed the following lab results today and those on care everywhere, if indicated     CRP   Date Value Ref Range Status   04/04/2018 <0.1 0.0 - 0.8 mg/dL Final      No results found for: SED   Last Renal Panel:  Sodium   Date Value Ref Range Status   02/09/2021 141 136 - 145 mmol/L Final     Potassium   Date Value Ref Range Status   02/09/2021 4.3 3.5 - 5.0 mmol/L Final     Chloride   Date Value Ref Range Status   02/09/2021 107 98 - 107 mmol/L Final     Carbon Dioxide (CO2)   Date Value Ref Range Status   02/09/2021 28 22 - 31 mmol/L Final     Anion Gap   Date Value Ref Range Status   02/09/2021 6 5 - 18 mmol/L  Final     Glucose   Date Value Ref Range Status   02/09/2021 121 70 - 125 mg/dL Final     Urea Nitrogen   Date Value Ref Range Status   02/09/2021 17 8 - 28 mg/dL Final     Creatinine   Date Value Ref Range Status   02/09/2021 0.97 0.70 - 1.30 mg/dL Final     GFR Estimate   Date Value Ref Range Status   02/09/2021 >60 >60 mL/min/1.73m2 Final     Calcium   Date Value Ref Range Status   02/09/2021 9.9 8.5 - 10.5 mg/dL Final     Albumin   Date Value Ref Range Status   04/04/2018 3.8 3.5 - 5.0 g/dL Final      Lab Results   Component Value Date    WBC 7.5 02/09/2021     Lab Results   Component Value Date    RBC 5.10 02/09/2021     Lab Results   Component Value Date    HGB 15.8 02/09/2021     Lab Results   Component Value Date    HCT 43.7 02/09/2021     No components found for: MCT  Lab Results   Component Value Date    MCV 86 02/09/2021     Lab Results   Component Value Date    MCH 31.0 02/09/2021     Lab Results   Component Value Date    MCHC 36.2 02/09/2021     Lab Results   Component Value Date    RDW 14.1 02/09/2021     Lab Results   Component Value Date     02/09/2021      Lab Results   Component Value Date    A1C 5.7 02/09/2021    A1C 5.9 10/13/2020    A1C 5.8 07/08/2020    A1C 6.1 09/19/2019      No results found for: TSH   No results found for: VITDT                Impression:  Encounter Diagnoses   Name Primary?     Leg hematoma, left, subsequent encounter Yes     Left leg swelling      Secondary lymphedema      Venous insufficiency of both lower extremities      Mass of left thigh      Pre-diabetes      Venous hypertension of lower extremity, bilateral           8/27/2021 left shin pre-debridement             Are any of these wounds new today: No; Location: na    Assessment/Plan:          1. Debridement: After discussion of risk factors and verbal consent was obtained 2% Lidocaine HCL jelly was applied, under clean conditions, the left shin ulceration(s) were debrided using currette. Devitalized and  nonviable tissue, along with any fibrin and slough, was removed to improve granulation tissue formation, stimulate wound healing, decrease overall bacteria load, disrupt biofilm formation and decrease edge senescence. The epibole was aggressively treated so the edges were open and flat. Total excisional debridement was 1 sq cm from the epidermis/dermis area and into the subcutaneous tissue with a depth of 0.1 cm.   Ulcers were improved afterwards and .  Measures were unchanged after debridement.       2.  Wound treatment: wound treatment will include irrigation and dressings to promote autolytic debridement which will include:will stop the silvercel and go to endoform; he declined home care; he cannot do the dressing on his own; will continue to come to clinic twice per week; ok for nurse debridement as well to continue to work on any forming epibole or biofilm Improved            3. 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 The compression wraps were applied today in clinic. Stable           4. Nutrition: focus on diet; low sodium           5. Offloading: na     Patient will follow up with me in 3 weeks for reevaluation. They were instructed to call the clinic sooner with any signs or symptoms of infection or any further questions/concerns. Answered all questions.          Sulma Fisher DNP, RN, CNP, CWOCN, CFCN, CLT  [facility]   [phone]        This note was electronically signed by Sulma Fisher NP

## 2021-08-31 ENCOUNTER — ALLIED HEALTH/NURSE VISIT (OUTPATIENT)
Dept: VASCULAR SURGERY | Facility: CLINIC | Age: 81
End: 2021-08-31
Attending: NURSE PRACTITIONER
Payer: COMMERCIAL

## 2021-08-31 VITALS
DIASTOLIC BLOOD PRESSURE: 68 MMHG | SYSTOLIC BLOOD PRESSURE: 118 MMHG | RESPIRATION RATE: 12 BRPM | HEART RATE: 76 BPM | TEMPERATURE: 97.9 F

## 2021-08-31 DIAGNOSIS — M79.89 LEFT LEG SWELLING: Primary | ICD-10-CM

## 2021-08-31 DIAGNOSIS — S80.12XD LEG HEMATOMA, LEFT, SUBSEQUENT ENCOUNTER: ICD-10-CM

## 2021-08-31 DIAGNOSIS — I87.2 VENOUS INSUFFICIENCY OF BOTH LOWER EXTREMITIES: ICD-10-CM

## 2021-08-31 DIAGNOSIS — I89.0 SECONDARY LYMPHEDEMA: ICD-10-CM

## 2021-08-31 PROCEDURE — 97597 DBRDMT OPN WND 1ST 20 CM/<: CPT

## 2021-08-31 PROCEDURE — 11042 DBRDMT SUBQ TIS 1ST 20SQCM/<: CPT

## 2021-08-31 RX ADMIN — LIDOCAINE HYDROCHLORIDE 1 ML: 20 JELLY TOPICAL at 11:15

## 2021-08-31 ASSESSMENT — PAIN SCALES - GENERAL: PAINLEVEL: NO PAIN (0)

## 2021-08-31 NOTE — PROGRESS NOTES
pre debridement left shin      post debridement left shin          Certified Wound Nurse Visit    Chief Complaint: Patient presents to clinic for assement, and treatment of their ulcer and and swelling. Left lower leg wound slow improvements. Has left thigh tumor apt scheduled 9/10/21Tolerated debridement well.    Wound appearance and Dressing on Arrival:   Rolled wound edges continue,endoform,gauze,roll gauze size f tubular compression and short stretch from toes to 2-3 inches below knee, had slipped down.    Allergies: No Known Allergies    Medications:   Current Outpatient Medications:      carvediloL (COREG) 25 MG tablet, [CARVEDILOL (COREG) 25 MG TABLET] Take 1 tablet (25 mg total) by mouth 2 (two) times a day with meals., Disp: 180 tablet, Rfl: 3     cholecalciferol, vitamin D3, (VITAMIN D3) 2,000 unit cap, [CHOLECALCIFEROL, VITAMIN D3, (VITAMIN D3) 2,000 UNIT CAP] Take 2,000 Units by mouth daily., Disp: , Rfl:      magnesium citrate 1.745 GM/30ML solution, , Disp: , Rfl:      mupirocin (BACTROBAN) 2 % external ointment, ELIZABET EXT AA TID, Disp: , Rfl:      polyethylene glycol (MIRALAX) 17 GM/Dose powder, , Disp: , Rfl:      sulfamethoxazole-trimethoprim (BACTRIM DS) 800-160 MG tablet, TAKE 1 TABLET BY MOUTH TWICE DAILY FOR 10 DAYS, Disp: , Rfl:      warfarin ANTICOAGULANT (COUMADIN/JANTOVEN) 5 MG tablet, [WARFARIN ANTICOAGULANT (COUMADIN/JANTOVEN) 5 MG TABLET] TAKE 1/2 TO 1 TABLET BY MOUTH EVERY DAY, ADJUST DOSE PER INR RESULTS AS DIRECTED, Disp: 90 tablet, Rfl: 1    Current Facility-Administered Medications:      lidocaine (XYLOCAINE) 2 % external gel, , Topical, PRN, Sulma Fisher, NP, 1 mL at 08/31/21 1115    Past Medical History:    Past Medical History:   Diagnosis Date     Actinic keratosis     Created by Conversion      Atrial fibrillation (H) 06/10/2014     Benign neoplasm of colon     Created by [facility] Annotation: May  5 2011  1:17PM -  ,  : 2006      Benign neoplasm of skin      Created by Conversion  Replacement Utility updated for latest IMO load     Calculus of kidney     Created by Conversion [facility] Annotation: May  4 2011  7:56AM - KeliDemar: STENT AND LASER      Calculus of ureter 4/4/2018     Cardiomyopathy (H) 06/16/2014     Cataract 2012     Coronary artery disease      Effusion of ankle and foot joint     Created by Conversion      Hydrarthrosis 2012     Hydronephrosis with urinary obstruction due to ureteral calculus 4/4/2018     Hypercholesteremia     Created by Conversion      Hyperlipidemia 2012     Impaired fasting glucose 9/19/2019     Internal derangement of knee     Created by Conversion  Replacement Utility updated for latest IMO load     Left hydrocele 9/19/2019     Malignant neoplasm of prostate (H)     Created by Conversion      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 Reoccurrence on 7/24/2017 and again persistent [id number] score of 4 with history of TIA-on warfarin Asymptomatic and on rate control since July 2017.      Plantar fasciitis of right foot      Prostate CA (H) 2011     Seborrheic keratosis 2012     Skin neoplasm     Of uncertain behavior     TIA (transient ischemic attack) 01/24/2012     Transient cerebral ischemia     Created by Conversion  Replacement Utility updated for latest IMO load     Tubular adenoma of colon 2006     Unspecified cataract     Created by Conversion      Ureterolithiasis        Surgical History:   Past Surgical History:   Procedure Laterality Date     COLONOSCOPY W/ BIOPSIES N/A 4/19/2021    Procedure: COLONOSCOPY with polypectomy;  Surgeon: Raimundo Smith MD;  Location:  [facility] Main OR;  Service: Gastroenterology     [place] CYSTO/URETERO W/LITHOTRIPSY &INDWELL STENT INSRT Left 2018    Procedure: CYSTOSCOPY, LEFT  URETEROSCOPY, LASER LITHOTRIPSY STENT INSERTION;  Surgeon: Foreign Lakhani MD;  Location: [facility] Main OR;  Service: Urology     PROSTATE SURGERY       URETEROSCOPY       ZZHC COLONOSCOPY W/WO BRUSH/WASH N/A 2/15/2021    Procedure: COLONOSCOPY with polypectomy, tattoo, cautery;  Surgeon: Compa Miner DO;  Location: [facility] Main OR;  Service: Gastroenterology       Family history:   Family History   Problem Relation Age of Onset     Coronary Artery Disease Father      Heart Disease Father      Colon Cancer Brother      Urolithiasis Brother        Social History:   Social History     Socioeconomic History     Marital status:      Spouse name: Not on file     Number of children: Not on file     Years of education: Not on file     Highest education level: Not on file   Occupational History     Not on file   Tobacco Use     Smoking status: Former Smoker     Quit date: 2014     Years since quittin.2     Smokeless tobacco: Never Used   Substance and Sexual Activity     Alcohol use: Yes     Comment: Alcoholic Drinks/day: Occasional     Drug use: No     Sexual activity: Not on file   Other Topics Concern     Not on file   Social History Narrative     Not on file     Social Determinants of Health     Financial Resource Strain:      Difficulty of Paying Living Expenses:    Food Insecurity:      Worried About Running Out of Food in the Last Year:      Ran Out of Food in the Last Year:    Transportation Needs:      Lack of Transportation (Medical):      Lack of Transportation (Non-Medical):    Physical Activity:      Days of Exercise per Week:      Minutes of Exercise per Session:    Stress:      Feeling of Stress :    Social Connections:      Frequency of Communication with Friends and Family:      Frequency of Social Gatherings with Friends  and Family:      Attends Anabaptism Services:      Active Member of Clubs or Organizations:      Attends Club or Organization Meetings:      Marital Status:    Intimate Partner Violence:      Fear of Current or Ex-Partner:      Emotionally Abused:      Physically Abused:      Sexually Abused:         Assessment:      Vital Signs: /68   Pulse 76   Temp 97.9  F (36.6  C)   Resp 12     General:  Patient presents to clinic in no apparent distress.    Psychiatric:  Alert and oriented x3.     CMS intact: pulses present on bilateral pedal pulses and cap refill brisk    Lower extremity:  edema is present.      Integumentary:  Skin is wnl    Wound size:   VASC Wound Lt lower shin (Active)   Pre Size Length 0.8 08/31/21 1100   Pre Size Width 0.8 08/31/21 1100   Pre Size Depth 0.1 08/31/21 1100   Pre Total Sq cm 0.64 08/31/21 1100   Post Size Length 0.8 08/31/21 1100   Post Size Width 0.8 08/31/21 1100   Post Size Depth 0.1 08/31/21 1100   Post Total Sq cm 0.64 08/31/21 1100   Undermined .2cm @ 1 clock  08/17/21 1100   Tunneling no 08/17/21 1100   Description pink 08/17/21 1100   Product Used Collagen 08/31/21 1100   Number of days: 48       Circumferential volume measures:    Circumferential Measures 7/16/2021 7/27/2021 8/3/2021 8/10/2021 8/31/2021   Right just above MTP - 25 24.5 24.5 24.5   Right Ankle - 26 27 27.4 28   Right Widest Calf - 37 37.5 38.0 38   Right Thigh Up 10cm - - 43 - 44   Right Knee to Ankle - - - - -   Left - just above MTP 27.3 26.5 26.2 25.4 25.5   Left Ankle 25.5 26 31 25.2 25.5   Left Widest Calf 40 41.5 41 41.4 41   Left Thigh Up 10cm - - 65 - 65   Left Knee to Ankle - - - - -       Undermining is not present.      The periwoundskin isWNL    Drainage:small    Odor: none      Plan:     1.  After permission was obtained 2% Lidocaine HCL jelly was applied, under clean conditions, the ulceration(s) were debrided using currette.       Devitalized and non viable tissue was removed to improve  "granulation tissue formation, stimulate wound healing, decrease overall bacteria load, disrupt biofilm formation and      decrease edge senescence.  Total excisional debridement was 0.64sq cm into the subcutaneous tissue   Ulcers were improved afterwards. They were .  Measures       were as noted on the flow sheet.            2. Treatment provided will include irrigation and dressings to promote autolytic debridement and will be as listed below            3. Edema:            4. Nutrition:             5. Offloading: na      6. Patient will Friday     7. Plan for next visit: Reassess wound(s) and Reassess skin integrity.       Cleansed with: robb    Protected skin with: Remedy Skin Repair    Dressings Applied: Alginate, Foam Adhesive, Sorbact, Collagen, Silver alginate, Adaptic, Medihoney, Topicals, ABD's\" \"Packing Strips and Tegaderm Ag-Mesh    Compression Applied to the Left Leg: Short Stretch/size F tubular    Compression Applied to the Right Leg: None    Offloading applied: None    Trial Products: no    Provider notified regarding concerns: no    Treatment Changes: no    Educational Barriers: none    Taught Regarding: dressing change,AVS    Teaching Method: AVS,demonstration    Nursing care  and Plan of care provided was coordinated and discussed with Sulma Fisher CNP     Outcomes and treatment recommendations are to promote skin integrity.     Actions taken by LEAH RN: 30 minutes .  "

## 2021-08-31 NOTE — PATIENT INSTRUCTIONS
" Return for [place] twice per week; ok for nurse debridement;.  Wound Care Instructions     2 TIMES PER WEEK and as needed, ok for nurse debridement; Cleanse your left shin wound(s) with Dilute hibiclens 30cc in 500cc NS.     Pat Dry with non-sterile gauze     Apply Lotion to the intact skin surrounding your wound and other dry skin locations. Some good lotions include: Remedy Skin Repair Cream, Sarna, Vanicream or Cetaphil     Primary Dressing: Apply endoform collagen into/onto the wounds     Secondary dressing: Cover with gauze     Secure with non-sterile roll gauze (4\" x 75\" roll) and tape (1\" roll tape) as needed; avoid adhesive directly on the skin     Compression: tubular compression and short stretch     It is not ok to get your wound wet in the bath or shower     SEEK MEDICAL CARE IF:    You have an increase in swelling, pain, or redness around the wound.    You have an increase in the amount of pus coming from the wound.    There is a bad smell coming from the wound.    The wound appears to be worsening/enlarging    You have a fever greater than 101.5 F        It is ok to continue current wound care treatment/products for the next 2-3 days until new wound care supplies are ordered and arrive. If longer than this please contact our office at [phone].             "

## 2021-09-03 ENCOUNTER — ALLIED HEALTH/NURSE VISIT (OUTPATIENT)
Dept: VASCULAR SURGERY | Facility: CLINIC | Age: 81
End: 2021-09-03
Attending: NURSE PRACTITIONER
Payer: COMMERCIAL

## 2021-09-03 VITALS — DIASTOLIC BLOOD PRESSURE: 70 MMHG | SYSTOLIC BLOOD PRESSURE: 136 MMHG | HEART RATE: 88 BPM | TEMPERATURE: 97.8 F

## 2021-09-03 DIAGNOSIS — S81.802D LEG WOUND, LEFT, SUBSEQUENT ENCOUNTER: Primary | ICD-10-CM

## 2021-09-03 PROCEDURE — 97602 WOUND(S) CARE NON-SELECTIVE: CPT

## 2021-09-03 PROCEDURE — 29581 APPL MULTLAYER CMPRN SYS LEG: CPT

## 2021-09-03 ASSESSMENT — PAIN SCALES - GENERAL: PAINLEVEL: NO PAIN (0)

## 2021-09-03 NOTE — NURSING NOTE
[facility]  -  Nurse Visit              Chief Complaint: Patient presents to clinic for assement, and treatment of their wound(s) and swelling.     Dressing on Arrival: left leg tubular compression and short stretch    No diagnosis found.      Allergies: No Known Allergies    Medications:   Current Outpatient Medications:      carvediloL (COREG) 25 MG tablet, [CARVEDILOL (COREG) 25 MG TABLET] Take 1 tablet (25 mg total) by mouth 2 (two) times a day with meals., Disp: 180 tablet, Rfl: 3     cholecalciferol, vitamin D3, (VITAMIN D3) 2,000 unit cap, [CHOLECALCIFEROL, VITAMIN D3, (VITAMIN D3) 2,000 UNIT CAP] Take 2,000 Units by mouth daily., Disp: , Rfl:      magnesium citrate 1.745 GM/30ML solution, , Disp: , Rfl:      mupirocin (BACTROBAN) 2 % external ointment, ELIZABET EXT AA TID, Disp: , Rfl:      polyethylene glycol (MIRALAX) 17 GM/Dose powder, , Disp: , Rfl:      sulfamethoxazole-trimethoprim (BACTRIM DS) 800-160 MG tablet, TAKE 1 TABLET BY MOUTH TWICE DAILY FOR 10 DAYS, Disp: , Rfl:      warfarin ANTICOAGULANT (COUMADIN/JANTOVEN) 5 MG tablet, [WARFARIN ANTICOAGULANT (COUMADIN/JANTOVEN) 5 MG TABLET] TAKE 1/2 TO 1 TABLET BY MOUTH EVERY DAY, ADJUST DOSE PER INR RESULTS AS DIRECTED, Disp: 90 tablet, Rfl: 1    Current Facility-Administered Medications:      lidocaine (XYLOCAINE) 2 % external gel, , Topical, PRN, Sulma Fisher, NP, 1 mL at 08/31/21 1115    Vital Signs: /70   Pulse 88   Temp 97.8  F (36.6  C)         Assessment:    General:  Patient presents to clinic in no apparent distress.  Psychiatric:  Alert and oriented x3.   Lower extremity:  edema is present.    Integumentary:  Skin is WNL    Swelling Measurements:  Circumferential Measures 7/16/2021 7/27/2021 8/3/2021 8/10/2021 8/31/2021   Right just above MTP - 25 24.5 24.5 24.5   Right Ankle - 26 27 27.4 28   Right Widest Calf - 37 37.5 38.0 38   Right Thigh Up 10cm - - 43 - 44   Right Knee to Ankle - - - - -   Left - just above MTP  27.3 26.5 26.2 25.4 25.5   Left Ankle 25.5 26 31 25.2 25.5   Left Widest Calf 40 41.5 41 41.4 41   Left Thigh Up 10cm - - 65 - 65   Left Knee to Ankle - - - - -       Wound info:  VASC Wound Lt lower shin (Active)   Pre Size Length 0.5 09/03/21 1000   Pre Size Width 0.5 09/03/21 1000   Pre Size Depth 0.1 09/03/21 1000   Pre Total Sq cm 0.25 09/03/21 1000   Post Size Length 0.8 08/31/21 1100   Post Size Width 0.8 08/31/21 1100   Post Size Depth 0.1 08/31/21 1100   Post Total Sq cm 0.64 08/31/21 1100   Undermined .2cm @ 1 clock  08/17/21 1100   Tunneling no 08/17/21 1100   Description pink 08/17/21 1100   Product Used Collagen 08/31/21 1100       Undermining is not present.    The periwoundskin is WNL    Plan:         1. Patient will follow up in 4 days.          2. Treatment provided will include irrigation and dressings to promote autolytic debridement and will be as listed below     Cleansed with: Dilute hibiclens 30cc in 500cc NS    Protected skin with: Remedy skin repair lotion    Dressings Applied: Endoform, Gauze and Secured with roll gauze and tape.     Compression Applied to the Left Leg: Tubular compression and Short stretch    Compression Applied to the Right Leg: None    Offloading applied: None    Trial Products: No  Provider notified regarding concerns: No  Treatment Changes: No        Shira Watts

## 2021-09-03 NOTE — PATIENT INSTRUCTIONS
ComprilanWrapping Instructions                 1. Before bandaging, carefully      Apply lotion into the skin.   Avoid any open sores      2. Measure out the tubular bandage  (Stockinette) to be used as underwrap:  twice the leg length from the tip of the  foot to the groin.        3. Gather up the bandage  (Stockinette) along its entire length..    4. ...and pull onto the leg as far as  the groin. The section remaining at  both ends is later pulled over the  padding material.      5.   Begin with two turns of the        short-stretch bandage       (Comprilan ) foot     No bandage tension...      6.  Take the bandage into a         extended figure of eight    7.  The bandage runs in figures of       eight 2-3 times around the upper       ankle and the foot...    8.  Bandage the rest of the lower leg      In a circular method up to the knee.    Between the individual turns of bandage is especially important. The bandage should  be smoothed down well and creasing avoided.    Compression bandages used in the management of lymphedema should be properly washed on a regular basis, so skin cells and oils won t become trapped in the fibers of the bandages and damage the integrity of the textile. Compression bandages may be machine or hand washed; machine wash is generally the preferred method. Once the bandages go through the spin cycle they are easy to hang and will dry much faster.  Daily washing is recommended, especially if lotions or creams are being used. If the bandages are machine washed it is recommended to place the unrolled bandages in a mesh laundry bag in order to protect the fabric during the washing cycle (the gentle cycle should be utilized).  Bandages are best washed in warm water (between 108 - 140oF); if the bandages are very dirty, they may be boil-washed up to 203oF.  It is best to have more than one set of bandages - one to wear, one to wash. The sets should be applied alternately to allow the  build  in  elasticity of the bandages to recover and to prolong their effectiveness.    Tips for hand washing procedures:  1. Start by filling a bowl, bucket, sink, or small tub with water.   2. The compression bandages should be dipped gently into the water to dampen.   3. Add a small amount of washing solution (see below).   4. Let the compression bandages soak for a few minutes.   5. For better cleaning, gently rub the fibers of the compression bandages together without stretching them excessively.   6. Then, empty the tub and refill with water - dip or rinse the clean compression bandages thoroughly to rid the bandages of residual salts and oils from perspiration.   7. Gently squeeze the compression bandages to remove excess water.   8. Lay flat to dry

## 2021-09-07 ENCOUNTER — ALLIED HEALTH/NURSE VISIT (OUTPATIENT)
Dept: VASCULAR SURGERY | Facility: CLINIC | Age: 81
End: 2021-09-07
Attending: NURSE PRACTITIONER
Payer: COMMERCIAL

## 2021-09-07 VITALS
RESPIRATION RATE: 18 BRPM | TEMPERATURE: 97 F | DIASTOLIC BLOOD PRESSURE: 68 MMHG | HEART RATE: 78 BPM | SYSTOLIC BLOOD PRESSURE: 108 MMHG

## 2021-09-07 DIAGNOSIS — S81.802D LEG WOUND, LEFT, SUBSEQUENT ENCOUNTER: Primary | ICD-10-CM

## 2021-09-07 DIAGNOSIS — I89.0 SECONDARY LYMPHEDEMA: ICD-10-CM

## 2021-09-07 DIAGNOSIS — M79.89 LEFT LEG SWELLING: ICD-10-CM

## 2021-09-07 PROCEDURE — 29581 APPL MULTLAYER CMPRN SYS LEG: CPT

## 2021-09-07 PROCEDURE — 97602 WOUND(S) CARE NON-SELECTIVE: CPT

## 2021-09-07 ASSESSMENT — PAIN SCALES - GENERAL: PAINLEVEL: NO PAIN (0)

## 2021-09-07 NOTE — PROGRESS NOTES
"    Left leg 9/7    [facility]  -  Nurse Visit      Chief Complaint: Patient presents to clinic for assement, and treatment of their left leg wound and bilateral leg swelling.    Nursing Note: Waldo arrives today for routine dressing change to left leg wound. Upon arrival has tubular compression and Comprilan on. No compression of right leg. Discussed with Waldo that his right leg appears that his swelling is up and recommended he begin wearing his compression stocking on his right leg. Waldo is agreeable and will begin doing this. Wound on left leg has large buildup of endoform that had created a thick scab like covering. This was gently removed today with cleansing. Wound bed appears hypergranular. Will possibly debride or use silver nitrate at next return visit. Waldo denies pain and discomfort rating his pain \"0\" out of 10. 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 Left thigh is visibly swollen. Plan is for Waldo to return on Monday of next week. He has typically been coming into clinic twice weekly but he is unable to come at the end of the week this week.     Dressing on Arrival: Comprilan, tubular compression, endoform and gauze to wound      ICD-10-CM    1. Leg wound, left, subsequent encounter  S81.802D    2. Left leg swelling  M79.89    3. Secondary lymphedema  I89.0          Allergies: No Known Allergies    Medications:   Current Outpatient Medications:      carvediloL (COREG) 25 MG tablet, [CARVEDILOL (COREG) 25 MG TABLET] Take 1 tablet (25 mg total) by mouth 2 (two) times a day with meals., Disp: 180 tablet, Rfl: 3     cholecalciferol, vitamin D3, (VITAMIN D3) 2,000 unit cap, [CHOLECALCIFEROL, VITAMIN D3, (VITAMIN D3) 2,000 UNIT CAP] Take 2,000 Units by mouth daily., Disp: , Rfl:      magnesium citrate 1.745 GM/30ML solution, , Disp: , Rfl:      mupirocin (BACTROBAN) 2 % external ointment, ELIZABET EXT AA TID, Disp: , Rfl:      polyethylene " glycol (MIRALAX) 17 GM/Dose powder, , Disp: , Rfl:      sulfamethoxazole-trimethoprim (BACTRIM DS) 800-160 MG tablet, TAKE 1 TABLET BY MOUTH TWICE DAILY FOR 10 DAYS, Disp: , Rfl:      warfarin ANTICOAGULANT (COUMADIN/JANTOVEN) 5 MG tablet, [WARFARIN ANTICOAGULANT (COUMADIN/JANTOVEN) 5 MG TABLET] TAKE 1/2 TO 1 TABLET BY MOUTH EVERY DAY, ADJUST DOSE PER INR RESULTS AS DIRECTED, Disp: 90 tablet, Rfl: 1    Current Facility-Administered Medications:      lidocaine (XYLOCAINE) 2 % external gel, , Topical, PRN, Sulma Fisher, NP, 1 mL at 08/31/21 1115    Vital Signs: /68 (BP Location: Left arm, Patient Position: Sitting, Cuff Size: Adult Large)   Pulse 78   Temp 97  F (36.1  C) (Temporal)   Resp 18         Assessment:    General:  Patient presents to clinic in no apparent distress.  Psychiatric:  Alert and oriented x3.   Lower extremity:  edema is present.    Integumentary:  Skin is Dry. Lotion applied    Swelling Measurements:  Circumferential Measures 7/27/2021 8/3/2021 8/10/2021 8/31/2021 9/7/2021   Right just above MTP 25 24.5 24.5 24.5 24.5   Right Ankle 26 27 27.4 28 27.8   Right Widest Calf 37 37.5 38.0 38 37.5   Right Thigh Up 10cm - 43 - 44 -   Right Knee to Ankle - - - - -   Left - just above MTP 26.5 26.2 25.4 25.5 24.6   Left Ankle 26 31 25.2 25.5 24.0   Left Widest Calf 41.5 41 41.4 41 36.4   Left Thigh Up 10cm - 65 - 65 (No Data)   Left Knee to Ankle - - - - -       Wound info:  VASC Wound Lt lower shin (Active)   Pre Size Length 0.6 09/07/21 1000   Pre Size Width 0.8 09/07/21 1000   Pre Size Depth 0.1 09/07/21 1000   Pre Total Sq cm 0.48 09/07/21 1000   Post Size Length 0.8 08/31/21 1100   Post Size Width 0.8 08/31/21 1100   Post Size Depth 0.1 08/31/21 1100   Post Total Sq cm 0.64 08/31/21 1100   Undermined .2cm @ 1 clock  08/17/21 1100   Tunneling no 08/17/21 1100   Description pink 08/17/21 1100   Product Used Collagen 09/07/21 1000       Undermining is not present.    The periwoundskin is  pink    Plan:         1. Patient will follow up in 6 days. Waldo is unable to come at the end of the week this week.         2. Treatment provided will include irrigation and dressings to promote autolytic debridement and will be as listed below     Cleansed with: Dilute hibiclens 30cc in 500cc NS    Protected skin with: Remedy skin repair lotion    Dressings Applied: Collagen, gauze, roll gauze    Compression Applied to the Left Leg: Tubular compression and comprilan    Compression Applied to the Right Leg: None-Spoke to Waldo about wearing his compression sock on his right leg. He will start wearing compression again.    Offloading applied: None    Trial Products: No  Provider notified regarding concerns: No  Treatment Changes: No        Gricelda Michel RN, WTA-C

## 2021-09-10 ENCOUNTER — HOSPITAL ENCOUNTER (INPATIENT)
Facility: CLINIC | Age: 81
Setting detail: SURGERY ADMIT
End: 2021-09-10
Attending: ORTHOPAEDIC SURGERY | Admitting: ORTHOPAEDIC SURGERY
Payer: COMMERCIAL

## 2021-09-10 ENCOUNTER — TELEPHONE (OUTPATIENT)
Dept: ORTHOPEDICS | Facility: CLINIC | Age: 81
End: 2021-09-10

## 2021-09-10 ENCOUNTER — OFFICE VISIT (OUTPATIENT)
Dept: ORTHOPEDICS | Facility: CLINIC | Age: 81
End: 2021-09-10
Payer: COMMERCIAL

## 2021-09-10 VITALS — HEIGHT: 72 IN | WEIGHT: 257 LBS | BODY MASS INDEX: 34.81 KG/M2

## 2021-09-10 DIAGNOSIS — M79.89 SOFT TISSUE MASS: Primary | ICD-10-CM

## 2021-09-10 DIAGNOSIS — Z11.59 ENCOUNTER FOR SCREENING FOR OTHER VIRAL DISEASES: ICD-10-CM

## 2021-09-10 PROCEDURE — 99204 OFFICE O/P NEW MOD 45 MIN: CPT | Performed by: ORTHOPAEDIC SURGERY

## 2021-09-10 ASSESSMENT — ENCOUNTER SYMPTOMS
LIGHT-HEADEDNESS: 0
EXERCISE INTOLERANCE: 0
PALPITATIONS: 1
HYPOTENSION: 0
LEG PAIN: 0
SLEEP DISTURBANCES DUE TO BREATHING: 0
SYNCOPE: 0
ORTHOPNEA: 0
HYPERTENSION: 0

## 2021-09-10 ASSESSMENT — MIFFLIN-ST. JEOR: SCORE: 1908.74

## 2021-09-10 NOTE — TELEPHONE ENCOUNTER
Patient is scheduled for surgery with Dr. Ferrari    Spoke with: Waldo    Date of Surgery: 10/4/2021    Location: [facility]    Informed patient they will need an adult  yes    Pre op with Provider n/a    H&P: Scheduled with pcp    Pre-procedure COVID-19 Test: patient will scheduled with covid scheduling team    Additional imaging/appointments: n/a    Surgery packet: Given in clinic     Additional comments: n/a

## 2021-09-10 NOTE — NURSING NOTE
Teaching Flowsheet   Relevant Diagnosis: Excision left thigh mass     Teaching Topic: preop     Person(s) involved in teaching:   Patient     Motivation Level:  Asks Questions: Yes  Eager to Learn: Yes  Cooperative: Yes  Receptive (willing/able to accept information): Yes  Any cultural factors/Alevism beliefs that may influence understanding or compliance? No       Patient demonstrates understanding of the following:  Reason for the appointment, diagnosis and treatment plan: Yes  Knowledge of proper use of medications and conditions for which they are ordered (with special attention to potential side effects or drug interactions): Yes  Which situations necessitate calling provider and whom to contact: Yes       Teaching Concerns Addressed:        Proper use and care of soap (medical equip, care aids, etc.): Yes  Nutritional needs and diet plan: Yes  Pain management techniques: Yes  Wound Care: No, explain: wound care will be on your discharge instructions  How and/when to access community resources: Yes     Instructional Materials Used/Given: surgery packet reviewed with patient.  He will obtain H&P with PCP.  He will await covid and PAN calls.  His son will drive him home and his wife will take care of him after surgery.  He will speak with this PCP about his coumadin as well.  He had questions and Dr. Ferrari came back in and answered them all.  He has no other questions.

## 2021-09-10 NOTE — NURSING NOTE
Reason For Visit:   Chief Complaint   Patient presents with     Consult     mass of left thigh        Ht 1.829 m (6')   Wt 116.6 kg (257 lb)   BMI 34.86 kg/m      Pain Assessment  Patient Currently in Pain: Sophia Durant, EMT

## 2021-09-10 NOTE — LETTER
9/10/2021         RE: Prosper Lopez  [address]  [place] [address]        Dear Colleague,    Thank you for referring your patient, Prosper Lopez, to the [facility]. Please see a copy of my visit note below.    This man has had a left thigh mass for 20 years.  Its growing.  He has some numbness in the lateral aspect of his left foot.    On examination he is alert oriented has a normal mood and affect and is in no acute distress peer respirations are regular nonlabored eyes are nonicteric.  The patient has a left thigh that is twice the size of his right thigh.  He does not have any notable edema distally although he has his left leg wrapped for his venous insufficiency issues.  He is able to move the left knee fully.  In the thigh there is no erythema or edema.    I reviewed his MRI.  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  He also has a nonfat area of tissue in the center of this mass.  This could be fibrosis within the atypical lipoma or could be an area of malignant transformation.  It is surrounded by more normal looking fatty tissue.    I discussed excision with the patient.  He is aware of the risks of numbness and tingling and weakness in the foot.  There is also risk of bleeding infection and pain.  I would leave a drain in for 1 to 2 weeks given the large amount of dead space and we would also consider removing some of the redundant skin.  I have answered all of his questions.  He would like to go forward with the surgery.      Sincerely,        Mundo Ferrari MD

## 2021-09-10 NOTE — PROGRESS NOTES
This man has had a left thigh mass for 20 years.  Its growing.  He has some numbness in the lateral aspect of his left foot.    On examination he is alert oriented has a normal mood and affect and is in no acute distress peer respirations are regular nonlabored eyes are nonicteric.  The patient has a left thigh that is twice the size of his right thigh.  He does not have any notable edema distally although he has his left leg wrapped for his venous insufficiency issues.  He is able to move the left knee fully.  In the thigh there is no erythema or edema.    I reviewed his MRI.  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  He also has a nonfat area of tissue in the center of this mass.  This could be fibrosis within the atypical lipoma or could be an area of malignant transformation.  It is surrounded by more normal looking fatty tissue.    I discussed excision with the patient.  He is aware of the risks of numbness and tingling and weakness in the foot.  There is also risk of bleeding infection and pain.  I would leave a drain in for 1 to 2 weeks given the large amount of dead space and we would also consider removing some of the redundant skin.  I have answered all of his questions.  He would like to go forward with the surgery.

## 2021-09-13 ENCOUNTER — ALLIED HEALTH/NURSE VISIT (OUTPATIENT)
Dept: VASCULAR SURGERY | Facility: CLINIC | Age: 81
End: 2021-09-13
Attending: NURSE PRACTITIONER
Payer: COMMERCIAL

## 2021-09-13 VITALS
TEMPERATURE: 97.5 F | RESPIRATION RATE: 18 BRPM | DIASTOLIC BLOOD PRESSURE: 68 MMHG | SYSTOLIC BLOOD PRESSURE: 118 MMHG | HEART RATE: 78 BPM

## 2021-09-13 DIAGNOSIS — S81.802D LEG WOUND, LEFT, SUBSEQUENT ENCOUNTER: Primary | ICD-10-CM

## 2021-09-13 DIAGNOSIS — M79.89 LEFT LEG SWELLING: ICD-10-CM

## 2021-09-13 PROCEDURE — 29581 APPL MULTLAYER CMPRN SYS LEG: CPT

## 2021-09-13 PROCEDURE — 97602 WOUND(S) CARE NON-SELECTIVE: CPT

## 2021-09-13 ASSESSMENT — PAIN SCALES - GENERAL: PAINLEVEL: NO PAIN (0)

## 2021-09-13 NOTE — PATIENT INSTRUCTIONS
"Follow up Friday with Sulma Fisher CNP. Please bring your compression socks to this visit.    Wound Care Instructions:    2 TIMES PER WEEK and as needed, ok for nurse debridement; Cleanse your left shin wound(s) with Dilute hibiclens 30cc in 500cc NS.     Pat Dry with non-sterile gauze     Apply Lotion to the intact skin surrounding your wound and other dry skin locations. Some good lotions include: Remedy Skin Repair Cream, Sarna, Vanicream or Cetaphil     Primary Dressing: Apply endoform collagen into/onto the wounds     Secondary dressing: Cover with gauze           Secure with non-sterile roll gauze (4\" x 75\" roll) and tape (1\" roll tape) as needed; avoid adhesive directly on the skin     Compression: tubular compression and short stretch     It is not ok to get your wound wet in the bath or shower     SEEK MEDICAL CARE IF:  You have an increase in swelling, pain, or redness around the wound.  You have an increase in the amount of pus coming from the wound.  There is a bad smell coming from the wound.  The wound appears to be worsening/enlarging  You have a fever greater than 101.5 F  "

## 2021-09-13 NOTE — PROGRESS NOTES
Lower legs 9/13        Left shin 9/14        Left shin 9/13    [facility]  -  Nurse Visit      Chief Complaint: Patient presents to clinic for assement, and treatment of their left leg wound and bilateral lower extremity swelling.     Dressing on Arrival: Comprilan, tubular compression, endoform and gauze. C/D/I    Nursing Note:     Waldo comes in today for routine dressing change to left leg wound. Last week wound was hypergranular. Today the wound has no hypergranular tissue and there was a thin scab present which was gently removed with cleansing. No drainage on old dressing and there are no signs or symptoms of infection. Dressings were reapplied. Tolerated dressing change well. Had no compression on right leg today. This had been discussed at visit last week. Reminded to bring his compression socks on Friday for follow up with Sulma Fisher CNP. Denies pain and discomfort. Waldo is currently seeing a MD for possible removal of mass in left thigh. Waldo reports he has a follow up with Dr. Gutierrez next week regarding this.       ICD-10-CM    1. Leg wound, left, subsequent encounter  S81.802D    2. Left leg swelling  M79.89          Allergies: No Known Allergies    Medications:   Current Outpatient Medications:      carvediloL (COREG) 25 MG tablet, [CARVEDILOL (COREG) 25 MG TABLET] Take 1 tablet (25 mg total) by mouth 2 (two) times a day with meals., Disp: 180 tablet, Rfl: 3     cholecalciferol, vitamin D3, (VITAMIN D3) 2,000 unit cap, [CHOLECALCIFEROL, VITAMIN D3, (VITAMIN D3) 2,000 UNIT CAP] Take 2,000 Units by mouth daily., Disp: , Rfl:      magnesium citrate 1.745 GM/30ML solution, , Disp: , Rfl:      mupirocin (BACTROBAN) 2 % external ointment, ELIZABET EXT AA TID, Disp: , Rfl:      polyethylene glycol (MIRALAX) 17 GM/Dose powder, , Disp: , Rfl:      sulfamethoxazole-trimethoprim (BACTRIM DS) 800-160 MG tablet, TAKE 1 TABLET BY MOUTH TWICE DAILY FOR 10 DAYS, Disp: , Rfl:      warfarin  ANTICOAGULANT (COUMADIN/JANTOVEN) 5 MG tablet, [WARFARIN ANTICOAGULANT (COUMADIN/JANTOVEN) 5 MG TABLET] TAKE 1/2 TO 1 TABLET BY MOUTH EVERY DAY, ADJUST DOSE PER INR RESULTS AS DIRECTED, Disp: 90 tablet, Rfl: 1    Current Facility-Administered Medications:      lidocaine (XYLOCAINE) 2 % external gel, , Topical, PRN, Sulma Fisher, NP, 1 mL at 08/31/21 1115    Vital Signs: /68 (BP Location: Left arm, Patient Position: Sitting, Cuff Size: Adult Large)   Pulse 78   Temp 97.5  F (36.4  C) (Temporal)   Resp 18         Assessment:    General:  Patient presents to clinic in no apparent distress.  Psychiatric:  Alert and oriented x3.   Lower extremity:  edema is present.    Integumentary:  Skin is WNL    Swelling Measurements:  Circumferential Measures 8/3/2021 8/10/2021 8/31/2021 9/7/2021 9/13/2021   Right just above MTP 24.5 24.5 24.5 24.5 24.5   Right Ankle 27 27.4 28 27.8 26.8   Right Widest Calf 37.5 38.0 38 37.5 37.6   Right Thigh Up 10cm 43 - 44 - -   Right Knee to Ankle - - - - -   Left - just above MTP 26.2 25.4 25.5 24.6 24.5   Left Ankle 31 25.2 25.5 24.0 23.6   Left Widest Calf 41 41.4 41 36.4 35.6   Left Thigh Up 10cm 65 - 65 (No Data) -   Left Knee to Ankle - - - - -       Wound info:  VASC Wound Lt lower shin (Active)   Pre Size Length 0.3 09/13/21 0800   Pre Size Width 0.6 09/13/21 0800   Pre Size Depth 0.1 09/13/21 0800   Pre Total Sq cm 0.18 09/13/21 0800   Post Size Length 0.8 08/31/21 1100   Post Size Width 0.8 08/31/21 1100   Post Size Depth 0.1 08/31/21 1100   Post Total Sq cm 0.64 08/31/21 1100   Undermined .2cm @ 1 clock  08/17/21 1100   Tunneling no 08/17/21 1100   Description pink 08/17/21 1100   Product Used Collagen 09/13/21 0800       Undermining is not present.    The periwoundskin is pink    Plan:         1. Patient will follow up on Friday with Sulma Fisher CNP.          2. Treatment provided will include irrigation and dressings to promote autolytic debridement and will be as listed  below     Cleansed with: Dilute hibiclens 30cc in 500cc NS    Protected skin with: Remedy skin repair lotion    Dressings Applied: Collagen, gauze, roll gauze    Compression Applied to the Left Leg: Tubular compression and comprilan  Compression Applied to the Right Leg: None-Spoke to patient last week about wearing compression on right leg. Waldo agreed to start wearing. Today no compression on right leg.    Offloading applied: None    Trial Products: No  Provider notified regarding concerns: No  Treatment Changes: No        Gricelda Michel RN

## 2021-09-14 ENCOUNTER — TELEPHONE (OUTPATIENT)
Dept: ANTICOAGULATION | Facility: CLINIC | Age: 81
End: 2021-09-14

## 2021-09-14 ENCOUNTER — LAB (OUTPATIENT)
Dept: LAB | Facility: CLINIC | Age: 81
End: 2021-09-14
Payer: COMMERCIAL

## 2021-09-14 ENCOUNTER — ANTICOAGULATION THERAPY VISIT (OUTPATIENT)
Dept: ANTICOAGULATION | Facility: CLINIC | Age: 81
End: 2021-09-14

## 2021-09-14 DIAGNOSIS — I48.19 PERSISTENT ATRIAL FIBRILLATION (H): ICD-10-CM

## 2021-09-14 DIAGNOSIS — I48.19 PERSISTENT ATRIAL FIBRILLATION (H): Primary | ICD-10-CM

## 2021-09-14 LAB — INR BLD: 2.4 (ref 0.9–1.1)

## 2021-09-14 PROCEDURE — 85610 PROTHROMBIN TIME: CPT | Performed by: FAMILY MEDICINE

## 2021-09-14 NOTE — TELEPHONE ENCOUNTER
Patient has upcoming surgery to remove a large left thigh mass on 10/4.     Per chart review, patient has held warfarin for recent colonoscopy (March 2021) with no bridge.       Routing to PharmD for review.      Jaz Sterling RN  [facility]  [phone]

## 2021-09-14 NOTE — PROGRESS NOTES
ANTICOAGULATION MANAGEMENT     Prosper Lopez 81 year old male is on warfarin with therapeutic INR result. (Goal INR 2.0-3.0)    Recent labs: (last 7 days)     09/14/21  0752   INR 2.4*       ASSESSMENT     Source(s): Chart Review and Template       Warfarin doses taken: Warfarin taken as instructed    Diet: No new diet changes identified    New illness, injury, or hospitalization: No    Medication/supplement changes: None noted    Signs or symptoms of bleeding or clotting: No    Previous INR: Therapeutic last 2(+) visits    Additional findings: Upcoming surgery/procedure left thigh mass removal on 10/1     PLAN     Recommended plan for no diet, medication or health factor changes affecting INR     Dosing Instructions: Continue your current warfarin dose with next INR in 2 weeks prior to surgery    Summary  As of 9/14/2021    Full warfarin instructions:  2.5 mg every Tue, Thu, Sat; 5 mg all other days   Next INR check:  9/28/2021             Detailed voice message left for Prosper with dosing instructions and follow up date.     Contact [phone] to schedule and with any changes, questions or concerns.     Education provided: Please call back if any changes to your diet, medications or how you've been taking warfarin, Goal range and significance of current result and Importance of following up at instructed interval    Plan made per [facility] anticoagulation protocol    Jaz Sterling RN  Anticoagulation Clinic  9/14/2021    _______________________________________________________________________     Anticoagulation Episode Summary     Current INR goal:  2.0-3.0   TTR:  79.6 % (1 y)   Target end date:  Indefinite   Send INR reminders to:  [facility]    Indications    Persistent atrial fibrillation (H) [I48.19]           Comments:           Anticoagulation Care Providers     Provider Role Specialty Phone number    Foreign Tavares MD Referring Family Medicine [phone]

## 2021-09-15 ENCOUNTER — OFFICE VISIT (OUTPATIENT)
Dept: ORTHOPEDICS | Facility: CLINIC | Age: 81
End: 2021-09-15
Payer: COMMERCIAL

## 2021-09-15 DIAGNOSIS — M79.89 SOFT TISSUE MASS: Primary | ICD-10-CM

## 2021-09-15 PROCEDURE — 99212 OFFICE O/P EST SF 10 MIN: CPT | Performed by: ORTHOPAEDIC SURGERY

## 2021-09-15 RX ORDER — TRANEXAMIC ACID 650 MG/1
1950 TABLET ORAL ONCE
Status: CANCELLED | OUTPATIENT
Start: 2021-09-15 | End: 2021-09-15

## 2021-09-15 RX ORDER — CEFAZOLIN SODIUM 2 G/100ML
2 INJECTION, SOLUTION INTRAVENOUS
Status: CANCELLED | OUTPATIENT
Start: 2021-09-15

## 2021-09-15 RX ORDER — CEFAZOLIN SODIUM 2 G/100ML
2 INJECTION, SOLUTION INTRAVENOUS SEE ADMIN INSTRUCTIONS
Status: CANCELLED | OUTPATIENT
Start: 2021-09-15

## 2021-09-15 NOTE — NURSING NOTE
Reason For Visit:   Chief Complaint   Patient presents with     RECHECK     discuss surgery        There were no vitals taken for this visit.    Pain Assessment  Patient Currently in Pain: No (no pain per pt)    Eleanor Howard, ATC

## 2021-09-15 NOTE — PROGRESS NOTES
This patient had some questions about his upcoming surgery.  He is at a busy time of year and would like to postpone that until January of next year which is about 4 months from now.  I think that is reasonable.  I talked to him about the potential for muscle weakness and the need to remove some skin as we greatly reduce the size of his left thigh.  He may need to use an assistive device to walk for several weeks.  He will have some muscle symptoms for months after the surgery.  He will have a drain placed that will stay in for 1 or 2 weeks.  I have answered all of his questions today.  I spent more than 10 minutes talking to him about these things and additional 10 minutes with charting and imaging review.

## 2021-09-15 NOTE — LETTER
9/15/2021         RE: Prosper Lopez  [address]  [place] [address]        Dear Colleague,    Thank you for referring your patient, Prosper Lopez, to the [facility] [place]. Please see a copy of my visit note below.    This patient had some questions about his upcoming surgery.  He is at a busy time of year and would like to postpone that until January of next year which is about 4 months from now.  I think that is reasonable.  I talked to him about the potential for muscle weakness and the need to remove some skin as we greatly reduce the size of his left thigh.  He may need to use an assistive device to walk for several weeks.  He will have some muscle symptoms for months after the surgery.  He will have a drain placed that will stay in for 1 or 2 weeks.  I have answered all of his questions today.  I spent more than 10 minutes talking to him about these things and additional 10 minutes with charting and imaging review.    Again, thank you for allowing me to participate in the care of your patient.        Sincerely,        Mundo Ferrari MD

## 2021-09-17 ENCOUNTER — OFFICE VISIT (OUTPATIENT)
Dept: VASCULAR SURGERY | Facility: CLINIC | Age: 81
End: 2021-09-17
Attending: NURSE PRACTITIONER
Payer: COMMERCIAL

## 2021-09-17 VITALS
TEMPERATURE: 97.8 F | SYSTOLIC BLOOD PRESSURE: 122 MMHG | DIASTOLIC BLOOD PRESSURE: 66 MMHG | HEART RATE: 88 BPM | WEIGHT: 258.3 LBS | RESPIRATION RATE: 20 BRPM | BODY MASS INDEX: 35.03 KG/M2

## 2021-09-17 DIAGNOSIS — S80.12XD LEG HEMATOMA, LEFT, SUBSEQUENT ENCOUNTER: ICD-10-CM

## 2021-09-17 DIAGNOSIS — I89.0 SECONDARY LYMPHEDEMA: ICD-10-CM

## 2021-09-17 DIAGNOSIS — M79.89 LEFT LEG SWELLING: ICD-10-CM

## 2021-09-17 DIAGNOSIS — R73.03 PRE-DIABETES: ICD-10-CM

## 2021-09-17 DIAGNOSIS — I87.2 VENOUS INSUFFICIENCY OF BOTH LOWER EXTREMITIES: ICD-10-CM

## 2021-09-17 DIAGNOSIS — I87.303 VENOUS HYPERTENSION OF LOWER EXTREMITY, BILATERAL: ICD-10-CM

## 2021-09-17 DIAGNOSIS — R22.42 MASS OF LEFT THIGH: ICD-10-CM

## 2021-09-17 DIAGNOSIS — S81.802D LEG WOUND, LEFT, SUBSEQUENT ENCOUNTER: Primary | ICD-10-CM

## 2021-09-17 DIAGNOSIS — C49.22 LIPOSARCOMA OF LOWER EXTREMITY, LEFT (H): ICD-10-CM

## 2021-09-17 PROCEDURE — 99213 OFFICE O/P EST LOW 20 MIN: CPT | Performed by: NURSE PRACTITIONER

## 2021-09-17 PROCEDURE — G0463 HOSPITAL OUTPT CLINIC VISIT: HCPCS | Performed by: NURSE PRACTITIONER

## 2021-09-17 ASSESSMENT — PAIN SCALES - GENERAL: PAINLEVEL: NO PAIN (0)

## 2021-09-17 NOTE — PROGRESS NOTES
Follow up Vascular Visit       Date of Service:09/17/21      Chief Complaint: left hematoma; left thigh swelling/mass      Pt returns to [facility] with regards to their left hematoma; left thigh swelling/mass.  They arrive today alone. They are currently using endoform to the wounds. This is being done by staff at our clinic weekly. They are using tubular compression and short stretch for compression. They are feeling well today. Denies fevers, chills. No shortness of breath. He reported worsening swelling in the left thigh we had MRI done and this was suspicious for liposarcoma; he was referred to surgical oncology he is scheduled for excision of left thigh mass on 10/4 with Dr. Gutierrez orthopedics; however he may post pone this further due to the patient having a very busy schedule right now.     Allergies: No Known Allergies    Medications:   Current Outpatient Medications:      carvediloL (COREG) 25 MG tablet, [CARVEDILOL (COREG) 25 MG TABLET] Take 1 tablet (25 mg total) by mouth 2 (two) times a day with meals., Disp: 180 tablet, Rfl: 3     cholecalciferol, vitamin D3, (VITAMIN D3) 2,000 unit cap, [CHOLECALCIFEROL, VITAMIN D3, (VITAMIN D3) 2,000 UNIT CAP] Take 2,000 Units by mouth daily., Disp: , Rfl:      warfarin ANTICOAGULANT (COUMADIN/JANTOVEN) 5 MG tablet, [WARFARIN ANTICOAGULANT (COUMADIN/JANTOVEN) 5 MG TABLET] TAKE 1/2 TO 1 TABLET BY MOUTH EVERY DAY, ADJUST DOSE PER INR RESULTS AS DIRECTED, Disp: 90 tablet, Rfl: 1    Current Facility-Administered Medications:      lidocaine (XYLOCAINE) 2 % external gel, , Topical, PRN, Sulma Fisher, NP, 1 mL at 08/31/21 1115    History:   Past Medical History:   Diagnosis Date     Actinic keratosis     Created by Conversion      Atrial fibrillation (H) 06/10/2014     Benign neoplasm of colon     Created by [facility] Annotation: May  5 2011  1:17PM -  ,  : 2006      Benign neoplasm of skin     Created by Conversion  Replacement  Utility updated for latest IMO load     Calculus of kidney     Created by Conversion [facility] Annotation: May  4 2011  7:56Demar Mittal: STENT AND LASER      Calculus of ureter 4/4/2018     Cardiomyopathy (H) 06/16/2014     Cataract 2012     Coronary artery disease      Effusion of ankle and foot joint     Created by Conversion      Hydrarthrosis 2012     Hydronephrosis with urinary obstruction due to ureteral calculus 4/4/2018     Hypercholesteremia     Created by Conversion      Hyperlipidemia 2012     Impaired fasting glucose 9/19/2019     Internal derangement of knee     Created by Conversion  Replacement Utility updated for latest IMO load     Left hydrocele 9/19/2019     Malignant neoplasm of prostate (H)     Created by Conversion      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 Reoccurrence on 7/24/2017 and again persistent [id number] score of 4 with history of TIA-on warfarin Asymptomatic and on rate control since July 2017.      Plantar fasciitis of right foot      Prostate CA (H) 2011     Seborrheic keratosis 2012     Skin neoplasm     Of uncertain behavior     TIA (transient ischemic attack) 01/24/2012     Transient cerebral ischemia     Created by Conversion  Replacement Utility updated for latest IMO load     Tubular adenoma of colon 2006     Unspecified cataract     Created by Conversion      Ureterolithiasis        Physical Exam:    /66   Pulse 88   Temp 97.8  F (36.6  C)   Resp 20   Wt 258 lb 4.8 oz (117.2 kg)   BMI 35.03 kg/m      General:  Patient presents to clinic in no apparent distress.  Head: normocephalic  atraumatic  Psychiatric:  Alert and oriented x3.   Respiratory: unlabored breathing; no cough  Integumentary:  Skin is uniformly warm, dry and pink.    Left leg: mild scaling; wounds are healed; swelling stable in the lower leg; persists in the left thigh          VASC Wound Lt lower shin (Active)   Pre Size Length 0.3 09/13/21 0800   Pre Size Width 0.6 09/13/21 0800   Pre Size Depth 0.1 09/13/21 0800   Pre Total Sq cm 0.18 09/13/21 0800   Post Size Length 0.8 08/31/21 1100   Post Size Width 0.8 08/31/21 1100   Post Size Depth 0.1 08/31/21 1100   Post Total Sq cm 0.64 08/31/21 1100   Description scab 09/17/21 0900   Product Used Collagen 09/13/21 0800   Number of days: 65            Circumferential volume measures:      Circumferential Measures 8/10/2021 8/31/2021 9/7/2021 9/13/2021 9/17/2021   Right just above MTP 24.5 24.5 24.5 24.5 -   Right Ankle 27.4 28 27.8 26.8 -   Right Widest Calf 38.0 38 37.5 37.6 -   Right Thigh Up 10cm - 44 - - -   Right Knee to Ankle - - - - -   Left - just above MTP 25.4 25.5 24.6 24.5 25.2   Left Ankle 25.2 25.5 24.0 23.6 24.8   Left Widest Calf 41.4 41 36.4 35.6 35.3   Left Thigh Up 10cm - 65 (No Data) - -   Left Knee to Ankle - - - - 36       Labs:    I personally reviewed the following lab results today and those on care everywhere, if indicated     CRP   Date Value Ref Range Status   04/04/2018 <0.1 0.0 - 0.8 mg/dL Final      No results found for: SED   Last Renal Panel:  Sodium   Date Value Ref Range Status   02/09/2021 141 136 - 145 mmol/L Final     Potassium   Date Value Ref Range Status   02/09/2021 4.3 3.5 - 5.0 mmol/L Final     Chloride   Date Value Ref Range Status   02/09/2021 107 98 - 107 mmol/L Final     Carbon Dioxide (CO2)   Date Value Ref Range Status   02/09/2021 28 22 - 31 mmol/L Final     Anion Gap   Date Value Ref Range Status   02/09/2021 6 5 - 18 mmol/L Final     Glucose   Date Value Ref Range Status   02/09/2021 121 70 - 125 mg/dL Final     Urea Nitrogen   Date  Value Ref Range Status   02/09/2021 17 8 - 28 mg/dL Final     Creatinine   Date Value Ref Range Status   02/09/2021 0.97 0.70 - 1.30 mg/dL Final     GFR Estimate   Date Value Ref Range Status   02/09/2021 >60 >60 mL/min/1.73m2 Final     Calcium   Date Value Ref Range Status   02/09/2021 9.9 8.5 - 10.5 mg/dL Final     Albumin   Date Value Ref Range Status   04/04/2018 3.8 3.5 - 5.0 g/dL Final      Lab Results   Component Value Date    WBC 7.5 02/09/2021     Lab Results   Component Value Date    RBC 5.10 02/09/2021     Lab Results   Component Value Date    HGB 15.8 02/09/2021     Lab Results   Component Value Date    HCT 43.7 02/09/2021     No components found for: MCT  Lab Results   Component Value Date    MCV 86 02/09/2021     Lab Results   Component Value Date    MCH 31.0 02/09/2021     Lab Results   Component Value Date    MCHC 36.2 02/09/2021     Lab Results   Component Value Date    RDW 14.1 02/09/2021     Lab Results   Component Value Date     02/09/2021      Lab Results   Component Value Date    A1C 5.7 02/09/2021    A1C 5.9 10/13/2020    A1C 5.8 07/08/2020    A1C 6.1 09/19/2019      No results found for: TSH   No results found for: VITDT                Impression:  Encounter Diagnoses   Name Primary?     Leg wound, left, subsequent encounter Yes     Left leg swelling      Secondary lymphedema      Leg hematoma, left, subsequent encounter      Venous insufficiency of both lower extremities      Mass of left thigh      Pre-diabetes      Venous hypertension of lower extremity, bilateral      Liposarcoma of lower extremity, left (H)           9/17/2021 left leg         7/19/21 left leg        Are any of these wounds new today: No; Location: na    Assessment/Plan:          1. Debridement:na     2.  Wound treatment: wound treatment will include irrigation and dressings to promote autolytic debridement which will include:wounds healed; no dressings; lotion daily Improved            3. Edema: scheduled to have  left thigh mass removed; we will put him in double tubular compression; and instructed him to go home and put on his stockings . The compression wraps were applied today in clinic. Stable            4. Nutrition: na           5. Offloading: na     Patient will follow up with me PRN weeks for reevaluation. They were instructed to call the clinic sooner with any signs or symptoms of infection or any further questions/concerns. Answered all questions.          Sulma Fisher DNP, RN, CNP, CWOCN, CFCN, CLT  [facility]   [phone]        This note was electronically signed by Sulma Fisher NP

## 2021-09-17 NOTE — PATIENT INSTRUCTIONS
Your wound is now healed    No dressings needed    We will apply double tubular compression until you get home and then put on your stocking    Good luck on your upcoming surgery      Continue to wear your compression stockings or velcro wraps every day; put them on first thing in the morning and remove at bedtime    Replace your compression stockings every 3-4 months; these garments will lose their elasticity and become ineffective    Replace velcro wraps every 1-2 years    Elevate your legs periodically throughout the day, 30-60 minutes 1-3 times per day    Apply lotion to your legs 1-2 times per day; some good name brands are Cetaphil, Sarna, Aveeno, VaniCream, CeraVe    Continue to walk and exercise    If you are taking a diuretic continue to do so at the direction of your primary care provider    Make an appointment at the vascular clinic again if you have worsening swelling, need a prescription for new compression garments; and/or develop new wounds

## 2021-09-22 ENCOUNTER — TELEPHONE (OUTPATIENT)
Dept: ANTICOAGULATION | Facility: CLINIC | Age: 81
End: 2021-09-22

## 2021-09-22 DIAGNOSIS — I48.19 PERSISTENT ATRIAL FIBRILLATION (H): Primary | ICD-10-CM

## 2021-09-22 NOTE — TELEPHONE ENCOUNTER
Received message from RN that patient would like to hold off on scheduling surgery until January 2022. Surgery has been cancelled.

## 2021-09-22 NOTE — TELEPHONE ENCOUNTER
PARKER-PROCEDURAL ANTICOAGULATION  MANAGEMENT    ASSESSMENT     Warfarin interruption plan for Left thigh mass excision on Monday, 10/4/2021.    Indication for Anticoagulation: Atrial Fibrillation      Risk stratification for thromboembolism: moderate (2017 ACC periprocedure pathway for NVAF Expert Consensus)    o [id number] = 4 (Age >= 75 and TIA)   o TIA in 2012 occurred prior to Atrial Fibrillation diagnosis in 2014  o Bleeding risk factor of stable low platelet counts below reference range (112-129)   o Previously tolerated 4 day warfarin holds without bridge on 4/2021 and 2/2021    NVAF: 2017 ACC periprocedure pathway for NVAF advises NO bridge for moderate risk stratification ([id number] score 5-6 or hx of stroke, TIA or systemic embolism > 3 months ago) with increased risk of bleeding    AFIB: 2019 AHA/ACC/HRS update of Afib guidelines recommend for patients with Afib without mechanical valves who required interruption of warfarin decisions about bridging should balance risks of stroke and bleeding and the duration of time a patient will not be anticoagulated.  They note that bridging maybe be appropriate only in patients with a very high thromboembolic risk.         RECOMMENDATION       Pre-Procedure:  o Hold warfarin for 5 days, until after procedure starting: Wednesday, 9/29/2021   o No Bridge      Post-Procedure:  o Resume warfarin dose if okay with provider doing procedure on night of procedure, 10/4/2021 PM: 7.5 mg warfarin x 1, then resume home dose of 2.5 mg Tues/Thurs/Sat and 5 mg ROW.   o Recheck INR ~ 7 days after resuming warfarin         Plan routed to referring provider for approval  ?   Min Chaparro, PharmD Candidate    SUBJECTIVE/OBJECTIVE     Prosper Lopez, a 81 year old male    Goal INR Range: 2.0-3.0     Patient bridged in past: No    Pertinent History: TIA in 2012, Atrial Fibrillation     Wt Readings from Last 3 Encounters:   09/17/21 117.2 kg (258 lb 4.8 oz)   09/10/21 116.6 kg  (257 lb)   07/16/21 116.6 kg (257 lb)      Ideal body weight: 77.6 kg (171 lb 1.2 oz)  Adjusted ideal body weight: 93.4 kg (205 lb 15.5 oz)     Estimated body mass index is 35.03 kg/m  as calculated from the following:    Height as of 9/10/21: 1.829 m (6').    Weight as of 9/17/21: 117.2 kg (258 lb 4.8 oz).    Lab Results   Component Value Date    INR 2.4 (H) 09/14/2021    INR 2.2 (H) 08/10/2021    INR 2.1 (H) 07/19/2021     Lab Results   Component Value Date    HGB 15.8 02/09/2021    HCT 43.7 02/09/2021     02/09/2021     Lab Results   Component Value Date    CR 0.97 02/09/2021    CR 0.81 10/13/2020    CR 0.92 07/08/2020     Estimated CrCl: 80 mL/min

## 2021-09-23 ENCOUNTER — TELEPHONE (OUTPATIENT)
Dept: ORTHOPEDICS | Facility: CLINIC | Age: 81
End: 2021-09-23

## 2021-09-23 NOTE — TELEPHONE ENCOUNTER
RN called and left a voice message for Waldo.  He was informed that the [facility] does not take [employer] insurance.  So we could not do surgery here this year without a big money out of pocket for him.  Which is his option.  However, he has cancelled the surgery and will reschedule for next year.,  RN asked that he call me back.  Is he changing insurances or what?  Please call me back.

## 2021-09-23 NOTE — TELEPHONE ENCOUNTER
Foerign Tavares MD  [facility] 22 hours ago (5:30 PM)     THAO Wright for requested hold plan x 5 days without bridge.     Foreign Tavares MD

## 2021-09-24 NOTE — TELEPHONE ENCOUNTER
Chart reviewed 9/23/2021 indicating potential surgery cancellation. Reached out to patient and left voice message to call back with updated procedure plan. Current 9/28/2021 INR can be pushed the the week of 10/11/2021 if no procedure is planned.     Min Chaparro, PharmD Candidate

## 2021-09-26 ENCOUNTER — HEALTH MAINTENANCE LETTER (OUTPATIENT)
Age: 81
End: 2021-09-26

## 2021-09-28 ENCOUNTER — LAB (OUTPATIENT)
Dept: LAB | Facility: CLINIC | Age: 81
End: 2021-09-28
Payer: COMMERCIAL

## 2021-09-28 ENCOUNTER — ANTICOAGULATION THERAPY VISIT (OUTPATIENT)
Dept: ANTICOAGULATION | Facility: CLINIC | Age: 81
End: 2021-09-28

## 2021-09-28 ENCOUNTER — IMMUNIZATION (OUTPATIENT)
Dept: FAMILY MEDICINE | Facility: CLINIC | Age: 81
End: 2021-09-28
Payer: COMMERCIAL

## 2021-09-28 DIAGNOSIS — I48.19 PERSISTENT ATRIAL FIBRILLATION (H): Primary | ICD-10-CM

## 2021-09-28 DIAGNOSIS — I48.19 PERSISTENT ATRIAL FIBRILLATION (H): ICD-10-CM

## 2021-09-28 LAB — INR BLD: 2.8 (ref 0.9–1.1)

## 2021-09-28 PROCEDURE — 90662 IIV NO PRSV INCREASED AG IM: CPT

## 2021-09-28 PROCEDURE — G0008 ADMIN INFLUENZA VIRUS VAC: HCPCS

## 2021-09-28 PROCEDURE — 85610 PROTHROMBIN TIME: CPT | Performed by: FAMILY MEDICINE

## 2021-09-28 NOTE — PROGRESS NOTES
ANTICOAGULATION MANAGEMENT     Prosper Lopez 81 year old male is on warfarin with therapeutic INR result. (Goal INR 2.0-3.0)    Recent labs: (last 7 days)     09/28/21  0825   INR 2.8*       ASSESSMENT     Source(s): Chart Review, Patient/Caregiver Call and Template       Warfarin doses taken: Warfarin taken as instructed    Diet: No new diet changes identified    New illness, injury, or hospitalization: No    Medication/supplement changes: None noted    Signs or symptoms of bleeding or clotting: No    Previous INR: Therapeutic last 2(+) visits    Additional findings: He has put off his surgery until next year.      PLAN     Recommended plan for no diet, medication or health factor changes affecting INR     Dosing Instructions: Continue your current warfarin dose with next INR in 5 weeks       Summary  As of 9/28/2021    Full warfarin instructions:  2.5 mg every Tue, Thu, Sat; 5 mg all other days   Next INR check:  11/2/2021             Telephone call with Prosper who verbalizes understanding and agrees to plan    Lab visit scheduled    Education provided: Importance of therapeutic range    Plan made per ACC anticoagulation protocol    Ana Galvan RN  Anticoagulation Clinic  9/28/2021    _______________________________________________________________________     Anticoagulation Episode Summary     Current INR goal:  2.0-3.0   TTR:  79.6 % (1 y)   Target end date:  Indefinite   Send INR reminders to:  MAGDALENA TEJEDA    Indications    Persistent atrial fibrillation (H) [I48.19]           Comments:           Anticoagulation Care Providers     Provider Role Specialty Phone number    Foreign Tavares MD Referring Family Medicine [phone]

## 2021-09-28 NOTE — TELEPHONE ENCOUNTER
Patient has cancelled his surgery until some time next year. He had INR done today. Spoke with patient.

## 2021-10-26 ENCOUNTER — LAB (OUTPATIENT)
Dept: LAB | Facility: CLINIC | Age: 81
End: 2021-10-26
Payer: COMMERCIAL

## 2021-10-26 ENCOUNTER — TELEPHONE (OUTPATIENT)
Dept: ANTICOAGULATION | Facility: CLINIC | Age: 81
End: 2021-10-26

## 2021-10-26 ENCOUNTER — ANTICOAGULATION THERAPY VISIT (OUTPATIENT)
Dept: ANTICOAGULATION | Facility: CLINIC | Age: 81
End: 2021-10-26

## 2021-10-26 DIAGNOSIS — I48.19 PERSISTENT ATRIAL FIBRILLATION (H): ICD-10-CM

## 2021-10-26 DIAGNOSIS — I48.19 PERSISTENT ATRIAL FIBRILLATION (H): Primary | ICD-10-CM

## 2021-10-26 LAB — INR BLD: 2.2 (ref 0.9–1.1)

## 2021-10-26 PROCEDURE — 85610 PROTHROMBIN TIME: CPT | Performed by: FAMILY MEDICINE

## 2021-10-26 NOTE — PROGRESS NOTES
ANTICOAGULATION MANAGEMENT     Prosper Lpoez 81 year old male is on warfarin with therapeutic INR result. (Goal INR 2.0-3.0)    Recent labs: (last 7 days)     10/26/21  0828   INR 2.2*       ASSESSMENT     Source(s): Chart Review, Patient/Caregiver Call and Template       Warfarin doses taken: Warfarin taken as instructed    Diet: No new diet changes identified    New illness, injury, or hospitalization: No    Medication/supplement changes: None noted    Signs or symptoms of bleeding or clotting: No    Previous INR: Therapeutic last 2(+) visits    Additional findings: None     PLAN     Recommended plan for no diet, medication or health factor changes affecting INR     Dosing Instructions: Continue your current warfarin dose with next INR in 6 weeks       Summary  As of 10/26/2021    Full warfarin instructions:  2.5 mg every Tue, Thu, Sat; 5 mg all other days   Next INR check:  12/7/2021             Telephone call with Prosper who verbalizes understanding and agrees to plan    Lab visit scheduled    Education provided: Goal range and significance of current result and Contact [phone] with any changes, questions or concerns.     Plan made per ACC anticoagulation protocol    Jaz Sterling RN  Anticoagulation Clinic  10/26/2021    _______________________________________________________________________     Anticoagulation Episode Summary     Current INR goal:  2.0-3.0   TTR:  79.6 % (1 y)   Target end date:  Indefinite   Send INR reminders to:  MAGDALENA TEJEDA    Indications    Persistent atrial fibrillation (H) [I48.19]           Comments:           Anticoagulation Care Providers     Provider Role Specialty Phone number    Foreign Tavares MD Referring Family Medicine [phone]

## 2021-10-26 NOTE — TELEPHONE ENCOUNTER
ANTICOAGULATION MANAGEMENT      Prosper Lopez due for annual renewal of referral to anticoagulation monitoring. Order pended for your review and signature.      ANTICOAGULATION SUMMARY      Warfarin indication(s)     Atrial fibrillation    Heart valve present?  NO       Current goal range   INR: 2.0-3.0     Goal appropriate for indication? Yes, INR 2-3 appropriate for hx of DVT, PE, hypercoagulable state, Afib, LVAD, or bileaflet AVR without risk factors     Current duration of therapy Indefinite/long term therapy   Time in Therapeutic Range (TTR)  (Goal > 60%) 79.6%       Office visit with referring provider's group within last year yes on 7/9/21       Jaz Sterling RN

## 2021-11-21 ENCOUNTER — HEALTH MAINTENANCE LETTER (OUTPATIENT)
Age: 81
End: 2021-11-21

## 2021-12-02 ENCOUNTER — E-VISIT (OUTPATIENT)
Dept: FAMILY MEDICINE | Facility: CLINIC | Age: 81
End: 2021-12-02
Payer: COMMERCIAL

## 2021-12-02 DIAGNOSIS — Z20.822 ENCOUNTER FOR LABORATORY TESTING FOR COVID-19 VIRUS: Primary | ICD-10-CM

## 2021-12-02 PROCEDURE — 99421 OL DIG E/M SVC 5-10 MIN: CPT | Performed by: FAMILY MEDICINE

## 2021-12-02 NOTE — PATIENT INSTRUCTIONS
Dear Prosper Lopez,    Based on your responses, we have ordered COVID-19 (coronavirus) testing for you. Testing is recommended for people without symptoms in a number of different situations. These reasons include testing prior to air travel, testing after international travel, periodic testing for people who are attending in-person school, and testing related to participation in activities such as sports.     How to schedule:  Go to your [employer] home page and scroll down to the section that says  You have an appointment that needs to be scheduled  and click the large green button that says  Schedule Now  and follow the steps to find the next available opening.     If you are unable to complete these [employer] scheduling steps, please call [phone] to schedule your testing.      Know the test result takes usually 1-2 days to return but occasionally takes longer (over 95% are done within 72 hours).The results are available on [employer] right when the lab is completed. We will also call all people who have positive results.    What are the symptoms of COVID-19?  The most common symptoms are cough, fever and trouble breathing. Less common symptoms include headache, body aches, fatigue (feeling very tired), chills, sore throat, stuffy or runny nose, diarrhea (loose poop), loss of taste or smell, belly pain, and nausea or vomiting (feeling sick to your stomach or throwing up).    If you develop symptoms of COVID-19, you should be re-evaluated to see if retesting would be recommended.     Where can I get more information?  [facility] - About COVID-19: [email or web address]  CDC - What to Do If You're Sick: www.cdc.gov/coronavirus/2019-ncov/about/steps-when-sick.html  CDC - Ending Home Isolation: www.cdc.gov/coronavirus/2019-ncov/hcp/disposition-in-home-patients.html  CDC - Caring for Someone: www.cdc.gov/coronavirus/2019-ncov/if-you-are-sick/care-for-someone.html  [facility]  trials (COVID-19 research studies): [email or web address]  Below are the COVID-19 hotlines at the [place] Department [facility] ([facility]). Interpreters are available.  For health questions: Call 519-442-6737 or 1-306.189.8333 (7 a.m. to 7 p.m.)  For questions about schools and childcare: Call 045-644-5368 or 1-261.978.8883 (7 a.m. to 7 p.m.)

## 2021-12-03 ENCOUNTER — LAB (OUTPATIENT)
Dept: LAB | Facility: CLINIC | Age: 81
End: 2021-12-03
Attending: FAMILY MEDICINE
Payer: COMMERCIAL

## 2021-12-03 DIAGNOSIS — Z20.822 ENCOUNTER FOR LABORATORY TESTING FOR COVID-19 VIRUS: ICD-10-CM

## 2021-12-03 PROCEDURE — U0003 INFECTIOUS AGENT DETECTION BY NUCLEIC ACID (DNA OR RNA); SEVERE ACUTE RESPIRATORY SYNDROME CORONAVIRUS 2 (SARS-COV-2) (CORONAVIRUS DISEASE [COVID-19]), AMPLIFIED PROBE TECHNIQUE, MAKING USE OF HIGH THROUGHPUT TECHNOLOGIES AS DESCRIBED BY CMS-2020-01-R: HCPCS

## 2021-12-03 PROCEDURE — U0005 INFEC AGEN DETEC AMPLI PROBE: HCPCS

## 2021-12-04 LAB — SARS-COV-2 RNA RESP QL NAA+PROBE: NEGATIVE

## 2021-12-07 ENCOUNTER — LAB (OUTPATIENT)
Dept: LAB | Facility: CLINIC | Age: 81
End: 2021-12-07
Payer: COMMERCIAL

## 2021-12-07 ENCOUNTER — ANTICOAGULATION THERAPY VISIT (OUTPATIENT)
Dept: ANTICOAGULATION | Facility: CLINIC | Age: 81
End: 2021-12-07

## 2021-12-07 DIAGNOSIS — I48.19 PERSISTENT ATRIAL FIBRILLATION (H): Primary | ICD-10-CM

## 2021-12-07 DIAGNOSIS — I48.19 PERSISTENT ATRIAL FIBRILLATION (H): ICD-10-CM

## 2021-12-07 LAB — INR BLD: 2.9 (ref 0.9–1.1)

## 2021-12-07 PROCEDURE — 36416 COLLJ CAPILLARY BLOOD SPEC: CPT

## 2021-12-07 PROCEDURE — 85610 PROTHROMBIN TIME: CPT

## 2021-12-07 NOTE — PROGRESS NOTES
Foreign Tavares MD Wolyniec, Brittany, RN  Caller: Unspecified (Today,  9:10 AM)  Okay for extended 8 week interval per request.     Foreign Tavares MD

## 2021-12-07 NOTE — PROGRESS NOTES
ANTICOAGULATION MANAGEMENT     Prosper Lopez 81 year old male is on warfarin with therapeutic INR result. (Goal INR 2.0-3.0)    Recent labs: (last 7 days)     12/07/21  0808   INR 2.9*       ASSESSMENT     Source(s): Chart Review, Patient/Caregiver Call and Template       Warfarin doses taken: Warfarin taken as instructed    Diet: No new diet changes identified    New illness, injury, or hospitalization: No    Medication/supplement changes: None noted    Signs or symptoms of bleeding or clotting: No    Previous INR: Therapeutic last 2(+) visits    Additional findings: None     PLAN     Recommended plan for no diet, medication or health factor changes affecting INR     Dosing Instructions: Continue your current warfarin dose with next INR in 8 weeks       Summary  As of 12/7/2021    Full warfarin instructions:  2.5 mg every Tue, Thu, Sat; 5 mg all other days   Next INR check:  2/1/2022             Telephone call with  wife, Frances who agrees to plan and repeated back plan correctly    Patient offered & declined to schedule next visit    Education provided: Goal range and significance of current result and Contact [phone] with any changes, questions or concerns.     Plan made per [facility] anticoagulation protocol    Jaz Sterling RN  Anticoagulation Clinic  12/7/2021    _______________________________________________________________________     Anticoagulation Episode Summary     Current INR goal:  2.0-3.0   TTR:  79.6 % (1 y)   Target end date:  Indefinite   Send INR reminders to:  MAGDALENA TEJEDA    Indications    Persistent atrial fibrillation (H) [I48.19]           Comments:           Anticoagulation Care Providers     Provider Role Specialty Phone number    Foreign Tavares MD Referring Family Medicine [phone]

## 2021-12-07 NOTE — PROGRESS NOTES
Anticoagulation-Extended Recheck Request    Under [facility] Anticoagulation protocol, Anticoagulation team may extend INR recheck interval + 1 week for each stable in range INR to max of 6 weeks. Extended interval rechecks between 8-12 weeks for patients on stable maintenance therapy require an approval (one time) from referring provider.    Anticoagulation clinic suggests consideration of extended intervals in medically stable patients, with standard INR goals, and no change in warfarin dose for last 4-6 months    Prosper Lopez, 81 year old, male has been on:    Current recheck interval: 6 week  Compliant: Yes  Stable warfarin dose since: 5/4/21  INR Goal: 2.0-3.0  Time in Therapeutic range: 79.6%    Lab Results   Component Value Date    INR 2.9 12/07/2021    INR 2.2 10/26/2021    INR 2.8 09/28/2021    INR 2.4 09/14/2021    INR 2.2 08/10/2021    INR 2.1 07/19/2021    INR 2.5 07/13/2021    INR 2.30 07/05/2021    INR 2.00 06/23/2021    INR 2.10 05/04/2021    INR 1.46 04/19/2021    INR 2.10 03/23/2021    INR 2.40 03/09/2021    INR 1.60 02/25/2021    INR 1.35 02/15/2021         Please advise if Prosper is approved to have extended interval rechecks every 8 weeks while on stable maintenance therapy    Thank you,    Jaz Sterling RN

## 2022-01-18 ENCOUNTER — LAB (OUTPATIENT)
Dept: LAB | Facility: CLINIC | Age: 82
End: 2022-01-18
Payer: COMMERCIAL

## 2022-01-18 ENCOUNTER — ANTICOAGULATION THERAPY VISIT (OUTPATIENT)
Dept: ANTICOAGULATION | Facility: CLINIC | Age: 82
End: 2022-01-18

## 2022-01-18 DIAGNOSIS — I48.19 PERSISTENT ATRIAL FIBRILLATION (H): Primary | ICD-10-CM

## 2022-01-18 DIAGNOSIS — I48.19 PERSISTENT ATRIAL FIBRILLATION (H): ICD-10-CM

## 2022-01-18 LAB — INR BLD: 3.1 (ref 0.9–1.1)

## 2022-01-18 PROCEDURE — 36415 COLL VENOUS BLD VENIPUNCTURE: CPT

## 2022-01-18 PROCEDURE — 85610 PROTHROMBIN TIME: CPT

## 2022-01-18 NOTE — PROGRESS NOTES
ANTICOAGULATION MANAGEMENT     Prosper Lopez 81 year old male is on warfarin with supratherapeutic INR result. (Goal INR 2.0-3.0)    Recent labs: (last 7 days)     01/18/22  0819   INR 3.1*       ASSESSMENT     Source(s): Chart Review and Template       Warfarin doses taken: Warfarin taken as instructed    Diet: No new diet changes identified    New illness, injury, or hospitalization: No    Medication/supplement changes: None noted    Signs or symptoms of bleeding or clotting: No    Previous INR: Therapeutic last 2(+) visits    Additional findings: None     PLAN     Recommended plan for no diet, medication or health factor changes affecting INR     Dosing Instructions: Continue your current warfarin dose with next INR in 2-3 weeks       Summary  As of 1/18/2022    Full warfarin instructions:  2.5 mg every Tue, Thu, Sat; 5 mg all other days   Next INR check:  2/8/2022             Telephone call with Prosper who verbalizes understanding and agrees to plan    Lab visit scheduled    Education provided: Importance of consistent vitamin K intake    Plan made per [facility] anticoagulation protocol    Ling Guevara RN  Anticoagulation Clinic  1/18/2022    _______________________________________________________________________     Anticoagulation Episode Summary     Current INR goal:  2.0-3.0   TTR:  73.8 % (1 y)   Target end date:  Indefinite   Send INR reminders to:  MAGDALENA TEJEDA    Indications    Persistent atrial fibrillation (H) [I48.19]           Comments:           Anticoagulation Care Providers     Provider Role Specialty Phone number    Foreign Tavares MD Referring Family Medicine [phone]

## 2022-02-06 DIAGNOSIS — I48.19 PERSISTENT ATRIAL FIBRILLATION (H): ICD-10-CM

## 2022-02-08 ENCOUNTER — ANTICOAGULATION THERAPY VISIT (OUTPATIENT)
Dept: ANTICOAGULATION | Facility: CLINIC | Age: 82
End: 2022-02-08

## 2022-02-08 ENCOUNTER — LAB (OUTPATIENT)
Dept: LAB | Facility: CLINIC | Age: 82
End: 2022-02-08
Payer: COMMERCIAL

## 2022-02-08 DIAGNOSIS — I48.19 PERSISTENT ATRIAL FIBRILLATION (H): Primary | ICD-10-CM

## 2022-02-08 DIAGNOSIS — I48.19 PERSISTENT ATRIAL FIBRILLATION (H): ICD-10-CM

## 2022-02-08 LAB — INR BLD: 3 (ref 0.9–1.1)

## 2022-02-08 PROCEDURE — 85610 PROTHROMBIN TIME: CPT

## 2022-02-08 PROCEDURE — 36415 COLL VENOUS BLD VENIPUNCTURE: CPT

## 2022-02-08 RX ORDER — CARVEDILOL 25 MG/1
TABLET ORAL
Qty: 180 TABLET | Refills: 3 | Status: SHIPPED | OUTPATIENT
Start: 2022-02-08 | End: 2023-02-06

## 2022-02-08 NOTE — PROGRESS NOTES
ANTICOAGULATION MANAGEMENT     Prosper Lopez 81 year old male is on warfarin with therapeutic INR result. (Goal INR 2.0-3.0)    Recent labs: (last 7 days)     02/08/22  0759   INR 3.0*       ASSESSMENT     Source(s): Chart Review and Template       Warfarin doses taken: Warfarin taken as instructed    Diet: No new diet changes identified    New illness, injury, or hospitalization: No    Medication/supplement changes: None noted    Signs or symptoms of bleeding or clotting: No    Previous INR: Therapeutic last visit; previously outside of goal range    Additional findings: None     PLAN     Recommended plan for no diet, medication or health factor changes affecting INR     Dosing Instructions: Continue your current warfarin dose with next INR in 4 weeks       Summary  As of 2/8/2022    Full warfarin instructions:  2.5 mg every Tue, Thu, Sat; 5 mg all other days   Next INR check:  3/8/2022             Detailed voice message left for Prosper with dosing instructions and follow up date.     Contact [phone] to schedule and with any changes, questions or concerns.     Education provided: Please call back if any changes to your diet, medications or how you've been taking warfarin and Goal range and significance of current result    Plan made per [facility] anticoagulation protocol    Jaz Sterling RN  Anticoagulation Clinic  2/8/2022    _______________________________________________________________________     Anticoagulation Episode Summary     Current INR goal:  2.0-3.0   TTR:  72.4 % (1 y)   Target end date:  Indefinite   Send INR reminders to:  MAGDALENA TEJEDA    Indications    Persistent atrial fibrillation (H) [I48.19]           Comments:           Anticoagulation Care Providers     Provider Role Specialty Phone number    Foreign Tavares MD Referring Family Medicine [phone]

## 2022-02-08 NOTE — TELEPHONE ENCOUNTER
"Routing refill request to provider for review/approval because:  A break in medication    Last Written Prescription Date:  10/24/20  Last Fill Quantity: 180,  # refills: 3   Last office visit provider:  7/9/21     Requested Prescriptions   Pending Prescriptions Disp Refills     carvedilol (COREG) 25 MG tablet [Pharmacy Med Name: CARVEDILOL 25MG TABLETS] 180 tablet 3     Sig: TAKE 1 TABLET(25 MG) BY MOUTH TWICE DAILY WITH MEALS       Beta-Blockers Protocol Passed - 2/6/2022  5:48 PM        Passed - Blood pressure under 140/90 in past 12 months     BP Readings from Last 3 Encounters:   09/17/21 122/66   09/13/21 118/68   09/07/21 108/68                 Passed - Patient is age 6 or older        Passed - Recent (12 mo) or future (30 days) visit within the authorizing provider's specialty     Patient has had an office visit with the authorizing provider or a provider within the authorizing providers department within the previous 12 mos or has a future within next 30 days. See \"Patient Info\" tab in inbasket, or \"Choose Columns\" in Meds & Orders section of the refill encounter.              Passed - Medication is active on med list             Demar Hernandez RN 02/08/22 10:41 AM  "

## 2022-03-17 ENCOUNTER — TELEPHONE (OUTPATIENT)
Dept: ANTICOAGULATION | Facility: CLINIC | Age: 82
End: 2022-03-17
Payer: COMMERCIAL

## 2022-03-17 NOTE — TELEPHONE ENCOUNTER
ANTICOAGULATION     Prosper Lopez is overdue for INR check.      Left message for patient to call and schedule lab appointment as soon as possible. If returning call, please schedule.     Kimberly Duron RN

## 2022-03-24 ENCOUNTER — TELEPHONE (OUTPATIENT)
Dept: ANTICOAGULATION | Facility: CLINIC | Age: 82
End: 2022-03-24
Payer: COMMERCIAL

## 2022-03-24 NOTE — TELEPHONE ENCOUNTER
ANTICOAGULATION     Prosper Lopez is overdue for INR check.      Left message for patient to call and schedule lab appointment as soon as possible. If returning call, please schedule.     Jaz Sterling RN

## 2022-03-31 ENCOUNTER — DOCUMENTATION ONLY (OUTPATIENT)
Dept: ANTICOAGULATION | Facility: CLINIC | Age: 82
End: 2022-03-31
Payer: COMMERCIAL

## 2022-03-31 NOTE — LETTER
March 31, 2022      Prosper Lopez  [address]  [place] [address]              Dear Prosper,    You are currently under the care of [facility] for your warfarin (Coumadin ) therapy.  We are contacting you because our records show you were due for an INR on 3/8/22.    There are potentially serious risks when taking warfarin without careful monitoring and we want to make sure you are safely managed.  Routine INR monitoring is required for warfarin refills.     Please call [phone] as soon as possible to schedule an appointment.  If there has been a change in your care or other concerns, please let us know so we can help and or update our records.     Sincerely,       [facility]

## 2022-03-31 NOTE — PROGRESS NOTES
ANTICOAGULATION     Prosper Lopez is overdue for INR check.      Reminder letter sent    Jaz Sterling RN

## 2022-04-07 ENCOUNTER — LAB (OUTPATIENT)
Dept: LAB | Facility: CLINIC | Age: 82
End: 2022-04-07
Payer: COMMERCIAL

## 2022-04-07 ENCOUNTER — ANTICOAGULATION THERAPY VISIT (OUTPATIENT)
Dept: ANTICOAGULATION | Facility: CLINIC | Age: 82
End: 2022-04-07

## 2022-04-07 DIAGNOSIS — I48.19 PERSISTENT ATRIAL FIBRILLATION (H): Primary | ICD-10-CM

## 2022-04-07 DIAGNOSIS — I48.19 PERSISTENT ATRIAL FIBRILLATION (H): ICD-10-CM

## 2022-04-07 LAB — INR BLD: 2.1 (ref 0.9–1.1)

## 2022-04-07 PROCEDURE — 85610 PROTHROMBIN TIME: CPT

## 2022-04-07 PROCEDURE — 36416 COLLJ CAPILLARY BLOOD SPEC: CPT

## 2022-04-07 NOTE — PROGRESS NOTES
ANTICOAGULATION MANAGEMENT     Prosper Lopez 81 year old male is on warfarin with therapeutic INR result. (Goal INR 2.0-3.0)    Recent labs: (last 7 days)     04/07/22  0816   INR 2.1*       ASSESSMENT       Source(s): Chart Review, Patient/Caregiver Call and Template       Warfarin doses taken: Warfarin taken as instructed    Diet: No new diet changes identified    New illness, injury, or hospitalization: No    Medication/supplement changes: None noted    Signs or symptoms of bleeding or clotting: No    Previous INR: Therapeutic last 2(+) visits    Additional findings: None       PLAN     Recommended plan for no diet, medication or health factor changes affecting INR     Dosing Instructions: continue your current warfarin dose with next INR in 5 weeks       Summary  As of 4/7/2022    Full warfarin instructions:  2.5 mg every Tue, Thu, Sat; 5 mg all other days   Next INR check:  5/12/2022             Telephone call with Walod who verbalizes understanding and agrees to plan    Patient offered & declined to schedule next visit    Education provided: Goal range and significance of current result and Contact [phone] with any changes, questions or concerns.     Plan made per [facility] anticoagulation protocol    Jaz Sterling RN  Anticoagulation Clinic  4/7/2022    _______________________________________________________________________     Anticoagulation Episode Summary     Current INR goal:  2.0-3.0   TTR:  81.7 % (1 y)   Target end date:  Indefinite   Send INR reminders to:  MAGDALENA TEJEDA    Indications    Persistent atrial fibrillation (H) [I48.19]           Comments:           Anticoagulation Care Providers     Provider Role Specialty Phone number    Foreign Tavares MD Referring Family Medicine [phone]

## 2022-05-09 ENCOUNTER — LAB (OUTPATIENT)
Dept: LAB | Facility: CLINIC | Age: 82
End: 2022-05-09
Payer: COMMERCIAL

## 2022-05-09 ENCOUNTER — ANTICOAGULATION THERAPY VISIT (OUTPATIENT)
Dept: ANTICOAGULATION | Facility: CLINIC | Age: 82
End: 2022-05-09

## 2022-05-09 DIAGNOSIS — I48.19 PERSISTENT ATRIAL FIBRILLATION (H): ICD-10-CM

## 2022-05-09 DIAGNOSIS — I48.19 PERSISTENT ATRIAL FIBRILLATION (H): Primary | ICD-10-CM

## 2022-05-09 LAB — INR BLD: 1.7 (ref 0.9–1.1)

## 2022-05-09 PROCEDURE — 85610 PROTHROMBIN TIME: CPT

## 2022-05-09 PROCEDURE — 36415 COLL VENOUS BLD VENIPUNCTURE: CPT

## 2022-05-09 NOTE — PROGRESS NOTES
ANTICOAGULATION MANAGEMENT     Prosper Lopez 82 year old male is on warfarin with subtherapeutic INR result. (Goal INR 2.0-3.0)    Recent labs: (last 7 days)     05/09/22  0807   INR 1.7*       ASSESSMENT       Source(s): Chart Review, Patient/Caregiver Call and Template       Warfarin doses taken: Warfarin taken as instructed    Diet: No new diet changes identified    New illness, injury, or hospitalization: No    Medication/supplement changes: None noted    Signs or symptoms of bleeding or clotting: No    Previous INR: Therapeutic last 2(+) visits    Additional findings: None       PLAN     Recommended plan for no diet, medication or health factor changes affecting INR     Dosing Instructions: continue your current warfarin dose with next INR in 2 weeks       Summary  As of 5/9/2022    Full warfarin instructions:  2.5 mg every Tue, Thu, Sat; 5 mg all other days   Next INR check:  5/23/2022             Telephone call with Waldo who verbalizes understanding and agrees to plan and who agrees to plan and repeated back plan correctly    Lab visit scheduled    Education provided: Please call back if any changes to your diet, medications or how you've been taking warfarin, Monitoring for bleeding signs and symptoms and Monitoring for clotting signs and symptoms    Plan made per [facility] anticoagulation protocol    Kimberly Duron RN  Anticoagulation Clinic  5/9/2022    _______________________________________________________________________     Anticoagulation Episode Summary     Current INR goal:  2.0-3.0   TTR:  81.9 % (1 y)   Target end date:  Indefinite   Send INR reminders to:  MAGDALENA TEJEDA    Indications    Persistent atrial fibrillation (H) [I48.19]           Comments:           Anticoagulation Care Providers     Provider Role Specialty Phone number    Foreign Tavares MD Referring Family Medicine [phone]

## 2022-05-23 ENCOUNTER — LAB (OUTPATIENT)
Dept: LAB | Facility: CLINIC | Age: 82
End: 2022-05-23
Payer: COMMERCIAL

## 2022-05-23 ENCOUNTER — ANTICOAGULATION THERAPY VISIT (OUTPATIENT)
Dept: ANTICOAGULATION | Facility: CLINIC | Age: 82
End: 2022-05-23

## 2022-05-23 DIAGNOSIS — I48.19 PERSISTENT ATRIAL FIBRILLATION (H): Primary | ICD-10-CM

## 2022-05-23 DIAGNOSIS — I48.19 PERSISTENT ATRIAL FIBRILLATION (H): ICD-10-CM

## 2022-05-23 LAB — INR BLD: 2.1 (ref 0.9–1.1)

## 2022-05-23 PROCEDURE — 36416 COLLJ CAPILLARY BLOOD SPEC: CPT

## 2022-05-23 PROCEDURE — 85610 PROTHROMBIN TIME: CPT

## 2022-05-23 NOTE — PROGRESS NOTES
ANTICOAGULATION MANAGEMENT     Prosper Lopez 82 year old male is on warfarin with therapeutic INR result. (Goal INR 2.0-3.0)    Recent labs: (last 7 days)     05/23/22  0810   INR 2.1*       ASSESSMENT       Source(s): Chart Review, Patient/Caregiver Call and Template       Warfarin doses taken: Warfarin taken as instructed    Diet: No new diet changes identified    New illness, injury, or hospitalization: No    Medication/supplement changes: None noted    Signs or symptoms of bleeding or clotting: No    Previous INR: Subtherapeutic    Additional findings: None       PLAN     Recommended plan for no diet, medication or health factor changes affecting INR     Dosing Instructions: continue your current warfarin dose with next INR in 3 weeks       Summary  As of 5/23/2022    Full warfarin instructions:  2.5 mg every Tue, Thu, Sat; 5 mg all other days   Next INR check:  6/13/2022             Telephone call with Waldo who verbalizes understanding and agrees to plan    Lab visit scheduled    Education provided: Please call back if any changes to your diet, medications or how you've been taking warfarin    Plan made per [facility] anticoagulation protocol    Kimberly Duron RN  Anticoagulation Clinic  5/23/2022    _______________________________________________________________________     Anticoagulation Episode Summary     Current INR goal:  2.0-3.0   TTR:  79.1 % (1 y)   Target end date:  Indefinite   Send INR reminders to:  MAGDALENA TEJEDA    Indications    Persistent atrial fibrillation (H) [I48.19]           Comments:           Anticoagulation Care Providers     Provider Role Specialty Phone number    Foreign aTvares MD Referring Family Medicine [phone]

## 2022-06-13 ENCOUNTER — LAB (OUTPATIENT)
Dept: LAB | Facility: CLINIC | Age: 82
End: 2022-06-13
Payer: COMMERCIAL

## 2022-06-13 ENCOUNTER — ANTICOAGULATION THERAPY VISIT (OUTPATIENT)
Dept: ANTICOAGULATION | Facility: CLINIC | Age: 82
End: 2022-06-13

## 2022-06-13 DIAGNOSIS — I48.19 PERSISTENT ATRIAL FIBRILLATION (H): Primary | ICD-10-CM

## 2022-06-13 DIAGNOSIS — I48.19 PERSISTENT ATRIAL FIBRILLATION (H): ICD-10-CM

## 2022-06-13 LAB — INR BLD: 1.8 (ref 0.9–1.1)

## 2022-06-13 PROCEDURE — 85610 PROTHROMBIN TIME: CPT

## 2022-06-13 PROCEDURE — 36416 COLLJ CAPILLARY BLOOD SPEC: CPT

## 2022-06-13 NOTE — PROGRESS NOTES
ANTICOAGULATION MANAGEMENT     Prosper Lopez 82 year old male is on warfarin with subtherapeutic INR result. (Goal INR 2.0-3.0)    Recent labs: (last 7 days)     06/13/22  0820   INR 1.8*       ASSESSMENT       Source(s): Chart Review and Template       Warfarin doses taken: Warfarin taken as instructed    Diet: No new diet changes identified    New illness, injury, or hospitalization: No    Medication/supplement changes: None noted    Signs or symptoms of bleeding or clotting: No    Previous INR: Therapeutic last visit; previously outside of goal range    Additional findings: None       PLAN     Recommended plan for no diet, medication or health factor changes affecting INR     Dosing Instructions: Increase your warfarin dose (9% change) with next INR in 2 weeks       Summary  As of 6/13/2022    Full warfarin instructions:  2.5 mg every Tue, Sat; 5 mg all other days   Next INR check:  6/27/2022             Detailed voice message left for Waldo with dosing instructions and follow up date.   Sent [employer] message with dosing and follow up instructions    Contact [phone] to schedule and with any changes, questions or concerns.     Education provided: Please call back if any changes to your diet, medications or how you've been taking warfarin and Goal range and significance of current result    Plan made per [facility] anticoagulation protocol    Jaz Sterling RN  Anticoagulation Clinic  6/13/2022    _______________________________________________________________________     Anticoagulation Episode Summary     Current INR goal:  2.0-3.0   TTR:  75.2 % (1 y)   Target end date:  Indefinite   Send INR reminders to:  MAGDALENA TEJEDA    Indications    Persistent atrial fibrillation (H) [I48.19]           Comments:           Anticoagulation Care Providers     Provider Role Specialty Phone number    Foreign Tavares MD Referring Family Medicine [phone]

## 2022-07-07 ENCOUNTER — TELEPHONE (OUTPATIENT)
Dept: ANTICOAGULATION | Facility: CLINIC | Age: 82
End: 2022-07-07

## 2022-07-07 NOTE — TELEPHONE ENCOUNTER
ANTICOAGULATION     Prosper Lopez is overdue for INR check.      Spoke with Waldo and scheduled lab appointment on 7/12    Dayanara Lambert RN

## 2022-07-12 ENCOUNTER — ANTICOAGULATION THERAPY VISIT (OUTPATIENT)
Dept: ANTICOAGULATION | Facility: CLINIC | Age: 82
End: 2022-07-12

## 2022-07-12 ENCOUNTER — LAB (OUTPATIENT)
Dept: LAB | Facility: CLINIC | Age: 82
End: 2022-07-12
Payer: COMMERCIAL

## 2022-07-12 DIAGNOSIS — I48.19 PERSISTENT ATRIAL FIBRILLATION (H): Primary | ICD-10-CM

## 2022-07-12 DIAGNOSIS — I48.19 PERSISTENT ATRIAL FIBRILLATION (H): ICD-10-CM

## 2022-07-12 LAB — INR BLD: 2.4 (ref 0.9–1.1)

## 2022-07-12 PROCEDURE — 36416 COLLJ CAPILLARY BLOOD SPEC: CPT

## 2022-07-12 PROCEDURE — 85610 PROTHROMBIN TIME: CPT

## 2022-07-12 NOTE — PROGRESS NOTES
ANTICOAGULATION MANAGEMENT     Prosper Lopez 82 year old male is on warfarin with therapeutic INR result. (Goal INR 2.0-3.0)    Recent labs: (last 7 days)     07/12/22  0812   INR 2.4*       ASSESSMENT       Source(s): Chart Review, Patient/Caregiver Call and Template       Warfarin doses taken: Warfarin taken differently, but did not change total weekly dose    Diet: No new diet changes identified    New illness, injury, or hospitalization: No    Medication/supplement changes: None noted    Signs or symptoms of bleeding or clotting: No    Previous INR: Subtherapeutic    Additional findings: None       PLAN     Recommended plan for no diet, medication or health factor changes affecting INR     Dosing Instructions: continue your current warfarin dose with next INR in 3-4 weeks   It has been 4 weeks since last check.     Summary  As of 7/12/2022    Full warfarin instructions:  2.5 mg every Tue, Thu; 5 mg all other days   Next INR check:  8/9/2022             Telephone call with Waldo who verbalizes understanding and agrees to plan    Lab visit scheduled    Education provided: Please call back if any changes to your diet, medications or how you've been taking warfarin and Importance of therapeutic range    Plan made per [facility] anticoagulation protocol    Ana Galvan RN  Anticoagulation Clinic  7/12/2022    _______________________________________________________________________     Anticoagulation Episode Summary     Current INR goal:  2.0-3.0   TTR:  72.6 % (1 y)   Target end date:  Indefinite   Send INR reminders to:  MAGDALENA TEJEDA    Indications    Persistent atrial fibrillation (H) [I48.19]           Comments:           Anticoagulation Care Providers     Provider Role Specialty Phone number    Foreign Tavares MD Referring Family Medicine [phone]

## 2022-08-08 DIAGNOSIS — Z79.01 LONG TERM CURRENT USE OF ANTICOAGULANT THERAPY: ICD-10-CM

## 2022-08-08 DIAGNOSIS — G45.9 TRANSIENT CEREBRAL ISCHEMIA: ICD-10-CM

## 2022-08-09 ENCOUNTER — LAB (OUTPATIENT)
Dept: LAB | Facility: CLINIC | Age: 82
End: 2022-08-09
Payer: COMMERCIAL

## 2022-08-09 ENCOUNTER — ANTICOAGULATION THERAPY VISIT (OUTPATIENT)
Dept: ANTICOAGULATION | Facility: CLINIC | Age: 82
End: 2022-08-09

## 2022-08-09 DIAGNOSIS — I48.19 PERSISTENT ATRIAL FIBRILLATION (H): Primary | ICD-10-CM

## 2022-08-09 DIAGNOSIS — I48.19 PERSISTENT ATRIAL FIBRILLATION (H): ICD-10-CM

## 2022-08-09 LAB — INR BLD: 2 (ref 0.9–1.1)

## 2022-08-09 PROCEDURE — 36416 COLLJ CAPILLARY BLOOD SPEC: CPT

## 2022-08-09 PROCEDURE — 85610 PROTHROMBIN TIME: CPT

## 2022-08-09 RX ORDER — WARFARIN SODIUM 5 MG/1
2.5-5 TABLET ORAL DAILY
Qty: 90 TABLET | Refills: 1 | Status: SHIPPED | OUTPATIENT
Start: 2022-08-09 | End: 2023-02-06

## 2022-08-09 NOTE — TELEPHONE ENCOUNTER
ANTICOAGULATION MANAGEMENT:  Medication Refill    Anticoagulation Summary  As of 7/12/2022    Warfarin maintenance plan:  2.5 mg (5 mg x 0.5) every Tue, Thu; 5 mg (5 mg x 1) all other days   Next INR check:  8/9/2022   Target end date:  Indefinite    Indications    Persistent atrial fibrillation (H) [I48.19]             Anticoagulation Care Providers     Provider Role Specialty Phone number    Foreign Tavares MD Referring Family Medicine [phone]          Visit with referring provider/group within last year: Yes    [facility] referral signed within last year: Yes    Prosper meets all criteria for refill (current [facility] referral, office visit with referring provider/group in last year, lab monitoring up to date or not exceeding 2 weeks overdue). Rx instructions and quantity supplied updated to match patient's current dosing plan. Warfarin 90 day supply with 1 refill granted per [facility] protocol     Ana aGlvan RN  Anticoagulation Clinic

## 2022-08-09 NOTE — PROGRESS NOTES
ANTICOAGULATION MANAGEMENT     Prosper Lopez 82 year old male is on warfarin with therapeutic INR result. (Goal INR 2.0-3.0)    Recent labs: (last 7 days)     08/09/22  0813   INR 2.0*       ASSESSMENT       Source(s): Chart Review, Patient/Caregiver Call and Template       Warfarin doses taken: Warfarin taken as instructed    Diet: No new diet changes identified    New illness, injury, or hospitalization: No    Medication/supplement changes: None noted    Signs or symptoms of bleeding or clotting: No    Previous INR: Therapeutic last visit; previously outside of goal range    Additional findings: None       PLAN     Recommended plan for no diet, medication or health factor changes affecting INR     Dosing Instructions: Continue your current warfarin dose with next INR in 5 weeks       Summary  As of 8/9/2022    Full warfarin instructions:  2.5 mg every Tue, Thu; 5 mg all other days   Next INR check:  9/13/2022             Telephone call with Waldo who verbalizes understanding and agrees to plan    Lab visit scheduled    Education provided: Please call back if any changes to your diet, medications or how you've been taking warfarin, Goal range and significance of current result and Importance of therapeutic range    Plan made per [facility] anticoagulation protocol    Ana Galvan RN  Anticoagulation Clinic  8/9/2022    _______________________________________________________________________     Anticoagulation Episode Summary     Current INR goal:  2.0-3.0   TTR:  72.6 % (1 y)   Target end date:  Indefinite   Send INR reminders to:  MAGDALENA TEJEDA    Indications    Persistent atrial fibrillation (H) [I48.19]           Comments:           Anticoagulation Care Providers     Provider Role Specialty Phone number    Foreign Tavares MD Referring Family Medicine [phone]

## 2022-09-09 ENCOUNTER — TRANSFERRED RECORDS (OUTPATIENT)
Dept: HEALTH INFORMATION MANAGEMENT | Facility: CLINIC | Age: 82
End: 2022-09-09

## 2022-09-13 ENCOUNTER — DOCUMENTATION ONLY (OUTPATIENT)
Dept: ANTICOAGULATION | Facility: CLINIC | Age: 82
End: 2022-09-13

## 2022-09-13 ENCOUNTER — ANTICOAGULATION THERAPY VISIT (OUTPATIENT)
Dept: ANTICOAGULATION | Facility: CLINIC | Age: 82
End: 2022-09-13

## 2022-09-13 ENCOUNTER — LAB (OUTPATIENT)
Dept: LAB | Facility: CLINIC | Age: 82
End: 2022-09-13
Payer: COMMERCIAL

## 2022-09-13 DIAGNOSIS — I48.19 PERSISTENT ATRIAL FIBRILLATION (H): Primary | ICD-10-CM

## 2022-09-13 DIAGNOSIS — G45.9 TIA (TRANSIENT ISCHEMIC ATTACK): ICD-10-CM

## 2022-09-13 DIAGNOSIS — G45.9 TRANSIENT CEREBRAL ISCHEMIA: ICD-10-CM

## 2022-09-13 DIAGNOSIS — I48.19 PERSISTENT ATRIAL FIBRILLATION (H): ICD-10-CM

## 2022-09-13 LAB — INR BLD: 2.9 (ref 0.9–1.1)

## 2022-09-13 PROCEDURE — 85610 PROTHROMBIN TIME: CPT

## 2022-09-13 PROCEDURE — 36416 COLLJ CAPILLARY BLOOD SPEC: CPT

## 2022-09-13 NOTE — PROGRESS NOTES
ANTICOAGULATION CLINIC REFERRAL RENEWAL REQUEST       An annual renewal order is required for all patients referred to [facility].?  Please review and sign the pended referral order for Prosper Lopez.       ANTICOAGULATION SUMMARY      Warfarin indication(s)   Atrial Fibrillation and TIA    Mechanical heart valve present?  NO       Current goal range   INR: 2.0-3.0     Goal appropriate for indication? Goal INR 2-3, standard for indication(s) above     Time in Therapeutic Range (TTR)  (Goal > 60%) 72.6%       Office visit with referring provider's group within last year yes on 12/2/22       Ana Galvan RN  [facility]

## 2022-09-13 NOTE — PROGRESS NOTES
ANTICOAGULATION MANAGEMENT     Prosper Lopez 82 year old male is on warfarin with therapeutic INR result. (Goal INR 2.0-3.0)    Recent labs: (last 7 days)     09/13/22  0816   INR 2.9*       ASSESSMENT       Source(s): Chart Review, Patient/Caregiver Call and Template       Warfarin doses taken: Warfarin taken as instructed    Diet: No new diet changes identified    New illness, injury, or hospitalization: No    Medication/supplement changes: None noted    Signs or symptoms of bleeding or clotting: No    Previous INR: Therapeutic last 2(+) visits    Additional findings: None       PLAN     Recommended plan for no diet, medication or health factor changes affecting INR     Dosing Instructions: Continue your current warfarin dose with next INR in 6 weeks       Summary  As of 9/13/2022    Full warfarin instructions:  2.5 mg every Tue, Thu; 5 mg all other days   Next INR check:  10/25/2022             Telephone call with Waldo who verbalizes understanding and agrees to plan    Lab visit scheduled    Education provided: Please call back if any changes to your diet, medications or how you've been taking warfarin and Importance of therapeutic range    Plan made per [facility] anticoagulation protocol    Ana Galvan RN  Anticoagulation Clinic  9/13/2022    _______________________________________________________________________     Anticoagulation Episode Summary     Current INR goal:  2.0-3.0   TTR:  72.6 % (1 y)   Target end date:  Indefinite   Send INR reminders to:  MAGDALENA TEJEDA    Indications    Persistent atrial fibrillation (H) [I48.19]           Comments:           Anticoagulation Care Providers     Provider Role Specialty Phone number    Foreign Tavares MD Referring Family Medicine [phone]

## 2022-10-20 ENCOUNTER — TRANSFERRED RECORDS (OUTPATIENT)
Dept: HEALTH INFORMATION MANAGEMENT | Facility: CLINIC | Age: 82
End: 2022-10-20

## 2022-10-25 ENCOUNTER — ANTICOAGULATION THERAPY VISIT (OUTPATIENT)
Dept: ANTICOAGULATION | Facility: CLINIC | Age: 82
End: 2022-10-25

## 2022-10-25 ENCOUNTER — LAB (OUTPATIENT)
Dept: LAB | Facility: CLINIC | Age: 82
End: 2022-10-25
Payer: COMMERCIAL

## 2022-10-25 DIAGNOSIS — I48.19 PERSISTENT ATRIAL FIBRILLATION (H): Primary | ICD-10-CM

## 2022-10-25 DIAGNOSIS — I48.19 PERSISTENT ATRIAL FIBRILLATION (H): ICD-10-CM

## 2022-10-25 DIAGNOSIS — G45.9 TIA (TRANSIENT ISCHEMIC ATTACK): ICD-10-CM

## 2022-10-25 LAB — INR BLD: 2.3 (ref 0.9–1.1)

## 2022-10-25 PROCEDURE — 36416 COLLJ CAPILLARY BLOOD SPEC: CPT

## 2022-10-25 PROCEDURE — 85610 PROTHROMBIN TIME: CPT

## 2022-10-25 NOTE — PROGRESS NOTES
ANTICOAGULATION MANAGEMENT     Prosper Lopez 82 year old male is on warfarin with therapeutic INR result. (Goal INR 2.0-3.0)    Recent labs: (last 7 days)     10/25/22  0826   INR 2.3*       ASSESSMENT       Source(s): Chart Review and Template       Warfarin doses taken: Warfarin taken as instructed    Diet: No new diet changes identified    New illness, injury, or hospitalization: No    Medication/supplement changes: None noted    Signs or symptoms of bleeding or clotting: No    Previous INR: Therapeutic last 2(+) visits    Additional findings: None       PLAN     Recommended plan for no diet, medication or health factor changes affecting INR     Dosing Instructions: Continue your current warfarin dose with next INR in 6 weeks       Summary  As of 10/25/2022    Full warfarin instructions:  2.5 mg every Tue, Thu; 5 mg all other days; Starting 10/25/2022   Next INR check:  12/6/2022             Detailed voice message left for Waldo with dosing instructions and follow up date.     Contact [phone] to schedule and with any changes, questions or concerns.     Education provided:     Please call back if any changes to your diet, medications or how you've been taking warfarin    Plan made per [facility] anticoagulation protocol    Ana Galvan RN  Anticoagulation Clinic  10/25/2022    _______________________________________________________________________     Anticoagulation Episode Summary     Current INR goal:  2.0-3.0   TTR:  72.6 % (1 y)   Target end date:  Indefinite   Send INR reminders to:  MAGDALENA TEJEDA    Indications    Persistent atrial fibrillation (H) [I48.19]  TIA (transient ischemic attack) [G45.9]           Comments:           Anticoagulation Care Providers     Provider Role Specialty Phone number    Foreign Tavares MD Referring Family Medicine [phone]

## 2022-11-07 ENCOUNTER — TRANSFERRED RECORDS (OUTPATIENT)
Dept: HEALTH INFORMATION MANAGEMENT | Facility: CLINIC | Age: 82
End: 2022-11-07

## 2022-12-05 ENCOUNTER — LAB (OUTPATIENT)
Dept: LAB | Facility: CLINIC | Age: 82
End: 2022-12-05
Payer: COMMERCIAL

## 2022-12-05 ENCOUNTER — HOSPITAL ENCOUNTER (INPATIENT)
Facility: CLINIC | Age: 82
Setting detail: SURGERY ADMIT
End: 2022-12-05
Attending: ORTHOPAEDIC SURGERY | Admitting: ORTHOPAEDIC SURGERY
Payer: COMMERCIAL

## 2022-12-05 ENCOUNTER — ANTICOAGULATION THERAPY VISIT (OUTPATIENT)
Dept: ANTICOAGULATION | Facility: CLINIC | Age: 82
End: 2022-12-05

## 2022-12-05 DIAGNOSIS — I48.19 PERSISTENT ATRIAL FIBRILLATION (H): ICD-10-CM

## 2022-12-05 DIAGNOSIS — G45.9 TIA (TRANSIENT ISCHEMIC ATTACK): ICD-10-CM

## 2022-12-05 DIAGNOSIS — I48.19 PERSISTENT ATRIAL FIBRILLATION (H): Primary | ICD-10-CM

## 2022-12-05 LAB — INR BLD: 3 (ref 0.9–1.1)

## 2022-12-05 PROCEDURE — 36416 COLLJ CAPILLARY BLOOD SPEC: CPT

## 2022-12-05 PROCEDURE — 85610 PROTHROMBIN TIME: CPT

## 2022-12-05 NOTE — PROGRESS NOTES
ANTICOAGULATION MANAGEMENT     Prosper Lopez 82 year old male is on warfarin with therapeutic INR result. (Goal INR 2.0-3.0)    Recent labs: (last 7 days)     12/05/22  0808   INR 3.0*       ASSESSMENT       Source(s): Chart Review, Patient/Caregiver Call and Template       Warfarin doses taken: Warfarin taken as instructed    Diet: Decreased greens/vitamin K in diet; plans to resume previous intake    New illness, injury, or hospitalization: No    Medication/supplement changes: None noted    Signs or symptoms of bleeding or clotting: No    Previous INR: Therapeutic last 2(+) visits    Additional findings: None       PLAN     Recommended plan for temporary change(s) affecting INR     Dosing Instructions: Continue your current warfarin dose with next INR in 6 weeks       Summary  As of 12/5/2022    Full warfarin instructions:  2.5 mg every Tue, Thu; 5 mg all other days; Starting 12/5/2022   Next INR check:  1/16/2023             Telephone call with Waldo who verbalizes understanding and agrees to plan    Lab visit scheduled    Education provided:     Please call back if any changes to your diet, medications or how you've been taking warfarin    Goal range and lab monitoring: goal range and significance of current result and Importance of therapeutic range    Plan made per [facility] anticoagulation protocol    Ana Galvan RN  Anticoagulation Clinic  12/5/2022    _______________________________________________________________________     Anticoagulation Episode Summary     Current INR goal:  2.0-3.0   TTR:  72.6 % (1 y)   Target end date:  Indefinite   Send INR reminders to:  MAGDAL[facility] TEJEDA    Indications    Persistent atrial fibrillation (H) [I48.19]  TIA (transient ischemic attack) [G45.9]           Comments:           Anticoagulation Care Providers     Provider Role Specialty Phone number    Foreign Tavares MD Referring Family Medicine [phone]

## 2023-01-16 ENCOUNTER — LAB (OUTPATIENT)
Dept: LAB | Facility: CLINIC | Age: 83
End: 2023-01-16
Payer: COMMERCIAL

## 2023-01-16 ENCOUNTER — ANTICOAGULATION THERAPY VISIT (OUTPATIENT)
Dept: ANTICOAGULATION | Facility: CLINIC | Age: 83
End: 2023-01-16

## 2023-01-16 DIAGNOSIS — I48.19 PERSISTENT ATRIAL FIBRILLATION (H): ICD-10-CM

## 2023-01-16 DIAGNOSIS — G45.9 TIA (TRANSIENT ISCHEMIC ATTACK): ICD-10-CM

## 2023-01-16 DIAGNOSIS — I48.19 PERSISTENT ATRIAL FIBRILLATION (H): Primary | ICD-10-CM

## 2023-01-16 LAB — INR BLD: 2 (ref 0.9–1.1)

## 2023-01-16 PROCEDURE — 85610 PROTHROMBIN TIME: CPT

## 2023-01-16 PROCEDURE — 36416 COLLJ CAPILLARY BLOOD SPEC: CPT

## 2023-01-16 NOTE — PROGRESS NOTES
ANTICOAGULATION MANAGEMENT     Prosper Lopez 82 year old male is on warfarin with therapeutic INR result. (Goal INR 2.0-3.0)    Recent labs: (last 7 days)     01/16/23  0758   INR 2.0*       ASSESSMENT       Source(s): Chart Review, Patient/Caregiver Call and Template       Warfarin doses taken: Warfarin taken as instructed    Diet: No new diet changes identified    New illness, injury, or hospitalization: No    Medication/supplement changes: None noted    Signs or symptoms of bleeding or clotting: No    Previous INR: Therapeutic last 2(+) visits    Additional findings: None       PLAN     Recommended plan for no diet, medication or health factor changes affecting INR     Dosing Instructions: Continue your current warfarin dose with next INR in 8 weeks       Summary  As of 1/16/2023    Full warfarin instructions:  2.5 mg every Tue, Thu; 5 mg all other days   Next INR check:  3/13/2023             Telephone call with  wifeFrances who verbalizes understanding and agrees to plan    Lab visit scheduled    Education provided:     Contact [phone] with any changes, questions or concerns.     Plan made per [facility] anticoagulation protocol    Jaz Sterling RN  Anticoagulation Clinic  1/16/2023    _______________________________________________________________________     Anticoagulation Episode Summary     Current INR goal:  2.0-3.0   TTR:  77.7 % (1 y)   Target end date:  Indefinite   Send INR reminders to:  MAGDALENA TEJEDA    Indications    Persistent atrial fibrillation (H) [I48.19]  TIA (transient ischemic attack) [G45.9]           Comments:  Approved for 8 week checks per -- see encounter from 12/07/21         Anticoagulation Care Providers     Provider Role Specialty Phone number    Foreign Tavares MD Referring Family Medicine [phone]

## 2023-02-04 DIAGNOSIS — G45.9 TRANSIENT CEREBRAL ISCHEMIA: ICD-10-CM

## 2023-02-04 DIAGNOSIS — I48.19 PERSISTENT ATRIAL FIBRILLATION (H): ICD-10-CM

## 2023-02-04 DIAGNOSIS — Z79.01 LONG TERM CURRENT USE OF ANTICOAGULANT THERAPY: ICD-10-CM

## 2023-02-05 NOTE — TELEPHONE ENCOUNTER
"Routing refill request to provider for review/approval because:  Patient needs to be seen because it has been more than 1 year since last office visit.    Last Written Prescription Date:  2/8/22  Last Fill Quantity: 180,  # refills: 3   Last office visit provider:  7/9/21     Requested Prescriptions   Pending Prescriptions Disp Refills     carvedilol (COREG) 25 MG tablet [Pharmacy Med Name: CARVEDILOL 25MG TABLETS] 180 tablet 3     Sig: TAKE 1 TABLET(25 MG) BY MOUTH TWICE DAILY WITH MEALS       Beta-Blockers Protocol Failed - 2/4/2023  6:07 AM        Failed - Blood pressure under 140/90 in past 12 months     BP Readings from Last 3 Encounters:   09/17/21 122/66   09/13/21 118/68   09/07/21 108/68                 Failed - Recent (12 mo) or future (30 days) visit within the authorizing provider's specialty     Patient has had an office visit with the authorizing provider or a provider within the authorizing providers department within the previous 12 mos or has a future within next 30 days. See \"Patient Info\" tab in inbasket, or \"Choose Columns\" in Meds & Orders section of the refill encounter.              Passed - Patient is age 6 or older        Passed - Medication is active on med list           warfarin ANTICOAGULANT (COUMADIN) 5 MG tablet [Pharmacy Med Name: WARFARIN SOD 5MG TABLETS (PEACH)] 90 tablet 1     Sig: TAKE 1/2 TO 1 TABLET BY MOUTH ONCE DAILY. ADJUST DOSE PER INR RESULTS       Vitamin K Antagonists Failed - 2/4/2023  6:07 AM        Failed - Recent (12 mo) or future (30 days) visit within the authorizing provider's specialty     Patient has had an office visit with the authorizing provider or a provider within the authorizing providers department within the previous 12 mos or has a future within next 30 days. See \"Patient Info\" tab in [facility], or \"Choose Columns\" in Meds & Orders section of the refill encounter.              Failed - INR is within goal in the past 6 weeks     Confirm INR is within goal " in the past 6 weeks.     Recent Labs   Lab Test 01/16/23  0758   INR 2.0*                       Passed - Medication is active on med list        Passed - Patient is 18 years of age or older             Deb Cabral RN 02/05/23 5:16 PM

## 2023-02-06 RX ORDER — CARVEDILOL 25 MG/1
TABLET ORAL
Qty: 180 TABLET | Refills: 3 | Status: SHIPPED | OUTPATIENT
Start: 2023-02-06 | End: 2024-01-30

## 2023-02-06 RX ORDER — WARFARIN SODIUM 5 MG/1
TABLET ORAL
Qty: 90 TABLET | Refills: 1 | Status: SHIPPED | OUTPATIENT
Start: 2023-02-06 | End: 2023-08-03

## 2023-03-13 ENCOUNTER — LAB (OUTPATIENT)
Dept: LAB | Facility: CLINIC | Age: 83
End: 2023-03-13
Payer: COMMERCIAL

## 2023-03-13 ENCOUNTER — ANTICOAGULATION THERAPY VISIT (OUTPATIENT)
Dept: ANTICOAGULATION | Facility: CLINIC | Age: 83
End: 2023-03-13

## 2023-03-13 DIAGNOSIS — G45.9 TIA (TRANSIENT ISCHEMIC ATTACK): ICD-10-CM

## 2023-03-13 DIAGNOSIS — I48.19 PERSISTENT ATRIAL FIBRILLATION (H): Primary | ICD-10-CM

## 2023-03-13 DIAGNOSIS — I48.19 PERSISTENT ATRIAL FIBRILLATION (H): ICD-10-CM

## 2023-03-13 LAB — INR BLD: 2.3 (ref 0.9–1.1)

## 2023-03-13 PROCEDURE — 36416 COLLJ CAPILLARY BLOOD SPEC: CPT

## 2023-03-13 PROCEDURE — 85610 PROTHROMBIN TIME: CPT

## 2023-03-13 NOTE — PROGRESS NOTES
ANTICOAGULATION MANAGEMENT     Prosper Lopez 82 year old male is on warfarin with therapeutic INR result. (Goal INR 2.0-3.0)    Recent labs: (last 7 days)     03/13/23  0758   INR 2.3*       ASSESSMENT     Warfarin Lab Questionnaire    No flowsheet data found.  No flowsheet data found.  Warfarin Lab Questionnaire 3/13/2023   Missed doses? No   Medication changes? No   Abnormal bleeding? No   Shortness of breath? No   Injuries or illness since last INR? No   Changes in diet or alcohol? No   Upcoming surgery, procedure? No   Best number to call with results? [phone]       Additional findings: None       PLAN     Recommended plan for no diet, medication or health factor changes affecting INR     Dosing Instructions: Continue your current warfarin dose with next INR in 6 weeks       Summary  As of 3/13/2023    Full warfarin instructions:  2.5 mg every Tue, Thu; 5 mg all other days   Next INR check:  4/24/2023             Telephone call with Waldo who verbalizes understanding and agrees to plan    Lab visit scheduled    Education provided:     Goal range and lab monitoring: goal range and significance of current result    Contact [phone] with any changes, questions or concerns.     Plan made per [facility] anticoagulation protocol    Sneha Jurado, RN  Anticoagulation Clinic  3/13/2023    _______________________________________________________________________     Anticoagulation Episode Summary     Current INR goal:  2.0-3.0   TTR:  84.1 % (1 y)   Target end date:  Indefinite   Send INR reminders to:  MAGDAL[facility] TEJEDA    Indications    Persistent atrial fibrillation (H) [I48.19]  TIA (transient ischemic attack) [G45.9]           Comments:  Approved for 8 week checks per -- see encounter from 12/07/21         Anticoagulation Care Providers     Provider Role Specialty Phone number    Foreign Tavares MD Referring Family Medicine [phone]

## 2023-04-23 ENCOUNTER — HEALTH MAINTENANCE LETTER (OUTPATIENT)
Age: 83
End: 2023-04-23

## 2023-04-24 ENCOUNTER — ANTICOAGULATION THERAPY VISIT (OUTPATIENT)
Dept: ANTICOAGULATION | Facility: CLINIC | Age: 83
End: 2023-04-24

## 2023-04-24 ENCOUNTER — LAB (OUTPATIENT)
Dept: LAB | Facility: CLINIC | Age: 83
End: 2023-04-24
Payer: COMMERCIAL

## 2023-04-24 DIAGNOSIS — I48.19 PERSISTENT ATRIAL FIBRILLATION (H): ICD-10-CM

## 2023-04-24 DIAGNOSIS — G45.9 TIA (TRANSIENT ISCHEMIC ATTACK): ICD-10-CM

## 2023-04-24 DIAGNOSIS — I48.19 PERSISTENT ATRIAL FIBRILLATION (H): Primary | ICD-10-CM

## 2023-04-24 LAB — INR BLD: 2.3 (ref 0.9–1.1)

## 2023-04-24 PROCEDURE — 85610 PROTHROMBIN TIME: CPT

## 2023-04-24 PROCEDURE — 36416 COLLJ CAPILLARY BLOOD SPEC: CPT

## 2023-04-24 NOTE — PROGRESS NOTES
ANTICOAGULATION MANAGEMENT     Prosper Lopez 83 year old male is on warfarin with therapeutic INR result. (Goal INR 2.0-3.0)    Recent labs: (last 7 days)     04/24/23  0814   INR 2.3*       ASSESSMENT     Warfarin Lab Questionnaire    Warfarin Doses Last 7 Days      4/24/2023     8:15 AM   Dose in Tablet or Mg   TAB or MG? tablet (tab)     Pt Rptd Dose SUNDAY MONDAY TUESDAY WED THURS FRIDAY SATURDAY 4/24/2023   8:15 AM 5 5 2.5 5 2.5 5 5         4/24/2023   Warfarin Lab Questionnaire   Missed doses? No   Changes in diet or alcohol? No   Medication changes? No   Injuries or illness since last INR? No   Shortness of breath? No   Abnormal bleeding? No   Upcoming surgery, procedure? No   Best number to call with results? [phone]        Previous INR: Therapeutic last 2(+) visits  Additional findings: None       PLAN     Recommended plan for no diet, medication or health factor changes affecting INR     Dosing Instructions: Continue your current warfarin dose with next INR in 8 weeks       Summary  As of 4/24/2023    Full warfarin instructions:  2.5 mg every Tue, Thu; 5 mg all other days   Next INR check:  6/19/2023             Telephone call with Waldo who verbalizes understanding and agrees to plan    Lab visit scheduled    Education provided:     Goal range and lab monitoring: goal range and significance of current result    Contact [phone] with any changes, questions or concerns.     Plan made per [facility] anticoagulation protocol    Kimberly Duron RN  Anticoagulation Clinic  4/24/2023    _______________________________________________________________________     Anticoagulation Episode Summary     Current INR goal:  2.0-3.0   TTR:  86.4 % (1 y)   Target end date:  Indefinite   Send INR reminders to:  [facility]    Indications    Persistent atrial fibrillation (H) [I48.19]  TIA (transient ischemic attack) [G45.9]           Comments:  Approved for 8 week checks per -- see encounter from 12/07/21          Anticoagulation Care Providers     Provider Role Specialty Phone number    Foreign Tavares MD Referring Family Medicine [phone]

## 2023-06-19 ENCOUNTER — LAB (OUTPATIENT)
Dept: LAB | Facility: CLINIC | Age: 83
End: 2023-06-19
Payer: COMMERCIAL

## 2023-06-19 ENCOUNTER — ANTICOAGULATION THERAPY VISIT (OUTPATIENT)
Dept: ANTICOAGULATION | Facility: CLINIC | Age: 83
End: 2023-06-19

## 2023-06-19 DIAGNOSIS — G45.9 TIA (TRANSIENT ISCHEMIC ATTACK): ICD-10-CM

## 2023-06-19 DIAGNOSIS — I48.19 PERSISTENT ATRIAL FIBRILLATION (H): ICD-10-CM

## 2023-06-19 DIAGNOSIS — I48.19 PERSISTENT ATRIAL FIBRILLATION (H): Primary | ICD-10-CM

## 2023-06-19 LAB — INR BLD: 2.3 (ref 0.9–1.1)

## 2023-06-19 PROCEDURE — 85610 PROTHROMBIN TIME: CPT

## 2023-06-19 PROCEDURE — 36416 COLLJ CAPILLARY BLOOD SPEC: CPT

## 2023-06-19 NOTE — PROGRESS NOTES
ANTICOAGULATION MANAGEMENT     Prosper Lopez 83 year old male is on warfarin with therapeutic INR result. (Goal INR 2.0-3.0)    Recent labs: (last 7 days)     06/19/23  0757   INR 2.3*       ASSESSMENT       Source(s): Chart Review and Patient/Caregiver Call       Warfarin doses taken: Warfarin taken as instructed    Diet: No new diet changes identified    Medication/supplement changes: None noted    New illness, injury, or hospitalization: No    Signs or symptoms of bleeding or clotting: No    Previous result: Therapeutic last 2(+) visits    Additional findings: None         PLAN     Recommended plan for no diet, medication or health factor changes affecting INR     Dosing Instructions: Continue your current warfarin dose with next INR in 8 weeks       Summary  As of 6/19/2023    Full warfarin instructions:  2.5 mg every Tue, Thu; 5 mg all other days   Next INR check:  8/14/2023             Telephone call with  Frances who verbalizes understanding and agrees to plan    Patient offered & declined to schedule next visit    Education provided:     Please call back if any changes to your diet, medications or how you've been taking warfarin    Goal range and lab monitoring: goal range and significance of current result and Importance of therapeutic range    Plan made per [facility] anticoagulation protocol    Ana Galvan RN  Anticoagulation Clinic  6/19/2023    _______________________________________________________________________     Anticoagulation Episode Summary     Current INR goal:  2.0-3.0   TTR:  98.9 % (1 y)   Target end date:  Indefinite   Send INR reminders to:  MAGDALENA TEJEDA    Indications    Persistent atrial fibrillation (H) [I48.19]  TIA (transient ischemic attack) [G45.9]           Comments:  Approved for 8 week checks per -- see encounter from 12/07/21         Anticoagulation Care Providers     Provider Role Specialty Phone number    Foreign Tavares MD Referring Family Medicine  [phone]

## 2023-08-03 DIAGNOSIS — Z79.01 LONG TERM CURRENT USE OF ANTICOAGULANT THERAPY: ICD-10-CM

## 2023-08-03 DIAGNOSIS — G45.9 TRANSIENT CEREBRAL ISCHEMIA: ICD-10-CM

## 2023-08-03 RX ORDER — WARFARIN SODIUM 5 MG/1
TABLET ORAL
Qty: 30 TABLET | Refills: 0 | Status: SHIPPED | OUTPATIENT
Start: 2023-08-03 | End: 2023-12-05

## 2023-08-03 NOTE — TELEPHONE ENCOUNTER
"Routing refill request to provider for review/approval because:  Labs out of range:  INR  Patient needs to be seen because it has been more than 1 year since last office visit.    Last Written Prescription Date:  2/6/2023  Last Fill Quantity: 90,  # refills: 1   Last office visit provider:  7/9/2021     Requested Prescriptions   Pending Prescriptions Disp Refills    warfarin ANTICOAGULANT (COUMADIN) 5 MG tablet [Pharmacy Med Name: WARFARIN SOD 5MG TABLETS (PEACH)] 90 tablet 1     Sig: TAKE 1/2 TO 1 TABLET BY MOUTH ONCE DAILY. ADJUST DOSE PER INR RESULTS       Vitamin K Antagonists Failed - 8/3/2023  6:04 AM        Failed - Recent (12 mo) or future (30 days) visit within the authorizing provider's specialty     Patient has had an office visit with the authorizing provider or a provider within the authorizing providers department within the previous 12 mos or has a future within next 30 days. See \"Patient Info\" tab in inbasket, or \"Choose Columns\" in Meds & Orders section of the refill encounter.              Failed - INR is within goal in the past 6 weeks     Confirm INR is within goal in the past 6 weeks.     Recent Labs   Lab Test 06/19/23  0757   INR 2.3*                       Passed - Medication is active on med list        Passed - Patient is 18 years of age or older             TIFF RODRIGUEZ RN 08/03/23 11:46 AM  "

## 2023-08-03 NOTE — TELEPHONE ENCOUNTER
ANTICOAGULATION MANAGEMENT:  Medication Refill    Anticoagulation Summary  As of 6/19/2023      Warfarin maintenance plan:  2.5 mg (5 mg x 0.5) every Tue, Thu; 5 mg (5 mg x 1) all other days   Next INR check:  8/14/2023   Target end date:  Indefinite    Indications    Persistent atrial fibrillation (H) [I48.19]  TIA (transient ischemic attack) [G45.9]                 Anticoagulation Care Providers       Provider Role Specialty Phone number    Foreign Tavares MD Referring Family Medicine [phone]            Refill Criteria    Visit with referring provider/group: Overdue for referring provider visit, last visit on 12/2/2021    [facility] referral signed last signed: 09/13/2022; within last year: Yes    Lab monitoring not exceeding 2 weeks overdue:  Yes    Prosper does NOT meet all criteria for refill: Visit with referring provider's group was >= 1 year ago. 30 day corey fill approved; patient notified to schedule labs/annual visit per [facility] protocol    Kimberly Duron RN  Anticoagulation Clinic

## 2023-08-08 ENCOUNTER — LAB (OUTPATIENT)
Dept: LAB | Facility: CLINIC | Age: 83
End: 2023-08-08
Payer: COMMERCIAL

## 2023-08-08 ENCOUNTER — ANTICOAGULATION THERAPY VISIT (OUTPATIENT)
Dept: ANTICOAGULATION | Facility: CLINIC | Age: 83
End: 2023-08-08

## 2023-08-08 DIAGNOSIS — G45.9 TIA (TRANSIENT ISCHEMIC ATTACK): ICD-10-CM

## 2023-08-08 DIAGNOSIS — I48.19 PERSISTENT ATRIAL FIBRILLATION (H): Primary | ICD-10-CM

## 2023-08-08 DIAGNOSIS — I48.19 PERSISTENT ATRIAL FIBRILLATION (H): ICD-10-CM

## 2023-08-08 LAB — INR BLD: 2.6 (ref 0.9–1.1)

## 2023-08-08 PROCEDURE — 85610 PROTHROMBIN TIME: CPT

## 2023-08-08 PROCEDURE — 36416 COLLJ CAPILLARY BLOOD SPEC: CPT

## 2023-08-08 NOTE — PROGRESS NOTES
ANTICOAGULATION MANAGEMENT     Prosper Lopez 83 year old male is on warfarin with therapeutic INR result. (Goal INR 2.0-3.0)    Recent labs: (last 7 days)     08/08/23  0756   INR 2.6*       ASSESSMENT     Source(s): Chart Review and Patient/Caregiver Call     Warfarin doses taken: Warfarin taken as instructed  Diet: No new diet changes identified  Medication/supplement changes: None noted  New illness, injury, or hospitalization: No  Signs or symptoms of bleeding or clotting: No  Previous result: Therapeutic last 2(+) visits  Additional findings: None       PLAN     Recommended plan for no diet, medication or health factor changes affecting INR     Dosing Instructions: Continue your current warfarin dose with next INR in 8 weeks.  Moved 2.5mg days from Tues/Thur to Tues/Fri to make it more even.        Summary  As of 8/8/2023      Full warfarin instructions:  2.5 mg every Tue, Fri; 5 mg all other days   Next INR check:  10/3/2023               Telephone call with Waldo who verbalizes understanding and agrees to plan    Lab visit scheduled    Education provided:   Goal range and lab monitoring: goal range and significance of current result  Contact [phone] with any changes, questions or concerns.     Plan made per [facility] anticoagulation protocol    Sneha Jurado, RN  Anticoagulation Clinic  8/8/2023    _______________________________________________________________________     Anticoagulation Episode Summary       Current INR goal:  2.0-3.0   TTR:  100.0 % (1 y)   Target end date:  Indefinite   Send INR reminders to:  MAGDALENA TEJEDA    Indications    Persistent atrial fibrillation (H) [I48.19]  TIA (transient ischemic attack) [G45.9]             Comments:  Approved for 8 week checks per -- see encounter from 12/07/21             Anticoagulation Care Providers       Provider Role Specialty Phone number    Foreign Tavares MD Referring Family Medicine [phone]

## 2023-08-15 ENCOUNTER — DOCUMENTATION ONLY (OUTPATIENT)
Dept: ANTICOAGULATION | Facility: CLINIC | Age: 83
End: 2023-08-15

## 2023-08-15 DIAGNOSIS — G45.9 TIA (TRANSIENT ISCHEMIC ATTACK): ICD-10-CM

## 2023-08-15 DIAGNOSIS — I48.19 PERSISTENT ATRIAL FIBRILLATION (H): Primary | ICD-10-CM

## 2023-08-15 NOTE — PROGRESS NOTES
ANTICOAGULATION CLINIC REFERRAL RENEWAL REQUEST       An annual renewal order is required for all patients referred to [facility] Anticoagulation Clinic.?  Please review and sign the pended referral order for Prosper Lopez.       ANTICOAGULATION SUMMARY      Warfarin indication(s)   Atrial Fibrillation and TIA    Mechanical heart valve present?  NO       Current goal range   INR: 2.0-3.0     Goal appropriate for indication? Goal INR 2-3, standard for indication(s) above     Time in Therapeutic Range (TTR)  (Goal > 60%) 100%       Office visit with referring provider's group within last year no on 7/9/21       Jaz Sterling RN  [facility] Anticoagulation Clinic

## 2023-10-03 ENCOUNTER — LAB (OUTPATIENT)
Dept: LAB | Facility: CLINIC | Age: 83
End: 2023-10-03
Payer: COMMERCIAL

## 2023-10-03 ENCOUNTER — ANTICOAGULATION THERAPY VISIT (OUTPATIENT)
Dept: ANTICOAGULATION | Facility: CLINIC | Age: 83
End: 2023-10-03

## 2023-10-03 DIAGNOSIS — I48.19 PERSISTENT ATRIAL FIBRILLATION (H): ICD-10-CM

## 2023-10-03 DIAGNOSIS — G45.9 TIA (TRANSIENT ISCHEMIC ATTACK): ICD-10-CM

## 2023-10-03 DIAGNOSIS — I48.19 PERSISTENT ATRIAL FIBRILLATION (H): Primary | ICD-10-CM

## 2023-10-03 LAB — INR BLD: 3.8 (ref 0.9–1.1)

## 2023-10-03 PROCEDURE — 36415 COLL VENOUS BLD VENIPUNCTURE: CPT

## 2023-10-03 PROCEDURE — 85610 PROTHROMBIN TIME: CPT

## 2023-10-03 NOTE — PROGRESS NOTES
ANTICOAGULATION MANAGEMENT     Prosper Lopez 83 year old male is on warfarin with supratherapeutic INR result. (Goal INR 2.0-3.0)    Recent labs: (last 7 days)     10/03/23  0801   INR 3.8*     ASSESSMENT     Source(s): Chart Review and Patient/Caregiver Call     Warfarin doses taken: Warfarin taken as instructed  Diet: Decreased greens/vitamin K in diet; plans to resume previous intake  Medication/supplement changes: None noted  New illness, injury, or hospitalization: No  Signs or symptoms of bleeding or clotting: No  Previous result: Therapeutic last 2(+) visits  Additional findings: None       PLAN     Recommended plan for temporary change(s) affecting INR     Dosing Instructions: hold dose then continue your current warfarin dose with next INR in 1 week       Summary  As of 10/3/2023      Full warfarin instructions:  10/3: Hold; Otherwise 2.5 mg every Tue, Fri; 5 mg all other days   Next INR check:  10/10/2023               Telephone call with Waldo who verbalizes understanding and agrees to plan    Lab visit scheduled    Education provided:   Please call back if any changes to your diet, medications or how you've been taking warfarin    Plan made per [facility] anticoagulation protocol    Maria Antonia Ahn RN  Anticoagulation Clinic  10/3/2023    _______________________________________________________________________     Anticoagulation Episode Summary       Current INR goal:  2.0-3.0   TTR:  89.8 % (1 y)   Target end date:  Indefinite   [facility]    Indications    Persistent atrial fibrillation (H) [I48.19]  TIA (transient ischemic attack) [G45.9]             Comments:  Approved for 8 week checks per -- see encounter from 12/07/21             Anticoagulation Care Providers       Provider Role Specialty Phone number    Foreign Tavares MD Referring Family Medicine [phone]          Left message for patient to return call to [facility].  Maria Antonia Ahn RN

## 2023-10-11 ENCOUNTER — NURSE TRIAGE (OUTPATIENT)
Dept: FAMILY MEDICINE | Facility: CLINIC | Age: 83
End: 2023-10-11

## 2023-10-11 NOTE — TELEPHONE ENCOUNTER
CDC guidelines -     Getting a COVID-19 vaccine after you recover from COVID-19 infection provides added protection against COVID-19. You may consider delaying your vaccine by 3 months. However, certain factors could be reasons to get a vaccine sooner rather than later, such as:    personal risk of severe disease,  risk of disease in a loved one or close contact,  local?COVID-19 hospital admission level,  and the most common?COVID-19 variant?currently causing illness.

## 2023-10-11 NOTE — TELEPHONE ENCOUNTER
Received a call from patient wanting to get treatment for covid.     Pt test positive today but started showing symptoms over the weekend.    Please call patient back to triage.

## 2023-10-11 NOTE — TELEPHONE ENCOUNTER
Nurse Triage SBAR    Is this a 2nd Level Triage? YES, LICENSED PRACTITIONER REVIEW IS REQUIRED    Situation:   Tested positive for COVID and seeking treatment.    Background:   Age 83  A-fib  CAD  Obesity    GFR >60 on 2/9/21  ALT, AST, Alk Phos an Bili WNL on 4/4/2018    Warfarin 5 mg- on med list #2; needs to see provider virtually.    Last OV was 7/9/2021 with provider Rayshawn (more than 2 years).      Assessment:   Fever or chills- no  Cough- both dry and productive; not coughing that much  Shortness of breath or difficulty breathing- no  Chest pain, tightness, or pressure- no  Fatigue- no  Muscle or body aches-no  Headache-  New loss of taste or smell-no  Sore throat- no  Congestion or runny nose-yes  Nausea or vomiting- no  Diarrhea- on Sunday, but better today    Symptoms started since Saturday night/Sunday morning. It is on Sunday morning that pt really feels it.    Been taking Robitussin.    Was exposed to someone with COVID infection at [facility]    Protocol Recommended Disposition:   Discuss With PCP And Callback By Nurse Within 1 Hour    Recommendation:   Initiated RN COVID-19 TX Protocol but unable to proceed with pt last OV over 2 years ago and currently taking warfarin. Assisted with scheduling virtual visit for later today.    Patient was wondering if there is a wait time to get his COVID booster now that he tested positive for COVID. Informed pt that usually CDC recommends waiting 90 days, but writer could route to PCP to confirm if that's the case for this pt.     Routed to provider    Does the patient meet one of the following criteria for ADS visit consideration? 16+ years old, with an [facility] PCP     TIP  Providers, please consider if this condition is appropriate for management at one of our Acute and Diagnostic Services sites.     If patient is a good candidate, please use dotphrase <dot>triageresponse and select Refer to ADS to document.    Reason for Disposition   HIGH RISK patient (e.g., weak  immune system, age > 64 years, obesity with BMI of 30 or higher, pregnant, chronic lung disease or other chronic medical condition) and COVID symptoms (e.g., cough, fever)  (Exceptions: Already seen by doctor or NP/PA and no new or worsening symptoms.)    Additional Information   Negative: SEVERE difficulty breathing (e.g., struggling for each breath, speaks in single words)   Negative: Difficult to awaken or acting confused (e.g., disoriented, slurred speech)   Negative: Bluish (or gray) lips or face now   Negative: Shock suspected (e.g., cold/pale/clammy skin, too weak to stand, low BP, rapid pulse)   Negative: Sounds like a life-threatening emergency to the triager   Negative: Diagnosed or suspected COVID-19 and symptoms lasting 3 or more weeks   Negative: COVID-19 exposure and no symptoms   Negative: COVID-19 vaccine reaction suspected (e.g., fever, headache, muscle aches) occurring 1 to 3 days after getting vaccine   Negative: COVID-19 vaccine, questions about   Negative: Lives with someone known to have influenza (flu test positive) and flu-like symptoms (e.g., cough, runny nose, sore throat, SOB; with or without fever)   Negative: Possible COVID-19 symptoms and triager concerned about severity of symptoms or other causes   Negative: COVID-19 and breastfeeding, questions about   Negative: SEVERE or constant chest pain or pressure  (Exception: Mild central chest pain, present only when coughing.)   Negative: MODERATE difficulty breathing (e.g., speaks in phrases, SOB even at rest, pulse 100-120)   Negative: Headache and stiff neck (can't touch chin to chest)   Negative: Oxygen level (e.g., pulse oximetry) 90% or lower   Negative: Chest pain or pressure  (Exception: MILD central chest pain, present only when coughing.)   Negative: Drinking very little and dehydration suspected (e.g., no urine > 12 hours, very dry mouth, very lightheaded)   Negative: Patient sounds very sick or weak to the triager   Negative:  MILD difficulty breathing (e.g., minimal/no SOB at rest, SOB with walking, pulse <100)   Negative: Fever > 103 F (39.4 C)   Negative: Fever > 101 F (38.3 C) and over 60 years of age   Negative: Fever > 100.0 F (37.8 C) and bedridden (e.g., CVA, chronic illness, recovering from surgery)    Protocols used: Coronavirus (COVID-19) Diagnosed or [id number]    RN COVID TREATMENT VISIT  10/11/23      The patient has been triaged and does not require a higher level of care.    Prosper Lopez  83 year old  Current weight? 260 lbs    Has the patient been seen by a primary care provider at an [facility] or [facility] within the past two years? No, therefore patient is not eligible for COVID treatment standing order. Patient informed a virtual visit with a provider will be required for treatment. Patient will be scheduled or transferred to a  at the end of this call.       SHADI Jara, RN  [facility]

## 2023-10-12 NOTE — TELEPHONE ENCOUNTER
Provider Response to 2nd Level Triage Request    I have reviewed the RN documentation. My recommendation is:  Okay to receive COVID booster as soon as patient is feeling better following recent infection, ideally within 90 days of illness.

## 2023-10-13 ENCOUNTER — NURSE TRIAGE (OUTPATIENT)
Dept: NURSING | Facility: CLINIC | Age: 83
End: 2023-10-13
Payer: COMMERCIAL

## 2023-10-13 NOTE — TELEPHONE ENCOUNTER
Nurse Triage SBAR    Is this a 2nd Level Triage? No    Situation/Background: Patient is calling with concern of being scheduled for INR on Monday, 10/16/23. Patient reports that is symptoms are improving. Symptoms started Sunday, 10/8/23. Patient is asking if he can attend the scheduled visit.     Assessment:  Patient declines triage    Recommendation: Per disposition, Information provided utilizing Covid 19 triage protocol. Patient advised that he will be on Day 8 of Covid symptoms. Quarantine guidelines discussed with patient. Patient advised to wear a mask to the appointment. Reschedule and call FNA for Triage if symptoms worsen or patient develops a fever. Advised patient to call back with any new or worsening symptoms. Patient verbalized understanding and agrees with plan.    Protocol Recommended Disposition: Telephone advice    Chel Flores RN on 10/13/2023 at 2:27 PM  [facility]  Reason for Disposition    COVID-19 Home Isolation, questions about    Additional Information    Question about upcoming scheduled test, no triage required and triager able to answer question    Protocols used: Coronavirus (COVID-19) Diagnosed or [id number], Information Only Call-A-

## 2023-10-19 ENCOUNTER — ANTICOAGULATION THERAPY VISIT (OUTPATIENT)
Dept: ANTICOAGULATION | Facility: CLINIC | Age: 83
End: 2023-10-19

## 2023-10-19 ENCOUNTER — LAB (OUTPATIENT)
Dept: LAB | Facility: CLINIC | Age: 83
End: 2023-10-19
Payer: COMMERCIAL

## 2023-10-19 DIAGNOSIS — I48.19 PERSISTENT ATRIAL FIBRILLATION (H): ICD-10-CM

## 2023-10-19 DIAGNOSIS — I48.19 PERSISTENT ATRIAL FIBRILLATION (H): Primary | ICD-10-CM

## 2023-10-19 DIAGNOSIS — G45.9 TIA (TRANSIENT ISCHEMIC ATTACK): ICD-10-CM

## 2023-10-19 LAB — INR BLD: 2.1 (ref 0.9–1.1)

## 2023-10-19 PROCEDURE — 36416 COLLJ CAPILLARY BLOOD SPEC: CPT

## 2023-10-19 PROCEDURE — 85610 PROTHROMBIN TIME: CPT

## 2023-10-19 NOTE — PROGRESS NOTES
ANTICOAGULATION MANAGEMENT     Prosper Lopez 83 year old male is on warfarin with therapeutic INR result. (Goal INR 2.0-3.0)    Recent labs: (last 7 days)     10/19/23  0819   INR 2.1*       ASSESSMENT     Source(s): Chart Review and Patient/Caregiver Call     Warfarin doses taken: Warfarin taken as instructed  Diet: No new diet changes identified  Medication/supplement changes:  Tylenol PRN recently  New illness, injury, or hospitalization: Yes: COVID 1-2 weeks ago, feeling fine, just minor cold symptoms, stuffy nose  Signs or symptoms of bleeding or clotting: No  Previous result: Supratherapeutic 1x hold  Additional findings: None       PLAN     Recommended plan for temporary change(s) affecting INR     Dosing Instructions: Continue your current warfarin dose with next INR in 3 weeks       Summary  As of 10/19/2023      Full warfarin instructions:  2.5 mg every Tue, Fri; 5 mg all other days   Next INR check:  11/9/2023               Telephone call with Waldo who verbalizes understanding and agrees to plan    Lab visit scheduled    Education provided:   Goal range and lab monitoring: goal range and significance of current result  Contact [phone] with any changes, questions or concerns.     Plan made per [facility] anticoagulation protocol    Sneha Jurado, RN  Anticoagulation Clinic  10/19/2023    _______________________________________________________________________     Anticoagulation Episode Summary       Current INR goal:  2.0-3.0   TTR:  87.7% (1 y)   Target end date:  Indefinite   Send INR reminders to:  MAGDALENA TEJEDA    Indications    Persistent atrial fibrillation (H) [I48.19]  TIA (transient ischemic attack) [G45.9]             Comments:  Approved for 8 week checks per -- see encounter from 12/07/21             Anticoagulation Care Providers       Provider Role Specialty Phone number    Foreign Tavares MD Referring Family Medicine [phone]

## 2023-11-09 ENCOUNTER — ANTICOAGULATION THERAPY VISIT (OUTPATIENT)
Dept: ANTICOAGULATION | Facility: CLINIC | Age: 83
End: 2023-11-09

## 2023-11-09 ENCOUNTER — IMMUNIZATION (OUTPATIENT)
Dept: FAMILY MEDICINE | Facility: CLINIC | Age: 83
End: 2023-11-09
Payer: COMMERCIAL

## 2023-11-09 ENCOUNTER — LAB (OUTPATIENT)
Dept: LAB | Facility: CLINIC | Age: 83
End: 2023-11-09
Payer: COMMERCIAL

## 2023-11-09 DIAGNOSIS — Z23 FLU VACCINE NEED: Primary | ICD-10-CM

## 2023-11-09 DIAGNOSIS — I48.19 PERSISTENT ATRIAL FIBRILLATION (H): Primary | ICD-10-CM

## 2023-11-09 DIAGNOSIS — I48.19 PERSISTENT ATRIAL FIBRILLATION (H): ICD-10-CM

## 2023-11-09 DIAGNOSIS — G45.9 TIA (TRANSIENT ISCHEMIC ATTACK): ICD-10-CM

## 2023-11-09 LAB — INR BLD: 2.9 (ref 0.9–1.1)

## 2023-11-09 PROCEDURE — G0008 ADMIN INFLUENZA VIRUS VAC: HCPCS

## 2023-11-09 PROCEDURE — 36416 COLLJ CAPILLARY BLOOD SPEC: CPT

## 2023-11-09 PROCEDURE — 90662 IIV NO PRSV INCREASED AG IM: CPT

## 2023-11-09 PROCEDURE — 85610 PROTHROMBIN TIME: CPT

## 2023-11-09 NOTE — PROGRESS NOTES
ANTICOAGULATION MANAGEMENT     Prosper Lopez 83 year old male is on warfarin with therapeutic INR result. (Goal INR 2.0-3.0)    Recent labs: (last 7 days)     11/09/23  0831   INR 2.9*       ASSESSMENT     Source(s): Chart Review and Patient/Caregiver Call     Warfarin doses taken: Warfarin taken as instructed  Diet: No new diet changes identified  Medication/supplement changes: None noted  New illness, injury, or hospitalization: No  Signs or symptoms of bleeding or clotting: No  Previous result: Therapeutic last visit; previously outside of goal range  Additional findings: None       PLAN     Recommended plan for no diet, medication or health factor changes affecting INR     Dosing Instructions: Continue your current warfarin dose with next INR in 4 weeks       Summary  As of 11/9/2023      Full warfarin instructions:  2.5 mg every Tue, Fri; 5 mg all other days   Next INR check:  12/7/2023               Telephone call with Waldo who verbalizes understanding and agrees to plan    Check at provider office visit 12/7    Education provided:   Goal range and lab monitoring: goal range and significance of current result  Contact [phone] with any changes, questions or concerns.     Plan made per [facility] anticoagulation protocol    Sneha Jurado RN  Anticoagulation Clinic  11/9/2023    _______________________________________________________________________     Anticoagulation Episode Summary       Current INR goal:  2.0-3.0   TTR:  87.7% (1 y)   Target end date:  Indefinite   Send INR reminders to:  MAGDALENA TEJEDA    Indications    Persistent atrial fibrillation (H) [I48.19]  TIA (transient ischemic attack) [G45.9]             Comments:  Approved for 8 week checks per -- see encounter from 12/07/21             Anticoagulation Care Providers       Provider Role Specialty Phone number    Foreign Tavares MD Referring Family Medicine [phone]

## 2023-11-09 NOTE — PROGRESS NOTES
Prior to immunization administration, verified patients identity using patient s name and date of birth. Please see Immunization Activity for additional information.     Screening Questionnaire for Adult Immunization    Are you sick today?   No   Do you have allergies to medications, food, a vaccine component or latex?   No   Have you ever had a serious reaction after receiving a vaccination?   No   Do you have a long-term health problem with heart, lung, kidney, or metabolic disease (e.g., diabetes), asthma, a blood disorder, no spleen, complement component deficiency, a cochlear implant, or a spinal fluid leak?  Are you on long-term aspirin therapy?   No   Do you have cancer, leukemia, HIV/AIDS, or any other immune system problem?   No   Do you have a parent, brother, or sister with an immune system problem?   No   In the past 3 months, have you taken medications that affect  your immune system, such as prednisone, other steroids, or anticancer drugs; drugs for the treatment of rheumatoid arthritis, Crohn s disease, or psoriasis; or have you had radiation treatments?   No   Have you had a seizure, or a brain or other nervous system problem?   No   During the past year, have you received a transfusion of blood or blood    products, or been given immune (gamma) globulin or antiviral drug?   No   For women: Are you pregnant or is there a chance you could become       pregnant during the next month?   No   Have you received any vaccinations in the past 4 weeks?   No     Immunization questionnaire answers were all negative.    I have reviewed the following standing orders:   This patient is due and qualifies for the Influenza vaccine.    Click here for Influenza Vaccine Standing Order    I have reviewed the vaccines inclusion and exclusion criteria; No concerns regarding eligibility.     Patient instructed to remain in clinic for 15 minutes afterwards, and to report any adverse reactions.     Screening performed by  Foreign Saenz MA on 11/9/2023 at 6:57 AM.

## 2023-12-05 ENCOUNTER — TELEPHONE (OUTPATIENT)
Dept: FAMILY MEDICINE | Facility: CLINIC | Age: 83
End: 2023-12-05
Payer: COMMERCIAL

## 2023-12-05 DIAGNOSIS — Z79.01 LONG TERM CURRENT USE OF ANTICOAGULANT THERAPY: ICD-10-CM

## 2023-12-05 DIAGNOSIS — I48.19 PERSISTENT ATRIAL FIBRILLATION (H): Primary | ICD-10-CM

## 2023-12-05 DIAGNOSIS — G45.9 TIA (TRANSIENT ISCHEMIC ATTACK): ICD-10-CM

## 2023-12-05 DIAGNOSIS — G45.9 TRANSIENT CEREBRAL ISCHEMIA: ICD-10-CM

## 2023-12-05 RX ORDER — WARFARIN SODIUM 5 MG/1
TABLET ORAL
Qty: 80 TABLET | Refills: 1 | Status: SHIPPED | OUTPATIENT
Start: 2023-12-05 | End: 2023-12-05

## 2023-12-05 RX ORDER — WARFARIN SODIUM 5 MG/1
TABLET ORAL
Qty: 80 TABLET | Refills: 0 | Status: SHIPPED | OUTPATIENT
Start: 2023-12-05 | End: 2023-12-21

## 2023-12-05 NOTE — TELEPHONE ENCOUNTER
ANTICOAGULATION MANAGEMENT:  Medication Refill    Anticoagulation Summary  As of 2023      Warfarin maintenance plan:  2.5 mg (5 mg x 0.5) every Tue, Fri; 5 mg (5 mg x 1) all other days   Next INR check:  2023   Target end date:  Indefinite    Indications    Persistent atrial fibrillation (H) [I48.19]  TIA (transient ischemic attack) [G45.9]                 Anticoagulation Care Providers       Provider Role Specialty Phone number    Foreign Tavares MD Referring Family Medicine [phone]            Refill Criteria    Visit with referring provider/group: Meets criteria: office visit within referring provider group in the last 1 year on 10/11/23, scheduled with PCP on 23    [facility] referral signed last signed: 08/15/2023; within last year: Yes    Lab monitoring not exceeding 2 weeks overdue: Yes    Prosper meets all criteria for refill. Rx instructions and quantity supplied updated to match patient's current dosing plan. 90 day supply with no refills per [facility] protocol   He is out of 5mg pills and was wondering if he could use his  wife's 2mg pills.  Told patient I would refill the 5mg pills today and he should continue his normal dosing schedule using the 5mg pills.  He is scheduled for INR check and office visit with PCP in 2 days.      Sneha Jurado RN  Anticoagulation Clinic

## 2023-12-05 NOTE — TELEPHONE ENCOUNTER
General Call    Contacts         Type Contact Phone/Fax    12/05/2023 11:15 AM CST Phone (Incoming) Waldo Lopez (Self) [phone] (M)          Reason for Call:  Speak to INR nurse    What are your questions or concerns:  Pt had questions regarding his dose.    Date of last appointment with provider: n/a    Could we send this information to you in [employer] or would you prefer to receive a phone call?:   Patient would prefer a phone call   Okay to leave a detailed message?: Yes at Cell number on file:    Telephone Information:   Mobile [phone]

## 2023-12-07 ENCOUNTER — OFFICE VISIT (OUTPATIENT)
Dept: FAMILY MEDICINE | Facility: CLINIC | Age: 83
End: 2023-12-07
Payer: COMMERCIAL

## 2023-12-07 ENCOUNTER — ANTICOAGULATION THERAPY VISIT (OUTPATIENT)
Dept: ANTICOAGULATION | Facility: CLINIC | Age: 83
End: 2023-12-07

## 2023-12-07 VITALS
HEART RATE: 87 BPM | HEIGHT: 69 IN | BODY MASS INDEX: 37.18 KG/M2 | SYSTOLIC BLOOD PRESSURE: 110 MMHG | WEIGHT: 251 LBS | DIASTOLIC BLOOD PRESSURE: 68 MMHG | OXYGEN SATURATION: 96 % | TEMPERATURE: 97.3 F | RESPIRATION RATE: 14 BRPM

## 2023-12-07 DIAGNOSIS — N20.0 CALCULUS OF KIDNEY: ICD-10-CM

## 2023-12-07 DIAGNOSIS — I25.10 CORONARY ARTERY DISEASE INVOLVING NATIVE CORONARY ARTERY OF NATIVE HEART WITHOUT ANGINA PECTORIS: ICD-10-CM

## 2023-12-07 DIAGNOSIS — G45.9 TIA (TRANSIENT ISCHEMIC ATTACK): ICD-10-CM

## 2023-12-07 DIAGNOSIS — Z23 ENCOUNTER FOR IMMUNIZATION: ICD-10-CM

## 2023-12-07 DIAGNOSIS — Z85.46 H/O PROSTATE CANCER: ICD-10-CM

## 2023-12-07 DIAGNOSIS — R73.01 IMPAIRED FASTING GLUCOSE: ICD-10-CM

## 2023-12-07 DIAGNOSIS — I48.19 PERSISTENT ATRIAL FIBRILLATION (H): Primary | ICD-10-CM

## 2023-12-07 DIAGNOSIS — Z00.00 ENCOUNTER FOR MEDICARE ANNUAL WELLNESS EXAM: Primary | ICD-10-CM

## 2023-12-07 DIAGNOSIS — D12.6 BENIGN NEOPLASM OF COLON, UNSPECIFIED PART OF COLON: Chronic | ICD-10-CM

## 2023-12-07 DIAGNOSIS — E66.01 MORBID OBESITY (H): ICD-10-CM

## 2023-12-07 DIAGNOSIS — E55.9 VITAMIN D INSUFFICIENCY: ICD-10-CM

## 2023-12-07 DIAGNOSIS — I48.19 PERSISTENT ATRIAL FIBRILLATION (H): ICD-10-CM

## 2023-12-07 LAB
ALBUMIN SERPL BCG-MCNC: 4.2 G/DL (ref 3.5–5.2)
ALP SERPL-CCNC: 60 U/L (ref 40–150)
ALT SERPL W P-5'-P-CCNC: 15 U/L (ref 0–70)
ANION GAP SERPL CALCULATED.3IONS-SCNC: 9 MMOL/L (ref 7–15)
AST SERPL W P-5'-P-CCNC: 17 U/L (ref 0–45)
BILIRUB SERPL-MCNC: 0.6 MG/DL
BUN SERPL-MCNC: 15.2 MG/DL (ref 8–23)
CALCIUM SERPL-MCNC: 10.8 MG/DL (ref 8.8–10.2)
CHLORIDE SERPL-SCNC: 106 MMOL/L (ref 98–107)
CHOLEST SERPL-MCNC: 154 MG/DL
CREAT SERPL-MCNC: 0.93 MG/DL (ref 0.67–1.17)
DEPRECATED HCO3 PLAS-SCNC: 27 MMOL/L (ref 22–29)
EGFRCR SERPLBLD CKD-EPI 2021: 81 ML/MIN/1.73M2
ERYTHROCYTE [DISTWIDTH] IN BLOOD BY AUTOMATED COUNT: 14.3 % (ref 10–15)
FASTING STATUS PATIENT QL REPORTED: ABNORMAL
GLUCOSE SERPL-MCNC: 95 MG/DL (ref 70–99)
HBA1C MFR BLD: 5.6 % (ref 0–5.6)
HCT VFR BLD AUTO: 43.7 % (ref 40–53)
HDLC SERPL-MCNC: 30 MG/DL
HGB BLD-MCNC: 14.8 G/DL (ref 13.3–17.7)
INR BLD: 1.9 (ref 0.9–1.1)
LDLC SERPL CALC-MCNC: 89 MG/DL
MCH RBC QN AUTO: 30.3 PG (ref 26.5–33)
MCHC RBC AUTO-ENTMCNC: 33.9 G/DL (ref 31.5–36.5)
MCV RBC AUTO: 90 FL (ref 78–100)
NONHDLC SERPL-MCNC: 124 MG/DL
PLATELET # BLD AUTO: 138 10E3/UL (ref 150–450)
POTASSIUM SERPL-SCNC: 4.7 MMOL/L (ref 3.4–5.3)
PROT SERPL-MCNC: 7 G/DL (ref 6.4–8.3)
RBC # BLD AUTO: 4.88 10E6/UL (ref 4.4–5.9)
SODIUM SERPL-SCNC: 142 MMOL/L (ref 135–145)
TRIGL SERPL-MCNC: 176 MG/DL
VIT D+METAB SERPL-MCNC: 29 NG/ML (ref 20–50)
WBC # BLD AUTO: 9.4 10E3/UL (ref 4–11)

## 2023-12-07 PROCEDURE — 36415 COLL VENOUS BLD VENIPUNCTURE: CPT | Performed by: FAMILY MEDICINE

## 2023-12-07 PROCEDURE — 82306 VITAMIN D 25 HYDROXY: CPT | Performed by: FAMILY MEDICINE

## 2023-12-07 PROCEDURE — 85027 COMPLETE CBC AUTOMATED: CPT | Performed by: FAMILY MEDICINE

## 2023-12-07 PROCEDURE — 99213 OFFICE O/P EST LOW 20 MIN: CPT | Mod: 25 | Performed by: FAMILY MEDICINE

## 2023-12-07 PROCEDURE — 83036 HEMOGLOBIN GLYCOSYLATED A1C: CPT | Performed by: FAMILY MEDICINE

## 2023-12-07 PROCEDURE — 90480 ADMN SARSCOV2 VAC 1/ONLY CMP: CPT | Performed by: FAMILY MEDICINE

## 2023-12-07 PROCEDURE — G0439 PPPS, SUBSEQ VISIT: HCPCS | Performed by: FAMILY MEDICINE

## 2023-12-07 PROCEDURE — 80061 LIPID PANEL: CPT | Performed by: FAMILY MEDICINE

## 2023-12-07 PROCEDURE — 91320 SARSCV2 VAC 30MCG TRS-SUC IM: CPT | Performed by: FAMILY MEDICINE

## 2023-12-07 PROCEDURE — 85610 PROTHROMBIN TIME: CPT | Performed by: FAMILY MEDICINE

## 2023-12-07 PROCEDURE — 80053 COMPREHEN METABOLIC PANEL: CPT | Performed by: FAMILY MEDICINE

## 2023-12-07 RX ORDER — RESPIRATORY SYNCYTIAL VIRUS VACCINE 120MCG/0.5
0.5 KIT INTRAMUSCULAR ONCE
Qty: 1 EACH | Refills: 0 | Status: CANCELLED | OUTPATIENT
Start: 2023-12-07 | End: 2023-12-07

## 2023-12-07 ASSESSMENT — ENCOUNTER SYMPTOMS
DIZZINESS: 0
HEMATURIA: 0
NERVOUS/ANXIOUS: 0
HEADACHES: 0
DIARRHEA: 0
EYE PAIN: 0
FREQUENCY: 0
WEAKNESS: 0
NAUSEA: 0
ARTHRALGIAS: 0
ABDOMINAL PAIN: 0
JOINT SWELLING: 0
PARESTHESIAS: 0
PALPITATIONS: 0
COUGH: 0
SHORTNESS OF BREATH: 0
MYALGIAS: 0
CHILLS: 0
FEVER: 0
DYSURIA: 0
SORE THROAT: 0
CONSTIPATION: 0
HEARTBURN: 0
HEMATOCHEZIA: 0

## 2023-12-07 ASSESSMENT — ACTIVITIES OF DAILY LIVING (ADL): CURRENT_FUNCTION: NO ASSISTANCE NEEDED

## 2023-12-07 ASSESSMENT — PATIENT HEALTH QUESTIONNAIRE - PHQ9
SUM OF ALL RESPONSES TO PHQ QUESTIONS 1-9: 3
10. IF YOU CHECKED OFF ANY PROBLEMS, HOW DIFFICULT HAVE THESE PROBLEMS MADE IT FOR YOU TO DO YOUR WORK, TAKE CARE OF THINGS AT HOME, OR GET ALONG WITH OTHER PEOPLE: NOT DIFFICULT AT ALL
SUM OF ALL RESPONSES TO PHQ QUESTIONS 1-9: 3

## 2023-12-07 ASSESSMENT — PAIN SCALES - GENERAL: PAINLEVEL: NO PAIN (0)

## 2023-12-07 NOTE — PROGRESS NOTES
"SUBJECTIVE:     Waldo is a 83 year old, presenting for the following:  Medicare Visit (Pt is not fasting)        2023     2:30 PM   Additional Questions   Roomed by        Are you in the first 12 months of your Medicare coverage?  No    Annual wellness visit completed.  Nonfasting status.  Persistent atrial fibrillation.  Continues warfarin anticoagulation 2.5 mg on Tuesday and Friday otherwise 5 mg daily.  History of prior TIA perhaps however patient poor historian in this regard.  No recent concern for recurrent nephrolithiasis.  Has had multiple colon polyps in the past that have follow-up colonoscopy 2021.  CAD history with nonobstructive disease.  Impaired fasting glucose noted with A1c as high as 6.1% in the past.  Vitamin D insufficiency.  Prostate surgery due to cancer previously.  Is scheduled to see urologist in January for follow-up and declines prostate exam today.  Vitamin D supplement utilized.  Needs updated COVID vaccination.  Did have COVID infection in September.  Comprehensive review of systems as above otherwise all negative.       - Frances  5 sons -   1 daughter -  40 breast CA  Tobacco: quit ~  (1 ppd x > 30 years) - had quit once for 14 years  EtOH: occ  Mom -  92 y.o due to ? cancer  Dad -  56 heart attack  2 bros - one  age 78 heart attack and EtOHism  1 sis -  ? cancer   Surgeries: prostate surgery due to cancer; kidney stones bilateral; bilateral cataracts  Hospitalizations: kidney stone   Work: retired ()    Best bowling score \"289\" - more recently \"265\" about 1 year ago, now \"177-180\"      Healthy Habits:     In general, how would you rate your overall health?  Good    Frequency of exercise:  2-3 days/week    Duration of exercise:  Less than 15 minutes    Do you usually eat at least 4 servings of fruit and vegetables a day, include whole grains    & fiber and avoid regularly eating high fat or \"junk\" foods?  Yes    " Taking medications regularly:  Yes    Medication side effects:  None    Ability to successfully perform activities of daily living:  No assistance needed    Home Safety:  Lack of grab bars in the bathroom    Hearing Impairment:  No hearing concerns    In the past 6 months, have you been bothered by leaking of urine?  No    In general, how would you rate your overall mental or emotional health?  Good    Additional concerns today:  Yes      Today's PHQ-9 Score:       2023     2:23 PM   PHQ-9 SCORE   PHQ-9 Total Score MyChart 3 (Minimal depression)   PHQ-9 Total Score 3           Have you ever done Advance Care Planning? (For example, a Health Directive, POLST, or a discussion with a medical provider or your loved ones about your wishes): No, advance care planning information given to patient to review.  Advanced care planning was discussed at today's visit.       Fall risk  Fallen 2 or more times in the past year?: No  Any fall with injury in the past year?: No    Cognitive Screening   1) Repeat 3 items (Leader, Season, Table)    2) Clock draw: NORMAL  3) 3 item recall: Recalls 3 objects  Results: 3 items recalled: COGNITIVE IMPAIRMENT LESS LIKELY    Mini-CogTM Copyright S Marie. Licensed by the author for use in [facility]; reprinted with permission (somaryann@[facility]). All rights reserved.      Do you have sleep apnea, excessive snoring or daytime drowsiness? : no    Reviewed and updated as needed this visit by clinical staff   Tobacco  Allergies  Meds  Problems             Reviewed and updated as needed this visit by Provider    Allergies  Meds  Problems            Social History     Tobacco Use    Smoking status: Former     Types: Cigarettes     Quit date: 2014     Years since quittin.5    Smokeless tobacco: Never   Substance Use Topics    Alcohol use: Yes     Comment: Alcoholic Drinks/day: Occasional             2023     2:20 PM   Alcohol Use   Prescreen: >3 drinks/day or >7  drinks/week? No          No data to display              Do you have a current opioid prescription? No  Do you use any other controlled substances or medications that are not prescribed by a provider? None              Current providers sharing in care for this patient include:   Patient Care Team:  Foreign Tavares MD as PCP - General  Foreign Tavares MD as Assigned PCP    The following health maintenance items are reviewed in Epic and correct as of today:  Health Maintenance   Topic Date Due    ZOSTER IMMUNIZATION (1 of 2) Never done    RSV VACCINE (Pregnancy & 60+) (1 - 1-dose 60+ series) Never done    MEDICARE ANNUAL WELLNESS VISIT  12/07/2024    ANNUAL REVIEW OF HM ORDERS  12/07/2024    FALL RISK ASSESSMENT  12/07/2024    ADVANCE CARE PLANNING  10/13/2025    DTAP/TDAP/TD IMMUNIZATION (3 - Td or Tdap) 10/13/2030    PHQ-2 (once per calendar year)  Completed    INFLUENZA VACCINE  Completed    Pneumococcal Vaccine: 65+ Years  Completed    COVID-19 Vaccine  Completed    IPV IMMUNIZATION  Aged Out    HPV IMMUNIZATION  Aged Out    MENINGITIS IMMUNIZATION  Aged Out    RSV MONOCLONAL ANTIBODY  Aged Out     Lab work is in process  Labs reviewed in EPIC  BP Readings from Last 3 Encounters:   12/07/23 110/68   09/17/21 122/66   09/13/21 118/68    Wt Readings from Last 3 Encounters:   12/07/23 113.9 kg (251 lb)   09/17/21 117.2 kg (258 lb 4.8 oz)   09/10/21 116.6 kg (257 lb)                  Patient Active Problem List   Diagnosis    Nephrolithiasis    Hydrarthrosis Of The Ankle / Foot    Compound Nevus    Actinic Keratosis    Seborrheic Keratosis    Skin Neoplasm Of Uncertain Behavior    Malignant Neoplasm Of The Prostate Gland    Transient Ischemic Attack    Cataract    Hypercholesteremia    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    Internal Derangement Of Knee    Persistent atrial fibrillation (H)    Coronary artery disease, NONOBSTRUCTIVE,  ASX, NEGATIVE STRESS TEST 2014    Obesity    Plantar fasciitis of  right foot    Calculus of ureter    Hydronephrosis with urinary obstruction due to ureteral calculus    Ureterolithiasis    Left hydrocele    Mixed conductive and sensorineural hearing loss of both ears    Impaired fasting glucose    Venous insufficiency of both lower extremities    H/O prostate cancer    Obesity (BMI 35.0-39.9) with comorbidity (H)    TIA (transient ischemic attack)     Past Surgical History:   Procedure Laterality Date    COLONOSCOPY W/ BIOPSIES N/A 2021    Procedure: COLONOSCOPY with polypectomy;  Surgeon: Raimundo Smith MD;  Location: [facility] OR;  Service: Gastroenterology    TN CYSTO/URETERO W/LITHOTRIPSY &INDWELL STENT INSRT Left 2018    Procedure: CYSTOSCOPY, LEFT  URETEROSCOPY, LASER LITHOTRIPSY STENT INSERTION;  Surgeon: Foreign Lakhani MD;  Location: [facility] Main OR;  Service: Urology    PROSTATE SURGERY      URETEROSCOPY      ZZHC COLONOSCOPY W/WO BRUSH/WASH N/A 2/15/2021    Procedure: COLONOSCOPY with polypectomy, tattoo, cautery;  Surgeon: Compa Miner DO;  Location: [facility];  Service: Gastroenterology       Social History     Tobacco Use    Smoking status: Former     Types: Cigarettes     Quit date: 2014     Years since quittin.5    Smokeless tobacco: Never   Substance Use Topics    Alcohol use: Yes     Comment: Alcoholic Drinks/day: Occasional     Family History   Problem Relation Age of Onset    Coronary Artery Disease Father     Heart Disease Father     Colon Cancer Brother     Urolithiasis Brother          Current Outpatient Medications   Medication Sig Dispense Refill    carvedilol (COREG) 25 MG tablet TAKE 1 TABLET(25 MG) BY MOUTH TWICE DAILY WITH MEALS 180 tablet 3    cholecalciferol, vitamin D3, (VITAMIN D3) 2,000 unit cap [CHOLECALCIFEROL, VITAMIN D3, (VITAMIN D3) 2,000 UNIT CAP] Take 2,000 Units by mouth daily.      warfarin ANTICOAGULANT (COUMADIN) 5 MG tablet Take 2.5 mg every Tuesday and Friday and 5mg all other  "days as directed by INR clinic 80 tablet 0     No Known Allergies    Needs updated COVID vaccination today.        Review of Systems   Constitutional:  Negative for chills and fever.   HENT:  Negative for congestion, ear pain, hearing loss and sore throat.    Eyes:  Negative for pain and visual disturbance.   Respiratory:  Negative for cough and shortness of breath.    Cardiovascular:  Negative for chest pain, palpitations and peripheral edema.   Gastrointestinal:  Negative for abdominal pain, constipation, diarrhea, heartburn, hematochezia and nausea.   Genitourinary:  Positive for impotence. Negative for dysuria, frequency, genital sores, hematuria, penile discharge and urgency.   Musculoskeletal:  Negative for arthralgias, joint swelling and myalgias.   Skin:  Negative for rash.   Neurological:  Negative for dizziness, weakness, headaches and paresthesias.   Psychiatric/Behavioral:  Negative for mood changes. The patient is not nervous/anxious.      Constitutional, HEENT, cardiovascular, pulmonary, GI, , musculoskeletal, neuro, skin, endocrine and psych systems are negative, except as otherwise noted.    OBJECTIVE:   /68   Pulse 87   Temp 97.3  F (36.3  C)   Resp 14   Ht 1.753 m (5' 9\")   Wt 113.9 kg (251 lb)   SpO2 96%   BMI 37.07 kg/m   Estimated body mass index is 37.07 kg/m  as calculated from the following:    Height as of this encounter: 1.753 m (5' 9\").    Weight as of this encounter: 113.9 kg (251 lb).    Physical Exam  GENERAL: healthy, alert and no distress  EYES: Eyes grossly normal to inspection, PERRL and conjunctivae and sclerae normal  HENT: ear canals and TM's normal, nose and mouth without ulcers or lesions  NECK: no adenopathy, no asymmetry, masses, or scars and thyroid normal to palpation  RESP: lungs clear to auscultation - no rales, rhonchi or wheezes  CV: regular rate and rhythm, normal S1 S2, no S3 or S4, no murmur, click or rub, no peripheral edema and peripheral pulses " strong  ABDOMEN: soft, nontender, no hepatosplenomegaly, no masses and bowel sounds normal   (male): normal male genitalia without lesions or urethral discharge, no hernia  RECTAL: normal sphincter tone, no rectal masses, prostate normal size, smooth, nontender without nodules or masses  MS: no gross musculoskeletal defects noted, no edema  SKIN: no suspicious lesions or rashes  NEURO: Normal strength and tone, mentation intact and speech normal  PSYCH: mentation appears normal, affect normal/bright    Diagnostic Test Results:  Labs reviewed in Epic  No results found for this or any previous visit (from the past 24 hour(s)).    ASSESSMENT / PLAN:     Encounter for Medicare annual wellness exam  Annual wellness visit completed.  Risk questionnaire reviewed in detail.  Annual physical exams to continue.    Persistent atrial fibrillation (H)  Persistent atrial fibrillation, rate controlled.  Warfarin 2.5 mg on Tuesdays and Fridays otherwise 5 mg daily.  [id number] score for previously.  Continued utilization as well with carvedilol 25 mg twice daily.  - INR point of care    TIA (transient ischemic attack)  Denies recurrent concerns or further issues.  Continues warfarin anticoagulation with persistent atrial fibrillation.  - INR point of care    Nephrolithiasis  History of nephrolithiasis noted.  Lab assessment completed today.  Asymptomatic at this time without evidence for gross hematuria.  - CBC with platelets  - Comprehensive metabolic panel  - CBC with platelets  - Comprehensive metabolic panel    Benign neoplasm of colon, unspecified part of colon  History of colonic polyps x 7 on colonoscopy February 15, 2021 with follow-up colonoscopy April 19, 2021 described.    H/O prostate cancer  Has follow-up with urologist next month.  Declines digital rectal exam today.    Obesity (BMI 35.0-39.9) with comorbidity (H)  Obesity history.  Weight goal less than 240 pounds initially, less than 225 pounds per patient  "plan.    Coronary artery disease involving native coronary artery of native heart without angina pectoris  Nonobstructive CAD.  Asymptomatic currently.  - Lipid panel reflex to direct LDL Non-fasting  - Lipid panel reflex to direct LDL Non-fasting    Impaired fasting glucose  Impaired fasting glucose and will update A1c today.  Nonfasting status currently.  - Comprehensive metabolic panel  - Hemoglobin A1c  - Comprehensive metabolic panel  - Hemoglobin A1c    Vitamin D insufficiency  Ensure adequate vitamin D replacement.  Using vitamin D supplement.  - Vitamin D Deficiency  - Vitamin D Deficiency    Encounter for immunization  Updated COVID vaccination provided.  - COVID-19 12+ (2023-24) (PFIZER)       Patient has been advised of split billing requirements and indicates understanding: Yes      COUNSELING:  Reviewed preventive health counseling, as reflected in patient instructions       Regular exercise       Healthy diet/nutrition       Vision screening       Hearing screening       Dental care       Bladder control       Fall risk prevention       Colon cancer screening       Prostate cancer screening      BMI:   Estimated body mass index is 37.07 kg/m  as calculated from the following:    Height as of this encounter: 1.753 m (5' 9\").    Weight as of this encounter: 113.9 kg (251 lb).   Weight management plan: Discussed healthy diet and exercise guidelines      He reports that he quit smoking about 9 years ago. He has never used smokeless tobacco.      Appropriate preventive services were discussed with this patient, including applicable screening as appropriate for fall prevention, nutrition, physical activity, Tobacco-use cessation, weight loss and cognition.  Checklist reviewing preventive services available has been given to the patient.    Reviewed patients plan of care and provided an AVS. The Intermediate Care Plan ( asthma action plan, low back pain action plan, and migraine action plan) for Prosper meets " the Care Plan requirement. This Care Plan has been established and reviewed with the Patient.          Foreign Tavares MD  [facility]    Identified Health Risks:  I have reviewed Opioid Use Disorder and Substance Use Disorder risk factors and made any needed referrals.   Answers submitted by the patient for this visit:  Patient Health Questionnaire (Submitted on 12/7/2023)  If you checked off any problems, how difficult have these problems made it for you to do your work, take care of things at home, or get along with other people?: Not difficult at all  PHQ9 TOTAL SCORE: 3

## 2023-12-07 NOTE — PROGRESS NOTES
ANTICOAGULATION MANAGEMENT     Prosper Lopez 83 year old male is on warfarin with subtherapeutic INR result. (Goal INR 2.0-3.0)    Recent labs: (last 7 days)     12/07/23  1521   INR 1.9*       ASSESSMENT     Source(s): Chart Review and Patient/Caregiver Call     Warfarin doses taken: Warfarin taken as instructed  Diet: Increased greens/vitamin K in diet; plans to resume previous intake  Medication/supplement changes: None noted  New illness, injury, or hospitalization: No  Signs or symptoms of bleeding or clotting: No  Previous result: Therapeutic last 2(+) visits  Additional findings: None       PLAN     Recommended plan for temporary change(s) affecting INR     Dosing Instructions: booster dose then continue your current warfarin dose with next INR in 2 weeks       Summary  As of 12/7/2023      Full warfarin instructions:  12/7: 7.5 mg; Otherwise 2.5 mg every Tue, Fri; 5 mg all other days   Next INR check:  12/21/2023               Telephone call with Waldo who verbalizes understanding and agrees to plan and who agrees to plan and repeated back plan correctly    Lab visit scheduled    Education provided:   Please call back if any changes to your diet, medications or how you've been taking warfarin  Goal range and lab monitoring: goal range and significance of current result and Importance of therapeutic range    Plan made per [facility] anticoagulation protocol    Ana Galvan RN  Anticoagulation Clinic  12/7/2023    _______________________________________________________________________     Anticoagulation Episode Summary       Current INR goal:  2.0-3.0   TTR:  87.0% (1 y)   Target end date:  Indefinite   Send INR reminders to:  [facility]    Indications    Persistent atrial fibrillation (H) [I48.19]  TIA (transient ischemic attack) [G45.9]             Comments:  Approved for 8 week checks per -- see encounter from 12/07/21             Anticoagulation Care Providers       Provider Role  Specialty Phone number    Foreign Tavares MD Referring Family Medicine [phone]

## 2023-12-07 NOTE — PATIENT INSTRUCTIONS
Patient Education   Personalized Prevention Plan  You are due for the preventive services outlined below.  Your care team is available to assist you in scheduling these services.  If you have already completed any of these items, please share that information with your care team to update in your medical record.  Health Maintenance Due   Topic Date Due     Zoster (Shingles) Vaccine (1 of 2) Never done     RSV VACCINE (Pregnancy & 60+) (1 - 1-dose 60+ series) Never done     COVID-19 Vaccine (6 - 2023-24 season) 09/01/2023

## 2023-12-21 ENCOUNTER — LAB (OUTPATIENT)
Dept: LAB | Facility: CLINIC | Age: 83
End: 2023-12-21
Payer: COMMERCIAL

## 2023-12-21 ENCOUNTER — ANTICOAGULATION THERAPY VISIT (OUTPATIENT)
Dept: ANTICOAGULATION | Facility: CLINIC | Age: 83
End: 2023-12-21

## 2023-12-21 DIAGNOSIS — G45.9 TIA (TRANSIENT ISCHEMIC ATTACK): ICD-10-CM

## 2023-12-21 DIAGNOSIS — Z79.01 LONG TERM CURRENT USE OF ANTICOAGULANT THERAPY: ICD-10-CM

## 2023-12-21 DIAGNOSIS — I48.19 PERSISTENT ATRIAL FIBRILLATION (H): ICD-10-CM

## 2023-12-21 DIAGNOSIS — G45.9 TRANSIENT CEREBRAL ISCHEMIA: ICD-10-CM

## 2023-12-21 DIAGNOSIS — I48.19 PERSISTENT ATRIAL FIBRILLATION (H): Primary | ICD-10-CM

## 2023-12-21 LAB — INR BLD: 2.1 (ref 0.9–1.1)

## 2023-12-21 PROCEDURE — 85610 PROTHROMBIN TIME: CPT

## 2023-12-21 PROCEDURE — 36416 COLLJ CAPILLARY BLOOD SPEC: CPT

## 2023-12-21 RX ORDER — WARFARIN SODIUM 5 MG/1
TABLET ORAL
Qty: 80 TABLET | Refills: 1 | Status: SHIPPED | OUTPATIENT
Start: 2023-12-21 | End: 2024-04-11

## 2023-12-21 NOTE — PROGRESS NOTES
ANTICOAGULATION MANAGEMENT     Prosper Lopez 83 year old male is on warfarin with therapeutic INR result. (Goal INR 2.0-3.0)    Recent labs: (last 7 days)     12/21/23  0758   INR 2.1*       ASSESSMENT     Source(s): Chart Review and Patient/Caregiver Call     Warfarin doses taken: Warfarin taken as instructed  Diet: No new diet changes identified  Medication/supplement changes: None noted  New illness, injury, or hospitalization: No  Signs or symptoms of bleeding or clotting: No  Previous result: Subtherapeutic  Additional findings: Refill needed today. Prosper meets all criteria for refill (current [facility] referral, office visit with referring provider/group in last 1 year unless directed to return in 2 years in last referring provider visit note, lab monitoring up to date or not exceeding 2 weeks overdue). Rx instructions and quantity supplied updated to match patient's current dosing plan. Warfarin 90 day supply with 1 refill granted per [facility] protocol        PLAN     Recommended plan for no diet, medication or health factor changes affecting INR     Dosing Instructions: Continue your current warfarin dose with next INR in 3-4 weeks       Summary  As of 12/21/2023      Full warfarin instructions:  2.5 mg every Tue, Fri; 5 mg all other days   Next INR check:  1/18/2024               Telephone call with Waldo who verbalizes understanding and agrees to plan    Lab visit scheduled    Education provided:   Goal range and lab monitoring: goal range and significance of current result  Contact [phone] with any changes, questions or concerns.     Plan made per [facility] anticoagulation protocol    Sneha Jurado RN  Anticoagulation Clinic  12/21/2023    _______________________________________________________________________     Anticoagulation Episode Summary       Current INR goal:  2.0-3.0   TTR:  85.1% (1 y)   Target end date:  Indefinite   Send INR reminders to:  [facility]    Indications    Persistent atrial  fibrillation (H) [I48.19]  TIA (transient ischemic attack) [G45.9]             Comments:  Approved for 8 week checks per -- see encounter from 12/07/21             Anticoagulation Care Providers       Provider Role Specialty Phone number    Foreign Tavares MD Referring Family Medicine [phone]

## 2024-01-18 ENCOUNTER — ANTICOAGULATION THERAPY VISIT (OUTPATIENT)
Dept: ANTICOAGULATION | Facility: CLINIC | Age: 84
End: 2024-01-18

## 2024-01-18 ENCOUNTER — LAB (OUTPATIENT)
Dept: LAB | Facility: CLINIC | Age: 84
End: 2024-01-18
Payer: COMMERCIAL

## 2024-01-18 DIAGNOSIS — I48.19 PERSISTENT ATRIAL FIBRILLATION (H): ICD-10-CM

## 2024-01-18 DIAGNOSIS — G45.9 TIA (TRANSIENT ISCHEMIC ATTACK): ICD-10-CM

## 2024-01-18 DIAGNOSIS — I48.19 PERSISTENT ATRIAL FIBRILLATION (H): Primary | ICD-10-CM

## 2024-01-18 LAB — INR BLD: 1.9 (ref 0.9–1.1)

## 2024-01-18 PROCEDURE — 36416 COLLJ CAPILLARY BLOOD SPEC: CPT

## 2024-01-18 PROCEDURE — 85610 PROTHROMBIN TIME: CPT

## 2024-01-18 NOTE — PROGRESS NOTES
ANTICOAGULATION MANAGEMENT     Prosper Lopez 83 year old male is on warfarin with subtherapeutic INR result. (Goal INR 2.0-3.0)    Recent labs: (last 7 days)     01/18/24  0753   INR 1.9*       ASSESSMENT     Source(s): Chart Review  Previous INR was Therapeutic last visit; previously outside of goal range  Medication, diet, health changes since last INR chart reviewed; none identified         PLAN     Recommended plan for no diet, medication or health factor changes affecting INR     Dosing Instructions: Increase your warfarin dose (8.3% change) with next INR in 2 weeks       Summary  As of 1/18/2024      Full warfarin instructions:  2.5 mg every Tue; 5 mg all other days   Next INR check:  2/1/2024               Detailed voice message left for Waldo with dosing instructions and follow up date.   Sent [facility] message with dosing and follow up instructions    Contact [phone] to schedule and with any changes, questions or concerns.     Education provided:   Goal range and lab monitoring: goal range and significance of current result  Contact [phone] with any changes, questions or concerns.     Plan made per [facility] anticoagulation protocol    Sneha Jurado, RN  Anticoagulation Clinic  1/18/2024    _______________________________________________________________________     Anticoagulation Episode Summary       Current INR goal:  2.0-3.0   TTR:  81.2% (1 y)   Target end date:  Indefinite   Send INR reminders to:  [facility]    Indications    Persistent atrial fibrillation (H) [I48.19]  TIA (transient ischemic attack) [G45.9]             Comments:  Approved for 8 week checks per -- see encounter from 12/07/21             Anticoagulation Care Providers       Provider Role Specialty Phone number    Foreign Tavares MD Referring Family Medicine [phone]

## 2024-01-30 DIAGNOSIS — I48.19 PERSISTENT ATRIAL FIBRILLATION (H): ICD-10-CM

## 2024-01-30 RX ORDER — CARVEDILOL 25 MG/1
TABLET ORAL
Qty: 180 TABLET | Refills: 3 | Status: SHIPPED | OUTPATIENT
Start: 2024-01-30

## 2024-02-01 ENCOUNTER — LAB (OUTPATIENT)
Dept: LAB | Facility: CLINIC | Age: 84
End: 2024-02-01
Payer: COMMERCIAL

## 2024-02-01 ENCOUNTER — ANTICOAGULATION THERAPY VISIT (OUTPATIENT)
Dept: ANTICOAGULATION | Facility: CLINIC | Age: 84
End: 2024-02-01

## 2024-02-01 DIAGNOSIS — I48.19 PERSISTENT ATRIAL FIBRILLATION (H): ICD-10-CM

## 2024-02-01 DIAGNOSIS — G45.9 TIA (TRANSIENT ISCHEMIC ATTACK): ICD-10-CM

## 2024-02-01 DIAGNOSIS — I48.19 PERSISTENT ATRIAL FIBRILLATION (H): Primary | ICD-10-CM

## 2024-02-01 LAB — INR BLD: 3 (ref 0.9–1.1)

## 2024-02-01 PROCEDURE — 85610 PROTHROMBIN TIME: CPT

## 2024-02-01 PROCEDURE — 36416 COLLJ CAPILLARY BLOOD SPEC: CPT

## 2024-02-01 NOTE — PROGRESS NOTES
ANTICOAGULATION MANAGEMENT     Prosper Lopez 83 year old male is on warfarin with therapeutic INR result. (Goal INR 2.0-3.0)    Recent labs: (last 7 days)     02/01/24  0742   INR 3.0*       ASSESSMENT     Source(s): Chart Review and Patient/Caregiver Call     Warfarin doses taken: Warfarin taken as instructed  Diet: No new diet changes identified  Medication/supplement changes: None noted  New illness, injury, or hospitalization: No  Signs or symptoms of bleeding or clotting: No  Previous result: Subtherapeutic increased by 8.3%  Additional findings: None       PLAN     Recommended plan for no diet, medication or health factor changes affecting INR     Dosing Instructions: Continue your current warfarin dose with next INR in 3 weeks       Summary  As of 2/1/2024      Full warfarin instructions:  2.5 mg every Tue; 5 mg all other days   Next INR check:  2/22/2024               Telephone call with Waldo who verbalizes understanding and agrees to plan    Lab visit scheduled    Education provided:   Goal range and lab monitoring: goal range and significance of current result  Contact [phone] with any changes, questions or concerns.     Plan made per [facility] anticoagulation protocol    Sneha Jurado, RN  Anticoagulation Clinic  2/1/2024    _______________________________________________________________________     Anticoagulation Episode Summary       Current INR goal:  2.0-3.0   TTR:  80.9% (1 y)   Target end date:  Indefinite   Send INR reminders to:  [facility]    Indications    Persistent atrial fibrillation (H) [I48.19]  TIA (transient ischemic attack) [G45.9]             Comments:  Approved for 8 week checks per -- see encounter from 12/07/21             Anticoagulation Care Providers       Provider Role Specialty Phone number    Foreign Tavares MD Referring Family Medicine [phone]

## 2024-02-20 ENCOUNTER — TRANSFERRED RECORDS (OUTPATIENT)
Dept: HEALTH INFORMATION MANAGEMENT | Facility: CLINIC | Age: 84
End: 2024-02-20
Payer: COMMERCIAL

## 2024-02-22 ENCOUNTER — ANTICOAGULATION THERAPY VISIT (OUTPATIENT)
Dept: ANTICOAGULATION | Facility: CLINIC | Age: 84
End: 2024-02-22

## 2024-02-22 ENCOUNTER — LAB (OUTPATIENT)
Dept: LAB | Facility: CLINIC | Age: 84
End: 2024-02-22
Payer: COMMERCIAL

## 2024-02-22 DIAGNOSIS — G45.9 TIA (TRANSIENT ISCHEMIC ATTACK): ICD-10-CM

## 2024-02-22 DIAGNOSIS — I48.19 PERSISTENT ATRIAL FIBRILLATION (H): ICD-10-CM

## 2024-02-22 DIAGNOSIS — I48.19 PERSISTENT ATRIAL FIBRILLATION (H): Primary | ICD-10-CM

## 2024-02-22 LAB — INR BLD: 2 (ref 0.9–1.1)

## 2024-02-22 PROCEDURE — 36416 COLLJ CAPILLARY BLOOD SPEC: CPT

## 2024-02-22 PROCEDURE — 85610 PROTHROMBIN TIME: CPT

## 2024-02-22 NOTE — PROGRESS NOTES
ANTICOAGULATION MANAGEMENT     Prosper Lopez 83 year old male is on warfarin with therapeutic INR result. (Goal INR 2.0-3.0)    Recent labs: (last 7 days)     02/22/24  0801   INR 2.0*       ASSESSMENT     Source(s): Chart Review and Patient/Caregiver Call     Warfarin doses taken: Warfarin taken as instructed  Diet: No new diet changes identified  Medication/supplement changes: None noted  New illness, injury, or hospitalization: No  Signs or symptoms of bleeding or clotting: No  Previous result: Therapeutic last visit; previously outside of goal range  Additional findings: None       PLAN     Recommended plan for no diet, medication or health factor changes affecting INR     Dosing Instructions: Continue your current warfarin dose with next INR in 4 weeks       Summary  As of 2/22/2024      Full warfarin instructions:  2.5 mg every Tue; 5 mg all other days   Next INR check:  3/21/2024               Telephone call with Waldo who verbalizes understanding and agrees to plan    Patient offered & declined to schedule next visit    Education provided:   Contact [phone] with any changes, questions or concerns.     Plan made per [facility] anticoagulation protocol    Jaz Sterling RN  Anticoagulation Clinic  2/22/2024    _______________________________________________________________________     Anticoagulation Episode Summary       Current INR goal:  2.0-3.0   TTR:  80.9% (1 y)   Target end date:  Indefinite   Send INR reminders to:  MAGDALENA TEJEDA    Indications    Persistent atrial fibrillation (H) [I48.19]  TIA (transient ischemic attack) [G45.9]             Comments:  Approved for 8 week checks per -- see encounter from 12/07/21             Anticoagulation Care Providers       Provider Role Specialty Phone number    Foreign Tavares MD Referring Family Medicine [phone]

## 2024-03-13 ENCOUNTER — NURSE TRIAGE (OUTPATIENT)
Dept: NURSING | Facility: CLINIC | Age: 84
End: 2024-03-13
Payer: COMMERCIAL

## 2024-03-13 NOTE — TELEPHONE ENCOUNTER
Nurse Triage SBAR    Is this a 2nd Level Triage? NO    Situation: Medication question    Background: Patient states that he is having mild diarrhea. He has had 3 episodes in the last 24 hours.  He took immodium yesterday and stated that it really helped. He is calling today because he read on the immodium package not to take if you have an abnormal heart rhythm.  Patient has afib and takes coumadin.  He denies fevers, denies blood in his stool, denies abdominal pain.     Assessment: Mild diarrhea, medication question    Protocol Recommended Disposition:   See in Office Within 3 Days    Recommendation: Patient is just wondering what he could take for his diarrhea. Please contact him with any further recommendations.      Routed to provider    Does the patient meet one of the following criteria for ADS visit consideration? No    Reason for Disposition   Patient wants to be seen    Additional Information   Negative: Shock suspected (e.g., cold/pale/clammy skin, too weak to stand, low BP, rapid pulse)   Negative: Difficult to awaken or acting confused (e.g., disoriented, slurred speech)   Negative: Sounds like a life-threatening emergency to the triager   Negative: Vomiting also present and worse than the diarrhea   Negative: Blood in stool and without diarrhea   Negative: SEVERE abdominal pain (e.g., excruciating) and present > 1 hour   Negative: SEVERE abdominal pain and age > 60 years   Negative: Bloody, black, or tarry bowel movements  (Exception: Chronic-unchanged black-grey bowel movements and is taking iron pills or Pepto-Bismol.)   Negative: SEVERE diarrhea (e.g., 7 or more times / day more than normal) and age > 60 years   Negative: Constant abdominal pain lasting > 2 hours   Negative: Drinking very little and dehydration suspected (e.g., no urine > 12 hours, very dry mouth, very lightheaded)   Negative: Patient sounds very sick or weak to the triager   Negative: SEVERE diarrhea (e.g., 7 or more times / day  more than normal) and present > 24 hours (1 day)   Negative: MODERATE diarrhea (e.g., 4-6 times / day more than normal) and present > 48 hours (2 days)   Negative: MODERATE diarrhea (e.g., 4-6 times / day more than normal) and age > 70 years   Negative: Abdominal pain (Exception: Pain clears completely with each passage of diarrhea stool.)   Negative: Fever > 101 F (38.3 C)   Negative: Blood in the stool  (Exception: Only on toilet paper. Reason: Diarrhea can cause rectal irritation with blood on wiping.)   Negative: Mucus or pus in stool has been present > 2 days and diarrhea is more than mild   Negative: Weak immune system (e.g., HIV positive, cancer chemo, splenectomy, organ transplant, chronic steroids)   Negative: Travel to a foreign country in past month   Negative: Recent antibiotic therapy (i.e., within last 2 months) and diarrhea present > 3 days since antibiotic was stopped   Negative: Recent hospitalization and diarrhea present > 3 days   Negative: Tube feedings (e.g., nasogastric, g-tube, j-tube)   Negative: MILD diarrhea (e.g., 1-3 or more stools than normal in past 24 hours) diarrhea without known cause and present > 7 days    Protocols used: Diarrhea-A-OH

## 2024-03-13 NOTE — TELEPHONE ENCOUNTER
"Called pt to offer appt with PCP tomorrow to be seen. Patient declined and stated he is getting better.    Diarrhea started Saturday per pt. Patient stated he had some tacos from [employer] on Saturday and started feeling ill. He vomited everything out and had diarrhea.    Diarrhea level out on Sunday and diarrhea was fine on Monday and last night was half and half semi-solid, not all watery stools.   Had another diarrhea episode once this morning d/t drinking some carbonated water. He is watching what he is eating and been drinking more water.    Provided pt with some care advice and he verbalized understanding.    He was just wondering if he can take loperamide d/e his health conditions:  \"Recommendation: Patient is just wondering what he could take for his diarrhea. Please contact him with any further recommendations.\"    OK to leave detailed messages.    Sourav Best, AYLINN, RN  [facility]          "

## 2024-03-13 NOTE — TELEPHONE ENCOUNTER
Writer huddled with the PCP regarding nurse triage from 3/13/24, regarding the patient's ongoing diarrhea.    PCP recommended that the patient take 2 tablets of loperamide OTC at the onset of the diarrhea, then the patient can take 1 tablet of loperamide every loose stool, as needed, up to 5 total tablets in a day, through 3/15/24.    Writer called the patient and relayed the above recommendations to the patient.    Provider Recommendation Follow Up:   Reached patient/caregiver. Informed of provider's recommendations. Patient verbalized understanding and agrees with the plan.          Patient stated he did take 2 tablets of the loperamide yesterday at the onset of the diarrhea symptoms and he took one loperamide tablet earlier today.    States he is feeling better and the the diarrhea has lessened, but he is still having loose stool so he will take another tablet now.    Writer recommended to the patient to call back to the clinic for any worsening symptoms, such as vomiting, blood in stool, or increased nausea, and patient verbalized understanding and agrees with the plan.    Denies other questions or concerns at this time.    Kimberly Sullivan, RN, BSN  [facility]

## 2024-03-19 ENCOUNTER — TRANSFERRED RECORDS (OUTPATIENT)
Dept: HEALTH INFORMATION MANAGEMENT | Facility: CLINIC | Age: 84
End: 2024-03-19
Payer: COMMERCIAL

## 2024-03-28 ENCOUNTER — MYC MEDICAL ADVICE (OUTPATIENT)
Dept: ANTICOAGULATION | Facility: CLINIC | Age: 84
End: 2024-03-28
Payer: COMMERCIAL

## 2024-04-04 ENCOUNTER — TELEPHONE (OUTPATIENT)
Dept: ANTICOAGULATION | Facility: CLINIC | Age: 84
End: 2024-04-04
Payer: COMMERCIAL

## 2024-04-04 NOTE — TELEPHONE ENCOUNTER
ANTICOAGULATION     Prosper Lopez is overdue for an INR check.     Left message for patient to call and schedule lab appointment as soon as possible. If returning call, please schedule.     Sneha Jurado RN

## 2024-04-11 ENCOUNTER — ANTICOAGULATION THERAPY VISIT (OUTPATIENT)
Dept: ANTICOAGULATION | Facility: CLINIC | Age: 84
End: 2024-04-11

## 2024-04-11 ENCOUNTER — LAB (OUTPATIENT)
Dept: LAB | Facility: CLINIC | Age: 84
End: 2024-04-11
Payer: COMMERCIAL

## 2024-04-11 DIAGNOSIS — Z79.01 LONG TERM CURRENT USE OF ANTICOAGULANT THERAPY: ICD-10-CM

## 2024-04-11 DIAGNOSIS — G45.9 TRANSIENT CEREBRAL ISCHEMIA: ICD-10-CM

## 2024-04-11 DIAGNOSIS — I48.19 PERSISTENT ATRIAL FIBRILLATION (H): ICD-10-CM

## 2024-04-11 DIAGNOSIS — G45.9 TIA (TRANSIENT ISCHEMIC ATTACK): ICD-10-CM

## 2024-04-11 DIAGNOSIS — I48.19 PERSISTENT ATRIAL FIBRILLATION (H): Primary | ICD-10-CM

## 2024-04-11 LAB — INR BLD: 2.1 (ref 0.9–1.1)

## 2024-04-11 PROCEDURE — 85610 PROTHROMBIN TIME: CPT

## 2024-04-11 PROCEDURE — 36416 COLLJ CAPILLARY BLOOD SPEC: CPT

## 2024-04-11 RX ORDER — WARFARIN SODIUM 5 MG/1
TABLET ORAL
Qty: 90 TABLET | Refills: 0 | Status: SHIPPED | OUTPATIENT
Start: 2024-04-11 | End: 2024-07-01

## 2024-04-11 NOTE — PROGRESS NOTES
ANTICOAGULATION MANAGEMENT     Prosper Lopez 83 year old male is on warfarin with therapeutic INR result. (Goal INR 2.0-3.0)    Recent labs: (last 7 days)     04/11/24  0759   INR 2.1*       ASSESSMENT     Source(s): Chart Review and Patient/Caregiver Call     Warfarin doses taken: Warfarin taken as instructed  Diet: No new diet changes identified  Medication/supplement changes: None noted  New illness, injury, or hospitalization: No  Signs or symptoms of bleeding or clotting: No  Previous result: Therapeutic last 2(+) visits  Additional findings: Last INR 7 weeks ago. Refill needed, meets requirements. He is going to use 2 mg tabs he has on hand as well for his 5 mg days.       PLAN     Recommended plan for no diet, medication or health factor changes affecting INR     Dosing Instructions: Continue your current warfarin dose with next INR in 8 weeks       Summary  As of 4/11/2024      Full warfarin instructions:  2.5 mg every Tue; 5 mg all other days   Next INR check:  6/6/2024               Telephone call with Waldo who verbalizes understanding and agrees to plan    Check at provider office visit    Education provided:   Please call back if any changes to your diet, medications or how you've been taking warfarin  Goal range and lab monitoring: goal range and significance of current result  Importance of notifying anticoagulation clinic for: changes in medications; a sooner lab recheck maybe needed  Contact [phone] with any changes, questions or concerns.     Plan made per [facility] anticoagulation protocol    Kimberly Duron RN  Anticoagulation Clinic  4/11/2024    _______________________________________________________________________     Anticoagulation Episode Summary       Current INR goal:  2.0-3.0   TTR:  80.9% (1 y)   Target end date:  Indefinite   Send INR reminders to:  MAGDALENA TEJEDA    Indications    Persistent atrial fibrillation (H) [I48.19]  TIA (transient ischemic attack) [G45.9]              Comments:  Approved for 8 week checks per -- see encounter from 12/07/21             Anticoagulation Care Providers       Provider Role Specialty Phone number    Foreign Tavares MD Referring Family Medicine [phone]

## 2024-06-07 ENCOUNTER — LAB (OUTPATIENT)
Dept: LAB | Facility: CLINIC | Age: 84
End: 2024-06-07
Payer: COMMERCIAL

## 2024-06-07 ENCOUNTER — OFFICE VISIT (OUTPATIENT)
Dept: FAMILY MEDICINE | Facility: CLINIC | Age: 84
End: 2024-06-07
Payer: COMMERCIAL

## 2024-06-07 ENCOUNTER — ANTICOAGULATION THERAPY VISIT (OUTPATIENT)
Dept: ANTICOAGULATION | Facility: CLINIC | Age: 84
End: 2024-06-07

## 2024-06-07 VITALS
SYSTOLIC BLOOD PRESSURE: 108 MMHG | BODY MASS INDEX: 38.8 KG/M2 | TEMPERATURE: 97.9 F | DIASTOLIC BLOOD PRESSURE: 56 MMHG | OXYGEN SATURATION: 97 % | WEIGHT: 262 LBS | HEART RATE: 70 BPM | HEIGHT: 69 IN | RESPIRATION RATE: 16 BRPM

## 2024-06-07 DIAGNOSIS — Z23 ENCOUNTER FOR IMMUNIZATION: ICD-10-CM

## 2024-06-07 DIAGNOSIS — E83.52 HYPERCALCEMIA: ICD-10-CM

## 2024-06-07 DIAGNOSIS — G45.9 TIA (TRANSIENT ISCHEMIC ATTACK): ICD-10-CM

## 2024-06-07 DIAGNOSIS — D69.6 THROMBOCYTOPENIA (H): ICD-10-CM

## 2024-06-07 DIAGNOSIS — I48.19 PERSISTENT ATRIAL FIBRILLATION (H): Primary | ICD-10-CM

## 2024-06-07 DIAGNOSIS — I25.10 CORONARY ARTERY DISEASE INVOLVING NATIVE CORONARY ARTERY OF NATIVE HEART WITHOUT ANGINA PECTORIS: ICD-10-CM

## 2024-06-07 DIAGNOSIS — R73.01 IMPAIRED FASTING GLUCOSE: ICD-10-CM

## 2024-06-07 DIAGNOSIS — I48.19 PERSISTENT ATRIAL FIBRILLATION (H): ICD-10-CM

## 2024-06-07 DIAGNOSIS — E78.5 HYPERLIPIDEMIA LDL GOAL <70: ICD-10-CM

## 2024-06-07 LAB
CA-I BLD-MCNC: 5.2 MG/DL (ref 4.4–5.2)
ERYTHROCYTE [DISTWIDTH] IN BLOOD BY AUTOMATED COUNT: 14.2 % (ref 10–15)
HBA1C MFR BLD: 5.7 % (ref 0–5.6)
HCT VFR BLD AUTO: 42.9 % (ref 40–53)
HGB BLD-MCNC: 14.2 G/DL (ref 13.3–17.7)
INR BLD: 2.5 (ref 0.9–1.1)
MCH RBC QN AUTO: 29.9 PG (ref 26.5–33)
MCHC RBC AUTO-ENTMCNC: 33.1 G/DL (ref 31.5–36.5)
MCV RBC AUTO: 90 FL (ref 78–100)
PLATELET # BLD AUTO: 119 10E3/UL (ref 150–450)
RBC # BLD AUTO: 4.75 10E6/UL (ref 4.4–5.9)
WBC # BLD AUTO: 6.7 10E3/UL (ref 4–11)

## 2024-06-07 PROCEDURE — 91320 SARSCV2 VAC 30MCG TRS-SUC IM: CPT | Performed by: FAMILY MEDICINE

## 2024-06-07 PROCEDURE — 80053 COMPREHEN METABOLIC PANEL: CPT | Performed by: FAMILY MEDICINE

## 2024-06-07 PROCEDURE — 85610 PROTHROMBIN TIME: CPT

## 2024-06-07 PROCEDURE — 36416 COLLJ CAPILLARY BLOOD SPEC: CPT

## 2024-06-07 PROCEDURE — 83036 HEMOGLOBIN GLYCOSYLATED A1C: CPT | Performed by: FAMILY MEDICINE

## 2024-06-07 PROCEDURE — G2211 COMPLEX E/M VISIT ADD ON: HCPCS | Performed by: FAMILY MEDICINE

## 2024-06-07 PROCEDURE — 85027 COMPLETE CBC AUTOMATED: CPT | Performed by: FAMILY MEDICINE

## 2024-06-07 PROCEDURE — 80061 LIPID PANEL: CPT | Performed by: FAMILY MEDICINE

## 2024-06-07 PROCEDURE — 36415 COLL VENOUS BLD VENIPUNCTURE: CPT | Performed by: FAMILY MEDICINE

## 2024-06-07 PROCEDURE — 90480 ADMN SARSCOV2 VAC 1/ONLY CMP: CPT | Performed by: FAMILY MEDICINE

## 2024-06-07 PROCEDURE — 99214 OFFICE O/P EST MOD 30 MIN: CPT | Performed by: FAMILY MEDICINE

## 2024-06-07 PROCEDURE — 82330 ASSAY OF CALCIUM: CPT | Performed by: FAMILY MEDICINE

## 2024-06-07 RX ORDER — RESPIRATORY SYNCYTIAL VIRUS VACCINE 120MCG/0.5
0.5 KIT INTRAMUSCULAR ONCE
Qty: 1 EACH | Refills: 0 | Status: CANCELLED | OUTPATIENT
Start: 2024-06-07 | End: 2024-06-07

## 2024-06-07 ASSESSMENT — PAIN SCALES - GENERAL: PAINLEVEL: NO PAIN (0)

## 2024-06-07 NOTE — PROGRESS NOTES
Assessment/Plan:    Persistent atrial fibrillation (H)  Persistent atrial fibrillation.  [id number] score for historically.  Has not seen cardiology in 5 to 6 years.  Referred back to cardiology for further assessment.  Continues carvedilol 25 mg twice daily.  Appears rate controlled today.  Warfarin anticoagulation 2.5 mg on Tuesday otherwise 5 mg daily with INR today at 2.5.  Lab assessment completed.  - Comprehensive metabolic panel  - Adult Cardiology al [facility] Referral  - Comprehensive metabolic panel    Coronary artery disease involving native coronary artery of native heart without angina pectoris  CAD historically.  Asymptomatic currently.  Referred back to see cardiology since last visit greater than 5 years ago.    Impaired fasting glucose  A1c and fasting glucose obtained with weight goal continuing less than 250 pounds initially, less than 240 pounds ideally.  - Hemoglobin A1c  - Comprehensive metabolic panel  - Hemoglobin A1c  - Comprehensive metabolic panel    Thrombocytopenia (H24)  Update CBC with history of mild thrombocytopenia.  - Comprehensive metabolic panel  - CBC with platelets  - Comprehensive metabolic panel  - CBC with platelets    Hypercalcemia  Hypercalcemia historically mild with prior calcium level of 10.8.  Repeat serum calcium and as well as ionized calcium levels today.  - Comprehensive metabolic panel  - Ionized Calcium  - Comprehensive metabolic panel  - Ionized Calcium    Hyperlipidemia LDL goal <70  Update lipid cascade today while fasting.  Remains off cholesterol-lowering medicine at this time per patient request.  - Lipid panel reflex to direct LDL Fasting  - Lipid panel reflex to direct LDL Fasting    Encounter for immunization  COVID updated booster to be provided.  - COVID-19 12+ (2023-24) (PFIZER)    [id number] score of 4 with history of TIA and on chronic warfarin.     The longitudinal plan of care for the diagnosis(es)/condition(s) as documented were addressed  "during this visit. Due to the added complexity in care, I will continue to support Waldo in the subsequent management and with ongoing continuity of care.             Subjective:    Prosper Lopez is seen today for follow-up assessment.  Persistent atrial fibrillation.  Warfarin 2.5 mg on Tuesday otherwise 5 mg daily.  INR today was 2.5.  No bleeding concerns.  History of described TIA previously.  Carvedilol 25 mg twice daily with nonobstructive coronary artery disease noted.  Impaired fasting glucose.  Vitamin D supplement 2000 units daily with level at 29 on 2023.  Had mild calcium elevation at that time.  Chronic mild thrombocytopenia as well without bleeding issues.  Comprehensive review of systems as above otherwise all negative.       - Frances  5 sons -  1 daughter -  40 breast CA  Tobacco: quit ~  (1 ppd x > 30 years) - had quit once for 14 years  EtOH: occ  Mom -  92 y.o due to ? cancer  Dad -  56 heart attack  2 bros - one  age 78 heart attack and EtOHism  1 sis -  ? cancer  Surgeries: prostate surgery due to cancer; kidney stones bilateral; bilateral cataracts  Hospitalizations: kidney stone  Work: retired ()    Best bowling score \"289\" - more recently \"265\" about 1 year ago, now \"177-180\"           Past Surgical History:   Procedure Laterality Date    COLONOSCOPY W/ BIOPSIES N/A 2021    Procedure: COLONOSCOPY with polypectomy;  Surgeon: Raimundo Smith MD;  Location: [facility];  Service: Gastroenterology    AL CYSTO/URETERO W/LITHOTRIPSY &INDWELL STENT INSRT Left 2018    Procedure: CYSTOSCOPY, LEFT  URETEROSCOPY, LASER LITHOTRIPSY STENT INSERTION;  Surgeon: Foreign Lakhani MD;  Location: [facility] OR;  Service: Urology    PROSTATE SURGERY      URETEROSCOPY      ZZHC COLONOSCOPY W/WO BRUSH/WASH N/A 2/15/2021    Procedure: COLONOSCOPY with polypectomy, tattoo, cautery;  Surgeon: Compa Miner, " DO;  Location: [facility] OR;  Service: Gastroenterology        Family History   Problem Relation Age of Onset    Coronary Artery Disease Father     Heart Disease Father     Colon Cancer Brother     Urolithiasis Brother         Past Medical History:   Diagnosis Date    Actinic keratosis     Created by Conversion     Atrial fibrillation (H) 06/10/2014    Benign neoplasm of colon     Created by Conversion [facility] Annotation: May  5 2011  1:17PM -  ,  : 2006     Benign neoplasm of skin     Created by Conversion  Replacement Utility updated for latest IMO load    Calculus of kidney     Created by Conversion [facility] Annotation: May  4 2011  7:56AM - Keli, Demar: STENT AND LASER     Calculus of ureter 4/4/2018    Cardiomyopathy (H) 06/16/2014    Cataract 2012    Coronary artery disease     Effusion of ankle and foot joint     Created by Conversion     Hydrarthrosis 2012    Hydronephrosis with urinary obstruction due to ureteral calculus 4/4/2018    Hypercholesteremia     Created by Conversion     Hyperlipidemia 2012    Impaired fasting glucose 9/19/2019    Internal derangement of knee     Created by Conversion  Replacement Utility updated for latest IMO load    Left hydrocele 9/19/2019    Malignant neoplasm of prostate (H)     Created by Conversion     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 Reoccurrence on 7/24/2017 and again persistent [id number] score of 4 with history of TIA-on warfarin Asymptomatic and on rate control since July 2017.     Plantar fasciitis of right foot     Prostate CA (H) 2011    Seborrheic keratosis 2012    Skin neoplasm     Of uncertain  "behavior    TIA (transient ischemic attack) 01/24/2012    Transient cerebral ischemia     Created by Conversion  Replacement Utility updated for latest IMO load    Tubular adenoma of colon 2006    Unspecified cataract     Created by Conversion     Ureterolithiasis         Social History     Tobacco Use    Smoking status: Former     Current packs/day: 0.00     Types: Cigarettes     Quit date: 5/29/2014     Years since quitting: 10.0    Smokeless tobacco: Never   Substance Use Topics    Alcohol use: Yes     Comment: Alcoholic Drinks/day: Occasional    Drug use: No        Current Outpatient Medications   Medication Sig Dispense Refill    carvedilol (COREG) 25 MG tablet TAKE 1 TABLET(25 MG) BY MOUTH TWICE DAILY WITH MEALS 180 tablet 3    cholecalciferol, vitamin D3, (VITAMIN D3) 2,000 unit cap [CHOLECALCIFEROL, VITAMIN D3, (VITAMIN D3) 2,000 UNIT CAP] Take 2,000 Units by mouth daily.      warfarin ANTICOAGULANT (COUMADIN) 5 MG tablet Take 2.5 mg every Tuesday and Friday and 5mg all other days as directed by INR clinic 90 tablet 0          Objective:    Vitals:    06/07/24 0754 06/07/24 0825   BP: 100/60 108/56   Pulse: 70    Resp: 16    Temp: 97.9  F (36.6  C)    SpO2: 97%    Weight: 118.8 kg (262 lb)    Height: 1.753 m (5' 9\")       Body mass index is 38.69 kg/m .    Alert.  No apparent distress.  Chest clear.  Cardiac exam irregular otherwise rate controlled.  Blood pressure 108/56 on recheck.  Extremities warm and dry.      Echocardiogram 3/22/18  Narrative & Impression    When compared to the previous study dated 10/14/2014, no significant change.    Left ventricle ejection fraction is normal. The calculated left ventricular ejection fraction is 58%.    Normal left ventricular size and systolic function.    E/e' > 15, suggesting elevated LV filling pressures.    Left atrial volume is mildly increased.    The aortic root is mildly dilated.      This note has been dictated using voice recognition software and as a " result may contain minor grammatical errors and unintended word substitutions.         Answers submitted by the patient for this visit:  General Questionnaire (Submitted on 6/7/2024)  Chief Complaint: Chronic problems general questions HPI Form  What is the reason for your visit today? : pysical  How many servings of fruits and vegetables do you eat daily?: 2-3  On average, how many sweetened beverages do you drink each day (Examples: soda, juice, sweet tea, etc.  Do NOT count diet or artificially sweetened beverages)?: 0  How many minutes a day do you exercise enough to make your heart beat faster?: 9 or less  How many days a week do you exercise enough to make your heart beat faster?: 3 or less  How many days per week do you miss taking your medication?: 0

## 2024-06-08 LAB
ALBUMIN SERPL BCG-MCNC: 4.1 G/DL (ref 3.5–5.2)
ALP SERPL-CCNC: 50 U/L (ref 40–150)
ALT SERPL W P-5'-P-CCNC: 14 U/L (ref 0–70)
ANION GAP SERPL CALCULATED.3IONS-SCNC: 8 MMOL/L (ref 7–15)
AST SERPL W P-5'-P-CCNC: 16 U/L (ref 0–45)
BILIRUB SERPL-MCNC: 0.7 MG/DL
BUN SERPL-MCNC: 17.5 MG/DL (ref 8–23)
CALCIUM SERPL-MCNC: 10 MG/DL (ref 8.8–10.2)
CHLORIDE SERPL-SCNC: 107 MMOL/L (ref 98–107)
CHOLEST SERPL-MCNC: 125 MG/DL
CREAT SERPL-MCNC: 0.86 MG/DL (ref 0.67–1.17)
DEPRECATED HCO3 PLAS-SCNC: 22 MMOL/L (ref 22–29)
EGFRCR SERPLBLD CKD-EPI 2021: 85 ML/MIN/1.73M2
FASTING STATUS PATIENT QL REPORTED: ABNORMAL
FASTING STATUS PATIENT QL REPORTED: ABNORMAL
GLUCOSE SERPL-MCNC: 114 MG/DL (ref 70–99)
HDLC SERPL-MCNC: 30 MG/DL
LDLC SERPL CALC-MCNC: 81 MG/DL
NONHDLC SERPL-MCNC: 95 MG/DL
POTASSIUM SERPL-SCNC: 4.5 MMOL/L (ref 3.4–5.3)
PROT SERPL-MCNC: 6.3 G/DL (ref 6.4–8.3)
SODIUM SERPL-SCNC: 137 MMOL/L (ref 135–145)
TRIGL SERPL-MCNC: 72 MG/DL

## 2024-07-01 ENCOUNTER — TELEPHONE (OUTPATIENT)
Dept: ANTICOAGULATION | Facility: CLINIC | Age: 84
End: 2024-07-01
Payer: COMMERCIAL

## 2024-07-01 DIAGNOSIS — G45.9 TRANSIENT CEREBRAL ISCHEMIA: ICD-10-CM

## 2024-07-01 DIAGNOSIS — Z79.01 LONG TERM CURRENT USE OF ANTICOAGULANT THERAPY: ICD-10-CM

## 2024-07-01 RX ORDER — WARFARIN SODIUM 5 MG/1
TABLET ORAL
Qty: 90 TABLET | Refills: 0 | Status: SHIPPED | OUTPATIENT
Start: 2024-07-01 | End: 2024-08-06 | Stop reason: ALTCHOICE

## 2024-07-01 NOTE — TELEPHONE ENCOUNTER
ANTICOAGULATION MANAGEMENT:  Medication Refill    Anticoagulation Summary  As of 6/7/2024      Warfarin maintenance plan:  2.5 mg (5 mg x 0.5) every Tue; 5 mg (5 mg x 1) all other days   Next INR check:  8/2/2024   Target end date:  Indefinite    Indications    Persistent atrial fibrillation (H) [I48.19]  TIA (transient ischemic attack) [G45.9]                 Anticoagulation Care Providers       Provider Role Specialty Phone number    Foreign Tavares MD Referring Family Medicine [phone]            Refill Criteria    Visit with referring provider/group: Meets criteria: office visit within referring provider group in the last 1 year on 6/7/24    [facility] referral last signed: 08/15/2023; within last year: Yes    Lab monitoring not exceeding 2 weeks overdue: Yes    Prosper meets all criteria for refill. Rx instructions and quantity supplied updated to match patient's current dosing plan.  90 day supply with 0 refills granted per [facility] protocol     Kimberly Duron RN  Anticoagulation Clinic

## 2024-07-09 ENCOUNTER — OFFICE VISIT (OUTPATIENT)
Dept: CARDIOLOGY | Facility: CLINIC | Age: 84
End: 2024-07-09
Attending: FAMILY MEDICINE
Payer: COMMERCIAL

## 2024-07-09 VITALS
OXYGEN SATURATION: 93 % | RESPIRATION RATE: 16 BRPM | HEART RATE: 91 BPM | HEIGHT: 69 IN | SYSTOLIC BLOOD PRESSURE: 112 MMHG | BODY MASS INDEX: 37.49 KG/M2 | DIASTOLIC BLOOD PRESSURE: 68 MMHG | WEIGHT: 253.1 LBS

## 2024-07-09 DIAGNOSIS — D17.24 LIPOMA OF LEFT LOWER EXTREMITY: ICD-10-CM

## 2024-07-09 DIAGNOSIS — Z01.810 PRE-OPERATIVE CARDIOVASCULAR EXAMINATION: Primary | ICD-10-CM

## 2024-07-09 DIAGNOSIS — I25.10 CORONARY ARTERY DISEASE INVOLVING NATIVE CORONARY ARTERY OF NATIVE HEART WITHOUT ANGINA PECTORIS: ICD-10-CM

## 2024-07-09 DIAGNOSIS — I48.19 PERSISTENT ATRIAL FIBRILLATION (H): ICD-10-CM

## 2024-07-09 DIAGNOSIS — I10 PRIMARY HYPERTENSION: ICD-10-CM

## 2024-07-09 DIAGNOSIS — E78.00 HYPERCHOLESTEREMIA: ICD-10-CM

## 2024-07-09 PROCEDURE — 99204 OFFICE O/P NEW MOD 45 MIN: CPT | Performed by: STUDENT IN AN ORGANIZED HEALTH CARE EDUCATION/TRAINING PROGRAM

## 2024-07-09 PROCEDURE — G2211 COMPLEX E/M VISIT ADD ON: HCPCS | Performed by: STUDENT IN AN ORGANIZED HEALTH CARE EDUCATION/TRAINING PROGRAM

## 2024-07-09 NOTE — PROGRESS NOTES
"  [facility] HEART CARE   [address]  [place] [address]   [email or web address]   OFFICE: [phone]     CARDIOLOGY CLINIC NOTE     Thank you, Foreign Junior, for asking the [facility] Heart Care team to see Mr. Prosper Lopez to evaluate New Patient           History of Present Illness   Mr. Prosper Lopez is a 84 year old male with a significant past history of chronic atrial fibrillation on warfarin, CAD by virtue of an elevated CAC, HTN, who presents to establish care and for preoperative risk stratification prior to a fatty tumor resection of the thigh.  The patient notes good exercise capacity - he mows his own lawn with a  push mower.  He walks up and down the stairs to his basement.  He sometimes gets winded with heavy exertion but denies chest pain or pressure.  He had one episode of palpitations about a year ago.  He does not remember the circumstances around this.           Medications  Allergies   Current Outpatient Medications   Medication Sig Dispense Refill    apixaban ANTICOAGULANT (ELIQUIS ANTICOAGULANT) 5 MG tablet Take 1 tablet (5 mg) by mouth 2 times daily 180 tablet 3    carvedilol (COREG) 25 MG tablet TAKE 1 TABLET(25 MG) BY MOUTH TWICE DAILY WITH MEALS 180 tablet 3    cholecalciferol, vitamin D3, (VITAMIN D3) 2,000 unit cap [CHOLECALCIFEROL, VITAMIN D3, (VITAMIN D3) 2,000 UNIT CAP] Take 2,000 Units by mouth daily.      warfarin ANTICOAGULANT (COUMADIN) 5 MG tablet Take 2.5 mg every Tuesday; Take 5mg all other days. Or as directed by INR clinic. 90 tablet 0      No Known Allergies     Physical Examination Review of Systems   Vitals: /68 (BP Location: Right arm, Patient Position: Sitting, Cuff Size: Adult Regular)   Pulse 91   Resp 16   Ht 1.753 m (5' 9\")   Wt 114.8 kg (253 lb 1.6 oz)   SpO2 93%   BMI 37.38 kg/m    BMI= Body mass index is 37.38 kg/m .  Wt Readings from Last 3 Encounters:   07/09/24 114.8 kg (253 lb 1.6 oz) "   06/07/24 118.8 kg (262 lb)   12/07/23 113.9 kg (251 lb)       General: pleasant male. No acute distress.   Neck: No JVD  Lungs: clear to auscultation  COR:  irregularly irregular rate and rhythm, No murmurs, rubs, or gallops  Extrem: No edema        Please refer above for cardiac ROS details.       Past History     Family History:   Family History   Problem Relation Age of Onset    Coronary Artery Disease Father     Heart Disease Father     Colon Cancer Brother     Urolithiasis Brother         Social History:   Social History     Socioeconomic History    Marital status:      Spouse name: Not on file    Number of children: Not on file    Years of education: Not on file    Highest education level: Not on file   Occupational History    Not on file   Tobacco Use    Smoking status: Former     Current packs/day: 0.00     Types: Cigarettes     Quit date: 5/29/2014     Years since quitting: 10.1    Smokeless tobacco: Never   Substance and Sexual Activity    Alcohol use: Yes     Comment: Alcoholic Drinks/day: Occasional    Drug use: No    Sexual activity: Not on file   Other Topics Concern    Not on file   Social History Narrative    Not on file     Social Determinants of Health     Financial Resource Strain: Low Risk  (12/7/2023)    Financial Resource Strain     Within the past 12 months, have you or your family members you live with been unable to get utilities (heat, electricity) when it was really needed?: No   Food Insecurity: Low Risk  (12/7/2023)    Food Insecurity     Within the past 12 months, did you worry that your food would run out before you got money to buy more?: No     Within the past 12 months, did the food you bought just not last and you didn t have money to get more?: No   Transportation Needs: Low Risk  (12/7/2023)    Transportation Needs     Within the past 12 months, has lack of transportation kept you from medical appointments, getting your medicines, non-medical meetings or appointments,  work, or from getting things that you need?: No   Physical Activity: Not on file   Stress: Not on file   Social Connections: Not on file   Interpersonal Safety: Low Risk  (6/7/2024)    Interpersonal Safety     Do you feel physically and emotionally safe where you currently live?: Yes     Within the past 12 months, have you been hit, slapped, kicked or otherwise physically hurt by someone?: No     Within the past 12 months, have you been humiliated or emotionally abused in other ways by your partner or ex-partner?: No   Housing Stability: Low Risk  (12/7/2023)    Housing Stability     Do you have housing? : Yes     Are you worried about losing your housing?: No            Lab Results    Chemistry/lipid CBC Cardiac Enzymes/BNP/TSH/INR   Lab Results   Component Value Date    CHOL 125 06/07/2024    HDL 30 (L) 06/07/2024    TRIG 72 06/07/2024    BUN 17.5 06/07/2024     06/07/2024    CO2 22 06/07/2024    Lab Results   Component Value Date    WBC 6.7 06/07/2024    HGB 14.2 06/07/2024    HCT 42.9 06/07/2024    MCV 90 06/07/2024     (L) 06/07/2024    Lab Results   Component Value Date    TROPONINI 0.02 04/04/2018    INR 2.5 (H) 06/07/2024          Cardiac Problems and Cardiac Diagnostics     Most Recent Cardiac testing:  ECG Personal interpretation  4/30/2018  Afib HR 89 bpm    ECHO 3/22/2018    When compared to the previous study dated 10/14/2014, no significant change.    Left ventricle ejection fraction is normal. The calculated left ventricular ejection fraction is 58%.    Normal left ventricular size and systolic function.    E/e' > 15, suggesting elevated LV filling pressures.    Left atrial volume is mildly increased.    The aortic root is mildly dilated.    Stress test 10/29/2014:   CONCLUSION:  Exercise stress nuclear study is negative for inducible myocardial  ischemia or infarction.     Cardiac CAC 7/29/2014:  CONCLUSIONS:  Total Agatston score of 110, indicating an intermediate risk of  future  coronary events.         Assessment/Recommendations   Assessment:    Mr. Prosper Lopez is a 84 year old male with a significant past history of chronic atrial fibrillation on warfarin, CAD by virtue of an elevated CAC, HTN, who presents to establish care and for preoperative risk stratification prior to a fatty tumor resection of the thigh.      Preoperative risk stratification   - The patient can easily achieve > 4 METs and has no symptoms suggestive of cardiac angina   - Will get an echocardiogram as he did have LV systolic dysfunction remotely (though normalized LVEF in 2018)   - Will get a Holter monitor to assess HR control   - No ischemic testing is indicated at this time.   - Pt should hold warfarin prior to surgery.  When safe from a bleeding perspective should restart with eliquis.    Permanent afib   - Hx of afib over 20 years.  One attempt at DCCV many years ago and rate control was planned after that   - Continue carvedilol 25mg BID   - CHADS-Vasc at least 3-5.  On warfarin.  Will switch to eliquis 5mg BID after surgery   - Will get a Holter monitor to assess HR control    Preclinical CAD   - Not on cholesterol lowering agents per pt preference.  This is reasonable given age.  LDL 81.    HTN   - Well controlled   - Continue current management    RTC 12 months         Luke Alicia DO [facility]  Non-invasive Cardiologist  [facility]

## 2024-07-09 NOTE — LETTER
7/9/2024    Foreign Tavares MD  [address]  [facility] [address]    RE: Prosper PEREZ John       Dear Colleague,     I had the pleasure of seeing Prosper Lopez in the [facility].    [facility]   [address]  [place] [address]   [email or web address]   OFFICE: [phone]     CARDIOLOGY CLINIC NOTE     Thank you, Foreign Junior, for asking the [facility] Heart Care team to see Mr. Prosper Lopez to evaluate New Patient           History of Present Illness   Mr. Prosper Lopez is a 84 year old male with a significant past history of chronic atrial fibrillation on warfarin, CAD by virtue of an elevated CAC, HTN, who presents to establish care and for preoperative risk stratification prior to a fatty tumor resection of the thigh.  The patient notes good exercise capacity - he mows his own lawn with a  push mower.  He walks up and down the stairs to his basement.  He sometimes gets winded with heavy exertion but denies chest pain or pressure.  He had one episode of palpitations about a year ago.  He does not remember the circumstances around this.           Medications  Allergies   Current Outpatient Medications   Medication Sig Dispense Refill    apixaban ANTICOAGULANT (ELIQUIS ANTICOAGULANT) 5 MG tablet Take 1 tablet (5 mg) by mouth 2 times daily 180 tablet 3    carvedilol (COREG) 25 MG tablet TAKE 1 TABLET(25 MG) BY MOUTH TWICE DAILY WITH MEALS 180 tablet 3    cholecalciferol, vitamin D3, (VITAMIN D3) 2,000 unit cap [CHOLECALCIFEROL, VITAMIN D3, (VITAMIN D3) 2,000 UNIT CAP] Take 2,000 Units by mouth daily.      warfarin ANTICOAGULANT (COUMADIN) 5 MG tablet Take 2.5 mg every Tuesday; Take 5mg all other days. Or as directed by INR clinic. 90 tablet 0      No Known Allergies     Physical Examination Review of Systems   Vitals: /68 (BP Location: Right arm, Patient Position: Sitting, Cuff Size: Adult Regular)    "Pulse 91   Resp 16   Ht 1.753 m (5' 9\")   Wt 114.8 kg (253 lb 1.6 oz)   SpO2 93%   BMI 37.38 kg/m    BMI= Body mass index is 37.38 kg/m .  Wt Readings from Last 3 Encounters:   07/09/24 114.8 kg (253 lb 1.6 oz)   06/07/24 118.8 kg (262 lb)   12/07/23 113.9 kg (251 lb)       General: pleasant male. No acute distress.   Neck: No JVD  Lungs: clear to auscultation  COR:  irregularly irregular rate and rhythm, No murmurs, rubs, or gallops  Extrem: No edema        Please refer above for cardiac ROS details.       Past History     Family History:   Family History   Problem Relation Age of Onset    Coronary Artery Disease Father     Heart Disease Father     Colon Cancer Brother     Urolithiasis Brother         Social History:   Social History     Socioeconomic History    Marital status:      Spouse name: Not on file    Number of children: Not on file    Years of education: Not on file    Highest education level: Not on file   Occupational History    Not on file   Tobacco Use    Smoking status: Former     Current packs/day: 0.00     Types: Cigarettes     Quit date: 5/29/2014     Years since quitting: 10.1    Smokeless tobacco: Never   Substance and Sexual Activity    Alcohol use: Yes     Comment: Alcoholic Drinks/day: Occasional    Drug use: No    Sexual activity: Not on file   Other Topics Concern    Not on file   Social History Narrative    Not on file     Social Determinants of Health     Financial Resource Strain: Low Risk  (12/7/2023)    Financial Resource Strain     Within the past 12 months, have you or your family members you live with been unable to get utilities (heat, electricity) when it was really needed?: No   Food Insecurity: Low Risk  (12/7/2023)    Food Insecurity     Within the past 12 months, did you worry that your food would run out before you got money to buy more?: No     Within the past 12 months, did the food you bought just not last and you didn t have money to get more?: No "   Transportation Needs: Low Risk  (12/7/2023)    Transportation Needs     Within the past 12 months, has lack of transportation kept you from medical appointments, getting your medicines, non-medical meetings or appointments, work, or from getting things that you need?: No   Physical Activity: Not on file   Stress: Not on file   Social Connections: Not on file   Interpersonal Safety: Low Risk  (6/7/2024)    Interpersonal Safety     Do you feel physically and emotionally safe where you currently live?: Yes     Within the past 12 months, have you been hit, slapped, kicked or otherwise physically hurt by someone?: No     Within the past 12 months, have you been humiliated or emotionally abused in other ways by your partner or ex-partner?: No   Housing Stability: Low Risk  (12/7/2023)    Housing Stability     Do you have housing? : Yes     Are you worried about losing your housing?: No            Lab Results    Chemistry/lipid CBC Cardiac Enzymes/BNP/TSH/INR   Lab Results   Component Value Date    CHOL 125 06/07/2024    HDL 30 (L) 06/07/2024    TRIG 72 06/07/2024    BUN 17.5 06/07/2024     06/07/2024    CO2 22 06/07/2024    Lab Results   Component Value Date    WBC 6.7 06/07/2024    HGB 14.2 06/07/2024    HCT 42.9 06/07/2024    MCV 90 06/07/2024     (L) 06/07/2024    Lab Results   Component Value Date    TROPONINI 0.02 04/04/2018    INR 2.5 (H) 06/07/2024          Cardiac Problems and Cardiac Diagnostics     Most Recent Cardiac testing:  ECG Personal interpretation  4/30/2018  Afib HR 89 bpm    ECHO 3/22/2018    When compared to the previous study dated 10/14/2014, no significant change.    Left ventricle ejection fraction is normal. The calculated left ventricular ejection fraction is 58%.    Normal left ventricular size and systolic function.    E/e' > 15, suggesting elevated LV filling pressures.    Left atrial volume is mildly increased.    The aortic root is mildly dilated.    Stress test 10/29/2014:    CONCLUSION:  Exercise stress nuclear study is negative for inducible myocardial  ischemia or infarction.     Cardiac CAC 7/29/2014:  CONCLUSIONS:  Total Agatston score of 110, indicating an intermediate risk of future  coronary events.         Assessment/Recommendations   Assessment:    Mr. Prosper Lopez is a 84 year old male with a significant past history of chronic atrial fibrillation on warfarin, CAD by virtue of an elevated CAC, HTN, who presents to establish care and for preoperative risk stratification prior to a fatty tumor resection of the thigh.      Preoperative risk stratification   - The patient can easily achieve > 4 METs and has no symptoms suggestive of cardiac angina   - Will get an echocardiogram as he did have LV systolic dysfunction remotely (though normalized LVEF in 2018)   - Will get a Holter monitor to assess HR control   - No ischemic testing is indicated at this time.   - Pt should hold warfarin prior to surgery.  When safe from a bleeding perspective should restart with eliquis.    Permanent afib   - Hx of afib over 20 years.  One attempt at DCCV many years ago and rate control was planned after that   - Continue carvedilol 25mg BID   - CHADS-Vasc at least 3-5.  On warfarin.  Will switch to eliquis 5mg BID after surgery   - Will get a Holter monitor to assess HR control    Preclinical CAD   - Not on cholesterol lowering agents per pt preference.  This is reasonable given age.  LDL 81.    HTN   - Well controlled   - Continue current management    RTC 12 months         Luke Alicia DO [facility]  Non-invasive Cardiologist  [facility]         Thank you for allowing me to participate in the care of your patient.      Sincerely,     Luke Alicia DO     [facility]  cc:   Foreign Tavares MD  [address]  [address] [address]

## 2024-07-09 NOTE — PATIENT INSTRUCTIONS
It was a pleasure meeting you today    In summary:    We will get an echocardiogram and Holter monitor to assess the atrial fibrillation.  We will switch you from warfarin to Eliquis.  You should hold the warfarin prior to surgery, and when you are set to restart please start with the eliquis instead.    Please call my nurse Mundo Lopez at [phone] if you have any questions or issues.    We will schedule a follow up visit in  12 months      Luke Alicia DO [facility]  Non-invasive Cardiologist  [facility]

## 2024-07-12 ENCOUNTER — OFFICE VISIT (OUTPATIENT)
Dept: FAMILY MEDICINE | Facility: CLINIC | Age: 84
End: 2024-07-12
Payer: COMMERCIAL

## 2024-07-12 ENCOUNTER — ANCILLARY PROCEDURE (OUTPATIENT)
Dept: GENERAL RADIOLOGY | Facility: CLINIC | Age: 84
End: 2024-07-12
Payer: COMMERCIAL

## 2024-07-12 VITALS
RESPIRATION RATE: 18 BRPM | HEART RATE: 77 BPM | OXYGEN SATURATION: 95 % | SYSTOLIC BLOOD PRESSURE: 116 MMHG | DIASTOLIC BLOOD PRESSURE: 74 MMHG | TEMPERATURE: 98 F

## 2024-07-12 DIAGNOSIS — R05.2 SUBACUTE COUGH: ICD-10-CM

## 2024-07-12 DIAGNOSIS — J01.00 ACUTE NON-RECURRENT MAXILLARY SINUSITIS: Primary | ICD-10-CM

## 2024-07-12 PROCEDURE — 99213 OFFICE O/P EST LOW 20 MIN: CPT

## 2024-07-12 PROCEDURE — 71046 X-RAY EXAM CHEST 2 VIEWS: CPT | Mod: TC | Performed by: RADIOLOGY

## 2024-07-12 NOTE — PROGRESS NOTES
Assessment & Plan     Acute non-recurrent maxillary sinusitis  84-year-old male with 2 weeks of cough and runny nose and congestion that seems to be worsening.  No systemic symptoms or fevers.  Exam unremarkable and vitally stable but given symptoms obtain chest x-ray which I read as unremarkable.  Believe this is likely a sinus infection and will trial Augmentin.  Discussed return precautions.  Patient in agreement with plan.  - XR Chest 2 Views; Future  - amoxicillin-clavulanate (AUGMENTIN) 875-125 MG tablet; Take 1 tablet by mouth 2 times daily for 7 days    Return if symptoms worsen or fail to improve.    Subjective   Waldo is a 84 year old, presenting for the following health issues:  Cough (X 2wks, coughing up mucus, no chest pain, no SOB or wheezing, no headaches or dizziness)    HPI     Acute Illness  Acute illness concerns: Cough and congestion  Onset/Duration: 2 weeks  Symptoms:  Fever: No  Chills/Sweats: No  Headache (location?): No  Sinus Pressure: YES  Conjunctivitis:  No  Ear Pain: no  Rhinorrhea: YES  Congestion: YES  Sore Throat: No  Cough: YES - productive  Wheeze: No  Decreased Appetite: No  Nausea: No  Vomiting: No  Diarrhea: No  Dysuria/Freq.: No  Dysuria or Hematuria: No  Fatigue/Achiness: No  Sick/Strep Exposure: No  Therapies tried and outcome: Robitussin      Review of Systems  Constitutional, HEENT, cardiovascular, pulmonary, gi and gu systems are negative, except as otherwise noted.      Objective    /74   Pulse 77   Temp 98  F (36.7  C) (Oral)   Resp 18   SpO2 95%   There is no height or weight on file to calculate BMI.  Physical Exam   GENERAL: alert and no distress  NECK: no adenopathy, no asymmetry, masses, or scars  RESP: lungs clear to auscultation - no rales, rhonchi or wheezes  CV: regular rate and rhythm, normal S1 S2, no S3 or S4, no murmur, click or rub, no peripheral edema  ABDOMEN: soft, nontender, no hepatosplenomegaly, no masses and bowel sounds normal  MS: no gross  musculoskeletal defects noted, no edema    No results found for this or any previous visit (from the past 24 hour(s)).        Signed Electronically by: Tremayne Matamoros DO

## 2024-07-12 NOTE — PATIENT INSTRUCTIONS
You were seen today for a sinus infection.    Symptoms management:  - May use Tylenol or Ibuprofen for discomfort and/or fever if present  - May try saline irrigation to relieve congestion  - Use of nasal steroids (Flonase) as prescribed  - Take antibiotics as prescribed for the full course (even if symptoms have improved)  - If you are experiencing ear fullness, may try an oral decongestant such as sudafed    Reasons to come back for re-evaluation:  - Develop a fever of 100.4F or current fever worsens  - Troubles seeing or double vision  - Swelling or redness around one or both eyes  - Sfiff neck  - Symptoms are not improving over the next 5 days

## 2024-07-15 ENCOUNTER — HOSPITAL ENCOUNTER (OUTPATIENT)
Dept: CARDIOLOGY | Facility: CLINIC | Age: 84
Discharge: HOME | End: 2024-07-15
Attending: STUDENT IN AN ORGANIZED HEALTH CARE EDUCATION/TRAINING PROGRAM
Payer: COMMERCIAL

## 2024-07-15 DIAGNOSIS — I48.19 PERSISTENT ATRIAL FIBRILLATION (H): ICD-10-CM

## 2024-07-15 LAB — LVEF ECHO: NORMAL

## 2024-07-15 PROCEDURE — 255N000002 HC RX 255 OP 636: Performed by: STUDENT IN AN ORGANIZED HEALTH CARE EDUCATION/TRAINING PROGRAM

## 2024-07-15 PROCEDURE — 93306 TTE W/DOPPLER COMPLETE: CPT | Mod: 26 | Performed by: INTERNAL MEDICINE

## 2024-07-15 PROCEDURE — C8929 TTE W OR WO FOL WCON,DOPPLER: HCPCS

## 2024-07-15 PROCEDURE — 93225 XTRNL ECG REC<48 HRS REC: CPT

## 2024-07-15 RX ADMIN — PERFLUTREN 3 ML: 6.52 INJECTION, SUSPENSION INTRAVENOUS at 13:40

## 2024-07-22 PROCEDURE — 93227 XTRNL ECG REC<48 HR R&I: CPT | Performed by: INTERNAL MEDICINE

## 2024-07-25 NOTE — PROGRESS NOTES
ANTICOAGULATION MANAGEMENT     Prosper Lopez 84 year old male is on warfarin with therapeutic INR result. (Goal INR 2.0-3.0)    Recent labs: (last 7 days)     06/07/24  0747   INR 2.5*       ASSESSMENT     Source(s): Chart Review  Previous INR was Therapeutic last 2(+) visits  Medication, diet, health changes since last INR chart reviewed; none identified         PLAN     Recommended plan for no diet, medication or health factor changes affecting INR     Dosing Instructions: Continue your current warfarin dose with next INR in 8 weeks       Summary  As of 6/7/2024      Full warfarin instructions:  2.5 mg every Tue; 5 mg all other days   Next INR check:  8/2/2024               Detailed voice message left for Waldo with dosing instructions and follow up date.     Contact [phone]  to schedule and with any changes, questions or concerns.     Education provided:   Goal range and lab monitoring: goal range and significance of current result  Contact [phone]  with any changes, questions or concerns.     Plan made per [facility] anticoagulation protocol    Sneha Jurado, RN  Anticoagulation Clinic  6/7/2024    _______________________________________________________________________     Anticoagulation Episode Summary       Current INR goal:  2.0-3.0   TTR:  80.9% (1 y)   Target end date:  Indefinite   Send INR reminders to:  MAGDALENA TEJEDA    Indications    Persistent atrial fibrillation (H) [I48.19]  TIA (transient ischemic attack) [G45.9]             Comments:  Approved for 8 week checks per -- see encounter from 12/07/21             Anticoagulation Care Providers       Provider Role Specialty Phone number    Foreign Tavares MD Referring Family Medicine [phone]             Patient : Misha Diaz Age: 18 month old Sex: male   MRN: [id number] Encounter Date: 7/24/2024    History     Chief Complaint   Patient presents with    Fall    Head Injury Without LOC       HPI    Pt arrives with his parents. Pt's history was obtained through his parents.     Misha Diaz is a 18 month old male presenting to the emergency department complaining of a mechanical fall with a head injury and no LOC. Per the pt's mother, the pt was getting off of the couch when he got caught on a blanket. Pt fell backwards and hit his head on the coffee table. Pt has been acting normal. Pt has no emesis, LOC, or activity change. Pt has minimal blood on the back of his head.      Past/Family/Social History     No Known Allergies    No current facility-administered medications for this encounter.     No current outpatient medications on file.       History reviewed. No pertinent past medical history.    History reviewed. No pertinent surgical history.    History reviewed. No pertinent family history.            Review of Systems   Review of Symptoms     Review of Systems   Constitutional:  Negative for activity change, appetite change, fatigue and fever.   HENT:  Negative for congestion, ear pain, mouth sores, rhinorrhea and sore throat.    Eyes:  Negative for redness.   Respiratory:  Negative for cough.    Gastrointestinal:  Negative for abdominal pain, constipation, diarrhea and vomiting.   Genitourinary:  Negative for decreased urine volume, dysuria, frequency and hematuria.   Musculoskeletal:  Negative for myalgias.   Skin:  Negative for rash.        Positive for minimal blood on the back of pt's head.          Physical Exam   Physical Exam     ED Triage Vitals [07/24/24 2034]   ED Triage Vitals Group      Temp 97.7 °F (36.5 °C)      Heart Rate 110      Resp 30      BP       SpO2 99 %      EtCO2 mmHg       Height       Weight 26 lb 3.8 oz (11.9 kg)      Weight Scale Used       BMI (Calculated)       IBW/kg (Calculated)         Physical Exam  Vitals and nursing note reviewed.   Constitutional:       General: He is active. He is not in acute distress.     Appearance: He is well-developed. He is not toxic-appearing.      Comments: Pt is active, jumping on the bed.   HENT:      Head: Normocephalic.      Comments: No palpable step-offs.  No Colin sign, hemotympanum, raccoon eyes.     Right Ear: Tympanic membrane, ear canal and external ear normal.      Left Ear: Tympanic membrane, ear canal and external ear normal.      Nose: Nose normal.      Mouth/Throat:      Mouth: Mucous membranes are moist.      Pharynx: Oropharynx is clear. No oropharyngeal exudate or posterior oropharyngeal erythema.   Eyes:      Extraocular Movements: Extraocular movements intact.      Conjunctiva/sclera: Conjunctivae normal.      Pupils: Pupils are equal, round, and reactive to light.   Cardiovascular:      Rate and Rhythm: Normal rate and regular rhythm.      Pulses: Normal pulses.      Heart sounds: Normal heart sounds.   Pulmonary:      Effort: Pulmonary effort is normal.      Breath sounds: Normal breath sounds.   Abdominal:      General: Abdomen is flat.      Palpations: Abdomen is soft.      Tenderness: There is no abdominal tenderness.   Musculoskeletal:         General: Normal range of motion.      Cervical back: Normal range of motion and neck supple.      Comments: Pt was palpated to all extremities and thorax with no adverse reaction.    Lymphadenopathy:      Cervical: No cervical adenopathy.   Skin:     General: Skin is warm and dry.      Capillary Refill: Capillary refill takes less than 2 seconds.      Findings: Laceration (1 cm to L posterior scalp) present.   Neurological:      General: No focal deficit present.      Mental Status: He is alert.            Procedures   ED Procedures     Laceration Repair    Date/Time: 7/24/2024 9:24 PM    Performed by: Guera Fleming PA-C  Authorized by: Guera Fleming PA-C    Consent:     Consent  obtained:  Verbal    Consent given by:  Parent    Risks, benefits, and alternatives were discussed: yes      Risks discussed:  Infection, need for additional repair, pain, poor cosmetic result and poor wound healing    Alternatives discussed:  No treatment  Universal protocol:     Patient identity confirmed:  Arm band  Anesthesia:     Anesthesia method:  None  Laceration details:     Location:  Scalp    Scalp location:  Crown    Length (cm):  1  Treatment:     Area cleansed with:  Saline    Amount of cleaning:  Standard    Irrigation solution:  Sterile saline  Skin repair:     Repair method:  Tissue adhesive  Approximation:     Approximation:  Close  Repair type:     Repair type:  Simple  Post-procedure details:     Dressing:  Open (no dressing)    Procedure completion:  Tolerated well, no immediate complications       Lab Results   ED Lab     No results found for this visit on 07/24/24.       EKG     No EKG performed    Radiology Results    ED Radiology Results     Imaging Results    None             ED Medications   ED Medications     Medications - No data to display       ED Course     Vitals:    07/24/24 2034   Pulse: 110   Resp: 30   Temp: 97.7 °F (36.5 °C)   TempSrc: Axillary   SpO2: 99%   Weight: 11.9 kg (26 lb 3.8 oz)       ED Course as of 07/24/24 2126 Wed Jul 24, 2024 2115 Patient recheck. I examined the pt's wound which looks to be a 1 cm laceration to the L posterior scalp. I applied some glue to the pt's laceration, which he tolerated well. I discussed the fact that the pt's wound is superficial and his extremely reassuring PECARN score. I updated the family on the plan to discharge the pt and return with any precautions such as vomiting or change in behavior. Pt is stable for discharge. Pt understands and agrees with the plan. All questions addressed.  [MM]      ED Course User Index  [MM] Shruthi Jimenez       No cardiac monitor or imaging reviewed    SAR[facility] Head Injury/Trauma Algorithm: No CT  recommended; Risk of clinically important TBI <0.02%, generally lower than risk of CT-induced malignancies.    Medical Decision Making                  Patient is a 18 month old presenting after a fall off the couch with a scalp laceration.  There is no LOC.  Patient is acting himself, jumping on the bed and smiling.  No lethargy, vomiting.  Patient has a 1 cm laceration to his posterior left scalp with no underlying hematoma.  No palpable step-offs.      My differential diagnosis includes but is not limited to laceration, concussion, contusion.  Patient had no LOC and is acting himself so low concern for ICH.  No palpable skull fractures.  PECARN negative, will defer CT scan at this time.    Laceration cleansed and repaired with tissue adhesive.  All questions were answered and parents understand return precautions.    The patient will not require admission.     Does the Patient have sepsis: NO     Critical Care       No Critical Care        Disposition       Clinical Impression and Diagnosis  9:19 PM       ED Diagnosis       Diagnosis Comment Associated Orders       Final diagnosis    Laceration of scalp, initial encounter -- --            Follow Up:  No follow-up provider specified.        Summary of your Discharge Medications      You have not been prescribed any medications.         Pt is discharged to home/self care in stable condition.              Discharge 7/24/2024  9:18 PM  Misha Ek discharge to home/self care.            I have reviewed the information recorded by the scribe for accuracy and agree with its contents.    ____________________________________________________________________    Shruthi Jimenez acting as a scribe for Guera Fleming PA-C.    Guera Fleming PA-C  Dictation #[id number]  Scribe: Shruthi Jimenez    Attending Physician: Dr. Silver Carias  Dictation #[id number]     Guera Fleming PA-C  07/24/24 9540

## 2024-08-01 ENCOUNTER — ANTICOAGULATION THERAPY VISIT (OUTPATIENT)
Dept: ANTICOAGULATION | Facility: CLINIC | Age: 84
End: 2024-08-01

## 2024-08-01 ENCOUNTER — OFFICE VISIT (OUTPATIENT)
Dept: FAMILY MEDICINE | Facility: CLINIC | Age: 84
End: 2024-08-01
Payer: COMMERCIAL

## 2024-08-01 ENCOUNTER — TELEPHONE (OUTPATIENT)
Dept: ANTICOAGULATION | Facility: CLINIC | Age: 84
End: 2024-08-01

## 2024-08-01 VITALS
RESPIRATION RATE: 17 BRPM | HEIGHT: 69 IN | WEIGHT: 257 LBS | BODY MASS INDEX: 38.06 KG/M2 | TEMPERATURE: 97.6 F | OXYGEN SATURATION: 97 % | SYSTOLIC BLOOD PRESSURE: 122 MMHG | DIASTOLIC BLOOD PRESSURE: 70 MMHG | HEART RATE: 69 BPM

## 2024-08-01 DIAGNOSIS — R73.01 IMPAIRED FASTING GLUCOSE: ICD-10-CM

## 2024-08-01 DIAGNOSIS — R22.42 MASS OF LEFT THIGH: ICD-10-CM

## 2024-08-01 DIAGNOSIS — Z01.818 PREOPERATIVE EXAMINATION: Primary | ICD-10-CM

## 2024-08-01 DIAGNOSIS — G45.9 TIA (TRANSIENT ISCHEMIC ATTACK): ICD-10-CM

## 2024-08-01 DIAGNOSIS — Z01.818 PREOP GENERAL PHYSICAL EXAM: ICD-10-CM

## 2024-08-01 DIAGNOSIS — I48.19 PERSISTENT ATRIAL FIBRILLATION (H): ICD-10-CM

## 2024-08-01 DIAGNOSIS — E83.52 HYPERCALCEMIA: ICD-10-CM

## 2024-08-01 DIAGNOSIS — I48.19 PERSISTENT ATRIAL FIBRILLATION (H): Primary | ICD-10-CM

## 2024-08-01 DIAGNOSIS — D69.6 THROMBOCYTOPENIA (H): ICD-10-CM

## 2024-08-01 DIAGNOSIS — I25.10 CORONARY ARTERY DISEASE INVOLVING NATIVE CORONARY ARTERY OF NATIVE HEART WITHOUT ANGINA PECTORIS: ICD-10-CM

## 2024-08-01 DIAGNOSIS — E78.5 HYPERLIPIDEMIA LDL GOAL <70: ICD-10-CM

## 2024-08-01 LAB
ANION GAP SERPL CALCULATED.3IONS-SCNC: 8 MMOL/L (ref 7–15)
ATRIAL RATE - MUSE: 288 BPM
BUN SERPL-MCNC: 15.9 MG/DL (ref 8–23)
CALCIUM SERPL-MCNC: 10.4 MG/DL (ref 8.8–10.4)
CHLORIDE SERPL-SCNC: 106 MMOL/L (ref 98–107)
CREAT SERPL-MCNC: 0.92 MG/DL (ref 0.67–1.17)
DIASTOLIC BLOOD PRESSURE - MUSE: NORMAL MMHG
EGFRCR SERPLBLD CKD-EPI 2021: 82 ML/MIN/1.73M2
ERYTHROCYTE [DISTWIDTH] IN BLOOD BY AUTOMATED COUNT: 14.1 % (ref 10–15)
GLUCOSE SERPL-MCNC: 109 MG/DL (ref 70–99)
HCO3 SERPL-SCNC: 26 MMOL/L (ref 22–29)
HCT VFR BLD AUTO: 42.2 % (ref 40–53)
HGB BLD-MCNC: 14 G/DL (ref 13.3–17.7)
INR BLD: 2.6 (ref 0.9–1.1)
INTERPRETATION ECG - MUSE: NORMAL
MCH RBC QN AUTO: 29.7 PG (ref 26.5–33)
MCHC RBC AUTO-ENTMCNC: 33.2 G/DL (ref 31.5–36.5)
MCV RBC AUTO: 89 FL (ref 78–100)
P AXIS - MUSE: NORMAL DEGREES
PLATELET # BLD AUTO: 121 10E3/UL (ref 150–450)
POTASSIUM SERPL-SCNC: 4.7 MMOL/L (ref 3.4–5.3)
PR INTERVAL - MUSE: NORMAL MS
QRS DURATION - MUSE: 72 MS
QT - MUSE: 322 MS
QTC - MUSE: 385 MS
R AXIS - MUSE: 103 DEGREES
RBC # BLD AUTO: 4.72 10E6/UL (ref 4.4–5.9)
SODIUM SERPL-SCNC: 140 MMOL/L (ref 135–145)
SYSTOLIC BLOOD PRESSURE - MUSE: NORMAL MMHG
T AXIS - MUSE: 88 DEGREES
VENTRICULAR RATE- MUSE: 86 BPM
WBC # BLD AUTO: 7.6 10E3/UL (ref 4–11)

## 2024-08-01 PROCEDURE — 36416 COLLJ CAPILLARY BLOOD SPEC: CPT | Performed by: FAMILY MEDICINE

## 2024-08-01 PROCEDURE — 85027 COMPLETE CBC AUTOMATED: CPT | Performed by: FAMILY MEDICINE

## 2024-08-01 PROCEDURE — 99214 OFFICE O/P EST MOD 30 MIN: CPT | Performed by: FAMILY MEDICINE

## 2024-08-01 PROCEDURE — 36415 COLL VENOUS BLD VENIPUNCTURE: CPT | Performed by: FAMILY MEDICINE

## 2024-08-01 PROCEDURE — 85610 PROTHROMBIN TIME: CPT | Performed by: FAMILY MEDICINE

## 2024-08-01 PROCEDURE — 80048 BASIC METABOLIC PNL TOTAL CA: CPT | Performed by: FAMILY MEDICINE

## 2024-08-01 PROCEDURE — 93005 ELECTROCARDIOGRAM TRACING: CPT | Performed by: FAMILY MEDICINE

## 2024-08-01 PROCEDURE — 93010 ELECTROCARDIOGRAM REPORT: CPT | Performed by: INTERNAL MEDICINE

## 2024-08-01 ASSESSMENT — PAIN SCALES - GENERAL: PAINLEVEL: NO PAIN (0)

## 2024-08-01 NOTE — PATIENT INSTRUCTIONS
How to Take Your Medication Before Surgery  Preoperative Medication Instructions   Antiplatelet or Anticoagulation Medication Instructions   - warfarin: Thromboembolic risk is moderate (4-10%/year). DO NOT TAKE warfarin 5 days. Thromboembolic risk is moderate (4-10%/year). DO NOT TAKE warfarin 5 days.   - Bridging therapy ordered     Additional Medication Instructions  Take all scheduled medications on the day of surgery other than warfarin above.  Anticipate start of Eliquis 5 mg BID in place of warfarin following surgery.       Patient Education   Preparing for Your Surgery  Getting started  A nurse will call you to review your health history and instructions. They will give you an arrival time based on your scheduled surgery time. Please be ready to share:  Your doctor's clinic name and phone number  Your medical, surgical, and anesthesia history  A list of allergies and sensitivities  A list of medicines, including herbal treatments and over-the-counter drugs  Whether the patient has a legal guardian (ask how to send us the papers in advance)  Please tell us if you're pregnant--or if there's any chance you might be pregnant. Some surgeries may injure a fetus (unborn baby), so they require a pregnancy test. Surgeries that are safe for a fetus don't always need a test, and you can choose whether to have one.   If you have a child who's having surgery, please ask for a copy of Preparing for Your Child's Surgery.    Preparing for surgery  Within 10 to 30 days of surgery: Have a pre-op exam (sometimes called an H&P, or History and Physical). This can be done at a clinic or pre-operative center.  If you're having a , you may not need this exam. Talk to your care team.  At your pre-op exam, talk to your care team about all medicines you take. If you need to stop any medicines before surgery, ask when to start taking them again.  We do this for your safety. Many medicines can make you bleed too much during  surgery. Some change how well surgery (anesthesia) drugs work.  Call your insurance company to let them know you're having surgery. (If you don't have insurance, call [phone].)  Call your clinic if there's any change in your health. This includes signs of a cold or flu (sore throat, runny nose, cough, rash, fever). It also includes a scrape or scratch near the surgery site.  If you have questions on the day of surgery, call your hospital or surgery center.  Eating and drinking guidelines  For your safety: Unless your surgeon tells you otherwise, follow the guidelines below.  Eat and drink as usual until 8 hours before you arrive for surgery. After that, no food or milk.  Drink clear liquids until 2 hours before you arrive. These are liquids you can see through, like water, Gatorade, and Propel Water. They also include plain black coffee and tea (no cream or milk), candy, and breath mints. You can spit out gum when you arrive.  If you drink alcohol: Stop drinking it the night before surgery.  If your care team tells you to take medicine on the morning of surgery, it's okay to take it with a sip of water.  Preventing infection  Shower or bathe the night before and morning of your surgery. Follow the instructions your clinic gave you. (If no instructions, use regular soap.)  Don't shave or clip hair near your surgery site. We'll remove the hair if needed.  Don't smoke or vape the morning of surgery. You may chew nicotine gum up to 2 hours before surgery. A nicotine patch is okay.  Note: Some surgeries require you to completely quit smoking and nicotine. Check with your surgeon.  Your care team will make every effort to keep you safe from infection. We will:  Clean our hands often with soap and water (or an alcohol-based hand rub).  Clean the skin at your surgery site with a special soap that kills germs.  Give you a special gown to keep you warm. (Cold raises the risk of infection.)  Wear special hair covers,  masks, gowns and gloves during surgery.  Give antibiotic medicine, if prescribed. Not all surgeries need antibiotics.  What to bring on the day of surgery  Photo ID and insurance card  Copy of your health care directive, if you have one  Glasses and hearing aids (bring cases)  You can't wear contacts during surgery  Inhaler and eye drops, if you use them (tell us about these when you arrive)  CPAP machine or breathing device, if you use them  A few personal items, if spending the night  If you have . . .  A pacemaker, ICD (cardiac defibrillator) or other implant: Bring the ID card.  An implanted stimulator: Bring the remote control.  A legal guardian: Bring a copy of the certified (court-stamped) guardianship papers.  Please remove any jewelry, including body piercings. Leave jewelry and other valuables at home.  If you're going home the day of surgery  You must have a responsible adult drive you home. They should stay with you overnight as well.  If you don't have someone to stay with you, and you aren't safe to go home alone, we may keep you overnight. Insurance often won't pay for this.  After surgery  If it's hard to control your pain or you need more pain medicine, please call your surgeon's office.  Questions?   If you have any questions for your care team, list them here: _________________________________________________________________________________________________________________________________________________________________________ ____________________________________ ____________________________________ ____________________________________  For informational purposes only. Not to replace the advice of your health care provider. Copyright   2003, 2019 [facility]. All rights reserved. Clinically reviewed by Maribeth Carrasco MD. [employer] 360599 - REV 12/22.        Detail Level: Zone Patient will call for refill of metronidazole

## 2024-08-01 NOTE — PROGRESS NOTES
Preoperative Evaluation  [facility]  [address]   [facility] [address]  Phone: [phone]  Fax: [phone]  Primary Provider: Foreign Tavares MD  Pre-op Performing Provider: Foreign Tavares MD  Aug 1, 2024             8/1/2024   Surgical Information   What procedure is being done? excision left thigh mass   Facility or Hospital where procedure/surgery [facility] be performed[facility]   Who is doing the procedure / surgery? surgery excision   Date of surgery / procedure: august 13   Time of surgery / procedure: ?   Where do you plan to recover after surgery? at home with family        [facility]ax number for surgical facility: [facility] - pt to call with fax number    Assessment & Plan     The proposed surgical procedure is considered INTERMEDIATE risk.    Preoperative examination  Preoperative examination completed.  Scheduled left thigh mass excision.  No contraindication identified to scheduled procedure.    Mass of left thigh  20+ year noted left thigh mass, slowly increasing in size.  Scheduled removal.  No contraindication to procedure noted.    Persistent atrial fibrillation (H)  Persistent atrial fibrillation.  [id number] score = 4 historically.  Continues carvedilol 25 mg twice daily.  Appears rate controlled today.  Warfarin anticoagulation 2.5 mg on Tuesday otherwise 5 mg daily with INR today at 2.5.  Complete INR today.  Anticipate hold and bridge but will await anticoagulation nurse recommendation.  Anticipate beginning Eliquis 5 mg twice daily in place of warfarinfollowing surgery  - EKG 12-lead, tracing only    Coronary artery disease involving native coronary artery of native heart without angina pectoris  CAD historically.  Asymptomatic currently.  Continue cardiovascular risk factor reduction.    Impaired fasting glucose  Impaired fasting glucose historically.  Most recent fasting glucose 114 with A1c of 5.7% June 7, 2024.  Nonfasting status noted today.  Continue dietary  and exercise modification for additional benefit.  - Basic metabolic panel    Thrombocytopenia (H24)  History of thrombocytopenia, chronic, mild with most recent platelet count 119,000 and will update CBC with platelets.  - CBC with platelets    Hypercalcemia  History of prior hypercalcemia, mild.  Serum calcium 9.9 increased to 10.8 followed by 10.0 and will repeat calcium level currently.  Ionized calcium level of 5.2 June 7, 2024.  - Basic metabolic panel    Hyperlipidemia LDL goal <70  Remains off cholesterol-lowering medicine at this time per patient request.  Cardiovascular risk factor management ongoing.    TIA (transient ischemic attack)  History of TIA over 10 years ago with sensation of dizziness in which he was assessed with diagnosis of exclusion TIA.  Continues warfarin anticoagulation.         [id number] score of 4 with history of TIA and on chronic warfarin.        The longitudinal plan of care for the diagnosis(es)/condition(s) as documented were addressed during this visit. Due to the added complexity in care, I will continue to support Waldo in the subsequent management and with ongoing continuity of care.           Risks and Recommendations  The patient has the following additional risks and recommendations for perioperative complications:   - No identified additional risk factors other than previously addressed    Antiplatelet or Anticoagulation Medication Instructions   - warfarin: Thromboembolic risk is moderate (4-10%/year). DO NOT TAKE warfarin 5 days. Thromboembolic risk is moderate (4-10%/year). DO NOT TAKE warfarin 5 days.   - Bridging therapy ordered     Additional Medication Instructions  Take all scheduled medications on the day of surgery    Recommendation  Approval given to proceed with proposed procedure, without further diagnostic evaluation.    Subjective   Waldo is a 84 year old, presenting for the following:  Pre-Op Exam (8/13/24, Dr. Ferrari, Excision left thigh mass)           "2024     9:58 AM   Additional Questions   Roomed by [facility] related to upcoming procedure: Patient seen today for preop examination.'s left thigh mass.  6 been present over 20 years.  Slowly increasing in size.  Schedule excision.  Has had biopsy in the past suggesting benign process with continued cell division.  Persistent atrial fibrillation.  Warfarin 2.5 mg on Tuesday otherwise 5 mg daily.  INR today was 2.5.  No bleeding concerns.  History of described TIA previously over 10 years ago without recurrent issues.  Described dizziness at that time.  Workup apparently negative.  Carvedilol 25 mg twice daily with nonobstructive coronary artery disease noted.  Impaired fasting glucose.  Vitamin D supplement 2000 units daily with level at 29 on 2023.  Had mild calcium elevation at that time.  Chronic mild thrombocytopenia as well without bleeding issues.  Has elected to remain off statin therapy.  Reestablish with cardiologist recently.  Did have Holter monitor as well as echocardiogram without evidence for cardiomyopathy.  Comprehensive review of systems as above otherwise all negative.         - Frances  5 sons -  1 daughter -  40 breast CA  Tobacco: quit ~  (1 ppd x > 30 years) - had quit once for 14 years  EtOH: occ  Mom -  92 y.o due to ? cancer  Dad -  56 heart attack  2 bros - one  age 78 heart attack and EtOHism  1 sis -  ? cancer  Surgeries: prostate surgery due to cancer; kidney stones bilateral; bilateral cataracts  Hospitalizations: kidney stone  Work: retired ()    Best bowling score \"289\" - more recently \"265\" about 1 year ago, now \"177-180\"            - Frances   5 sons -   1 daughter -  40 breast CA   Tobacco: quit ~  (1 ppd x > 30 years) - had quit once for 14 years   EtOH: occ   Mom -  92 y.o due to ? cancer   Dad -  56 heart attack   2 bros - one  age 78 heart attack and EtOHism   1 " "sis -  ? cancer   Surgeries: prostate surgery due to cancer; kidney stones bilateral; bilateral cataracts   Hospitalizations: kidney stone   Work: retired ()     Best bowling score \"289\" - more recently \"265\" about 1 year ago, now \"177-180\"             2024   Pre-Op Questionnaire   Have you ever had a heart attack or stroke? No   Have you ever had surgery on your heart or blood vessels, such as a stent placement, a coronary artery bypass, or surgery on an artery in your head, neck, heart, or legs? No   Do you have chest pain with activity? No   Do you have a history of heart failure? No   Do you currently have a cold, bronchitis or symptoms of other infection? No   Do you have a cough, shortness of breath, or wheezing? No   Do you or anyone in your family have previous history of blood clots? No   Do you or does anyone in your family have a serious bleeding problem such as prolonged bleeding following surgeries or cuts? No   Have you ever had problems with anemia or been told to take iron pills? No   Have you had any abnormal blood loss such as black, tarry or bloody stools? No   Have you ever had a blood transfusion? No   Are you willing to have a blood transfusion if it is medically needed before, during, or after your surgery? Yes   Have you or any of your relatives ever had problems with anesthesia? No   Do you have sleep apnea, excessive snoring or daytime drowsiness? No   Do you have any artifical heart valves or other implanted medical devices like a pacemaker, defibrillator, or continuous glucose monitor? No   Do you have artificial joints? No   Are you allergic to latex? No        Health Care Directive  Patient does not have a Health Care Directive or Living Will: Discussed advance care planning with patient; information given to patient to review.    Preoperative Review of    reviewed - no record of controlled substances prescribed.      Status of Chronic Conditions:  See problem " list for active medical problems.  Problems all longstanding and stable, except as noted/documented.  See ROS for pertinent symptoms related to these conditions.    A-FIB - Patient has a longstanding history of chronic A-fib currently on rate control. Current treatment regimen includes Warfarin for stroke prevention and denies significant symptoms of lightheadedness, palpitations or dyspnea.     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  Reoccurrence on 7/24/2017 and again persistent  [id number] score of 4 with history of TIA-on warfarin  Asymptomatic and on rate control since July 2017.        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      Priority: Medium     Created by Conversion  [facility] Annotation: May  5 2011  1:17PM -  ,  : 2006        Plantar fasciitis of right foot 10/13/2016     Priority: Medium    Compound Nevus      Priority: Medium     Created by Conversion  Replacement Utility updated for latest IMO load        Transient Ischemic Attack      Priority: Medium     Created by  Conversion  Replacement Utility updated for latest IMO load        Internal Derangement Of Knee      Priority: Medium     Created by Conversion  Replacement Utility updated for latest IMO load        Obesity 09/01/2015     Priority: Medium    Coronary artery disease, NONOBSTRUCTIVE,  ASX, NEGATIVE STRESS TEST 2014 08/03/2015     Priority: Medium    Nephrolithiasis      Priority: Medium     Created by Conversion  [facility] Annotation: May  4 2011  7:56Demar Mittal: STENT AND   LASER        Hydrarthrosis Of The Ankle / Foot      Priority: Medium     Created by Conversion        Actinic Keratosis      Priority: Medium     Created by Conversion        Seborrheic Keratosis      Priority: Medium     Created by Conversion        Skin Neoplasm Of Uncertain Behavior      Priority: Medium     Created by Conversion        Malignant Neoplasm Of The Prostate Gland      Priority: Medium     Created by Conversion        Cataract      Priority: Medium     Created by Conversion          Past Medical History:   Diagnosis Date    Actinic keratosis     Created by Conversion     Atrial fibrillation (H) 06/10/2014    Benign neoplasm of colon     Created by Conversion [facility] Annotation: May  5 2011  1:17PM -  ,  : 2006     Benign neoplasm of skin     Created by Conversion  Replacement Utility updated for latest IMO load    Calculus of kidney     Created by Conversion [facility] Annotation: May  4 2011  7:56Demar Mittal: STENT AND LASER     Calculus of ureter 4/4/2018    Cardiomyopathy (H) 06/16/2014    Cataract 2012    Coronary artery disease     Effusion of ankle and foot joint     Created by Conversion     Hydrarthrosis 2012    Hydronephrosis with urinary obstruction due to ureteral calculus 4/4/2018    Hypercholesteremia     Created by Conversion     Hyperlipidemia 2012    Impaired fasting glucose 9/19/2019    Internal derangement of knee     Created by Conversion  Replacement Utility updated for latest IMO load     Left hydrocele 9/19/2019    Malignant neoplasm of prostate (H)     Created by Conversion     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 Reoccurrence on 7/24/2017 and again persistent [id number] score of 4 with history of TIA-on warfarin Asymptomatic and on rate control since July 2017.     Plantar fasciitis of right foot     Prostate CA (H) 2011    Seborrheic keratosis 2012    Skin neoplasm     Of uncertain behavior    TIA (transient ischemic attack) 01/24/2012    Transient cerebral ischemia     Created by Conversion  Replacement Utility updated for latest IMO load    Tubular adenoma of colon 2006    Unspecified cataract     Created by Conversion     Ureterolithiasis      Past Surgical History:   Procedure Laterality Date    COLONOSCOPY W/ BIOPSIES N/A 4/19/2021    Procedure: COLONOSCOPY with polypectomy;  Surgeon: Raimundo Smith MD;  Location: [facility];  Service: Gastroenterology    SC CYSTO/URETERO W/LITHOTRIPSY &INDWELL STENT INSRT Left 6/22/2018    Procedure: CYSTOSCOPY, LEFT  URETEROSCOPY, LASER LITHOTRIPSY STENT INSERTION;  Surgeon: Foreign Lakhani MD;  Location: [facility] Main OR;  Service: Urology    PROSTATE SURGERY      URETEROSCOPY  2009    ZZHC COLONOSCOPY W/WO BRUSH/WASH N/A 2/15/2021    Procedure: COLONOSCOPY with polypectomy, tattoo, cautery;  Surgeon: Compa Miner DO;  Location: [facility] OR;  Service: Gastroenterology     Current Outpatient Medications   Medication Sig Dispense Refill    apixaban ANTICOAGULANT (ELIQUIS ANTICOAGULANT) 5 MG tablet Take 1 tablet (5 mg) by mouth 2 times daily 180 tablet 3    carvedilol (COREG)  "25 MG tablet TAKE 1 TABLET(25 MG) BY MOUTH TWICE DAILY WITH MEALS 180 tablet 3    cholecalciferol, vitamin D3, (VITAMIN D3) 2,000 unit cap [CHOLECALCIFEROL, VITAMIN D3, (VITAMIN D3) 2,000 UNIT CAP] Take 2,000 Units by mouth daily.      warfarin ANTICOAGULANT (COUMADIN) 5 MG tablet Take 2.5 mg every Tuesday; Take 5mg all other days. Or as directed by INR clinic. 90 tablet 0       No Known Allergies     Social History     Tobacco Use    Smoking status: Former     Current packs/day: 0.00     Types: Cigarettes     Quit date: 5/29/2014     Years since quitting: 10.1    Smokeless tobacco: Never   Substance Use Topics    Alcohol use: Yes     Comment: Alcoholic Drinks/day: Occasional     Family History   Problem Relation Age of Onset    Coronary Artery Disease Father     Heart Disease Father     Colon Cancer Brother     Urolithiasis Brother      History   Drug Use No             Review of Systems  Constitutional, HEENT, cardiovascular, pulmonary, GI, , musculoskeletal, neuro, skin, endocrine and psych systems are negative, except as otherwise noted.    Objective    /70   Pulse 69   Temp 97.6  F (36.4  C)   Resp 17   Ht 1.753 m (5' 9\")   Wt 116.6 kg (257 lb)   SpO2 97%   BMI 37.95 kg/m     Estimated body mass index is 37.95 kg/m  as calculated from the following:    Height as of this encounter: 1.753 m (5' 9\").    Weight as of this encounter: 116.6 kg (257 lb).    Physical Exam  GENERAL: alert and no distress  EYES: Eyes grossly normal to inspection, PERRL and conjunctivae and sclerae normal  HENT: ear canals and TM's normal, nose and mouth without ulcers or lesions  NECK: no adenopathy, no asymmetry, masses, or scars  RESP: lungs clear to auscultation - no rales, rhonchi or wheezes  CV: regular rate and rhythm, normal S1 S2, no S3 or S4, no murmur, click or rub, no peripheral edema  ABDOMEN: soft, nontender, no hepatosplenomegaly, no masses and bowel sounds normal  MS: Large anteromedial left thigh mass " present, rubbery, without induration or fluctuance.  SKIN: no suspicious lesions or rashes  NEURO: Normal strength and tone, mentation intact and speech normal  PSYCH: mentation appears normal, affect normal/bright    Recent Labs   Lab Test 06/07/24  0830 06/07/24  0747 04/11/24  0759 12/21/23  0758 12/07/23  1521   HGB 14.2  --   --   --  14.8   *  --   --   --  138*   INR  --  2.5* 2.1*   < > 1.9*     --   --   --  142   POTASSIUM 4.5  --   --   --  4.7   CR 0.86  --   --   --  0.93   A1C 5.7*  --   --   --  5.6    < > = values in this interval not displayed.        Diagnostics  Labs pending at this time.  Results will be reviewed when available.  No results found for this or any previous visit (from the past 24 hour(s)).   EKG required for significant arrhythmia and not completed in the last 90 days.   Atrial fibrillation with ventricular rate 86 bpm with rightward axis.      Complete Echo 7/15/24 Adult. Definity (NDC #[id number]) given intravenously.  ______________________________________________________________________________  Interpretation Summary     Left ventricular size, wall motion and function are normal. The ejection  fraction is 60-65%.  Normal right ventricle size and systolic function.  There is mild concentric left ventricular hypertrophy.  Aortic root dilatation is present.  No hemodynamically significant valvular abnormalities on 2D or color flow  imaging.      Holter monitor 7/15/24:  STUDY INDICATION: Atrial fibrillation  PREDOMINANT RHYTHM: Continuous atrial fibrillation with an average ventricular response of 92 bpm.  Average daily heart rate  response appears to be  well controlled.  CARDIAC CONDUCTION: Normal QRS duration and QT intervals.  No significant bradycardia/pauses demonstrated.  VENTRICULAR ARRHYTHMIA: Rare ventricular ectopy.  No complex and no sustained ventricular tachyarrhythmia.  SYMPTOMATIC RECORDINGS: Patient did not report any cardiac symptoms.     IMPRESSION:  Abnormal 48-hour Holter monitor recording by virtue the presence of continuous atrial fibrillation.  Ventricular  response appears to be  well-controlled.  The patient did not report any symptoms in association with this arrhythmia.     COMMENT: The recording was for 48 hours.  The recording quality was adequate.  Frederic Polk MD  7/22/2024      Revised Cardiac Risk Index (RCRI)  The patient has the following serious cardiovascular risks for perioperative complications:   - Cerebrovascular Disease (TIA or CVA) = 1 point     RCRI Interpretation: 1 point: Class II (low risk - 0.9% complication rate)         Signed Electronically by: Foreign Tavares MD  A copy of this evaluation report is provided to the requesting physician.

## 2024-08-01 NOTE — TELEPHONE ENCOUNTER
[facility] ANTICOAGULATION  MANAGEMENT    Prosper requesting pre-procedure hold orders for warfarin and review for bridging      Procedure date: 8/13/24       Procedure:  Excision of Left thigh mass      Procedure location and phone number (if external):  [facility], [phone]     Number of warfarin hold days requested and/or target INR: unknown    Pre-op date:  8/1/24  Per provider note in pre-op: INR today at 2.5. Complete INR today. Anticipate hold and bridge but will await anticoagulation nurse recommendation. Anticipate beginning Eliquis 5 mg twice daily in place of warfarin following surgery       Routing to Anticoagulation Pharmacist for review.     [facility] pool: brooks Duron RN

## 2024-08-01 NOTE — PROGRESS NOTES
ANTICOAGULATION MANAGEMENT     Prosper Lopez 84 year old male is on warfarin with therapeutic INR result. (Goal INR 2.0-3.0)    Recent labs: (last 7 days)     08/01/24  1100   INR 2.6*       ASSESSMENT     Source(s): Chart Review and Patient/Caregiver Call     Warfarin doses taken: Warfarin taken as instructed  Diet: No new diet changes identified  Medication/supplement changes: None noted  New illness, injury, or hospitalization: No  Signs or symptoms of bleeding or clotting: No  Previous result: Therapeutic last 2(+) visits  Additional findings: Upcoming surgery/procedure - Excision of left thigh mass on 8/13/24. Pre op 8/1/24 with Dr. Tavares.  Per visit note: INR today at 2.5. Complete INR today. Anticipate hold and bridge but will await anticoagulation nurse recommendation. Anticipate beginning Eliquis 5 mg twice daily in place of warfarin following surgery. Message to [facility] for hold plan today.       PLAN     Recommended plan for no diet, medication or health factor changes affecting INR     Dosing Instructions: Continue your current warfarin dose UNTIL WE CALL YOU NEXT WEEK TO DISCUSS GOING OFF WARFARIN FOR YOUR PROCEDURE.    Summary  As of 8/1/2024      Full warfarin instructions:  2.5 mg every Tue; 5 mg all other days   Next INR check:  8/12/2024               Telephone call with Waldo who verbalizes understanding and agrees to plan and who agrees to plan and repeated back plan correctly    Contact [phone] to schedule and with any changes, questions or concerns. NO INR APPOINTMENT MADE AT THIS TIME. WILL NOT NEED ONE IF HE IS SWITCHING TO DOAC AFTER PROCEDURE.     Education provided: Please call back if any changes to your diet, medications or how you've been taking warfarin  Goal range and lab monitoring: goal range and significance of current result  Importance of notifying anticoagulation clinic for: upcoming surgeries and procedures 2 weeks in advance - LET US KNOW RIGHT AWAY IF YOUR PROCEDURE DATE  CHANGES OR IF YOU HAVE ANY OTHER QUESTIONS OR CHANGES.  Contact [phone] with any changes, questions or concerns.     Plan made per [facility] anticoagulation protocol    Kimberly Duron RN  Anticoagulation Clinic  8/1/2024    _______________________________________________________________________     Anticoagulation Episode Summary       Current INR goal:  2.0-3.0   TTR:  80.9% (1 y)   Target end date:  Indefinite   Send INR reminders to:  [facility]    Indications    Persistent atrial fibrillation (H) [I48.19]  TIA (transient ischemic attack) [G45.9]             Comments:  Approved for 8 week checks per -- see encounter from 12/07/21             Anticoagulation Care Providers       Provider Role Specialty Phone number    Foreign Tavares MD Referring Family Medicine [phone]

## 2024-08-05 NOTE — TELEPHONE ENCOUNTER
"PARKER-PROCEDURAL ANTICOAGULATION  MANAGEMENT    ASSESSMENT     Warfarin interruption plan for Excision of Left Thigh Mass on 8/13/24.    Indication for Anticoagulation: Atrial Fibrillation    [id number] = ~4 (Age >= 75 and TIA)  EF: 60-65% (echo 7/15/24)    Past procedure management  2/2021 and 4/2021-colonoscopy held without bridge    Parker-Procedure Risk stratification for thromboembolism: low-moderate (2022 Chest guidelines)    AFIB: 2022 CHEST Perioperative Management guidelines recommends against bridging for patients with atrial fibrillation except in high risk stratification patients.    RECOMMENDATION     Pre-Procedure:  Hold warfarin for 5 days, until after procedure starting: Thursday 8/8/24   No Bridge    Post-Procedure:  Start Eliquis 5 mg twice daily 24-48 hours after surgery when okay surgeon doing procedure  NO further INR if on Eliquis    Plan routed to referring provider for approval  ?   Geri Perry, [facility]    SUBJECTIVE/OBJECTIVE     Prosper Lopez, a 84 year old male    Goal INR Range: 2.0-3.0     Wt Readings from Last 3 Encounters:   08/01/24 116.6 kg (257 lb)   07/09/24 114.8 kg (253 lb 1.6 oz)   06/07/24 118.8 kg (262 lb)      Ideal body weight: 70.7 kg (155 lb 13.8 oz)  Adjusted ideal body weight: 89 kg (196 lb 5.1 oz)     Estimated body mass index is 37.95 kg/m  as calculated from the following:    Height as of an earlier encounter on 8/1/24: 1.753 m (5' 9\").    Weight as of an earlier encounter on 8/1/24: 116.6 kg (257 lb).    Lab Results   Component Value Date    INR 2.6 (H) 08/01/2024    INR 2.5 (H) 06/07/2024    INR 2.1 (H) 04/11/2024     Lab Results   Component Value Date    HGB 14.0 08/01/2024    HCT 42.2 08/01/2024     (L) 08/01/2024     Lab Results   Component Value Date    CR 0.92 08/01/2024    CR 0.86 06/07/2024    CR 0.93 12/07/2023     Estimated Creatinine Clearance: 75.3 mL/min (based on SCr of 0.92 mg/dL).    "

## 2024-08-06 NOTE — TELEPHONE ENCOUNTER
Foreign Tavares MD  You1 hour ago (7:55 AM)      I agree with plan.      Foreign Tavares MD      X Size Of Lesion In Cm (Optional): 0 Detail Level: Detailed

## 2024-08-06 NOTE — TELEPHONE ENCOUNTER
Spoke with patient regarding upcoming surgery and transition to Eliquis post-operatively. Instructed patient to ask surgeon when he can restart anticoagulation and at that time, begin the Eliquis 5mg BID.    Patient has already picked up Eliquis.    Prosper Lopez is being discharged from the [facility] ([facility]).    Anticoagulation episode resolved, ACC referral closed, Standing order discontinued, and warfarin Rx discontinued    If patient needs warfarin management in the future, please send a new referral      Jaz Sterling RN

## 2024-09-12 ENCOUNTER — TRANSFERRED RECORDS (OUTPATIENT)
Dept: HEALTH INFORMATION MANAGEMENT | Facility: CLINIC | Age: 84
End: 2024-09-12
Payer: COMMERCIAL

## 2024-10-08 ENCOUNTER — IMMUNIZATION (OUTPATIENT)
Dept: FAMILY MEDICINE | Facility: CLINIC | Age: 84
End: 2024-10-08
Payer: COMMERCIAL

## 2024-10-08 VITALS — TEMPERATURE: 97.3 F

## 2024-10-08 PROCEDURE — G0008 ADMIN INFLUENZA VIRUS VAC: HCPCS

## 2024-10-08 PROCEDURE — 90480 ADMN SARSCOV2 VAC 1/ONLY CMP: CPT

## 2024-10-08 PROCEDURE — 91320 SARSCV2 VAC 30MCG TRS-SUC IM: CPT

## 2024-10-08 PROCEDURE — 90662 IIV NO PRSV INCREASED AG IM: CPT

## 2024-10-08 NOTE — PROGRESS NOTES
Immunization History   Administered Date(s) Administered    COVID-19 12+ (Pfizer) 12/07/2023, 06/07/2024    COVID-19 Bivalent 12+ (Pfizer) 10/13/2022    COVID-19 MONOVALENT 12+ (Pfizer) 02/24/2021, 03/17/2021, 10/26/2021    COVID-19 Monovalent 12+ (Pfizer 2022) 07/11/2022    Influenza (High Dose) Trivalent,PF (Fluzone) 10/13/2016, 11/10/2017, 09/19/2019    Influenza Vaccine 65+ (Fluzone HD) 09/03/2020, 09/28/2021, 11/09/2023    Influenza Vaccine, 6+MO IM (QUADRIVALENT W/PRESERVATIVES) 09/01/2015    Pneumo Conj 13-V (2010&after) 09/01/2015    Pneumococcal 23 valent 05/03/2000, 05/08/2007    TD,PF 7+ (Tenivac) 10/13/2020    TDAP (Adacel,Boostrix) 05/04/2011     Prior to immunization administration, verified patients identity using patient s name and date of birth. Please see Immunization Activity for additional information.     Screening Questionnaire for Adult Immunization    Are you sick today?   No   Do you have allergies to medications, food, a vaccine component or latex?   No   Have you ever had a serious reaction after receiving a vaccination?   No   Do you have a long-term health problem with heart, lung, kidney, or metabolic disease (e.g., diabetes), asthma, a blood disorder, no spleen, complement component deficiency, a cochlear implant, or a spinal fluid leak?  Are you on long-term aspirin therapy?   No   Do you have cancer, leukemia, HIV/AIDS, or any other immune system problem?   No   Do you have a parent, brother, or sister with an immune system problem?   No   In the past 3 months, have you taken medications that affect  your immune system, such as prednisone, other steroids, or anticancer drugs; drugs for the treatment of rheumatoid arthritis, Crohn s disease, or psoriasis; or have you had radiation treatments?   No   Have you had a seizure, or a brain or other nervous system problem?   No   During the past year, have you received a transfusion of blood or blood    products, or been given immune (gamma)  globulin or antiviral drug?   No   For women: Are you pregnant or is there a chance you could become       pregnant during the next month?   NA   Have you received any vaccinations in the past 4 weeks?   No     Immunization questionnaire answers were all negative.    I have reviewed the following standing orders:   This patient is due and qualifies for the Covid-19 vaccine.     Click here for COVID-19 Standing Order    I have reviewed the vaccines inclusion and exclusion criteria; No concerns regarding eligibility.     This patient is due and qualifies for the Influenza vaccine.    Click here for Influenza Vaccine Standing Order    I have reviewed the vaccines inclusion and exclusion criteria; No concerns regarding eligibility.     Patient instructed to remain in clinic for 15 minutes afterwards, and to report any adverse reactions.     Screening performed by PARISA TAYLOR on 10/8/2024 at 8:01 AM.

## 2024-12-12 ENCOUNTER — OFFICE VISIT (OUTPATIENT)
Dept: FAMILY MEDICINE | Facility: CLINIC | Age: 84
End: 2024-12-12
Payer: COMMERCIAL

## 2024-12-12 VITALS
RESPIRATION RATE: 16 BRPM | BODY MASS INDEX: 36.43 KG/M2 | HEIGHT: 69 IN | DIASTOLIC BLOOD PRESSURE: 68 MMHG | WEIGHT: 246 LBS | SYSTOLIC BLOOD PRESSURE: 120 MMHG | OXYGEN SATURATION: 99 % | HEART RATE: 62 BPM | TEMPERATURE: 98.1 F

## 2024-12-12 DIAGNOSIS — G45.9 TIA (TRANSIENT ISCHEMIC ATTACK): ICD-10-CM

## 2024-12-12 DIAGNOSIS — E66.01 MORBID OBESITY (H): ICD-10-CM

## 2024-12-12 DIAGNOSIS — Z00.00 MEDICARE ANNUAL WELLNESS VISIT, SUBSEQUENT: Primary | ICD-10-CM

## 2024-12-12 DIAGNOSIS — E55.9 VITAMIN D INSUFFICIENCY: ICD-10-CM

## 2024-12-12 DIAGNOSIS — R73.01 IMPAIRED FASTING GLUCOSE: ICD-10-CM

## 2024-12-12 DIAGNOSIS — Z85.46 H/O PROSTATE CANCER: ICD-10-CM

## 2024-12-12 DIAGNOSIS — D69.6 THROMBOCYTOPENIA (H): ICD-10-CM

## 2024-12-12 DIAGNOSIS — E78.5 HYPERLIPIDEMIA LDL GOAL <70: ICD-10-CM

## 2024-12-12 DIAGNOSIS — R22.42 MASS OF LEFT THIGH: ICD-10-CM

## 2024-12-12 DIAGNOSIS — I25.10 CORONARY ARTERY DISEASE INVOLVING NATIVE CORONARY ARTERY OF NATIVE HEART WITHOUT ANGINA PECTORIS: ICD-10-CM

## 2024-12-12 DIAGNOSIS — I48.19 PERSISTENT ATRIAL FIBRILLATION (H): ICD-10-CM

## 2024-12-12 LAB
ALBUMIN SERPL BCG-MCNC: 4 G/DL (ref 3.5–5.2)
ALP SERPL-CCNC: 79 U/L (ref 40–150)
ALT SERPL W P-5'-P-CCNC: 5 U/L (ref 0–70)
ANION GAP SERPL CALCULATED.3IONS-SCNC: 10 MMOL/L (ref 7–15)
AST SERPL W P-5'-P-CCNC: 12 U/L (ref 0–45)
BILIRUB SERPL-MCNC: 0.8 MG/DL
BUN SERPL-MCNC: 17.8 MG/DL (ref 8–23)
CALCIUM SERPL-MCNC: 11.1 MG/DL (ref 8.8–10.4)
CHLORIDE SERPL-SCNC: 104 MMOL/L (ref 98–107)
CHOLEST SERPL-MCNC: 112 MG/DL
CREAT SERPL-MCNC: 0.88 MG/DL (ref 0.67–1.17)
EGFRCR SERPLBLD CKD-EPI 2021: 85 ML/MIN/1.73M2
ERYTHROCYTE [DISTWIDTH] IN BLOOD BY AUTOMATED COUNT: 15.9 % (ref 10–15)
EST. AVERAGE GLUCOSE BLD GHB EST-MCNC: 140 MG/DL
FASTING STATUS PATIENT QL REPORTED: YES
FASTING STATUS PATIENT QL REPORTED: YES
GLUCOSE SERPL-MCNC: 122 MG/DL (ref 70–99)
HBA1C MFR BLD: 6.5 % (ref 0–5.6)
HCO3 SERPL-SCNC: 24 MMOL/L (ref 22–29)
HCT VFR BLD AUTO: 36.9 % (ref 40–53)
HDLC SERPL-MCNC: 29 MG/DL
HGB BLD-MCNC: 11.3 G/DL (ref 13.3–17.7)
LDLC SERPL CALC-MCNC: 71 MG/DL
MCH RBC QN AUTO: 24.7 PG (ref 26.5–33)
MCHC RBC AUTO-ENTMCNC: 30.6 G/DL (ref 31.5–36.5)
MCV RBC AUTO: 81 FL (ref 78–100)
NONHDLC SERPL-MCNC: 83 MG/DL
PLATELET # BLD AUTO: 165 10E3/UL (ref 150–450)
POTASSIUM SERPL-SCNC: 4.8 MMOL/L (ref 3.4–5.3)
PROT SERPL-MCNC: 7.1 G/DL (ref 6.4–8.3)
RBC # BLD AUTO: 4.58 10E6/UL (ref 4.4–5.9)
SODIUM SERPL-SCNC: 138 MMOL/L (ref 135–145)
TRIGL SERPL-MCNC: 58 MG/DL
VIT D+METAB SERPL-MCNC: 29 NG/ML (ref 20–50)
WBC # BLD AUTO: 8.6 10E3/UL (ref 4–11)

## 2024-12-12 SDOH — HEALTH STABILITY: PHYSICAL HEALTH: ON AVERAGE, HOW MANY DAYS PER WEEK DO YOU ENGAGE IN MODERATE TO STRENUOUS EXERCISE (LIKE A BRISK WALK)?: 2 DAYS

## 2024-12-12 ASSESSMENT — SOCIAL DETERMINANTS OF HEALTH (SDOH): HOW OFTEN DO YOU GET TOGETHER WITH FRIENDS OR RELATIVES?: TWICE A WEEK

## 2024-12-12 ASSESSMENT — PAIN SCALES - GENERAL: PAINLEVEL_OUTOF10: NO PAIN (0)

## 2024-12-12 NOTE — PROGRESS NOTES
Preventive Care Visit  [facility]  Foreign Tavares MD, Family Medicine  Dec 12, 2024      Assessment & Plan     Medicare annual wellness visit, subsequent  Annual Wellness Visit completed.  Risk questionaire reviewed in detail.  Appropriate preventive services were discussed with this patient, including applicable screening as appropriate for fall prevention, nutrition, physical activity, tobacco-use cessation, weight loss, and cognition.  Annual Wellness Visits recommended to continue.     Obesity (BMI 35.0-39.9) with comorbidity (H)  Dietary and exercise modification for weight goal less than 230 pounds initially, less than 220 pounds ideally.    Mass of left thigh  Left thigh mass excision showing atypical lipomatous findings and will have repeat follow-up in August 2025 along with MRI per specialist.    Persistent atrial fibrillation (H)  Persistent atrial fibrillation.  Has had discussion regarding potential Watchman device etc. however patient not interested at this time and continues Eliquis 5 mg twice daily.  Carvedilol 25 mg twice daily.  Rate controlled noted.  - CBC with platelets    Coronary artery disease involving native coronary artery of native heart without angina pectoris  CAD.  Follows with cardiology.  Asymptomatic.    Impaired fasting glucose  A1c and fasting glucose obtained with weight goal less than 230 pounds initially, less than 220 pounds ideally.  - Comprehensive metabolic panel  - Hemoglobin A1c    Thrombocytopenia (H)  Thrombocytopenia in the past.  Update CBC with platelet count.    Hyperlipidemia LDL goal <70  Lipid cascade updated today while fasting.  Weight goal less than 230 pounds initially, less than 220 pounds ideally through dietary and exercise modifications.  - Lipid panel reflex to direct LDL Fasting    TIA (transient ischemic attack)  Distant history of TIA without recurrent concerns.    H/O prostate cancer  History of prostate cancer status post  "prostatectomy.    Vitamin D insufficiency  Utilizing vitamin D supplement.  Will ensure adequate replacement.  - Vitamin D Deficiency      Patient has been advised of split billing requirements and indicates understanding: Yes        BMI  Estimated body mass index is 36.07 kg/m  as calculated from the following:    Height as of this encounter: 1.759 m (5' 9.25\").    Weight as of this encounter: 111.6 kg (246 lb).   Weight management plan: Discussed healthy diet and exercise guidelines    Counseling  Appropriate preventive services were addressed with this patient via screening, questionnaire, or discussion as appropriate for fall prevention, nutrition, physical activity, Tobacco-use cessation, social engagement, weight loss and cognition.  Checklist reviewing preventive services available has been given to the patient.  Reviewed patient's diet, addressing concerns and/or questions.   He is at risk for lack of exercise and has been provided with information to increase physical activity for the benefit of his well-being.   He is at risk for psychosocial distress and has been provided with information to reduce risk.   Patient reported safety concerns were addressed today.        Taiwo Haas is a 84 year old, presenting for the following:  Medicare Visit (Pt is fasting)        12/12/2024    10:01 AM   Additional Questions   Roomed by [facility]    Annual wellness visit completed.  In general doing well.  Have a left thigh mass excision August 13, 2024 with atypical lipomatous material noted.  Does have scheduled follow-up with specialist August 2025 with repeat MRI at that time.  Persistent atrial fibrillation.  Follows with cardiology.  Continues Eliquis 5 mg twice daily and carvedilol 25 mg twice daily.  CAD historically and remains asymptomatic at this time.  Impaired fasting glucose with prior fasting glucose 114 and A1c of 5.7% June 7, 2024.  Prior history of mild thrombocytopenia.  Hyperlipidemia also " "with dietary modifications predominantly.  Distant history of TIA greater than 10 years ago.  Has had prior prostatectomy described as well and does have some stress urinary continence occasionally.  Vitamin D supplementation continuing.  Denies recent illness.  Immunizations reviewed and up-to-date.       - Frances   5 sons -   1 daughter -  40 breast CA   Tobacco: quit ~  (1 ppd x > 30 years) - had quit once for 14 years   EtOH: occ   Mom -  92 y.o due to ? cancer   Dad -  56 heart attack   2 bros - one  age 78 heart attack and EtOHism   1 sis -  ? cancer   Surgeries: prostate surgery due to cancer; kidney stones bilateral; bilateral cataracts   Hospitalizations: kidney stone   Work: retired ()     Best bowling score \"289\" - more recently \"265\" about 1 year ago, now \"177-180\"           Health Care Directive  Patient does not have a Health Care Directive: Discussed advance care planning with patient; however, patient declined at this time.      2024   General Health   How would you rate your overall physical health? Good   Feel stress (tense, anxious, or unable to sleep) To some extent      (!) STRESS CONCERN      2024   Nutrition   Diet: Regular (no restrictions)            2024   Exercise   Days per week of moderate/strenous exercise 2 days      (!) EXERCISE CONCERN      2024   Social Factors   Frequency of gathering with friends or relatives Twice a week   Worry food won't last until get money to buy more No   Food not last or not have enough money for food? No   Do you have housing? (Housing is defined as stable permanent housing and does not include staying ouside in a car, in a tent, in an abandoned building, in an overnight shelter, or couch-surfing.) Yes   Are you worried about losing your housing? No   Lack of transportation? No   Unable to get utilities (heat,electricity)? No            2024   Fall Risk   Fallen 2 or " more times in the past year? No    Trouble with walking or balance? No        Patient-reported          2024   Activities of Daily Living- Home Safety   Needs help with the following daily activites None of the above   Safety concerns in the home Throw rugs in the hallway            2024   Dental   Dentist two times every year? Yes            2024   Hearing Screening   Hearing concerns? None of the above            2024   Driving Risk Screening   Patient/family members have concerns about driving No            2024   General Alertness/Fatigue Screening   Have you been more tired than usual lately? No            2024   Urinary Incontinence Screening   Bothered by leaking urine in past 6 months No            2024   TB Screening   Were you born outside of the [place]? No              Today's PHQ-2 Score:       2024     7:50 AM   PHQ-2 (  Pfizer)   Q1: Little interest or pleasure in doing things 0    Q2: Feeling down, depressed or hopeless 0    PHQ-2 Score 0   Q1: Little interest or pleasure in doing things Not at all   Q2: Feeling down, depressed or hopeless Not at all   PHQ-2 Score 0       Patient-reported         2024   Substance Use   Alcohol more than 3/day or more than 7/wk No   Do you have a current opioid prescription? No   How severe/bad is pain from 1 to 10? 0/10 (No Pain)   Do you use any other substances recreationally? No        Social History     Tobacco Use    Smoking status: Former     Current packs/day: 0.00     Types: Cigarettes     Quit date: 2014     Years since quitting: 10.5    Smokeless tobacco: Never   Substance Use Topics    Alcohol use: Yes     Comment: Alcoholic Drinks/day: Occasional    Drug use: No         Fracture Risk Assessment Tool  Link to Frax Calculator  Use the information below to complete the Frax calculator  : 1940  Sex: male  Weight (kg): 111.6 kg (actual weight)  Height (cm): 175.9 cm  Previous Fragility Fracture:   No  History of parent with fractured hip:  No  Current Smoking:  No  Patient has been on glucocorticoids for more than 3 months (5mg/day or more): No  Rheumatoid Arthritis on Problem List:  No  Secondary Osteoporosis on Problem List:  No  Consumes 3 or more units of alcohol per day: No  Femoral Neck BMD (g/cm2)            Reviewed and updated as needed this visit by Provider                    Past Medical History:   Diagnosis Date    Actinic keratosis     Created by Conversion     Atrial fibrillation (H) 06/10/2014    Benign neoplasm of colon     Created by Conversion [employer] Annotation: May  5 2011  1:17PM -  ,  : 2006     Benign neoplasm of skin     Created by Conversion  Replacement Utility updated for latest IMO load    Calculus of kidney     Created by Conversion [employer] Annotation: May  4 2011  7:56AM - Demar Dickey: STENT AND LASER     Calculus of ureter 4/4/2018    Cardiomyopathy (H) 06/16/2014    Cataract 2012    Coronary artery disease     Effusion of ankle and foot joint     Created by Conversion     Hydrarthrosis 2012    Hydronephrosis with urinary obstruction due to ureteral calculus 4/4/2018    Hypercholesteremia     Created by Conversion     Hyperlipidemia 2012    Impaired fasting glucose 9/19/2019    Internal derangement of knee     Created by Conversion  Replacement Utility updated for latest IMO load    Left hydrocele 9/19/2019    Malignant neoplasm of prostate (H)     Created by Conversion     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 Reoccurrence on 7/24/2017 and again persistent [id number] score of 4 with history of TIA-on warfarin  Asymptomatic and on rate control since July 2017.     Plantar fasciitis of right foot     Prostate CA (H) 2011    Seborrheic keratosis 2012    Skin neoplasm     Of uncertain behavior    TIA (transient ischemic attack) 01/24/2012    Transient cerebral ischemia     Created by Conversion  Replacement Utility updated for latest IMO load    Tubular adenoma of colon 2006    Unspecified cataract     Created by Conversion     Ureterolithiasis      Past Surgical History:   Procedure Laterality Date    COLONOSCOPY W/ BIOPSIES N/A 4/19/2021    Procedure: COLONOSCOPY with polypectomy;  Surgeon: Raimundo Smith MD;  Location: [facility];  Service: Gastroenterology    RI CYSTO/URETERO W/LITHOTRIPSY &INDWELL STENT INSRT Left 6/22/2018    Procedure: CYSTOSCOPY, LEFT  URETEROSCOPY, LASER LITHOTRIPSY STENT INSERTION;  Surgeon: Foreign Lakhani MD;  Location: [facility] OR;  Service: Urology    PROSTATE SURGERY      URETEROSCOPY  2009    ZZ COLONOSCOPY W/WO BRUSH/WASH N/A 2/15/2021    Procedure: COLONOSCOPY with polypectomy, tattoo, cautery;  Surgeon: Compa Miner DO;  Location: [facility];  Service: Gastroenterology     Lab work is in process  Labs reviewed in EPIC  BP Readings from Last 3 Encounters:   12/12/24 120/68   08/01/24 122/70   07/12/24 116/74    Wt Readings from Last 3 Encounters:   12/12/24 111.6 kg (246 lb)   08/01/24 116.6 kg (257 lb)   07/09/24 114.8 kg (253 lb 1.6 oz)                  Patient Active Problem List   Diagnosis    Nephrolithiasis    Hydrarthrosis Of The Ankle / Foot    Compound Nevus    Actinic Keratosis    Seborrheic Keratosis    Skin Neoplasm Of Uncertain Behavior    Malignant Neoplasm Of The Prostate Gland    Transient Ischemic Attack    Cataract    Hypercholesteremia    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    Internal Derangement Of Knee    Persistent atrial fibrillation (H)    Coronary artery disease, NONOBSTRUCTIVE,  ASX, NEGATIVE STRESS TEST 2014     Obesity    Plantar fasciitis of right foot    Calculus of ureter    Hydronephrosis with urinary obstruction due to ureteral calculus    Ureterolithiasis    Left hydrocele    Mixed conductive and sensorineural hearing loss of both ears    Impaired fasting glucose    Venous insufficiency of both lower extremities    H/O prostate cancer    Obesity (BMI 35.0-39.9) with comorbidity (H)    TIA (transient ischemic attack)     Past Surgical History:   Procedure Laterality Date    COLONOSCOPY W/ BIOPSIES N/A 4/19/2021    Procedure: COLONOSCOPY with polypectomy;  Surgeon: Raimundo Smith MD;  Location: [facility] OR;  Service: Gastroenterology    SC CYSTO/URETERO W/LITHOTRIPSY &INDWELL STENT INSRT Left 6/22/2018    Procedure: CYSTOSCOPY, LEFT  URETEROSCOPY, LASER LITHOTRIPSY STENT INSERTION;  Surgeon: Foreign Lakhani MD;  Location: [facility] OR;  Service: Urology    PROSTATE SURGERY      URETEROSCOPY  2009    ZZHC COLONOSCOPY W/WO BRUSH/WASH N/A 2/15/2021    Procedure: COLONOSCOPY with polypectomy, tattoo, cautery;  Surgeon: Compa Miner DO;  Location: [facility];  Service: Gastroenterology       Social History     Tobacco Use    Smoking status: Former     Current packs/day: 0.00     Types: Cigarettes     Quit date: 5/29/2014     Years since quitting: 10.5    Smokeless tobacco: Never   Substance Use Topics    Alcohol use: Yes     Comment: Alcoholic Drinks/day: Occasional     Family History   Problem Relation Age of Onset    Coronary Artery Disease Father     Heart Disease Father     Colon Cancer Brother     Urolithiasis Brother          Current Outpatient Medications   Medication Sig Dispense Refill    apixaban ANTICOAGULANT (ELIQUIS ANTICOAGULANT) 5 MG tablet Take 1 tablet (5 mg) by mouth 2 times daily 180 tablet 3    carvedilol (COREG) 25 MG tablet TAKE 1 TABLET(25 MG) BY MOUTH TWICE DAILY WITH MEALS 180 tablet 3    cholecalciferol, vitamin D3, (VITAMIN D3) 2,000 unit cap [CHOLECALCIFEROL,  "VITAMIN D3, (VITAMIN D3) 2,000 UNIT CAP] Take 2,000 Units by mouth daily.       No Known Allergies  Current providers sharing in care for this patient include:  Patient Care Team:  Foreign Tavares MD as PCP - General  Foreign Tavares MD as Assigned PCP  Luke Alicia DO as Physician (Cardiovascular Disease)  Luke Alicia DO as Assigned Heart and Vascular Provider    The following health maintenance items are reviewed in Epic and correct as of today:  Health Maintenance   Topic Date Due    ZOSTER IMMUNIZATION (1 of 2) Never done    RSV VACCINE (1 - 1-dose 75+ series) Never done    MEDICARE ANNUAL WELLNESS VISIT  12/07/2024    LIPID  06/07/2025    BMP  08/01/2025    ANNUAL REVIEW OF HM ORDERS  12/12/2025    FALL RISK ASSESSMENT  12/12/2025    ADVANCE CARE PLANNING  12/12/2029    DTAP/TDAP/TD IMMUNIZATION (3 - Td or Tdap) 10/13/2030    PHQ-2 (once per calendar year)  Completed    INFLUENZA VACCINE  Completed    Pneumococcal Vaccine: 65+ Years  Completed    COVID-19 Vaccine  Completed    HPV IMMUNIZATION  Aged Out    MENINGITIS IMMUNIZATION  Aged Out    RSV MONOCLONAL ANTIBODY  Aged Out         Review of Systems  Constitutional, HEENT, cardiovascular, pulmonary, GI, , musculoskeletal, neuro, skin, endocrine and psych systems are negative, except as otherwise noted.     Objective    Exam  /68   Pulse 62   Temp 98.1  F (36.7  C)   Resp 16   Ht 1.759 m (5' 9.25\")   Wt 111.6 kg (246 lb)   SpO2 99%   BMI 36.07 kg/m     Estimated body mass index is 36.07 kg/m  as calculated from the following:    Height as of this encounter: 1.759 m (5' 9.25\").    Weight as of this encounter: 111.6 kg (246 lb).    Physical Exam    GENERAL: alert and no distress  EYES: Eyes grossly normal to inspection, PERRL and conjunctivae and sclerae normal  HENT: ear canals and TM's normal, nose and mouth without ulcers or lesions  NECK: no adenopathy, no asymmetry, masses, or scars  RESP: lungs clear to auscultation - no rales, " rhonchi or wheezes  CV:  irregular rhythm otherwise normal rate, normal S1 S2, no S3 or S4, no murmur, click or rub, no peripheral edema  ABDOMEN: soft, nontender, no hepatosplenomegaly, no masses and bowel sounds normal   (male):  deferred per patient   RECTAL:  deferred with prior prostatectomy noted  MS: no gross musculoskeletal defects noted, no edema.  Left thigh mass s/p excisional biopsy (atypical lipoma)  SKIN: no suspicious lesions or rashes  NEURO: Normal strength and tone, mentation intact and speech normal  PSYCH: mentation appears normal, affect normal/bright        Complete Echo 7/15/24 Adult. Definity (NDC #06010-419) given intravenously.  ______________________________________________________________________________  Interpretation Summary     Left ventricular size, wall motion and function are normal. The ejection  fraction is 60-65%.  Normal right ventricle size and systolic function.  There is mild concentric left ventricular hypertrophy.  Aortic root dilatation is present.  No hemodynamically significant valvular abnormalities on 2D or color flow  imaging.            12/12/2024   Mini Cog   Clock Draw Score 2 Normal   3 Item Recall 3 objects recalled   Mini Cog Total Score 5                 Signed Electronically by: Foreign Tavares MD

## 2025-01-30 ENCOUNTER — NURSE TRIAGE (OUTPATIENT)
Dept: FAMILY MEDICINE | Facility: CLINIC | Age: 85
End: 2025-01-30
Payer: COMMERCIAL

## 2025-01-30 NOTE — TELEPHONE ENCOUNTER
Nurse Triage SBAR    Is this a 2nd Level Triage? YES, LICENSED PRACTITIONER REVIEW IS REQUIRED    Situation: Patient calling into clinic to report blood in urine    Background:   No recent UTI, no recent antibiotics.    Hx: nephrolithiasis, malignant neoplasm of the prostate glad, calculus of ureter, hydronephrosis with urinary obstruction due to ureteral calculus, ureterolithiasis, left hydrocele, H/O prostate cancer, impaired fasting glucose    Assessment:   Bloody urine started Tuesday evening  Has occurred 3x since Tuesday (once today) Not bloody every time he urinates, if not bloody at that urination-just normal light yellow color  Describes urine as dark red/almost black (initial stream is darker than pure blood, then clears up as he urinates and becomes yellow).   Denies blood clots  Denies pain with urination  Denies fever  Denies urinary frequency  Denies difficulty urinating, able to urinate more than drops (normal volume), denies urgency, denies back or flank pain, abdominal pain, vomiting    Protocol Recommended Disposition:   Go To Office Now    Recommendation: Advised that patient be evaluated in person now due to blood in urine and currently on Eliquis. No appointments available at [facility] or [facility] clinics, so recommended patient be evaluated in Urgent Care. Patient endorses understanding and agrees with plan. Patient will go to [facility] Urgent care for evaluation.    Routed to provider    Does the patient meet one of the following criteria for ADS visit consideration? 16+ years old, with an [facility] PCP     Claudette Topete RN  [facility]      TIP  Providers, please consider if this condition is appropriate for management at one of our Acute and Diagnostic Services sites.     If patient is a good candidate, please use dotphrase <dot>triageresponse and select Refer to ADS to document.     Reason for Disposition   Taking Coumadin (warfarin) or other strong blood thinner, or  "known bleeding disorder (e.g., thrombocytopenia)    Additional Information   Negative: Shock suspected (e.g., cold/pale/clammy skin, too weak to stand, low BP, rapid pulse)   Negative: Sounds like a life-threatening emergency to the triager   Negative: Urinary catheter, questions about   Negative: Recent back or abdominal injury   Negative: Recent genital injury   Negative: Unable to urinate (or only a few drops) > 4 hours and bladder feels very full (e.g., palpable bladder or strong urge to urinate)   Negative: Diffuse (all over) muscle pains in the shoulders, arms, legs, and back and dark (cola or tea-colored) or red-colored urine   Negative: Passing pure blood or large blood clots (i.e., larger than a dime or grape)   Negative: Fever > 100.4 F (38.0 C)   Negative: Patient sounds very sick or weak to the triager   Negative: Known sickle cell disease    Answer Assessment - Initial Assessment Questions  1. COLOR of URINE: \"Describe the color of the urine.\"  (e.g., tea-colored, pink, red, bloody) \"Do you have blood clots in your urine?\" (e.g., none, pea, grape, small coin)      Describes urine as dark red/almost black (initial stream is darker than pure blood, then clears up as he urinates and becomes yellow). (States not every time he urinates, 3x since Tuesday). If not bloody at that urination-just normal light yellow color  Denies blood clots    2. ONSET: \"When did the bleeding start?\"       Started Tuesday evening  3. EPISODES: \"How many times has there been blood in the urine?\" or \"How many times today?\"      3x since Tuesday (once today)  4. PAIN with URINATION: \"Is there any pain with passing your urine?\" If Yes, ask: \"How bad is the pain?\"  (Scale 1-10; or mild, moderate, severe)     - MILD: Complains slightly about urination hurting.     - MODERATE: Interferes with normal activities.       - SEVERE: Excruciating, unwilling or unable to urinate because of the pain.       Denies pain  5. FEVER: \"Do you have a " "fever?\" If Yes, ask: \"What is your temperature, how was it measured, and when did it start?\"      denies  6. ASSOCIATED SYMPTOMS: \"Are you passing urine more frequently than usual?\"      Denies frequency  7. OTHER SYMPTOMS: \"Do you have any other symptoms?\" (e.g., back/flank pain, abdomen pain, vomiting)      Denies difficulty urinating, able to urinate more than drops (normal volume), denies urgency, denies back or flank pain, abdominal pain, vomiting  8. PREGNANCY: \"Is there any chance you are pregnant?\" \"When was your last menstrual period?\"      NA    Protocols used: Urine - Blood In-A-OH    "

## 2025-01-30 NOTE — TELEPHONE ENCOUNTER
Patient was also contacted by  staff and was scheduled with Dr. Tavares on 2/5/25 for the same concern, patient stated at that time that he will still try to get a ride to the urgent care [facility] and if he does make it he will call and cancel that appointment. Otherwise he will come in at the scheduled time if he can't get to [facility].     Earl Milian RN  [facility]

## 2025-01-30 NOTE — TELEPHONE ENCOUNTER
Upon chart review patient has not yet arrived in Urgent Care. Attempted huddle with PCP, but provider not available. Secure chat sent to PCP notifying of triage, and provider is aware of triage and will address as soon as able.    Claudette Topete RN  [facility]

## 2025-01-30 NOTE — TELEPHONE ENCOUNTER
Agree with need for further assessment.  Would suggest ER location in order to complete further diagnostic imaging if indicated.  Keep February 5, 2025 appointment in case unable to be evaluated prior.

## 2025-01-31 ENCOUNTER — OFFICE VISIT (OUTPATIENT)
Dept: URGENT CARE | Facility: URGENT CARE | Age: 85
End: 2025-01-31
Payer: COMMERCIAL

## 2025-01-31 ENCOUNTER — TELEPHONE (OUTPATIENT)
Dept: FAMILY MEDICINE | Facility: CLINIC | Age: 85
End: 2025-01-31

## 2025-01-31 VITALS
OXYGEN SATURATION: 96 % | WEIGHT: 246 LBS | TEMPERATURE: 97.7 F | BODY MASS INDEX: 36.07 KG/M2 | SYSTOLIC BLOOD PRESSURE: 112 MMHG | DIASTOLIC BLOOD PRESSURE: 70 MMHG | RESPIRATION RATE: 24 BRPM | HEART RATE: 86 BPM

## 2025-01-31 DIAGNOSIS — N39.9 URINARY PROBLEM IN MALE: ICD-10-CM

## 2025-01-31 DIAGNOSIS — R31.9 PAINLESS HEMATURIA: Primary | ICD-10-CM

## 2025-01-31 LAB
ALBUMIN UR-MCNC: 100 MG/DL
ANION GAP SERPL CALCULATED.3IONS-SCNC: 10 MMOL/L (ref 7–15)
APPEARANCE UR: CLEAR
BACTERIA #/AREA URNS HPF: ABNORMAL /HPF
BILIRUB UR QL STRIP: ABNORMAL
BUN SERPL-MCNC: 18.8 MG/DL (ref 8–23)
CALCIUM SERPL-MCNC: 11.4 MG/DL (ref 8.8–10.4)
CHLORIDE SERPL-SCNC: 104 MMOL/L (ref 98–107)
COLOR UR AUTO: ABNORMAL
CREAT SERPL-MCNC: 1.03 MG/DL (ref 0.67–1.17)
EGFRCR SERPLBLD CKD-EPI 2021: 72 ML/MIN/1.73M2
GLUCOSE SERPL-MCNC: 127 MG/DL (ref 70–99)
GLUCOSE UR STRIP-MCNC: NEGATIVE MG/DL
GRAN CASTS #/AREA URNS LPF: ABNORMAL /LPF
HCO3 SERPL-SCNC: 25 MMOL/L (ref 22–29)
HGB UR QL STRIP: ABNORMAL
HYALINE CASTS #/AREA URNS LPF: ABNORMAL /LPF
KETONES UR STRIP-MCNC: ABNORMAL MG/DL
LEUKOCYTE ESTERASE UR QL STRIP: NEGATIVE
MUCOUS THREADS #/AREA URNS LPF: PRESENT /LPF
NITRATE UR QL: NEGATIVE
PH UR STRIP: 5.5 [PH] (ref 5–8)
POTASSIUM SERPL-SCNC: 4.5 MMOL/L (ref 3.4–5.3)
RBC #/AREA URNS AUTO: >100 /HPF
SODIUM SERPL-SCNC: 139 MMOL/L (ref 135–145)
SP GR UR STRIP: >=1.03 (ref 1–1.03)
SQUAMOUS #/AREA URNS AUTO: ABNORMAL /LPF
UROBILINOGEN UR STRIP-ACNC: 1 E.U./DL
WBC #/AREA URNS AUTO: ABNORMAL /HPF

## 2025-01-31 PROCEDURE — 80048 BASIC METABOLIC PNL TOTAL CA: CPT | Performed by: PHYSICIAN ASSISTANT

## 2025-01-31 PROCEDURE — 36415 COLL VENOUS BLD VENIPUNCTURE: CPT | Performed by: PHYSICIAN ASSISTANT

## 2025-01-31 PROCEDURE — 81001 URINALYSIS AUTO W/SCOPE: CPT | Performed by: PHYSICIAN ASSISTANT

## 2025-01-31 PROCEDURE — 99214 OFFICE O/P EST MOD 30 MIN: CPT | Performed by: PHYSICIAN ASSISTANT

## 2025-01-31 NOTE — PROGRESS NOTES
Assessment & Plan     Painless hematuria  Pt was seen for 4 day hx of painless gross hematuria.  He has hx of kidney stone, prostate cancer s/p prostatectomy, a fib on anticoagulation.   He has no irritative voiding sx, abdomen pain, back pain, fevers or chills.  He is not passing any clots at this time.  His urologist is at [facility].  UA does not suggest infection.  I have placed a referral back to [facility] for gross hematuria.  No indication for CT imaging urgently in the ED today given he has no pain or sign of infection.  However, discussed if that changed he should present to the ER where appropriate imaging could be completed urgently.  He is on eliquis, will have him hold for now and recheck with his pcp early next week for recheck and further recommendations re: his anticoagulation in the short term.  Will check renal function today and contact pt with results.      Urinary problem in male    - UA Macroscopic with reflex to Microscopic and Culture - Clinic Collect  - UA Microscopic with Reflex to Culture  - Basic metabolic panel  (Ca, Cl, CO2, Creat, Gluc, K, Na, BUN)  - Adult Urology  Referral  - Basic metabolic panel  (Ca, Cl, CO2, Creat, Gluc, K, Na, BUN)    [facility] URGENT CARE [facility]     Waldo is a 84 year old male who presents to clinic today for the following health issues:  Chief Complaint   Patient presents with    Urinary Problem     Blood in urine. Started noticing on Tuesday night. Has been coming and going. Denies any pain or other symptoms.          1/31/2025    12:05 PM   Additional Questions   Roomed by Breanna BRICEÑO   Accompanied by self     HPI  Pt is a 84 year old male who presents with concerns re: painless hematuria x 4 days.  Has a hx of kidney stones, prostate cancer s/p prostatectomy (considered cancer free) and is anticoagulated.    At onset urine was  dark red color, no clots.    Seems to be lighter in color over the last few  days but still present.   Feels well otherwise.    No dysuria, frequency, fevers, abdomen pain or back pain.       Review of Systems  Constitutional, HEENT, cardiovascular, pulmonary, gi and gu systems are negative, except as otherwise noted.      Objective    /70   Pulse 86   Temp 97.7  F (36.5  C) (Oral)   Resp 24   Wt 111.6 kg (246 lb)   SpO2 96%   BMI 36.07 kg/m    Physical Exam   Patient is in no acute distress and appears well.  Urine visualized by myself is light cranberry juice appearing.   No costovertebral angle tenderness is present.  Abdomen is soft nontender to light or deep palpation no masses or hepatosplenomegaly present.  Results for orders placed or performed in visit on 01/31/25   UA Macroscopic with reflex to Microscopic and Culture - Clinic Collect     Status: Abnormal    Specimen: Urine, Clean Catch   Result Value Ref Range    Color Urine Mary Lou (A) Colorless, Straw, Light Yellow, Yellow    Appearance Urine Clear Clear    Glucose Urine Negative Negative mg/dL    Bilirubin Urine Moderate (A) Negative    Ketones Urine Trace (A) Negative mg/dL    Specific Gravity Urine >=1.030 1.005 - 1.030    Blood Urine Large (A) Negative    pH Urine 5.5 5.0 - 8.0    Protein Albumin Urine 100 (A) Negative mg/dL    Urobilinogen Urine 1.0 0.2, 1.0 E.U./dL    Nitrite Urine Negative Negative    Leukocyte Esterase Urine Negative Negative   UA Microscopic with Reflex to Culture     Status: Abnormal   Result Value Ref Range    Bacteria Urine Few (A) None Seen /HPF    RBC Urine >100 (A) 0-2 /HPF /HPF    WBC Urine 0-5 0-5 /HPF /HPF    Squamous Epithelials Urine None Seen None Seen /LPF    Mucus Urine Present (A) None Seen /LPF    Hyaline Casts Urine 2-5 (A) None Seen /LPF    Granular Casts Urine 0-2 (A) None Seen /LPF    Narrative    Urine Culture not indicated   Basic metabolic panel  (Ca, Cl, CO2, Creat, Gluc, K, Na, BUN)     Status: Abnormal   Result Value Ref Range    Sodium 139 135 - 145 mmol/L     Potassium 4.5 3.4 - 5.3 mmol/L    Chloride 104 98 - 107 mmol/L    Carbon Dioxide (CO2) 25 22 - 29 mmol/L    Anion Gap 10 7 - 15 mmol/L    Urea Nitrogen 18.8 8.0 - 23.0 mg/dL    Creatinine 1.03 0.67 - 1.17 mg/dL    GFR Estimate 72 >60 mL/min/1.73m2    Calcium 11.4 (H) 8.8 - 10.4 mg/dL    Glucose 127 (H) 70 - 99 mg/dL   Last CBC done 1 month ago with normal platelets.

## 2025-01-31 NOTE — PATIENT INSTRUCTIONS
"Hold Elequis until  recheck Wednesday with your regular doctor.      Please call your urologist for an appointment: let them know there is no sign of infection. No signs of stones causing problems.   Let them know \"painless hematuria\"  , no infection.   I put a referral in.            "

## 2025-01-31 NOTE — TELEPHONE ENCOUNTER
General Call    Contacts       Contact Date/Time Type Contact Phone/Fax    01/31/2025 02:01 PM CST Phone (Incoming) Waldo Lopez (Self) [phone] (H)          Reason for Call:  02/05/25 Appointment    What are your questions or concerns:  Patient was seen at [facility] on 01/31/25. They have scheduled him to see urology on 02/11/25. He is wondering if he should keep his appointment for 02/05/25 with PCP or reschedule for after he sees urology.     Date of last appointment with provider: 12/12/24    Could we send this information to you in [employer] or would you prefer to receive a phone call?:   Patient would prefer a phone call   Okay to leave a detailed message?: Yes at Home number on file [phone] (home)

## 2025-01-31 NOTE — Clinical Note
Pt has appointment with you in 5 days. I asked him to hold his eliquis until recheck.  my concern is he thought it had cleared when he urinated in clinic and it was a dark pink/light red color in clinic so not sure he is seeing it well. Follow-up appt is more for direction of his anticoagulation.  Follow-up with urology is planned with [facility].  Thank you.

## 2025-01-31 NOTE — TELEPHONE ENCOUNTER
Called and relayed note from Dr. Tavares, patient stated that he had never told anyone he was going to urgent care tonight, he had said that if he couldn't get in anywhere he would go then.     After relaying Dr. Dailey message patient stated he will keep the appointment for the fifth just in case but he will go over to urgent care tomorrow to get it checked out.     Earl Milian RN  [facility]

## 2025-02-03 NOTE — TELEPHONE ENCOUNTER
Called and relayed message from Dr. Tavares, patient verbalized understanding and has no further questions at this time.     Earl Milian RN  [facility]

## 2025-02-05 ENCOUNTER — OFFICE VISIT (OUTPATIENT)
Dept: FAMILY MEDICINE | Facility: CLINIC | Age: 85
End: 2025-02-05
Payer: COMMERCIAL

## 2025-02-05 VITALS
DIASTOLIC BLOOD PRESSURE: 60 MMHG | RESPIRATION RATE: 15 BRPM | WEIGHT: 247 LBS | SYSTOLIC BLOOD PRESSURE: 110 MMHG | BODY MASS INDEX: 36.58 KG/M2 | TEMPERATURE: 97.7 F | HEART RATE: 97 BPM | HEIGHT: 69 IN | OXYGEN SATURATION: 97 %

## 2025-02-05 DIAGNOSIS — N20.0 NEPHROLITHIASIS: ICD-10-CM

## 2025-02-05 DIAGNOSIS — E11.9 TYPE 2 DIABETES MELLITUS WITHOUT COMPLICATION, WITHOUT LONG-TERM CURRENT USE OF INSULIN (H): ICD-10-CM

## 2025-02-05 DIAGNOSIS — E83.52 HYPERCALCEMIA: ICD-10-CM

## 2025-02-05 DIAGNOSIS — R31.0 GROSS HEMATURIA: Primary | ICD-10-CM

## 2025-02-05 DIAGNOSIS — D69.6 THROMBOCYTOPENIA: ICD-10-CM

## 2025-02-05 DIAGNOSIS — I48.19 PERSISTENT ATRIAL FIBRILLATION (H): ICD-10-CM

## 2025-02-05 LAB
ALBUMIN UR-MCNC: 30 MG/DL
ANION GAP SERPL CALCULATED.3IONS-SCNC: 10 MMOL/L (ref 7–15)
APPEARANCE UR: CLEAR
BACTERIA #/AREA URNS HPF: ABNORMAL /HPF
BILIRUB UR QL STRIP: ABNORMAL
BUN SERPL-MCNC: 15.4 MG/DL (ref 8–23)
CA-I BLD-MCNC: 5.2 MG/DL (ref 4.4–5.2)
CALCIUM SERPL-MCNC: 10.6 MG/DL (ref 8.8–10.4)
CHLORIDE SERPL-SCNC: 107 MMOL/L (ref 98–107)
COLOR UR AUTO: YELLOW
CREAT SERPL-MCNC: 0.85 MG/DL (ref 0.67–1.17)
EGFRCR SERPLBLD CKD-EPI 2021: 86 ML/MIN/1.73M2
ERYTHROCYTE [DISTWIDTH] IN BLOOD BY AUTOMATED COUNT: 16.5 % (ref 10–15)
GLUCOSE SERPL-MCNC: 108 MG/DL (ref 70–99)
GLUCOSE UR STRIP-MCNC: NEGATIVE MG/DL
HCO3 SERPL-SCNC: 23 MMOL/L (ref 22–29)
HCT VFR BLD AUTO: 36.3 % (ref 40–53)
HGB BLD-MCNC: 11.1 G/DL (ref 13.3–17.7)
HGB UR QL STRIP: ABNORMAL
HYALINE CASTS #/AREA URNS LPF: ABNORMAL /LPF
KETONES UR STRIP-MCNC: ABNORMAL MG/DL
LEUKOCYTE ESTERASE UR QL STRIP: NEGATIVE
MCH RBC QN AUTO: 24.9 PG (ref 26.5–33)
MCHC RBC AUTO-ENTMCNC: 30.6 G/DL (ref 31.5–36.5)
MCV RBC AUTO: 81 FL (ref 78–100)
MUCOUS THREADS #/AREA URNS LPF: PRESENT /LPF
NITRATE UR QL: NEGATIVE
PH UR STRIP: 5.5 [PH] (ref 5–8)
PLATELET # BLD AUTO: 170 10E3/UL (ref 150–450)
POTASSIUM SERPL-SCNC: 4.4 MMOL/L (ref 3.4–5.3)
RBC # BLD AUTO: 4.46 10E6/UL (ref 4.4–5.9)
RBC #/AREA URNS AUTO: ABNORMAL /HPF
SODIUM SERPL-SCNC: 140 MMOL/L (ref 135–145)
SP GR UR STRIP: >=1.03 (ref 1–1.03)
SQUAMOUS #/AREA URNS AUTO: ABNORMAL /LPF
UROBILINOGEN UR STRIP-ACNC: 1 E.U./DL
WBC # BLD AUTO: 7.7 10E3/UL (ref 4–11)
WBC #/AREA URNS AUTO: ABNORMAL /HPF

## 2025-02-05 PROCEDURE — 82330 ASSAY OF CALCIUM: CPT | Performed by: FAMILY MEDICINE

## 2025-02-05 PROCEDURE — 80048 BASIC METABOLIC PNL TOTAL CA: CPT | Performed by: FAMILY MEDICINE

## 2025-02-05 PROCEDURE — 85027 COMPLETE CBC AUTOMATED: CPT | Performed by: FAMILY MEDICINE

## 2025-02-05 PROCEDURE — 81001 URINALYSIS AUTO W/SCOPE: CPT | Performed by: FAMILY MEDICINE

## 2025-02-05 PROCEDURE — G2211 COMPLEX E/M VISIT ADD ON: HCPCS | Performed by: FAMILY MEDICINE

## 2025-02-05 PROCEDURE — 99214 OFFICE O/P EST MOD 30 MIN: CPT | Performed by: FAMILY MEDICINE

## 2025-02-05 PROCEDURE — 36415 COLL VENOUS BLD VENIPUNCTURE: CPT | Performed by: FAMILY MEDICINE

## 2025-02-05 ASSESSMENT — PAIN SCALES - GENERAL: PAINLEVEL_OUTOF10: NO PAIN (0)

## 2025-02-05 NOTE — PROGRESS NOTES
Assessment/Plan:    Gross hematuria  Gross hematuria described x 1.  Happened January 28, 2025 prior to going to bed.  Asymptomatic.  No recurrent concerns.  Had been seen through [facility] walk-in care January 31, 2025.  Referred to see urologist.  Now scheduled for Friday, February 7, 2025.  Remains asymptomatic.  Described history of nephrolithiasis with episode of gross hematuria perhaps 40 years ago.  Urine microscopy with  RBCs.  Await urology recommendations for further diagnostic testing including likely cystoscopy with further imaging.  CBC updated today to ensure no evidence for significant anemia.  - UA Macroscopic with reflex to Microscopic and Culture - Lab Collect  - UA Macroscopic with reflex to Microscopic and Culture - Lab Collect  - UA Microscopic with Reflex to Culture  - CBC with platelets  - CBC with platelets    Hypercalcemia  Calcium level through walk-in care was 11.4 and will check ionized calcium and base metabolic panel and ensure adequate hydration.  - CBC with platelets  - Basic metabolic panel  - Ionized Calcium  - CBC with platelets  - Basic metabolic panel  - Ionized Calcium    Nephrolithiasis  Nephrolithiasis historically.  Will await urology recommendations.    Persistent atrial fibrillation (H)  Persistent atrial fibrillation noted and continues Eliquis 5 mg twice daily for anticoagulation.  Continue current anticoagulation.  CBC updated to ensure no evidence for significant anemia.  - Basic metabolic panel  - Basic metabolic panel    Thrombocytopenia  Thrombocytopenia.  Will update CBC.    Type 2 diabetes mellitus without complication, without long-term current use of insulin (H)  Recent A1c at 6.5% December 12, 2024 and we did discuss new diagnosis type 2 diabetes and dietary and exercise modifications for weight goal less than 230 pounds initially, less than 220 pounds ideally.  Does like baked goods.  Has scheduled follow-up June 13, 2025 for reassessment.    The  "longitudinal plan of care for the diagnosis(es)/condition(s) as documented were addressed during this visit. Due to the added complexity in care, I will continue to support Waldo in the subsequent management and with ongoing continuity of care.              Subjective:    Prosper Lopez is seen today for follow-up assessment.  Had episode of bright red blood in urine.  Happened last Tuesday, 2025 before bed.  Asymptomatic otherwise.  No recurrent concerns since this time.  Was seen through walk-in care 2025.  Continues Eliquis 5 mg twice daily due to persistent atrial fibrillation.  History of prior thrombocytopenia.  Continuing carvedilol 25 mg twice daily as well.  Vitamin D supplementation.  Recent A1c of 6.5% with prior impaired fasting glucose noted.  Dietary indiscretions with baked goods.  No fevers.  No back pain.  Episode of bright red blood in urine 40 years ago.  Described stone disease previously.  Comprehensive review of systems as above otherwise all negative.       - Frances   5 sons -   1 daughter -  40 breast CA   Tobacco: quit ~  (1 ppd x > 30 years) - had quit once for 14 years   EtOH: occ   Mom -  92 y.o due to ? cancer   Dad -  56 heart attack   2 bros - one  age 78 heart attack and EtOHism   1 sis -  ? cancer   Surgeries: prostate surgery due to cancer; kidney stones bilateral; bilateral cataracts   Hospitalizations: kidney stone   Work: retired ()     Best bowling score \"289\" - more recently \"265\" about 1 year ago, now \"177-180\"       Past Surgical History:   Procedure Laterality Date    COLONOSCOPY W/ BIOPSIES N/A 2021    Procedure: COLONOSCOPY with polypectomy;  Surgeon: Raimundo Smith MD;  Location: [facility];  Service: Gastroenterology    FL CYSTO/URETERO W/LITHOTRIPSY &INDWELL STENT INSRT Left 2018    Procedure: CYSTOSCOPY, LEFT  URETEROSCOPY, LASER LITHOTRIPSY STENT INSERTION;  Surgeon: " Foreign Lakhani MD;  Location: [facility] OR;  Service: Urology    PROSTATE SURGERY      URETEROSCOPY  2009    ZZHC COLONOSCOPY W/WO BRUSH/WASH N/A 2/15/2021    Procedure: COLONOSCOPY with polypectomy, tattoo, cautery;  Surgeon: Compa Miner DO;  Location: [facility] OR;  Service: Gastroenterology        Family History   Problem Relation Age of Onset    Coronary Artery Disease Father     Heart Disease Father     Colon Cancer Brother     Urolithiasis Brother         Past Medical History:   Diagnosis Date    Actinic keratosis     Created by Conversion     Atrial fibrillation (H) 06/10/2014    Benign neoplasm of colon     Created by Conversion [employer] Annotation: May  5 2011  1:17PM -  ,  : 2006     Benign neoplasm of skin     Created by Conversion  Replacement Utility updated for latest IMO load    Calculus of kidney     Created by Conversion [employer] Annotation: May  4 2011  7:56AM - Keli, Demar: STENT AND LASER     Calculus of ureter 4/4/2018    Cardiomyopathy (H) 06/16/2014    Cataract 2012    Coronary artery disease     Effusion of ankle and foot joint     Created by Conversion     Hydrarthrosis 2012    Hydronephrosis with urinary obstruction due to ureteral calculus 4/4/2018    Hypercholesteremia     Created by Conversion     Hyperlipidemia 2012    Impaired fasting glucose 9/19/2019    Internal derangement of knee     Created by Conversion  Replacement Utility updated for latest IMO load    Left hydrocele 9/19/2019    Malignant neoplasm of prostate (H)     Created by Conversion     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 Reoccurrence  "on 7/24/2017 and again persistent [id number] score of 4 with history of TIA-on warfarin Asymptomatic and on rate control since July 2017.     Plantar fasciitis of right foot     Prostate CA (H) 2011    Seborrheic keratosis 2012    Skin neoplasm     Of uncertain behavior    TIA (transient ischemic attack) 01/24/2012    Transient cerebral ischemia     Created by Conversion  Replacement Utility updated for latest IMO load    Tubular adenoma of colon 2006    Unspecified cataract     Created by Conversion     Ureterolithiasis         Social History     Tobacco Use    Smoking status: Former     Current packs/day: 0.00     Types: Cigarettes     Quit date: 5/29/2014     Years since quitting: 10.6    Smokeless tobacco: Never   Substance Use Topics    Alcohol use: Yes     Comment: Alcoholic Drinks/day: Occasional    Drug use: No        Current Outpatient Medications   Medication Sig Dispense Refill    apixaban ANTICOAGULANT (ELIQUIS ANTICOAGULANT) 5 MG tablet Take 1 tablet (5 mg) by mouth 2 times daily 180 tablet 3    carvedilol (COREG) 25 MG tablet TAKE 1 TABLET(25 MG) BY MOUTH TWICE DAILY WITH MEALS 180 tablet 3    cholecalciferol, vitamin D3, (VITAMIN D3) 2,000 unit cap [CHOLECALCIFEROL, VITAMIN D3, (VITAMIN D3) 2,000 UNIT CAP] Take 2,000 Units by mouth daily.            Objective:    Vitals:    02/05/25 1048   BP: 110/60   Pulse: 97   Resp: 15   Temp: 97.7  F (36.5  C)   SpO2: 97%   Weight: 112 kg (247 lb)   Height: 1.759 m (5' 9.25\")      Body mass index is 36.21 kg/m .    Alert.  No apparent distress.  Chest.  Cardiac exam regular.  Tremors warm and dry.      This note has been dictated using voice recognition software and as a result may contain minor grammatical errors and unintended word substitutions.         Answers submitted by the patient for this visit:  General Questionnaire (Submitted on 2/5/2025)  Chief Complaint: Chronic problems general questions HPI Form  How many days per week do you miss taking your " medication?: 0  General Concern (Submitted on 2/5/2025)  Chief Complaint: Chronic problems general questions HPI Form  What is the reason for your visit today?: blood in urine  When did your symptoms begin?: 1-2 weeks ago  What are your symptoms?: blood in urine  How would you describe these symptoms?: Moderate  Are your symptoms:: Staying the same  Have you had these symptoms before?: Yes  Have you tried or received treatment for these symptoms before?: Yes  Did that treatment work? : Yes  Please describe the treatment you had:: do not remember  Is there anything that makes you feel worse?: no  Is there anything that makes you feel better?: no  Questionnaire about: Chronic problems general questions HPI Form (Submitted on 2/5/2025)  Chief Complaint: Chronic problems general questions HPI Form

## 2025-02-07 ENCOUNTER — TRANSFERRED RECORDS (OUTPATIENT)
Dept: HEALTH INFORMATION MANAGEMENT | Facility: CLINIC | Age: 85
End: 2025-02-07
Payer: COMMERCIAL

## 2025-03-23 DIAGNOSIS — I48.19 PERSISTENT ATRIAL FIBRILLATION (H): ICD-10-CM

## 2025-03-24 RX ORDER — CARVEDILOL 25 MG/1
TABLET ORAL
Qty: 180 TABLET | Refills: 2 | Status: SHIPPED | OUTPATIENT
Start: 2025-03-24

## 2025-03-30 ENCOUNTER — HEALTH MAINTENANCE LETTER (OUTPATIENT)
Age: 85
End: 2025-03-30

## 2025-06-12 PROBLEM — M79.89 SOFT TISSUE MASS: Status: ACTIVE | Noted: 2021-09-14

## 2025-06-12 PROBLEM — N52.9 ERECTILE DYSFUNCTION: Status: ACTIVE | Noted: 2024-02-18

## 2025-06-12 PROBLEM — R22.42 MASS OF LEFT THIGH: Status: ACTIVE | Noted: 2024-06-18

## 2025-06-12 RX ORDER — WARFARIN SODIUM 5 MG/1
TABLET ORAL
COMMUNITY

## 2025-06-12 RX ORDER — SENNOSIDES 8.6 MG/1
1 TABLET ORAL
COMMUNITY
Start: 2024-08-13

## 2025-06-18 ENCOUNTER — OFFICE VISIT (OUTPATIENT)
Dept: FAMILY MEDICINE | Facility: CLINIC | Age: 85
End: 2025-06-18
Payer: COMMERCIAL

## 2025-06-18 ENCOUNTER — RESULTS FOLLOW-UP (OUTPATIENT)
Dept: FAMILY MEDICINE | Facility: CLINIC | Age: 85
End: 2025-06-18

## 2025-06-18 VITALS
RESPIRATION RATE: 20 BRPM | SYSTOLIC BLOOD PRESSURE: 128 MMHG | TEMPERATURE: 97.9 F | DIASTOLIC BLOOD PRESSURE: 66 MMHG | HEIGHT: 69 IN | WEIGHT: 244 LBS | BODY MASS INDEX: 36.14 KG/M2 | OXYGEN SATURATION: 96 % | HEART RATE: 91 BPM

## 2025-06-18 DIAGNOSIS — E11.9 TYPE 2 DIABETES MELLITUS WITHOUT COMPLICATION, WITHOUT LONG-TERM CURRENT USE OF INSULIN (H): Primary | ICD-10-CM

## 2025-06-18 DIAGNOSIS — R31.0 GROSS HEMATURIA: ICD-10-CM

## 2025-06-18 DIAGNOSIS — D64.9 NORMOCHROMIC NORMOCYTIC ANEMIA: ICD-10-CM

## 2025-06-18 DIAGNOSIS — E83.52 HYPERCALCEMIA: ICD-10-CM

## 2025-06-18 DIAGNOSIS — E66.01 MORBID OBESITY (H): ICD-10-CM

## 2025-06-18 DIAGNOSIS — I48.19 PERSISTENT ATRIAL FIBRILLATION (H): ICD-10-CM

## 2025-06-18 LAB
ERYTHROCYTE [DISTWIDTH] IN BLOOD BY AUTOMATED COUNT: 16 % (ref 10–15)
EST. AVERAGE GLUCOSE BLD GHB EST-MCNC: 123 MG/DL
HBA1C MFR BLD: 5.9 % (ref 0–5.6)
HCT VFR BLD AUTO: 39.2 % (ref 40–53)
HGB BLD-MCNC: 12.2 G/DL (ref 13.3–17.7)
MCH RBC QN AUTO: 26.3 PG (ref 26.5–33)
MCHC RBC AUTO-ENTMCNC: 31.1 G/DL (ref 31.5–36.5)
MCV RBC AUTO: 85 FL (ref 78–100)
PLATELET # BLD AUTO: 157 10E3/UL (ref 150–450)
RBC # BLD AUTO: 4.64 10E6/UL (ref 4.4–5.9)
WBC # BLD AUTO: 8.6 10E3/UL (ref 4–11)

## 2025-06-18 PROCEDURE — 85027 COMPLETE CBC AUTOMATED: CPT | Performed by: FAMILY MEDICINE

## 2025-06-18 PROCEDURE — 83036 HEMOGLOBIN GLYCOSYLATED A1C: CPT | Performed by: FAMILY MEDICINE

## 2025-06-18 PROCEDURE — 36415 COLL VENOUS BLD VENIPUNCTURE: CPT | Performed by: FAMILY MEDICINE

## 2025-06-18 RX ORDER — ACETAMINOPHEN AND CODEINE PHOSPHATE 300; 30 MG/1; MG/1
1 TABLET ORAL EVERY 6 HOURS PRN
COMMUNITY
Start: 2025-06-02

## 2025-06-18 RX ORDER — AMOXICILLIN 875 MG/1
TABLET, COATED ORAL
COMMUNITY
Start: 2025-06-02

## 2025-06-18 ASSESSMENT — PAIN SCALES - GENERAL: PAINLEVEL_OUTOF10: NO PAIN (0)

## 2025-06-18 NOTE — PROGRESS NOTES
Assessment/Plan:    Type 2 diabetes mellitus without complication, without long-term current use of insulin (H)  Type 2 diabetes improved with A1c improving from 6.5% to 5.9% with dietary modifications and some weight loss.  Continue to encourage patient to achieve weight goal less than 230 pounds initially, less than 220 pounds ideally.  Does have eye exams but declines follow-up at this time.  No history of neuropathy.  Prior gross hematuria is resolved and will complete microalbumin screen today.  - Hemoglobin A1c  - Comprehensive metabolic panel (BMP + Alb, Alk Phos, ALT, AST, Total. Bili, TP)  - Albumin Random Urine Quantitative with Creat Ratio  - Hemoglobin A1c  - Comprehensive metabolic panel (BMP + Alb, Alk Phos, ALT, AST, Total. Bili, TP)    Gross hematuria  No further concerns following follow-up with [facility].  Update CBC.    Hypercalcemia  Has had mild calcium elevation historically and will continue to avoid calcium supplementation.  Ensure adequate hydration.    Persistent atrial fibrillation (H)  Tolerating Eliquis 5 mg twice daily.  No further concerns for bleeding etc.  Had been on warfarin previously.  Rate controlled.  No exertional dyspnea described.    The longitudinal plan of care for the diagnosis(es)/condition(s) as documented were addressed during this visit. Due to the added complexity in care, I will continue to support Waldo in the subsequent management and with ongoing continuity of care.            Subjective:    Prosper Lopez is seen today for follow-up assessment.  Type 2 diabetes.  A1c of 6.5% December 12, 2024.  Still likes to have a donut from time to time but that has changed his diet some and has lost some weight.  Prior episode of gross hematuria has resolved.  Had seen a urologist subsequently and thought perhaps kidney stone related bleeding.  Hypercalcemia in the past.  Asymptomatic otherwise.  Persistent atrial fibrillation.  No longer on warfarin and now  "utilizing Eliquis 5 mg twice daily.  History of mild normochromic normocytic anemia.  Comprehensive review of systems as above otherwise all negative.  Denies recent illness.       - Frances   5 sons -   1 daughter -  40 breast CA   Tobacco: quit ~  (1 ppd x > 30 years) - had quit once for 14 years   EtOH: occ   Mom -  92 y.o due to ? cancer   Dad -  56 heart attack   2 bros - one  age 78 heart attack and EtOHism   1 sis -  ? cancer   Surgeries: prostate surgery due to cancer; kidney stones bilateral; bilateral cataracts   Hospitalizations: kidney stone   Work: retired ()     Best bowling score \"289\" - more recently \"265\" about 1 year ago, now \"177-180\"         Past Surgical History:   Procedure Laterality Date    COLONOSCOPY W/ BIOPSIES N/A 2021    Procedure: COLONOSCOPY with polypectomy;  Surgeon: Raimundo Smith MD;  Location: [facility];  Service: Gastroenterology    VT CYSTO/URETERO W/LITHOTRIPSY &INDWELL STENT INSRT Left 2018    Procedure: CYSTOSCOPY, LEFT  URETEROSCOPY, LASER LITHOTRIPSY STENT INSERTION;  Surgeon: Foreign Lakhani MD;  Location: [facility] OR;  Service: Urology    PROSTATE SURGERY      URETEROSCOPY      ZZHC COLONOSCOPY W/WO BRUSH/WASH N/A 2/15/2021    Procedure: COLONOSCOPY with polypectomy, tattoo, cautery;  Surgeon: Compa Miner DO;  Location: [facility];  Service: Gastroenterology        Family History   Problem Relation Age of Onset    Coronary Artery Disease Father     Heart Disease Father     Colon Cancer Brother     Urolithiasis Brother         Past Medical History:   Diagnosis Date    Actinic keratosis     Created by Conversion     Atrial fibrillation (H) 06/10/2014    Benign neoplasm of colon     Created by Conversion [facility] Annotation: May  5 2011  1:17PM -  ,  :      Benign neoplasm of skin     Created by Conversion  Replacement Utility updated for latest IMO load    " Calculus of kidney     Created by Conversion [facility] Annotation: May  4 2011  7:56AM - Demar Dickey: STENT AND LASER     Calculus of ureter 2018    Cardiomyopathy (H) 2014    Cataract 2012    Coronary artery disease     Effusion of ankle and foot joint     Created by Conversion     Hydrarthrosis     Hydronephrosis with urinary obstruction due to ureteral calculus 2018    Hypercholesteremia     Created by Conversion     Hyperlipidemia 2012    Impaired fasting glucose 2019    Internal derangement of knee     Created by Conversion  Replacement Utility updated for latest IMO load    Left hydrocele 2019    Malignant neoplasm of prostate (H)     Created by Conversion     Mixed conductive and sensorineural hearing loss of both ears 2019    Neoplasm of uncertain behavior of skin     Created by Conversion     Nephrolithiasis     first stone at age 68    Obesity 2015    Other seborrheic keratosis     Created by Conversion     Persistent atrial fibrillation (H)     First diagnosed in 2014 and appeared asymptomatic after cardioversion on 914. Reoccurrence on 2017 and again persistent [id number] score of 4 with history of TIA-on warfarin Asymptomatic and on rate control since 2017.     Plantar fasciitis of right foot     Prostate CA (H)     Seborrheic keratosis 2012    Skin neoplasm     Of uncertain behavior    TIA (transient ischemic attack) 2012    Transient cerebral ischemia     Created by Conversion  Replacement Utility updated for latest IMO load    Tubular adenoma of colon 2006    Unspecified cataract     Created by Conversion     Ureterolithiasis         Social History     Tobacco Use    Smoking status: Former     Current packs/day: 0.00     Types: Cigarettes     Quit date: 2014     Years since quittin.0    Smokeless tobacco: Never   Vaping Use    Vaping status: Never Used   Substance Use Topics    Alcohol use: Yes     Comment: Alcoholic  "Drinks/day: Occasional    Drug use: No        Current Outpatient Medications   Medication Sig Dispense Refill    acetaminophen-codeine (TYLENOL #3) 300-30 MG per tablet Take 1 tablet by mouth every 6 hours as needed.      amoxicillin (AMOXIL) 875 MG tablet       apixaban ANTICOAGULANT (ELIQUIS ANTICOAGULANT) 5 MG tablet Take 1 tablet (5 mg) by mouth 2 times daily 180 tablet 3    carvedilol (COREG) 25 MG tablet TAKE 1 TABLET(25 MG) BY MOUTH TWICE DAILY WITH MEALS 180 tablet 2    cholecalciferol, vitamin D3, (VITAMIN D3) 2,000 unit cap [CHOLECALCIFEROL, VITAMIN D3, (VITAMIN D3) 2,000 UNIT CAP] Take 2,000 Units by mouth daily.      senna (SENOKOT) 8.6 MG tablet Take 1 tablet by mouth.      warfarin ANTICOAGULANT (COUMADIN/JANTOVEN) 5 MG tablet TAKE 1/2 TABLET BY MOUTH EVERY TUESDAY AND 1 TABLET BY MOUTH ALL OTHER DAYS OR AS DIRECTED BY INR CLINIC            Objective:    Vitals:    06/18/25 1405   BP: 128/66   Pulse: 91   Resp: 20   Temp: 97.9  F (36.6  C)   TempSrc: Oral   SpO2: 96%   Weight: 110.7 kg (244 lb)   Height: 1.759 m (5' 9.25\")      Body mass index is 35.77 kg/m .    Alert.  No apparent distress.  Chest clear.  Cardiac exam irregular, rate controlled however.  Extremities warm and dry.      This note has been dictated using voice recognition software and as a result may contain minor grammatical errors and unintended word substitutions.   "

## 2025-06-19 LAB
ALBUMIN SERPL BCG-MCNC: 4.2 G/DL (ref 3.5–5.2)
ALP SERPL-CCNC: 77 U/L (ref 40–150)
ALT SERPL W P-5'-P-CCNC: 10 U/L (ref 0–70)
ANION GAP SERPL CALCULATED.3IONS-SCNC: 8 MMOL/L (ref 7–15)
AST SERPL W P-5'-P-CCNC: 12 U/L (ref 0–45)
BILIRUB SERPL-MCNC: 0.6 MG/DL
BUN SERPL-MCNC: 13 MG/DL (ref 8–23)
CALCIUM SERPL-MCNC: 10.9 MG/DL (ref 8.8–10.4)
CHLORIDE SERPL-SCNC: 108 MMOL/L (ref 98–107)
CREAT SERPL-MCNC: 0.91 MG/DL (ref 0.67–1.17)
CREAT UR-MCNC: 184 MG/DL
EGFRCR SERPLBLD CKD-EPI 2021: 83 ML/MIN/1.73M2
GLUCOSE SERPL-MCNC: 104 MG/DL (ref 70–99)
HCO3 SERPL-SCNC: 25 MMOL/L (ref 22–29)
MICROALBUMIN UR-MCNC: 20.5 MG/L
MICROALBUMIN/CREAT UR: 11.14 MG/G CR (ref 0–17)
POTASSIUM SERPL-SCNC: 4.6 MMOL/L (ref 3.4–5.3)
PROT SERPL-MCNC: 7.1 G/DL (ref 6.4–8.3)
SODIUM SERPL-SCNC: 141 MMOL/L (ref 135–145)

## 2025-06-24 DIAGNOSIS — I48.19 PERSISTENT ATRIAL FIBRILLATION (H): ICD-10-CM
